# Patient Record
Sex: FEMALE | Race: WHITE | Employment: UNEMPLOYED | URBAN - METROPOLITAN AREA
[De-identification: names, ages, dates, MRNs, and addresses within clinical notes are randomized per-mention and may not be internally consistent; named-entity substitution may affect disease eponyms.]

---

## 2019-10-16 ENCOUNTER — HOSPITAL ENCOUNTER (EMERGENCY)
Age: 34
Discharge: LEFT BEFORE DISCHARGE | DRG: 175 | End: 2019-10-17
Attending: EMERGENCY MEDICINE
Payer: COMMERCIAL

## 2019-10-16 VITALS
HEIGHT: 64 IN | WEIGHT: 140 LBS | HEART RATE: 110 BPM | BODY MASS INDEX: 23.9 KG/M2 | TEMPERATURE: 98.4 F | SYSTOLIC BLOOD PRESSURE: 129 MMHG | DIASTOLIC BLOOD PRESSURE: 73 MMHG | RESPIRATION RATE: 24 BRPM | OXYGEN SATURATION: 100 %

## 2019-10-16 PROCEDURE — 99284 EMERGENCY DEPT VISIT MOD MDM: CPT

## 2019-10-16 PROCEDURE — 81003 URINALYSIS AUTO W/O SCOPE: CPT

## 2019-10-16 PROCEDURE — 87086 URINE CULTURE/COLONY COUNT: CPT

## 2019-10-16 PROCEDURE — 81025 URINE PREGNANCY TEST: CPT

## 2019-10-16 ASSESSMENT — PAIN SCALES - GENERAL: PAINLEVEL_OUTOF10: 5

## 2019-10-17 ENCOUNTER — APPOINTMENT (OUTPATIENT)
Dept: CT IMAGING | Age: 34
DRG: 175 | End: 2019-10-17
Payer: COMMERCIAL

## 2019-10-17 LAB
ABSOLUTE EOS #: 0.39 K/UL (ref 0–0.44)
ABSOLUTE IMMATURE GRANULOCYTE: 0.02 K/UL (ref 0–0.3)
ABSOLUTE LYMPH #: 2.58 K/UL (ref 1.1–3.7)
ABSOLUTE MONO #: 0.54 K/UL (ref 0.1–1.2)
ALBUMIN SERPL-MCNC: 4.5 G/DL (ref 3.5–5.2)
ALBUMIN/GLOBULIN RATIO: ABNORMAL (ref 1–2.5)
ALP BLD-CCNC: 92 U/L (ref 35–104)
ALT SERPL-CCNC: 6 U/L (ref 5–33)
AMYLASE: 47 U/L (ref 28–100)
ANION GAP SERPL CALCULATED.3IONS-SCNC: 15 MMOL/L (ref 9–17)
AST SERPL-CCNC: 13 U/L
BASOPHILS # BLD: 0 % (ref 0–2)
BASOPHILS ABSOLUTE: 0.04 K/UL (ref 0–0.2)
BILIRUB SERPL-MCNC: 0.15 MG/DL (ref 0.3–1.2)
BILIRUBIN DIRECT: <0.08 MG/DL
BILIRUBIN URINE: NEGATIVE
BILIRUBIN, INDIRECT: ABNORMAL MG/DL (ref 0–1)
BUN BLDV-MCNC: 7 MG/DL (ref 6–20)
BUN/CREAT BLD: 11 (ref 9–20)
CALCIUM SERPL-MCNC: 9.2 MG/DL (ref 8.6–10.4)
CHLORIDE BLD-SCNC: 97 MMOL/L (ref 98–107)
CHP ED QC CHECK: YES
CO2: 23 MMOL/L (ref 20–31)
COLOR: YELLOW
COMMENT UA: NORMAL
CREAT SERPL-MCNC: 0.64 MG/DL (ref 0.5–0.9)
DIFFERENTIAL TYPE: ABNORMAL
EOSINOPHILS RELATIVE PERCENT: 4 % (ref 1–4)
GFR AFRICAN AMERICAN: >60 ML/MIN
GFR NON-AFRICAN AMERICAN: >60 ML/MIN
GFR SERPL CREATININE-BSD FRML MDRD: ABNORMAL ML/MIN/{1.73_M2}
GFR SERPL CREATININE-BSD FRML MDRD: ABNORMAL ML/MIN/{1.73_M2}
GLOBULIN: ABNORMAL G/DL (ref 1.5–3.8)
GLUCOSE BLD-MCNC: 91 MG/DL (ref 70–99)
GLUCOSE URINE: NEGATIVE
HCG QUALITATIVE: NEGATIVE
HCG, PREGNANCY URINE (POC): NEGATIVE
HCT VFR BLD CALC: 32.4 % (ref 36.3–47.1)
HEMOGLOBIN: 9.4 G/DL (ref 11.9–15.1)
IMMATURE GRANULOCYTES: 0 %
KETONES, URINE: NEGATIVE
LACTIC ACID: 1.5 MMOL/L (ref 0.5–2.2)
LEUKOCYTE ESTERASE, URINE: NEGATIVE
LIPASE: 13 U/L (ref 13–60)
LYMPHOCYTES # BLD: 24 % (ref 24–43)
MCH RBC QN AUTO: 21.5 PG (ref 25.2–33.5)
MCHC RBC AUTO-ENTMCNC: 29 G/DL (ref 28.4–34.8)
MCV RBC AUTO: 74.1 FL (ref 82.6–102.9)
MONOCYTES # BLD: 5 % (ref 3–12)
NITRITE, URINE: NEGATIVE
NRBC AUTOMATED: ABNORMAL PER 100 WBC
PDW BLD-RTO: 18.3 % (ref 11.8–14.4)
PH UA: 5.5 (ref 5–8)
PLATELET # BLD: 442 K/UL (ref 138–453)
PLATELET ESTIMATE: ABNORMAL
PMV BLD AUTO: 9.8 FL (ref 8.1–13.5)
POTASSIUM SERPL-SCNC: 3.4 MMOL/L (ref 3.7–5.3)
PREGNANCY TEST URINE, POC: NORMAL
PROTEIN UA: NEGATIVE
RBC # BLD: 4.37 M/UL (ref 3.95–5.11)
RBC # BLD: ABNORMAL 10*6/UL
SEG NEUTROPHILS: 67 % (ref 36–65)
SEGMENTED NEUTROPHILS ABSOLUTE COUNT: 7.26 K/UL (ref 1.5–8.1)
SODIUM BLD-SCNC: 135 MMOL/L (ref 135–144)
SPECIFIC GRAVITY UA: 1.01 (ref 1–1.03)
TOTAL PROTEIN: 8.2 G/DL (ref 6.4–8.3)
TURBIDITY: CLEAR
URINE HGB: NEGATIVE
UROBILINOGEN, URINE: NORMAL
WBC # BLD: 10.8 K/UL (ref 3.5–11.3)
WBC # BLD: ABNORMAL 10*3/UL

## 2019-10-17 PROCEDURE — 82150 ASSAY OF AMYLASE: CPT

## 2019-10-17 PROCEDURE — 84703 CHORIONIC GONADOTROPIN ASSAY: CPT

## 2019-10-17 PROCEDURE — 6360000004 HC RX CONTRAST MEDICATION: Performed by: EMERGENCY MEDICINE

## 2019-10-17 PROCEDURE — 83605 ASSAY OF LACTIC ACID: CPT

## 2019-10-17 PROCEDURE — 96375 TX/PRO/DX INJ NEW DRUG ADDON: CPT

## 2019-10-17 PROCEDURE — 80048 BASIC METABOLIC PNL TOTAL CA: CPT

## 2019-10-17 PROCEDURE — 96376 TX/PRO/DX INJ SAME DRUG ADON: CPT

## 2019-10-17 PROCEDURE — 6360000002 HC RX W HCPCS: Performed by: EMERGENCY MEDICINE

## 2019-10-17 PROCEDURE — 83690 ASSAY OF LIPASE: CPT

## 2019-10-17 PROCEDURE — 85025 COMPLETE CBC W/AUTO DIFF WBC: CPT

## 2019-10-17 PROCEDURE — 80076 HEPATIC FUNCTION PANEL: CPT

## 2019-10-17 PROCEDURE — 87040 BLOOD CULTURE FOR BACTERIA: CPT

## 2019-10-17 PROCEDURE — 96365 THER/PROPH/DIAG IV INF INIT: CPT

## 2019-10-17 PROCEDURE — 2580000003 HC RX 258: Performed by: EMERGENCY MEDICINE

## 2019-10-17 PROCEDURE — 74177 CT ABD & PELVIS W/CONTRAST: CPT

## 2019-10-17 RX ORDER — HEPARIN SODIUM 1000 [USP'U]/ML
40 INJECTION, SOLUTION INTRAVENOUS; SUBCUTANEOUS PRN
Status: DISCONTINUED | OUTPATIENT
Start: 2019-10-17 | End: 2019-10-17 | Stop reason: HOSPADM

## 2019-10-17 RX ORDER — OMEPRAZOLE 40 MG/1
40 CAPSULE, DELAYED RELEASE ORAL DAILY
Qty: 30 CAPSULE | Refills: 0 | Status: SHIPPED | OUTPATIENT
Start: 2019-10-17 | End: 2019-10-17

## 2019-10-17 RX ORDER — ONDANSETRON 2 MG/ML
4 INJECTION INTRAMUSCULAR; INTRAVENOUS ONCE
Status: COMPLETED | OUTPATIENT
Start: 2019-10-17 | End: 2019-10-17

## 2019-10-17 RX ORDER — HEPARIN SODIUM 1000 [USP'U]/ML
80 INJECTION, SOLUTION INTRAVENOUS; SUBCUTANEOUS PRN
Status: DISCONTINUED | OUTPATIENT
Start: 2019-10-17 | End: 2019-10-17 | Stop reason: HOSPADM

## 2019-10-17 RX ORDER — HEPARIN SODIUM 10000 [USP'U]/100ML
18 INJECTION, SOLUTION INTRAVENOUS CONTINUOUS
Status: DISCONTINUED | OUTPATIENT
Start: 2019-10-17 | End: 2019-10-17 | Stop reason: HOSPADM

## 2019-10-17 RX ORDER — 0.9 % SODIUM CHLORIDE 0.9 %
80 INTRAVENOUS SOLUTION INTRAVENOUS ONCE
Status: COMPLETED | OUTPATIENT
Start: 2019-10-17 | End: 2019-10-17

## 2019-10-17 RX ORDER — DEXAMETHASONE SODIUM PHOSPHATE 10 MG/ML
10 INJECTION INTRAMUSCULAR; INTRAVENOUS ONCE
Status: COMPLETED | OUTPATIENT
Start: 2019-10-17 | End: 2019-10-17

## 2019-10-17 RX ORDER — 0.9 % SODIUM CHLORIDE 0.9 %
30 INTRAVENOUS SOLUTION INTRAVENOUS ONCE
Status: COMPLETED | OUTPATIENT
Start: 2019-10-17 | End: 2019-10-17

## 2019-10-17 RX ORDER — PREDNISONE 20 MG/1
20 TABLET ORAL 2 TIMES DAILY
Qty: 10 TABLET | Refills: 0 | Status: SHIPPED | OUTPATIENT
Start: 2019-10-17 | End: 2019-10-17

## 2019-10-17 RX ORDER — 0.9 % SODIUM CHLORIDE 0.9 %
10 VIAL (ML) INJECTION ONCE
Status: DISCONTINUED | OUTPATIENT
Start: 2019-10-17 | End: 2019-10-17 | Stop reason: HOSPADM

## 2019-10-17 RX ORDER — DOXYCYCLINE HYCLATE 100 MG
100 TABLET ORAL 2 TIMES DAILY
Qty: 20 TABLET | Refills: 0 | Status: SHIPPED | OUTPATIENT
Start: 2019-10-17 | End: 2019-10-17

## 2019-10-17 RX ORDER — HYDROMORPHONE HYDROCHLORIDE 1 MG/ML
0.5 INJECTION, SOLUTION INTRAMUSCULAR; INTRAVENOUS; SUBCUTANEOUS ONCE
Status: COMPLETED | OUTPATIENT
Start: 2019-10-17 | End: 2019-10-17

## 2019-10-17 RX ORDER — PANTOPRAZOLE SODIUM 40 MG/10ML
40 INJECTION, POWDER, LYOPHILIZED, FOR SOLUTION INTRAVENOUS ONCE
Status: DISCONTINUED | OUTPATIENT
Start: 2019-10-17 | End: 2019-10-17 | Stop reason: HOSPADM

## 2019-10-17 RX ORDER — NICOTINE 21 MG/24HR
1 PATCH, TRANSDERMAL 24 HOURS TRANSDERMAL ONCE
Status: DISCONTINUED | OUTPATIENT
Start: 2019-10-17 | End: 2019-10-17 | Stop reason: HOSPADM

## 2019-10-17 RX ORDER — HYDROMORPHONE HYDROCHLORIDE 1 MG/ML
1 INJECTION, SOLUTION INTRAMUSCULAR; INTRAVENOUS; SUBCUTANEOUS ONCE
Status: COMPLETED | OUTPATIENT
Start: 2019-10-17 | End: 2019-10-17

## 2019-10-17 RX ORDER — HEPARIN SODIUM 1000 [USP'U]/ML
80 INJECTION, SOLUTION INTRAVENOUS; SUBCUTANEOUS ONCE
Status: DISCONTINUED | OUTPATIENT
Start: 2019-10-17 | End: 2019-10-17 | Stop reason: HOSPADM

## 2019-10-17 RX ORDER — SODIUM CHLORIDE 0.9 % (FLUSH) 0.9 %
10 SYRINGE (ML) INJECTION PRN
Status: DISCONTINUED | OUTPATIENT
Start: 2019-10-17 | End: 2019-10-17 | Stop reason: HOSPADM

## 2019-10-17 RX ORDER — LORAZEPAM 2 MG/ML
1 INJECTION INTRAMUSCULAR ONCE
Status: DISCONTINUED | OUTPATIENT
Start: 2019-10-17 | End: 2019-10-17 | Stop reason: HOSPADM

## 2019-10-17 RX ADMIN — HYDROMORPHONE HYDROCHLORIDE 1 MG: 1 INJECTION, SOLUTION INTRAMUSCULAR; INTRAVENOUS; SUBCUTANEOUS at 02:25

## 2019-10-17 RX ADMIN — CEFTRIAXONE SODIUM 1 G: 1 INJECTION, POWDER, FOR SOLUTION INTRAMUSCULAR; INTRAVENOUS at 02:28

## 2019-10-17 RX ADMIN — DEXAMETHASONE SODIUM PHOSPHATE 10 MG: 10 INJECTION INTRAMUSCULAR; INTRAVENOUS at 02:26

## 2019-10-17 RX ADMIN — IOPAMIDOL 75 ML: 755 INJECTION, SOLUTION INTRAVENOUS at 00:58

## 2019-10-17 RX ADMIN — Medication 10 ML: at 00:58

## 2019-10-17 RX ADMIN — HYDROMORPHONE HYDROCHLORIDE 0.5 MG: 1 INJECTION, SOLUTION INTRAMUSCULAR; INTRAVENOUS; SUBCUTANEOUS at 00:46

## 2019-10-17 RX ADMIN — SODIUM CHLORIDE 1905 ML: 9 INJECTION, SOLUTION INTRAVENOUS at 01:07

## 2019-10-17 RX ADMIN — SODIUM CHLORIDE 80 ML: 9 INJECTION, SOLUTION INTRAVENOUS at 00:58

## 2019-10-17 RX ADMIN — ONDANSETRON 4 MG: 2 INJECTION INTRAMUSCULAR; INTRAVENOUS at 00:45

## 2019-10-17 ASSESSMENT — PAIN SCALES - GENERAL
PAINLEVEL_OUTOF10: 7

## 2019-10-17 ASSESSMENT — PAIN DESCRIPTION - ORIENTATION: ORIENTATION: RIGHT

## 2019-10-17 ASSESSMENT — PAIN DESCRIPTION - LOCATION: LOCATION: FLANK

## 2019-10-17 ASSESSMENT — PAIN DESCRIPTION - PAIN TYPE: TYPE: ACUTE PAIN

## 2019-10-17 NOTE — ED NOTES
Patients family member out to the nurses station stating that patient doesn't want to stay in the hospital, Dr. Grayson Mcpherson updated in room to speak with patient.       Xavier Sung RN  10/17/19 6043

## 2019-10-17 NOTE — ED PROVIDER NOTES
negative. PHYSICAL EXAM    (up to 7 for level 4, 8 or more for level 5)     Vitals:    10/16/19 2133 10/16/19 2134   BP: 129/73    Pulse: 110    Resp: 24    Temp: 98.4 °F (36.9 °C)    SpO2: 100%    Weight:  140 lb (63.5 kg)   Height:  5' 4\" (1.626 m)       Physical exam reflects a very uncomfortable female she is tachypneic. She does appear in significant distress. She is pale and mildly diaphoretic. She is afebrile. Pulse ox is 100% on room air. She is not hypoxic. She is alert conversive. She is appropriate in behavior. Oral pharyngeal exam without lesion. Lungs are clear although diminished. She has a splinting form of respiration. Deep breathing causes her increased discomfort. Abdomen is soft anteriorly. She does have right flank pain. Extremities show no gross abnormality. Integument without rash or lesion. No neurovascular deficits are noted. DIAGNOSTIC RESULTS         RADIOLOGY:   Non-plain film images such as CT, Ultrasound and MRI are read by the radiologist. Plain radiographic images are visualized and preliminarily interpreted by the emergency physician with the below findings:        Interpretation per the Radiologist below, if available at the time of this note:    Randal   Final Result   Addendum 1 of 1   ADDENDUM:   Findings were discussed and reviewed with Dr. Vero Cummins. There is    a   small area of pleural and parenchymal disease in the posteromedial right   costophrenic angle (axial image 26-33). This may represent a small/early   pneumonia. As the patient symptoms are in this region and because of the   peripheral location of the lesion, a small pulmonary infarct should also    be   considered. If clinically indicated, follow-up CTPA may be helpful.          Final            LABS:  Labs Reviewed   BASIC METABOLIC PANEL - Abnormal; Notable for the following components:       Result Value    Potassium 3.4 (*)     Chloride 97 (*)     All other components within normal limits   CBC WITH AUTO DIFFERENTIAL - Abnormal; Notable for the following components:    Hemoglobin 9.4 (*)     Hematocrit 32.4 (*)     MCV 74.1 (*)     MCH 21.5 (*)     RDW 18.3 (*)     Seg Neutrophils 67 (*)     All other components within normal limits   HEPATIC FUNCTION PANEL - Abnormal; Notable for the following components: Total Bilirubin 0.15 (*)     All other components within normal limits   POCT URINE PREGNANCY - Normal   CULTURE BLOOD #1   CULTURE BLOOD #1   URINE CULTURE   AMYLASE   LIPASE   URINALYSIS   HCG, SERUM, QUALITATIVE   LACTIC ACID   APTT   APTT   APTT       All other labs were within normal range or not returned as of this dictation. EMERGENCY DEPARTMENT COURSE and DIFFERENTIAL DIAGNOSIS/MDM:   Vitals:    Vitals:    10/16/19 2133 10/16/19 2134   BP: 129/73    Pulse: 110    Resp: 24    Temp: 98.4 °F (36.9 °C)    SpO2: 100%    Weight:  140 lb (63.5 kg)   Height:  5' 4\" (1.626 m)     Patient is evaluated. She presents with severe right flank pain given her recent history of severe UTI evaluation is entertained for potential pyelonephritis. CT is reviewed. Initially read as negative by radiology patient did have an abnormal finding the right posterior costophrenic angles. I did discuss this with radiology as they had not initially noted this on the report. They are uncertain if patient has an inflammatory pleuritic component or potential early infiltrate. This abnormality is at the location of her pain. She is placed on a course of steroids to help with inflammation. She is returned to antibiotic therapy secondary to concern for pneumonia. Patient is on methadone at home. No additional pain meds given. She will be advised follow-up with her family physician for reevaluation. Patient has known history of severe hiatal hernia. This was reviewed with her as well.   She will also follow-up with her family physician to discuss treatment modalities given the severity of her presentation. Patient has no evidence of UTI or pyelonephritis today. Following initial disposition of patient I was again contacted by the radiologist.  This is some 40 minutes after our initial discussion concerning the initial missed CT finding. At this time the radiologist informed me he felt this patient may indeed have a pulmonary infarct as opposed to infiltrate with possible pleurisy. I immediately discussed this development with patient. I did discuss the need for additional imaging, admission as well as the potential life-threatening nature of pulmonary infarct. We did discuss the need for formal chest imaging with contrast.  Admission of this patient was arranged. Additional pain meds and heparin ordered. As I was discussing admission with the internal medicine physician patient then decided she wanted to sign out [address] I stepped back into the room to ask patient why as she was aware of all of the developments that had occurred during the course of her case, specifically with regard to missed findings and delayed read by radiology. Patient's significant other than stated he felt the only issue was that we would not allow this patient to go outside with her IV line in place and smoke. I am uncertain where this interpretation arose as 5 minutes ago they had both expressed total understanding of patient's diagnosis, the need for additional care and the serious nature of her diagnosis. In any case patient is alert oriented cogent of conversation and capable of making informed decisions. She is aware that she may have a life-threatening condition and may progress to increasing pain, respiratory failure she may have further pulmonary emboli/infarcts. Certainly with her history of IV drug abuse she does have risk factors.   She has nonetheless decided to sign out [address] she has declined further care    CONSULTS:  [address] [facility]

## 2019-10-18 ENCOUNTER — APPOINTMENT (OUTPATIENT)
Dept: CT IMAGING | Age: 34
DRG: 175 | End: 2019-10-18
Payer: COMMERCIAL

## 2019-10-18 ENCOUNTER — HOSPITAL ENCOUNTER (INPATIENT)
Age: 34
LOS: 6 days | Discharge: HOME | DRG: 175 | End: 2019-10-24
Attending: EMERGENCY MEDICINE | Admitting: INTERNAL MEDICINE
Payer: COMMERCIAL

## 2019-10-18 PROBLEM — I26.99 ACUTE PULMONARY EMBOLISM (HCC): Status: ACTIVE | Noted: 2019-10-18

## 2019-10-18 PROBLEM — F12.90 MARIJUANA SMOKER: Status: ACTIVE | Noted: 2019-10-18

## 2019-10-18 PROBLEM — F17.200 SMOKER: Status: ACTIVE | Noted: 2019-10-18

## 2019-10-18 PROBLEM — D64.9 ANEMIA: Status: ACTIVE | Noted: 2019-10-18

## 2019-10-18 LAB
ABSOLUTE EOS #: 0.29 K/UL (ref 0–0.44)
ABSOLUTE IMMATURE GRANULOCYTE: 0.02 K/UL (ref 0–0.3)
ABSOLUTE LYMPH #: 3.24 K/UL (ref 1.1–3.7)
ABSOLUTE MONO #: 0.44 K/UL (ref 0.1–1.2)
ABSOLUTE RETIC #: 0.03 M/UL (ref 0.03–0.08)
ANION GAP SERPL CALCULATED.3IONS-SCNC: 11 MMOL/L (ref 9–17)
APPEARANCE: NORMAL
BASOPHILS # BLD: 0 % (ref 0–2)
BASOPHILS ABSOLUTE: 0.03 K/UL (ref 0–0.2)
BILIRUBIN, POC: NORMAL
BLOOD URINE, POC: NORMAL
BUN BLDV-MCNC: 8 MG/DL (ref 6–20)
BUN/CREAT BLD: 14 (ref 9–20)
CALCIUM SERPL-MCNC: 9 MG/DL (ref 8.6–10.4)
CHLORIDE BLD-SCNC: 101 MMOL/L (ref 98–107)
CHP ED QC CHECK: NORMAL
CO2: 25 MMOL/L (ref 20–31)
COLOR, POC: YELLOW
CREAT SERPL-MCNC: 0.57 MG/DL (ref 0.5–0.9)
CULTURE: NO GROWTH
DIFFERENTIAL TYPE: ABNORMAL
EOSINOPHILS RELATIVE PERCENT: 4 % (ref 1–4)
FERRITIN: 15 UG/L (ref 13–150)
FOLATE: 8.4 NG/ML
GFR AFRICAN AMERICAN: >60 ML/MIN
GFR NON-AFRICAN AMERICAN: >60 ML/MIN
GFR SERPL CREATININE-BSD FRML MDRD: NORMAL ML/MIN/{1.73_M2}
GFR SERPL CREATININE-BSD FRML MDRD: NORMAL ML/MIN/{1.73_M2}
GLUCOSE BLD-MCNC: 84 MG/DL (ref 70–99)
GLUCOSE URINE, POC: NORMAL
HCT VFR BLD CALC: 27.1 % (ref 36.3–47.1)
HCT VFR BLD CALC: 28.6 % (ref 36.3–47.1)
HEMOGLOBIN: 7.8 G/DL (ref 11.9–15.1)
HEMOGLOBIN: 8.2 G/DL (ref 11.9–15.1)
HOMOCYSTEINE: 10.4 UMOL/L
IMMATURE GRANULOCYTES: 0 %
IMMATURE RETIC FRACT: 18.6 % (ref 2.7–18.3)
IRON SATURATION: 6 % (ref 20–55)
IRON: 18 UG/DL (ref 37–145)
KETONES, POC: NORMAL
LEUKOCYTE EST, POC: NORMAL
LYMPHOCYTES # BLD: 47 % (ref 24–43)
Lab: NORMAL
MCH RBC QN AUTO: 20.9 PG (ref 25.2–33.5)
MCH RBC QN AUTO: 21 PG (ref 25.2–33.5)
MCHC RBC AUTO-ENTMCNC: 28.7 G/DL (ref 28.4–34.8)
MCHC RBC AUTO-ENTMCNC: 28.8 G/DL (ref 28.4–34.8)
MCV RBC AUTO: 73 FL (ref 82.6–102.9)
MCV RBC AUTO: 73 FL (ref 82.6–102.9)
MONOCYTES # BLD: 6 % (ref 3–12)
NITRITE, POC: NORMAL
NRBC AUTOMATED: 0 PER 100 WBC
NRBC AUTOMATED: 0 PER 100 WBC
PARTIAL THROMBOPLASTIN TIME: 28.7 SEC (ref 23–31)
PDW BLD-RTO: 18.6 % (ref 11.8–14.4)
PDW BLD-RTO: 18.6 % (ref 11.8–14.4)
PH, POC: 6
PLATELET # BLD: 350 K/UL (ref 138–453)
PLATELET # BLD: 378 K/UL (ref 138–453)
PLATELET ESTIMATE: ABNORMAL
PMV BLD AUTO: 9.4 FL (ref 8.1–13.5)
PMV BLD AUTO: 9.8 FL (ref 8.1–13.5)
POTASSIUM SERPL-SCNC: 3.7 MMOL/L (ref 3.7–5.3)
PREGNANCY TEST URINE, POC: NORMAL
PROTEIN, POC: NORMAL
RBC # BLD: 3.71 M/UL (ref 3.95–5.11)
RBC # BLD: 3.92 M/UL (ref 3.95–5.11)
RBC # BLD: ABNORMAL 10*6/UL
RETIC %: 0.8 % (ref 0.5–1.9)
RETIC HEMOGLOBIN: 22.8 PG (ref 28.2–35.7)
SEG NEUTROPHILS: 42 % (ref 36–65)
SEGMENTED NEUTROPHILS ABSOLUTE COUNT: 2.86 K/UL (ref 1.5–8.1)
SODIUM BLD-SCNC: 137 MMOL/L (ref 135–144)
SPECIFIC GRAVITY, POC: 1.03
SPECIMEN DESCRIPTION: NORMAL
TOTAL IRON BINDING CAPACITY: 322 UG/DL (ref 250–450)
UNSATURATED IRON BINDING CAPACITY: 304 UG/DL (ref 112–347)
UROBILINOGEN, POC: 0.2
VITAMIN B-12: 357 PG/ML (ref 232–1245)
WBC # BLD: 6.9 K/UL (ref 3.5–11.3)
WBC # BLD: 8 K/UL (ref 3.5–11.3)
WBC # BLD: ABNORMAL 10*3/UL

## 2019-10-18 PROCEDURE — G0378 HOSPITAL OBSERVATION PER HR: HCPCS

## 2019-10-18 PROCEDURE — 85610 PROTHROMBIN TIME: CPT

## 2019-10-18 PROCEDURE — 82728 ASSAY OF FERRITIN: CPT

## 2019-10-18 PROCEDURE — 99285 EMERGENCY DEPT VISIT HI MDM: CPT

## 2019-10-18 PROCEDURE — 85306 CLOT INHIBIT PROT S FREE: CPT

## 2019-10-18 PROCEDURE — 6360000002 HC RX W HCPCS: Performed by: NURSE PRACTITIONER

## 2019-10-18 PROCEDURE — 85303 CLOT INHIBIT PROT C ACTIVITY: CPT

## 2019-10-18 PROCEDURE — 96376 TX/PRO/DX INJ SAME DRUG ADON: CPT

## 2019-10-18 PROCEDURE — 85730 THROMBOPLASTIN TIME PARTIAL: CPT

## 2019-10-18 PROCEDURE — 96375 TX/PRO/DX INJ NEW DRUG ADDON: CPT

## 2019-10-18 PROCEDURE — 83550 IRON BINDING TEST: CPT

## 2019-10-18 PROCEDURE — 81003 URINALYSIS AUTO W/O SCOPE: CPT

## 2019-10-18 PROCEDURE — 80048 BASIC METABOLIC PNL TOTAL CA: CPT

## 2019-10-18 PROCEDURE — 6360000002 HC RX W HCPCS: Performed by: EMERGENCY MEDICINE

## 2019-10-18 PROCEDURE — 81291 MTHFR GENE: CPT

## 2019-10-18 PROCEDURE — 85045 AUTOMATED RETICULOCYTE COUNT: CPT

## 2019-10-18 PROCEDURE — 96365 THER/PROPH/DIAG IV INF INIT: CPT

## 2019-10-18 PROCEDURE — 36415 COLL VENOUS BLD VENIPUNCTURE: CPT

## 2019-10-18 PROCEDURE — 6360000004 HC RX CONTRAST MEDICATION: Performed by: EMERGENCY MEDICINE

## 2019-10-18 PROCEDURE — 82607 VITAMIN B-12: CPT

## 2019-10-18 PROCEDURE — 2060000000 HC ICU INTERMEDIATE R&B

## 2019-10-18 PROCEDURE — 85027 COMPLETE CBC AUTOMATED: CPT

## 2019-10-18 PROCEDURE — 2580000003 HC RX 258: Performed by: EMERGENCY MEDICINE

## 2019-10-18 PROCEDURE — 82746 ASSAY OF FOLIC ACID SERUM: CPT

## 2019-10-18 PROCEDURE — 2500000003 HC RX 250 WO HCPCS: Performed by: NURSE PRACTITIONER

## 2019-10-18 PROCEDURE — 81240 F2 GENE: CPT

## 2019-10-18 PROCEDURE — 96366 THER/PROPH/DIAG IV INF ADDON: CPT

## 2019-10-18 PROCEDURE — 85613 RUSSELL VIPER VENOM DILUTED: CPT

## 2019-10-18 PROCEDURE — 83090 ASSAY OF HOMOCYSTEINE: CPT

## 2019-10-18 PROCEDURE — 81241 F5 GENE: CPT

## 2019-10-18 PROCEDURE — 81025 URINE PREGNANCY TEST: CPT

## 2019-10-18 PROCEDURE — 71260 CT THORAX DX C+: CPT

## 2019-10-18 PROCEDURE — 83540 ASSAY OF IRON: CPT

## 2019-10-18 PROCEDURE — 85300 ANTITHROMBIN III ACTIVITY: CPT

## 2019-10-18 PROCEDURE — 86147 CARDIOLIPIN ANTIBODY EA IG: CPT

## 2019-10-18 PROCEDURE — 99221 1ST HOSP IP/OBS SF/LOW 40: CPT | Performed by: NURSE PRACTITIONER

## 2019-10-18 PROCEDURE — 85025 COMPLETE CBC W/AUTO DIFF WBC: CPT

## 2019-10-18 PROCEDURE — 86038 ANTINUCLEAR ANTIBODIES: CPT

## 2019-10-18 RX ORDER — LANOLIN ALCOHOL/MO/W.PET/CERES
325 CREAM (GRAM) TOPICAL
Status: DISCONTINUED | OUTPATIENT
Start: 2019-10-19 | End: 2019-10-19

## 2019-10-18 RX ORDER — HEPARIN SODIUM 10000 [USP'U]/100ML
18 INJECTION, SOLUTION INTRAVENOUS CONTINUOUS
Status: DISCONTINUED | OUTPATIENT
Start: 2019-10-18 | End: 2019-10-18 | Stop reason: SDUPTHER

## 2019-10-18 RX ORDER — SODIUM CHLORIDE 0.9 % (FLUSH) 0.9 %
10 SYRINGE (ML) INJECTION PRN
Status: DISCONTINUED | OUTPATIENT
Start: 2019-10-18 | End: 2019-10-24 | Stop reason: HOSPADM

## 2019-10-18 RX ORDER — HEPARIN SODIUM 1000 [USP'U]/ML
80 INJECTION, SOLUTION INTRAVENOUS; SUBCUTANEOUS ONCE
Status: DISCONTINUED | OUTPATIENT
Start: 2019-10-18 | End: 2019-10-18 | Stop reason: SDUPTHER

## 2019-10-18 RX ORDER — HEPARIN SODIUM 1000 [USP'U]/ML
80 INJECTION, SOLUTION INTRAVENOUS; SUBCUTANEOUS ONCE
Status: COMPLETED | OUTPATIENT
Start: 2019-10-18 | End: 2019-10-18

## 2019-10-18 RX ORDER — SODIUM CHLORIDE 0.9 % (FLUSH) 0.9 %
10 SYRINGE (ML) INJECTION
Status: COMPLETED | OUTPATIENT
Start: 2019-10-18 | End: 2019-10-18

## 2019-10-18 RX ORDER — KETOROLAC TROMETHAMINE 30 MG/ML
30 INJECTION, SOLUTION INTRAMUSCULAR; INTRAVENOUS ONCE
Status: COMPLETED | OUTPATIENT
Start: 2019-10-18 | End: 2019-10-18

## 2019-10-18 RX ORDER — LORAZEPAM 2 MG/ML
0.5 INJECTION INTRAMUSCULAR EVERY 6 HOURS PRN
Status: DISCONTINUED | OUTPATIENT
Start: 2019-10-18 | End: 2019-10-24 | Stop reason: HOSPADM

## 2019-10-18 RX ORDER — DOCUSATE SODIUM 100 MG/1
100 CAPSULE, LIQUID FILLED ORAL DAILY
Status: DISCONTINUED | OUTPATIENT
Start: 2019-10-19 | End: 2019-10-24 | Stop reason: HOSPADM

## 2019-10-18 RX ORDER — HEPARIN SODIUM 1000 [USP'U]/ML
80 INJECTION, SOLUTION INTRAVENOUS; SUBCUTANEOUS PRN
Status: DISCONTINUED | OUTPATIENT
Start: 2019-10-18 | End: 2019-10-23

## 2019-10-18 RX ORDER — SODIUM CHLORIDE 0.9 % (FLUSH) 0.9 %
10 SYRINGE (ML) INJECTION EVERY 12 HOURS SCHEDULED
Status: DISCONTINUED | OUTPATIENT
Start: 2019-10-18 | End: 2019-10-24 | Stop reason: HOSPADM

## 2019-10-18 RX ORDER — 0.9 % SODIUM CHLORIDE 0.9 %
80 INTRAVENOUS SOLUTION INTRAVENOUS ONCE
Status: COMPLETED | OUTPATIENT
Start: 2019-10-18 | End: 2019-10-18

## 2019-10-18 RX ORDER — HEPARIN SODIUM 1000 [USP'U]/ML
40 INJECTION, SOLUTION INTRAVENOUS; SUBCUTANEOUS PRN
Status: DISCONTINUED | OUTPATIENT
Start: 2019-10-18 | End: 2019-10-23

## 2019-10-18 RX ORDER — ONDANSETRON 2 MG/ML
4 INJECTION INTRAMUSCULAR; INTRAVENOUS EVERY 6 HOURS PRN
Status: DISCONTINUED | OUTPATIENT
Start: 2019-10-18 | End: 2019-10-24 | Stop reason: HOSPADM

## 2019-10-18 RX ORDER — HEPARIN SODIUM 10000 [USP'U]/100ML
18 INJECTION, SOLUTION INTRAVENOUS CONTINUOUS
Status: DISCONTINUED | OUTPATIENT
Start: 2019-10-18 | End: 2019-10-23

## 2019-10-18 RX ORDER — HEPARIN SODIUM 1000 [USP'U]/ML
80 INJECTION, SOLUTION INTRAVENOUS; SUBCUTANEOUS PRN
Status: DISCONTINUED | OUTPATIENT
Start: 2019-10-18 | End: 2019-10-18 | Stop reason: SDUPTHER

## 2019-10-18 RX ADMIN — SODIUM CHLORIDE 80 ML: 9 INJECTION, SOLUTION INTRAVENOUS at 13:44

## 2019-10-18 RX ADMIN — KETOROLAC TROMETHAMINE 30 MG: 30 INJECTION, SOLUTION INTRAMUSCULAR at 13:35

## 2019-10-18 RX ADMIN — HEPARIN SODIUM 5260 UNITS: 1000 INJECTION INTRAVENOUS; SUBCUTANEOUS at 15:11

## 2019-10-18 RX ADMIN — HEPARIN SODIUM 5260 UNITS: 1000 INJECTION INTRAVENOUS; SUBCUTANEOUS at 22:59

## 2019-10-18 RX ADMIN — HEPARIN SODIUM AND DEXTROSE 18 UNITS/KG/HR: 10000; 5 INJECTION INTRAVENOUS at 15:14

## 2019-10-18 RX ADMIN — IOPAMIDOL 75 ML: 755 INJECTION, SOLUTION INTRAVENOUS at 13:44

## 2019-10-18 RX ADMIN — FAMOTIDINE 20 MG: 10 INJECTION, SOLUTION INTRAVENOUS at 22:59

## 2019-10-18 RX ADMIN — Medication 10 ML: at 13:45

## 2019-10-18 ASSESSMENT — ENCOUNTER SYMPTOMS
DIARRHEA: 0
FACIAL SWELLING: 0
EYE DISCHARGE: 0
CONSTIPATION: 0
VOMITING: 0
COUGH: 0
COLOR CHANGE: 0
ABDOMINAL PAIN: 0
EYE REDNESS: 0
BACK PAIN: 1
SHORTNESS OF BREATH: 0

## 2019-10-18 ASSESSMENT — PAIN DESCRIPTION - LOCATION: LOCATION: BACK

## 2019-10-18 ASSESSMENT — PAIN DESCRIPTION - DESCRIPTORS: DESCRIPTORS: SHARP

## 2019-10-18 ASSESSMENT — PAIN DESCRIPTION - ONSET: ONSET: ON-GOING

## 2019-10-18 ASSESSMENT — PAIN DESCRIPTION - ORIENTATION: ORIENTATION: RIGHT

## 2019-10-18 ASSESSMENT — PAIN SCALES - GENERAL
PAINLEVEL_OUTOF10: 7
PAINLEVEL_OUTOF10: 5
PAINLEVEL_OUTOF10: 7

## 2019-10-18 ASSESSMENT — PAIN DESCRIPTION - PROGRESSION: CLINICAL_PROGRESSION: GRADUALLY WORSENING

## 2019-10-18 ASSESSMENT — PAIN DESCRIPTION - FREQUENCY: FREQUENCY: INTERMITTENT

## 2019-10-18 NOTE — ED NOTES
Patient appears to be resting quietly waiting for ct report and re-evaluation by doctor Telly Hamilton.       Sunshine Moraes RN  10/18/19 9968

## 2019-10-18 NOTE — ED NOTES
pateint requesting additional pain medication  Doctor Dorcas Ramirez notified and states that to early to repeat tordol. Diet given.       Michelle Olson RN  10/18/19 1809

## 2019-10-18 NOTE — ED NOTES
Admission bed assignment obtained and phone repot given. Transfer made via stretcher.       Guy Snider RN  10/18/19 1917

## 2019-10-18 NOTE — ED PROVIDER NOTES
EKG: All EKG's are interpreted by the Emergency Department Physician who either signs or Co-signs this chart in the absence of a cardiologist.    RADIOLOGY:   Non-plain film images such as CT, Ultrasound and MRI are read by the radiologist. [place] radiographic images are visualized and preliminarily interpreted by the emergency physician with the below findings:    Interpretation per the Radiologist below, if available at the time of this note:    Ct Chest Pulmonary Embolism W Contrast    Result Date: 10/18/2019  EXAMINATION: CTA OF THE CHEST 10/18/2019 1:36 pm TECHNIQUE: CTA of the chest was performed after the administration of intravenous contrast.  Multiplanar reformatted images are provided for review. MIP images are provided for review. Dose modulation, iterative reconstruction, and/or weight based adjustment of the mA/kV was utilized to reduce the radiation dose to as low as reasonably achievable. COMPARISON: CT abdomen pelvis with contrast 10/17/2019 HISTORY: ORDERING SYSTEM PROVIDED HISTORY: Rule out PE or pulmonary infarct FINDINGS: Pulmonary Arteries: Single segmental and additional subsegmental pulmonary arterial filling defects in the posterior basal segment right lower lobe. Mediastinum: No evidence of mediastinal lymphadenopathy. The heart and pericardium demonstrate no acute abnormality. No evidence of right heart strain. There is no acute abnormality of the thoracic aorta. Lungs/pleura: Rounded consolidation in the posterior basal segment of the right lower lobe compatible with an infarction. Lungs are otherwise clear. No effusion. No pneumothorax. Upper Abdomen: Moderate size hiatal hernia. The upper abdomen is otherwise unremarkable. Soft Tissues/Bones: No acute bone or soft tissue abnormality. Acute segmental and subsegmental pulmonary emboli in the posterior basal segment right lower lobe with an associated infarction. Moderate size hiatal hernia.  Critical results were called by reevaluation, vital sign assessment, ordering and reviewing of of lab tests ordering and reviewing of x-ray studies, and admission orders. Aggregate critical care time is 45 minutes including only time during which I was engaged in work directly related to her care and did not include time spent treating other patients simultaneously. CONSULTS:  IP CONSULT TO HOSPITALIST    PROCEDURES:  None    FINAL IMPRESSION      1. Other acute pulmonary embolism without acute cor pulmonale (HCC)          DISPOSITION/PLAN   DISPOSITION        PATIENT REFERRED TO:   No follow-up provider specified.     DISCHARGE MEDICATIONS:     New Prescriptions    No medications on file         (Please note that portions of this note were completed with a voice recognition program.  Efforts were made to edit the dictations but occasionally words are mis-transcribed.)    Na Loredo MD  Attending Emergency Physician           Na Loredo MD  10/18/19 [address]       Na Loredo MD  10/18/19 3542

## 2019-10-18 NOTE — ED NOTES
Neris funez advised that echo cardio gram and doppler studies could be done on 10-19 unless a change in patients condition should occur.       Kar Rivera RN  10/18/19 4041

## 2019-10-19 PROBLEM — F19.11 HISTORY OF INTRAVENOUS DRUG ABUSE (HCC): Status: ACTIVE | Noted: 2019-10-19

## 2019-10-19 PROBLEM — Z87.898 HISTORY OF INTRAVENOUS DRUG ABUSE: Status: ACTIVE | Noted: 2019-10-19

## 2019-10-19 PROBLEM — D50.9 IRON DEFICIENCY ANEMIA: Status: ACTIVE | Noted: 2019-10-19

## 2019-10-19 LAB
ABSOLUTE EOS #: 0.27 K/UL (ref 0–0.44)
ABSOLUTE IMMATURE GRANULOCYTE: 0.02 K/UL (ref 0–0.3)
ABSOLUTE LYMPH #: 3.8 K/UL (ref 1.1–3.7)
ABSOLUTE MONO #: 0.27 K/UL (ref 0.1–1.2)
ALBUMIN SERPL-MCNC: 3.5 G/DL (ref 3.5–5.2)
ALBUMIN/GLOBULIN RATIO: ABNORMAL (ref 1–2.5)
ALP BLD-CCNC: 71 U/L (ref 35–104)
ALT SERPL-CCNC: 5 U/L (ref 5–33)
ANION GAP SERPL CALCULATED.3IONS-SCNC: 12 MMOL/L (ref 9–17)
AST SERPL-CCNC: 11 U/L
BASOPHILS # BLD: 1 % (ref 0–2)
BASOPHILS ABSOLUTE: 0.06 K/UL (ref 0–0.2)
BILIRUB SERPL-MCNC: <0.1 MG/DL (ref 0.3–1.2)
BILIRUBIN DIRECT: <0.08 MG/DL
BILIRUBIN, INDIRECT: ABNORMAL MG/DL (ref 0–1)
BUN BLDV-MCNC: 8 MG/DL (ref 6–20)
BUN/CREAT BLD: 14 (ref 9–20)
CALCIUM SERPL-MCNC: 8.8 MG/DL (ref 8.6–10.4)
CHLORIDE BLD-SCNC: 105 MMOL/L (ref 98–107)
CO2: 23 MMOL/L (ref 20–31)
CREAT SERPL-MCNC: 0.58 MG/DL (ref 0.5–0.9)
DIFFERENTIAL TYPE: ABNORMAL
EOSINOPHILS RELATIVE PERCENT: 4 % (ref 1–4)
GFR AFRICAN AMERICAN: >60 ML/MIN
GFR NON-AFRICAN AMERICAN: >60 ML/MIN
GFR SERPL CREATININE-BSD FRML MDRD: NORMAL ML/MIN/{1.73_M2}
GFR SERPL CREATININE-BSD FRML MDRD: NORMAL ML/MIN/{1.73_M2}
GLOBULIN: ABNORMAL G/DL (ref 1.5–3.8)
GLUCOSE BLD-MCNC: 80 MG/DL (ref 70–99)
HCT VFR BLD CALC: 30.4 % (ref 36.3–47.1)
HEMOGLOBIN: 8.7 G/DL (ref 11.9–15.1)
IMMATURE GRANULOCYTES: 0 %
LV EF: 53 %
LVEF MODALITY: NORMAL
LYMPHOCYTES # BLD: 57 % (ref 24–43)
MCH RBC QN AUTO: 21.4 PG (ref 25.2–33.5)
MCHC RBC AUTO-ENTMCNC: 28.6 G/DL (ref 28.4–34.8)
MCV RBC AUTO: 74.7 FL (ref 82.6–102.9)
MONOCYTES # BLD: 4 % (ref 3–12)
NRBC AUTOMATED: 0 PER 100 WBC
PARTIAL THROMBOPLASTIN TIME: 110.1 SEC (ref 23–31)
PARTIAL THROMBOPLASTIN TIME: 49.1 SEC (ref 23–31)
PARTIAL THROMBOPLASTIN TIME: 51.1 SEC (ref 23–31)
PDW BLD-RTO: 18.9 % (ref 11.8–14.4)
PLATELET # BLD: 290 K/UL (ref 138–453)
PLATELET ESTIMATE: ABNORMAL
PMV BLD AUTO: 9.5 FL (ref 8.1–13.5)
POTASSIUM SERPL-SCNC: 4 MMOL/L (ref 3.7–5.3)
RBC # BLD: 4.07 M/UL (ref 3.95–5.11)
RBC # BLD: ABNORMAL 10*6/UL
SEG NEUTROPHILS: 34 % (ref 36–65)
SEGMENTED NEUTROPHILS ABSOLUTE COUNT: 2.31 K/UL (ref 1.5–8.1)
SODIUM BLD-SCNC: 140 MMOL/L (ref 135–144)
TOTAL PROTEIN: 6.5 G/DL (ref 6.4–8.3)
WBC # BLD: 6.7 K/UL (ref 3.5–11.3)
WBC # BLD: ABNORMAL 10*3/UL

## 2019-10-19 PROCEDURE — 6360000002 HC RX W HCPCS: Performed by: NURSE PRACTITIONER

## 2019-10-19 PROCEDURE — 6370000000 HC RX 637 (ALT 250 FOR IP): Performed by: NURSE PRACTITIONER

## 2019-10-19 PROCEDURE — 96368 THER/DIAG CONCURRENT INF: CPT

## 2019-10-19 PROCEDURE — 2500000003 HC RX 250 WO HCPCS: Performed by: NURSE PRACTITIONER

## 2019-10-19 PROCEDURE — 6360000002 HC RX W HCPCS: Performed by: EMERGENCY MEDICINE

## 2019-10-19 PROCEDURE — 6360000002 HC RX W HCPCS: Performed by: INTERNAL MEDICINE

## 2019-10-19 PROCEDURE — 85025 COMPLETE CBC W/AUTO DIFF WBC: CPT

## 2019-10-19 PROCEDURE — 96376 TX/PRO/DX INJ SAME DRUG ADON: CPT

## 2019-10-19 PROCEDURE — 2580000003 HC RX 258: Performed by: INTERNAL MEDICINE

## 2019-10-19 PROCEDURE — 99232 SBSQ HOSP IP/OBS MODERATE 35: CPT | Performed by: INTERNAL MEDICINE

## 2019-10-19 PROCEDURE — 93306 TTE W/DOPPLER COMPLETE: CPT

## 2019-10-19 PROCEDURE — 36415 COLL VENOUS BLD VENIPUNCTURE: CPT

## 2019-10-19 PROCEDURE — 80076 HEPATIC FUNCTION PANEL: CPT

## 2019-10-19 PROCEDURE — 2060000000 HC ICU INTERMEDIATE R&B

## 2019-10-19 PROCEDURE — G0378 HOSPITAL OBSERVATION PER HR: HCPCS

## 2019-10-19 PROCEDURE — 80048 BASIC METABOLIC PNL TOTAL CA: CPT

## 2019-10-19 PROCEDURE — 93970 EXTREMITY STUDY: CPT

## 2019-10-19 PROCEDURE — 85730 THROMBOPLASTIN TIME PARTIAL: CPT

## 2019-10-19 PROCEDURE — 96366 THER/PROPH/DIAG IV INF ADDON: CPT

## 2019-10-19 RX ORDER — KETOROLAC TROMETHAMINE 30 MG/ML
30 INJECTION, SOLUTION INTRAMUSCULAR; INTRAVENOUS EVERY 6 HOURS PRN
Status: DISCONTINUED | OUTPATIENT
Start: 2019-10-19 | End: 2019-10-23

## 2019-10-19 RX ORDER — ACETAMINOPHEN 325 MG/1
650 TABLET ORAL EVERY 4 HOURS PRN
Status: DISCONTINUED | OUTPATIENT
Start: 2019-10-19 | End: 2019-10-24 | Stop reason: HOSPADM

## 2019-10-19 RX ORDER — METHADONE HYDROCHLORIDE 10 MG/1
110 TABLET ORAL DAILY
Status: DISCONTINUED | OUTPATIENT
Start: 2019-10-19 | End: 2019-10-24 | Stop reason: SDUPTHER

## 2019-10-19 RX ADMIN — KETOROLAC TROMETHAMINE 30 MG: 30 INJECTION, SOLUTION INTRAMUSCULAR at 21:55

## 2019-10-19 RX ADMIN — FAMOTIDINE 20 MG: 10 INJECTION, SOLUTION INTRAVENOUS at 09:25

## 2019-10-19 RX ADMIN — KETOROLAC TROMETHAMINE 30 MG: 30 INJECTION, SOLUTION INTRAMUSCULAR at 06:18

## 2019-10-19 RX ADMIN — FERROUS SULFATE TAB EC 325 MG (65 MG FE EQUIVALENT) 325 MG: 325 (65 FE) TABLET DELAYED RESPONSE at 09:25

## 2019-10-19 RX ADMIN — HEPARIN SODIUM 2630 UNITS: 1000 INJECTION INTRAVENOUS; SUBCUTANEOUS at 14:15

## 2019-10-19 RX ADMIN — HEPARIN SODIUM 2630 UNITS: 1000 INJECTION INTRAVENOUS; SUBCUTANEOUS at 21:32

## 2019-10-19 RX ADMIN — FAMOTIDINE 20 MG: 10 INJECTION, SOLUTION INTRAVENOUS at 21:34

## 2019-10-19 RX ADMIN — METHADONE HYDROCHLORIDE 110 MG: 10 TABLET ORAL at 10:00

## 2019-10-19 RX ADMIN — IRON SUCROSE 200 MG: 20 INJECTION, SOLUTION INTRAVENOUS at 11:55

## 2019-10-19 ASSESSMENT — PAIN SCALES - GENERAL
PAINLEVEL_OUTOF10: 7
PAINLEVEL_OUTOF10: 5
PAINLEVEL_OUTOF10: 8
PAINLEVEL_OUTOF10: 8

## 2019-10-19 ASSESSMENT — ENCOUNTER SYMPTOMS
SHORTNESS OF BREATH: 1
BACK PAIN: 1

## 2019-10-19 NOTE — H&P
[address]     HISTORY AND PHYSICAL EXAMINATION            Date:   10/19/2019  Patient name:  Malia Hong  Date of admission:  10/18/2019 12:34 PM  MRN:   [id number]  Account:  [de-identified]  YOB: 1985  PCP:    Brittny Patterson PA-C  Room:   97/9451-37  Code Status:    Full Code    Chief Complaint:     Chief Complaint   Patient presents with    Back Pain     inremittent past couple weeks. denies injury . History Obtained From:     patient, electronic medical record    History of Present Illness:     Malia Hong is a 34 y.o. Non-/non  female who presents with Back Pain (inremittent past couple weeks. denies injury . )   and is admitted to the hospital for the management of Acute pulmonary embolism ([facility], [place] [address] The patient presents for evaluation related to right lower back pain that she has had for approximately 2 weeks. She states she was initially seen at [facility] 2 weeks ago and was diagnosed with UTI and was given antibiotics. She was in our ER last night with the same complaints of right lower back pain. They did a CT of her abdomen with contrast to rule out abdominal issues/kidney stones which was negative. She states they wanted to admit her for 24 hours and do a CT scan of her chest this morning but she left AMA. She is back today to continue her work-up. Her chest CT she does have acute segmental and subsegmental pulmonary emboli in the posterior basal segment right lower lobe with an associated infarction without evidence of right heart strain. She reports right lower back pain that is intermittent. She states that with deep breathing her pain is sharp in that area and at rest is more of an ache. She states its been difficult to take a deep breath. She reports shortness of breath on exertion. She denies chest pain, diaphoresis, fever or chills.   She did complete the antibiotics Protein, UA POC trace     Urobilinogen, UA 0.2     Nitrite, UA neg     Leukocytes, UA neg     Blood, UA POC large     Appearance slightly cloudy    POCT urine pregnancy    Collection Time: 10/18/19 12:58 PM   Result Value Ref Range    Preg Test, Ur neg     QC OK? ok    CBC Auto Differential    Collection Time: 10/18/19  1:25 PM   Result Value Ref Range    WBC 6.9 3.5 - 11.3 k/uL    RBC 3.92 (L) 3.95 - 5.11 m/uL    Hemoglobin 8.2 (L) 11.9 - 15.1 g/dL    Hematocrit 28.6 (L) 36.3 - 47.1 %    MCV 73.0 (L) 82.6 - 102.9 fL    MCH 20.9 (L) 25.2 - 33.5 pg    MCHC 28.7 28.4 - 34.8 g/dL    RDW 18.6 (H) 11.8 - 14.4 %    Platelets 778 729 - 387 k/uL    MPV 9.4 8.1 - 13.5 fL    NRBC Automated 0.0 0.0 per 100 WBC    Differential Type NOT REPORTED     WBC Morphology NOT REPORTED     RBC Morphology ANISOCYTOSIS PRESENT     Platelet Estimate NOT REPORTED     Seg Neutrophils 42 36 - 65 %    Lymphocytes 47 (H) 24 - 43 %    Monocytes 6 3 - 12 %    Eosinophils % 4 1 - 4 %    Basophils 0 0 - 2 %    Immature Granulocytes 0 0 %    Segs Absolute 2.86 1.50 - 8.10 k/uL    Absolute Lymph # 3.24 1.10 - 3.70 k/uL    Absolute Mono # 0.44 0.10 - 1.20 k/uL    Absolute Eos # 0.29 0.00 - 0.44 k/uL    Basophils Absolute 0.03 0.00 - 0.20 k/uL    Absolute Immature Granulocyte 0.02 0.00 - 0.30 k/uL   Basic Metabolic Panel    Collection Time: 10/18/19  1:25 PM   Result Value Ref Range    Glucose 84 70 - 99 mg/dL    BUN 8 6 - 20 mg/dL    CREATININE 0.57 0.50 - 0.90 mg/dL    Bun/Cre Ratio 14 9 - 20    Calcium 9.0 8.6 - 10.4 mg/dL    Sodium 137 135 - 144 mmol/L    Potassium 3.7 3.7 - 5.3 mmol/L    Chloride 101 98 - 107 mmol/L    CO2 25 20 - 31 mmol/L    Anion Gap 11 9 - 17 mmol/L    GFR Non-African American >60 >60 mL/min    GFR African American >60 >60 mL/min    GFR Comment          GFR Staging NOT REPORTED    Iron and TIBC    Collection Time: 10/18/19  1:25 PM   Result Value Ref Range    Iron 18 (L) 37 - 145 ug/dL    TIBC 322 250 - 450 ug/dL    Iron CLARE

## 2019-10-19 NOTE — PROGRESS NOTES
Nutrition Assessment    Type and Reason for Visit: Positive Nutrition Screen(weight loss)    Nutrition Recommendations: 1. Continue Cardiac diet as ordered. 2. Start Ensure [place] twice daily. 3. Patient thinks she has hiatal hernia, may benefit from GI consult. Nutrition Assessment: Patient mildly malnourished on admit as evidenced by weight loss and poor PO intake incontext of hiatal hernia. Patient said she eats lot of crushed ice because it feels good in her mouth and stomach. Explained pt. crshued ice craving could be related to her low Hgb and adviced to eat small 5-6 balanced meals. Didn't see Hiatal Hernia diagnosis in record, consider GI consult. Will continue Cardiac diet. start Ensure [place] and monitor PO intake and weight. Malnutrition Assessment:  · Malnutrition Status: Mild Malnutrition  · Context: Acute illness or injury  · Findings of the 6 clinical characteristics of malnutrition (Minimum of 2 out of 6 clinical characteristics is required to make the diagnosis of moderate or severe Protein Calorie Malnutrition based on AND/ASPEN Guidelines):  1. Energy Intake-Less than or equal to 75% of estimated energy requirement, Greater than or equal to 3 months    2. Weight Loss-7.5% loss or greater, in 3 months  3. Fat Loss-Mild subcutaneous fat loss, Triceps  4. Muscle Loss-Mild muscle mass loss, Clavicles (pectoralis and deltoids)  5. Fluid Accumulation-Mild fluid accumulation, Extremities  6.  Strength-Not measured    Nutrition Risk Level:  Moderate    Nutrient Needs:  · Estimated Daily Total Kcal: 2718-8985 kcal based on 24-25 kcal/kg of admission weight  · Estimated Daily Protein (g): 73-83 gm based on 1.4-1.6 gm/kg of IBW  · Estimated Daily Total Fluid (ml/day): 4323-9981 ml based on 1 ml/kcal    Nutrition Diagnosis:   · Problem: Inadequate oral intake  · Etiology: related to Alteration in GI function     Signs and symptoms:  as evidenced by Diet history of poor intake, Intake 25-50%, Intake 50-75%, Weight loss greater than or equal to 7.5% in 3 months, Mild loss of subcutaneous fat, Mild muscle loss, Lab values, GI abnormality    Objective Information:  · Nutrition-Focused Physical Findings: Bowel sounds active. +2 RLE edema. Skin: WDL  · Wound Type: None  · Current Nutrition Therapies:  · Oral Diet Orders: Cardiac   · Oral Diet intake: 26-50%, 51-75%  · Oral Nutrition Supplement (ONS) Orders: None  · Anthropometric Measures:  · Ht: 5' 3\" (160 cm)   · Current Body Wt: 145 lb (65.8 kg)(weight gain likely due to edema)  · Admission Body Wt: 140 lb (63.5 kg)  · Usual Body Wt: 155 lb (70.3 kg)  · % Weight Change:  ,  9.7% loss in last 3 months  · Ideal Body Wt: 115 lb (52.2 kg), % [place] Body 122% of admission weight  · BMI Classification: BMI 25.0 - 29.9 Overweight    Nutrition Interventions:   Continue current diet, Start ONS  Continued Inpatient Monitoring, Coordination of Care    Nutrition Evaluation:   · Evaluation: Goals set   · Goals: PO intake % of meals and supplements and prevent further weight loss.      · Monitoring: Meal Intake, Supplement Intake, Diet Tolerance, I&O, Weight, Pertinent Labs, Monitor Bowel Function      Elle Oscar RD, LD  Office phone [phone]

## 2019-10-19 NOTE — PROGRESS NOTES
Pt recvd into 1014-2. She immediately requests to go out to smoke. Writer explains to the pt that she needs to be on BR due to her blood clot. Writer offers nicotine patch to which the pt refuses. Pt is upset at this time. Writer also relays to the pt that it is hospital policy that family can only stay the night is the pt is critical. At this time the pt becomes upset and tearful. She states that she will leave AMA if she is not allowed to go outside to smoke and if her  is not able to stay the night. Writer alerts the CNP on call of above as well as the house supervisor, Chata Dee. He is coming down to speak with the pt. Report given to ST. DEONNA BOCANEGRA RN who will be night nurse.

## 2019-10-19 NOTE — PROGRESS NOTES
Transitions of Care Pharmacy Service   Medication Review    The patient's list of current home medications has been reviewed. Source(s) of information: patient, [facility] Cho in [place]    Please feel free to call me with any questions about this encounter. Thank you.     [address], [facility]   Transitions of Care Pharmacy Service  Phone:  [phone]  Fax: [phone]      Electronically signed by [address], [facility] on 10/19/2019 at 7:39 PM           Medications Prior to Admission:   METHADONE HCL PO, Take 110 mg by mouth daily

## 2019-10-19 NOTE — PROGRESS NOTES
patient symptoms are in this region and because of the peripheral location of the lesion, a small pulmonary infarct should also be considered. If clinically indicated, follow-up CTPA may be helpful. Result Date: 10/17/2019  No acute finding in the abdomen or pelvis. Specifically, the kidneys are unremarkable with no evidence of pyelonephritis. Urinary bladder is unremarkable. There is a moderate dense formed stool load throughout the colon suggesting constipation. Moderate-sized hiatal hernia. Ct Chest Pulmonary Embolism W Contrast    Result Date: 10/18/2019  Acute segmental and subsegmental pulmonary emboli in the posterior basal segment right lower lobe with an associated infarction. Moderate size hiatal hernia. Critical results were called by Dr. Mery Tejada to [facility] on 10/18/2019 at 14:08. Physical Examination:        General appearance:  alert, cooperative and no distress  Mental Status:  oriented to person, place and time and normal affect  Lungs: Diminished breath sounds at bases, no rales or wheezing  Heart:  regular rate and rhythm, no murmur  Abdomen:  soft, nontender, nondistended, normal bowel sounds, no masses, hepatomegaly, splenomegaly  Extremities:  no edema, redness, tenderness in the calves  Skin:  no gross lesions, rashes, induration    Assessment:        Hospital Problems           Last Modified POA    * (Principal) Acute pulmonary embolism ([facility] [address] 10/19/2019 Yes    Smoker 10/18/2019 Yes    Marijuana smoker 10/18/2019 Yes    Iron deficiency anemia 10/19/2019 Yes    History of intravenous drug abuse ([facility] [address] 10/19/2019 Yes          Plan:        1. Continue heparin drip for today and will switch to oral tomorrow  2. Start IV Venofer  3.  Check stool for occult blood    Alex Martinez MD  10/19/2019  12:48 PM

## 2019-10-20 PROBLEM — K44.9 HH (HIATUS HERNIA): Status: ACTIVE | Noted: 2019-10-20

## 2019-10-20 PROBLEM — R19.5 OCCULT BLOOD POSITIVE STOOL: Status: ACTIVE | Noted: 2019-10-20

## 2019-10-20 LAB
ABSOLUTE EOS #: 0.25 K/UL (ref 0–0.44)
ABSOLUTE IMMATURE GRANULOCYTE: 0 K/UL (ref 0–0.3)
ABSOLUTE LYMPH #: 2.91 K/UL (ref 1.1–3.7)
ABSOLUTE MONO #: 0.31 K/UL (ref 0.1–1.2)
ANION GAP SERPL CALCULATED.3IONS-SCNC: 8 MMOL/L (ref 9–17)
BASOPHILS # BLD: 1 % (ref 0–2)
BASOPHILS ABSOLUTE: 0.06 K/UL (ref 0–0.2)
BUN BLDV-MCNC: 7 MG/DL (ref 6–20)
BUN/CREAT BLD: 13 (ref 9–20)
CALCIUM SERPL-MCNC: 8.7 MG/DL (ref 8.6–10.4)
CHLORIDE BLD-SCNC: 105 MMOL/L (ref 98–107)
CO2: 26 MMOL/L (ref 20–31)
CREAT SERPL-MCNC: 0.56 MG/DL (ref 0.5–0.9)
DATE, STOOL #1: ABNORMAL
DATE, STOOL #2: ABNORMAL
DATE, STOOL #3: ABNORMAL
DIFFERENTIAL TYPE: ABNORMAL
EOSINOPHILS RELATIVE PERCENT: 4 % (ref 1–4)
GFR AFRICAN AMERICAN: >60 ML/MIN
GFR NON-AFRICAN AMERICAN: >60 ML/MIN
GFR SERPL CREATININE-BSD FRML MDRD: ABNORMAL ML/MIN/{1.73_M2}
GFR SERPL CREATININE-BSD FRML MDRD: ABNORMAL ML/MIN/{1.73_M2}
GLUCOSE BLD-MCNC: 85 MG/DL (ref 70–99)
HCT VFR BLD CALC: 29.1 % (ref 36.3–47.1)
HEMOCCULT SP1 STL QL: POSITIVE
HEMOCCULT SP2 STL QL: ABNORMAL
HEMOCCULT SP3 STL QL: ABNORMAL
HEMOGLOBIN: 8 G/DL (ref 11.9–15.1)
IMMATURE GRANULOCYTES: 0 %
LYMPHOCYTES # BLD: 47 % (ref 24–43)
MCH RBC QN AUTO: 20.6 PG (ref 25.2–33.5)
MCHC RBC AUTO-ENTMCNC: 27.5 G/DL (ref 28.4–34.8)
MCV RBC AUTO: 74.8 FL (ref 82.6–102.9)
MONOCYTES # BLD: 5 % (ref 3–12)
MORPHOLOGY: ABNORMAL
NRBC AUTOMATED: 0 PER 100 WBC
PARTIAL THROMBOPLASTIN TIME: 111.2 SEC (ref 23–31)
PARTIAL THROMBOPLASTIN TIME: 56.2 SEC (ref 23–31)
PARTIAL THROMBOPLASTIN TIME: 83.7 SEC (ref 23–31)
PDW BLD-RTO: 19 % (ref 11.8–14.4)
PLATELET # BLD: 313 K/UL (ref 138–453)
PLATELET # BLD: 316 K/UL (ref 138–453)
PLATELET ESTIMATE: ABNORMAL
PMV BLD AUTO: 10.2 FL (ref 8.1–13.5)
POTASSIUM SERPL-SCNC: 4.1 MMOL/L (ref 3.7–5.3)
RBC # BLD: 3.89 M/UL (ref 3.95–5.11)
RBC # BLD: ABNORMAL 10*6/UL
SEG NEUTROPHILS: 43 % (ref 36–65)
SEGMENTED NEUTROPHILS ABSOLUTE COUNT: 2.67 K/UL (ref 1.5–8.1)
SODIUM BLD-SCNC: 139 MMOL/L (ref 135–144)
TIME, STOOL #1: ABNORMAL
TIME, STOOL #2: ABNORMAL
TIME, STOOL #3: ABNORMAL
WBC # BLD: 6.2 K/UL (ref 3.5–11.3)
WBC # BLD: ABNORMAL 10*3/UL

## 2019-10-20 PROCEDURE — 96376 TX/PRO/DX INJ SAME DRUG ADON: CPT

## 2019-10-20 PROCEDURE — 96366 THER/PROPH/DIAG IV INF ADDON: CPT

## 2019-10-20 PROCEDURE — 6360000002 HC RX W HCPCS: Performed by: NURSE PRACTITIONER

## 2019-10-20 PROCEDURE — 99254 IP/OBS CNSLTJ NEW/EST MOD 60: CPT | Performed by: INTERNAL MEDICINE

## 2019-10-20 PROCEDURE — 85025 COMPLETE CBC W/AUTO DIFF WBC: CPT

## 2019-10-20 PROCEDURE — 85730 THROMBOPLASTIN TIME PARTIAL: CPT

## 2019-10-20 PROCEDURE — 99232 SBSQ HOSP IP/OBS MODERATE 35: CPT | Performed by: INTERNAL MEDICINE

## 2019-10-20 PROCEDURE — APPNB30 APP NON BILLABLE TIME 0-30 MINS: Performed by: NURSE PRACTITIONER

## 2019-10-20 PROCEDURE — G0378 HOSPITAL OBSERVATION PER HR: HCPCS

## 2019-10-20 PROCEDURE — 6370000000 HC RX 637 (ALT 250 FOR IP): Performed by: INTERNAL MEDICINE

## 2019-10-20 PROCEDURE — 2060000000 HC ICU INTERMEDIATE R&B

## 2019-10-20 PROCEDURE — 6360000002 HC RX W HCPCS: Performed by: INTERNAL MEDICINE

## 2019-10-20 PROCEDURE — 2580000003 HC RX 258: Performed by: INTERNAL MEDICINE

## 2019-10-20 PROCEDURE — 85049 AUTOMATED PLATELET COUNT: CPT

## 2019-10-20 PROCEDURE — G0328 FECAL BLOOD SCRN IMMUNOASSAY: HCPCS

## 2019-10-20 PROCEDURE — 2500000003 HC RX 250 WO HCPCS: Performed by: NURSE PRACTITIONER

## 2019-10-20 PROCEDURE — 36415 COLL VENOUS BLD VENIPUNCTURE: CPT

## 2019-10-20 PROCEDURE — 80048 BASIC METABOLIC PNL TOTAL CA: CPT

## 2019-10-20 PROCEDURE — 6370000000 HC RX 637 (ALT 250 FOR IP): Performed by: NURSE PRACTITIONER

## 2019-10-20 RX ORDER — BISACODYL 10 MG
10 SUPPOSITORY, RECTAL RECTAL ONCE
Status: COMPLETED | OUTPATIENT
Start: 2019-10-20 | End: 2019-10-20

## 2019-10-20 RX ADMIN — FAMOTIDINE 20 MG: 10 INJECTION, SOLUTION INTRAVENOUS at 21:53

## 2019-10-20 RX ADMIN — FAMOTIDINE 20 MG: 10 INJECTION, SOLUTION INTRAVENOUS at 08:23

## 2019-10-20 RX ADMIN — KETOROLAC TROMETHAMINE 30 MG: 30 INJECTION, SOLUTION INTRAMUSCULAR at 22:00

## 2019-10-20 RX ADMIN — IRON SUCROSE 200 MG: 20 INJECTION, SOLUTION INTRAVENOUS at 13:05

## 2019-10-20 RX ADMIN — DOCUSATE SODIUM 100 MG: 100 CAPSULE, LIQUID FILLED ORAL at 08:21

## 2019-10-20 RX ADMIN — METHADONE HYDROCHLORIDE 110 MG: 10 TABLET ORAL at 08:21

## 2019-10-20 RX ADMIN — BISACODYL 10 MG: 10 SUPPOSITORY RECTAL at 10:36

## 2019-10-20 RX ADMIN — HEPARIN SODIUM 2630 UNITS: 1000 INJECTION INTRAVENOUS; SUBCUTANEOUS at 14:19

## 2019-10-20 ASSESSMENT — PAIN SCALES - GENERAL
PAINLEVEL_OUTOF10: 6
PAINLEVEL_OUTOF10: 7

## 2019-10-20 NOTE — PROGRESS NOTES
Dr Bert Jose informed of pharmacy coverage. Dr. Bert Jose still wants patient on the proper does of 15 mg 2 times daily and then 20 mg. Patient not to be discharged today due to positive occult blood. Will need follow up tomorrow with [employer] [facility] for the started pack coverage with the card. Discussed with RN.

## 2019-10-20 NOTE — PLAN OF CARE
PRE CONSULT ROUNDING NOTE  HPI  34year old female with pmh of anemia, IVDA, headaches who presented to the ED for intermittent right sided low back pain. She was diagnosed with a right sided PE and is now on a heparin drip. Our service was consulted for positive fecal occult blood testing. The pt has a hgb of 8 but has not had melena, abdominal pain, hematochezia or hematemesis. Her hgb in 2016 was in the 12 range. She is not on anticoagulation medication at home and does not take NSAIDS. Reports a 70# unintentional  weight loss in the last 6-7 months because she claims her hiatal hernia causes her severe heartburn and she has tried to eliminate trigger foods, she also has been eating ice. She has tried several OTC heartburn medications with no relief. No fevers, chills, dysphagia or change in the bowel habits. Labs show iron of 18 with a 6% saturation and she has been started on Venofer. CT abdomen shows a moderate sized hiatal hernia with stool.     Endoscopy none  Family no hx of IBD, liver stomach colon or pancreatic cancer  Social on methadone for hx of IVDA, smokes marijuana every 3-6 months, denies etoh  /64   Pulse 82   Temp 98.1 °F (36.7 °C) (Oral)   Resp 18   Ht 5' 3\" (1.6 m)   Wt 145 lb (65.8 kg)   LMP 10/16/2019   SpO2 99%   BMI 25.69 kg/m²     ROS meds labs imaging and past medical records were reviewed    Exam    General Appearance: alert and oriented to person, place and time, well-developed and well-nourished, in no acute distress  Skin: warm and dry, no rash or erythema  Head: normocephalic and atraumatic  Eyes: pupils equal, round, and reactive to light, extraocular eye movements intact, conjunctivae normal  ENT: hearing grossly normal bilaterally  Neck: neck supple and non tender without mass, no thyromegaly or thyroid nodules, no cervical lymphadenopathy   Pulmonary/Chest: clear to auscultation bilaterally but decreased in the right base- no wheezes, rales or rhonchi, normal air

## 2019-10-20 NOTE — PLAN OF CARE
Problem: Infection:  Goal: Will remain free from infection  Description  Will remain free from infection  Outcome: Ongoing     Problem: Safety:  Goal: Free from accidental physical injury  Description  Free from accidental physical injury  Outcome: Ongoing     Problem: Daily Care:  Goal: Daily care needs are met  Description  Daily care needs are met  Outcome: Ongoing     Problem: Pain:  Goal: Patient's pain/discomfort is manageable  Description  Patient's pain/discomfort is manageable  Outcome: Ongoing     Problem: Nutrition  Goal: Optimal nutrition therapy  10/19/2019 1404 by Jennie Cassidy RD, LD  Outcome: Ongoing

## 2019-10-20 NOTE — PROGRESS NOTES
states that with deep breathing her pain is sharp in that area and at rest is more of an ache. She states its been difficult to take a deep breath. She reports shortness of breath on exertion. She denies chest pain, diaphoresis, fever or chills. She did complete the antibiotics that were ordered for her UTI 2 weeks ago. She has a history of marijuana use and a daily smoker. She states she was recently told by her methadone clinic that she was anemic but she has not had formal work-up      Review of Systems:     Constitutional:  negative for chills, fevers, sweats  Respiratory:  negative for cough, dyspnea on exertion, shortness of breath, wheezing  Cardiovascular:  negative for chest pain, chest pressure/discomfort, lower extremity edema, palpitations  Gastrointestinal:  negative for abdominal pain, constipation, diarrhea, nausea, vomiting  Neurological:  negative for dizziness, headache    Medications: Allergies:  No Known Allergies    Current Meds:   Scheduled Meds:    methadone  110 mg Oral Daily    iron sucrose  200 mg Intravenous Q24H    sodium chloride flush  10 mL Intravenous 2 times per day    famotidine (PEPCID) injection  20 mg Intravenous BID    docusate sodium  100 mg Oral Daily     Continuous Infusions:    heparin (porcine) Stopped (10/20/19 0440)     PRN Meds: acetaminophen, ketorolac, heparin (porcine), sodium chloride flush, magnesium hydroxide, ondansetron, heparin (porcine), heparin (porcine), LORazepam    Data:     Past Medical History:   has a past medical history of Anemia, Drug abuse, IV ([address] Headache, Marijuana smoker, and Smoker. Social History:   reports that she has been smoking. She has never used smokeless tobacco. She reports that she has current or past drug history. Drug: Marijuana. She reports that she does not drink alcohol.      Family History:   Family History   Problem Relation Age of Onset    No Known Problems Mother     No Known Problems Father Vitals:  /64   Pulse 82   Temp 98.1 °F (36.7 °C) (Oral)   Resp 18   Ht 5' 3\" (1.6 m)   Wt 145 lb (65.8 kg)   LMP 10/16/2019   SpO2 99%   BMI 25.69 kg/m²   Temp (24hrs), Av °F (36.7 °C), Min:97.6 °F (36.4 °C), Max:98.2 °F (36.8 °C)    No results for input(s): POCGLU in the last 72 hours. I/O (24Hr): Intake/Output Summary (Last 24 hours) at 10/20/2019 1247  Last data filed at 10/20/2019 0634  Gross per 24 hour   Intake 642 ml   Output --   Net 642 ml       Labs:  Hematology:  Recent Labs     10/18/19  1500 10/18/19  1627 10/19/19  0454 10/20/19  0329   WBC  --  8.0 6.7 6.2   RBC  --  3.71* 4.07 3.89*   HGB  --  7.8* 8.7* 8.0*   HCT  --  27.1* 30.4* 29.1*   MCV  --  73.0* 74.7* 74.8*   MCH  --  21.0* 21.4* 20.6*   MCHC  --  28.8 28.6 27.5*   RDW  --  18.6* 18.9* 19.0*   PLT  --  350 290 316  313   MPV  --  9.8 9.5 10.2   INR 1.0  --   --   --      Chemistry:  Recent Labs     10/18/19  1325 10/19/19  0454 10/20/19  0329    140 139   K 3.7 4.0 4.1    105 105   CO2 25 23 26   GLUCOSE 84 80 85   BUN 8 8 7   CREATININE 0.57 0.58 0.56   ANIONGAP 11 12 8*   LABGLOM >60 >60 >60   GFRAA >60 >60 >60   CALCIUM 9.0 8.8 8.7     Recent Labs     10/19/19  0454   PROT 6.5   LABALBU 3.5   AST 11   ALT 5   ALKPHOS 71   BILITOT <0.10*   BILIDIR <0.08     ABG:No results found for: POCPH, PHART, PH, POCPCO2, LBS8RNJ, PCO2, POCPO2, PO2ART, PO2, POCHCO3, DHQ1XFL, HCO3, NBEA, PBEA, BEART, BE, THGBART, THB, RLI9IPF, LTYS2TMH, Y6RAVCTS, O2SAT, FIO2  Lab Results   Component Value Date/Time    SPECIAL right fa 12ml 10/17/2019 02:20 AM     Lab Results   Component Value Date/Time    CULTURE NO GROWTH 3 DAYS 10/17/2019 02:20 AM       Radiology:  Ct Abdomen Pelvis W Iv Contrast    Addendum Date: 10/17/2019    ADDENDUM: Findings were discussed and reviewed with Dr. Mary Serrano. There is a small area of pleural and parenchymal disease in the posteromedial right costophrenic angle (axial image [id number]). This may represent a small/early pneumonia. As the patient symptoms are in this region and because of the peripheral location of the lesion, a small pulmonary infarct should also be considered. If clinically indicated, follow-up CTPA may be helpful. Result Date: 10/17/2019  No acute finding in the abdomen or pelvis. Specifically, the kidneys are unremarkable with no evidence of pyelonephritis. Urinary bladder is unremarkable. There is a moderate dense formed stool load throughout the colon suggesting constipation. Moderate-sized hiatal hernia. Ct Chest Pulmonary Embolism W Contrast    Result Date: 10/18/2019  Acute segmental and subsegmental pulmonary emboli in the posterior basal segment right lower lobe with an associated infarction. Moderate size hiatal hernia. Critical results were called by Dr. Patrica Barone to Machelle Tavarez on 10/18/2019 at 14:08.     2D echo  Left ventricle is normal in size, normal wall thickness, global left  ventricular systolic function is low normal, estimated ejection fraction is  50-55%. Right atrial dilatation. Right ventricular dilatation with normal systolic function. Mild mitral regurgitation. Moderate tricuspid regurgitation. Estimated right ventricular systolic pressure is 78.8 mmHg. No pericardial effusion is seen.   Physical Examination:        General appearance:  alert, cooperative and no distress  Mental Status:  oriented to person, place and time and normal affect  Lungs: Diminished breath sounds at bases, no rales or wheezing  Heart:  regular rate and rhythm, no murmur  Abdomen:  soft, nontender, nondistended, normal bowel sounds, no masses, hepatomegaly, splenomegaly  Extremities:  no edema, redness, tenderness in the calves  Skin:  no gross lesions, rashes, induration    Assessment:        Hospital Problems           Last Modified POA    * (Principal) Acute pulmonary embolism ([address] 10/19/2019 Yes    Smoker 10/18/2019 Yes    Marijuana smoker 10/18/2019 Yes Iron deficiency anemia 10/19/2019 Yes    History of intravenous drug abuse ([facility] 10/19/2019 Yes    Occult blood positive stool 10/20/2019 Yes    HH (hiatus hernia) 10/20/2019 Yes          Plan:        1. Continue heparin drip for today and will switch to oral after clearance from GI  2. Consult GI due to occult blood positive stools  3.  Continue IV Venofer    Plan was to discharge the patient today, which will be held due to above reason    Asa Mei MD  10/20/2019  12:47 PM

## 2019-10-20 NOTE — PROGRESS NOTES
Potential discharge discussed with Dr. Donna Michael. Patient will need xarelto for discharge. Patient will need xarelto 15 mg 2 times daily for 21 days then 20 daily for 9 days. Patient will need to follow with PCP for continued treatment. Patient indicated she uses [facility] on [address] for medication. Pharmacy opens at 1100. Will need to follow for coverage. Plan discussed with patient and nurse.

## 2019-10-21 ENCOUNTER — ANESTHESIA EVENT (OUTPATIENT)
Dept: OPERATING ROOM | Age: 34
DRG: 175 | End: 2019-10-21
Payer: COMMERCIAL

## 2019-10-21 ENCOUNTER — ANESTHESIA (OUTPATIENT)
Dept: OPERATING ROOM | Age: 34
DRG: 175 | End: 2019-10-21
Payer: COMMERCIAL

## 2019-10-21 VITALS — DIASTOLIC BLOOD PRESSURE: 81 MMHG | OXYGEN SATURATION: 99 % | SYSTOLIC BLOOD PRESSURE: 131 MMHG

## 2019-10-21 LAB
ABSOLUTE EOS #: 0.21 K/UL (ref 0–0.44)
ABSOLUTE IMMATURE GRANULOCYTE: 0.01 K/UL (ref 0–0.3)
ABSOLUTE LYMPH #: 2.29 K/UL (ref 1.1–3.7)
ABSOLUTE MONO #: 0.29 K/UL (ref 0.1–1.2)
ANION GAP SERPL CALCULATED.3IONS-SCNC: 10 MMOL/L (ref 9–17)
ANTI-NUCLEAR ANTIBODY (ANA): NEGATIVE
BASOPHILS # BLD: 1 % (ref 0–2)
BASOPHILS ABSOLUTE: 0.03 K/UL (ref 0–0.2)
BUN BLDV-MCNC: 6 MG/DL (ref 6–20)
BUN/CREAT BLD: 11 (ref 9–20)
CALCIUM SERPL-MCNC: 8.7 MG/DL (ref 8.6–10.4)
CHLORIDE BLD-SCNC: 107 MMOL/L (ref 98–107)
CO2: 24 MMOL/L (ref 20–31)
CREAT SERPL-MCNC: 0.56 MG/DL (ref 0.5–0.9)
DIFFERENTIAL TYPE: ABNORMAL
EOSINOPHILS RELATIVE PERCENT: 4 % (ref 1–4)
GFR AFRICAN AMERICAN: >60 ML/MIN
GFR NON-AFRICAN AMERICAN: >60 ML/MIN
GFR SERPL CREATININE-BSD FRML MDRD: NORMAL ML/MIN/{1.73_M2}
GFR SERPL CREATININE-BSD FRML MDRD: NORMAL ML/MIN/{1.73_M2}
GLUCOSE BLD-MCNC: 85 MG/DL (ref 70–99)
HCG, PREGNANCY URINE (POC): NEGATIVE
HCT VFR BLD CALC: 28.9 % (ref 36.3–47.1)
HEMOGLOBIN: 8.2 G/DL (ref 11.9–15.1)
IMMATURE GRANULOCYTES: 0 %
LYMPHOCYTES # BLD: 38 % (ref 24–43)
MCH RBC QN AUTO: 21 PG (ref 25.2–33.5)
MCHC RBC AUTO-ENTMCNC: 28.4 G/DL (ref 28.4–34.8)
MCV RBC AUTO: 74.1 FL (ref 82.6–102.9)
MONOCYTES # BLD: 5 % (ref 3–12)
NRBC AUTOMATED: 0 PER 100 WBC
PARTIAL THROMBOPLASTIN TIME: 30.4 SEC (ref 23–31)
PARTIAL THROMBOPLASTIN TIME: 56.1 SEC (ref 23–31)
PARTIAL THROMBOPLASTIN TIME: 70.6 SEC (ref 23–31)
PDW BLD-RTO: 19 % (ref 11.8–14.4)
PLATELET # BLD: 327 K/UL (ref 138–453)
PLATELET ESTIMATE: ABNORMAL
PMV BLD AUTO: 9.8 FL (ref 8.1–13.5)
POTASSIUM SERPL-SCNC: 3.7 MMOL/L (ref 3.7–5.3)
RBC # BLD: 3.9 M/UL (ref 3.95–5.11)
RBC # BLD: ABNORMAL 10*6/UL
SEG NEUTROPHILS: 53 % (ref 36–65)
SEGMENTED NEUTROPHILS ABSOLUTE COUNT: 3.16 K/UL (ref 1.5–8.1)
SODIUM BLD-SCNC: 141 MMOL/L (ref 135–144)
WBC # BLD: 6 K/UL (ref 3.5–11.3)
WBC # BLD: ABNORMAL 10*3/UL

## 2019-10-21 PROCEDURE — 2500000003 HC RX 250 WO HCPCS: Performed by: NURSE ANESTHETIST, CERTIFIED REGISTERED

## 2019-10-21 PROCEDURE — 85025 COMPLETE CBC W/AUTO DIFF WBC: CPT

## 2019-10-21 PROCEDURE — 43239 EGD BIOPSY SINGLE/MULTIPLE: CPT | Performed by: INTERNAL MEDICINE

## 2019-10-21 PROCEDURE — G0378 HOSPITAL OBSERVATION PER HR: HCPCS

## 2019-10-21 PROCEDURE — 3609017100 HC EGD: Performed by: INTERNAL MEDICINE

## 2019-10-21 PROCEDURE — 99232 SBSQ HOSP IP/OBS MODERATE 35: CPT | Performed by: INTERNAL MEDICINE

## 2019-10-21 PROCEDURE — 7100000001 HC PACU RECOVERY - ADDTL 15 MIN: Performed by: INTERNAL MEDICINE

## 2019-10-21 PROCEDURE — 3700000000 HC ANESTHESIA ATTENDED CARE: Performed by: INTERNAL MEDICINE

## 2019-10-21 PROCEDURE — 88305 TISSUE EXAM BY PATHOLOGIST: CPT

## 2019-10-21 PROCEDURE — 36415 COLL VENOUS BLD VENIPUNCTURE: CPT

## 2019-10-21 PROCEDURE — 80048 BASIC METABOLIC PNL TOTAL CA: CPT

## 2019-10-21 PROCEDURE — 6370000000 HC RX 637 (ALT 250 FOR IP): Performed by: NURSE PRACTITIONER

## 2019-10-21 PROCEDURE — 6360000002 HC RX W HCPCS: Performed by: INTERNAL MEDICINE

## 2019-10-21 PROCEDURE — 2500000003 HC RX 250 WO HCPCS: Performed by: NURSE PRACTITIONER

## 2019-10-21 PROCEDURE — 7100000000 HC PACU RECOVERY - FIRST 15 MIN: Performed by: INTERNAL MEDICINE

## 2019-10-21 PROCEDURE — 2060000000 HC ICU INTERMEDIATE R&B

## 2019-10-21 PROCEDURE — 85730 THROMBOPLASTIN TIME PARTIAL: CPT

## 2019-10-21 PROCEDURE — 2500000003 HC RX 250 WO HCPCS: Performed by: INTERNAL MEDICINE

## 2019-10-21 PROCEDURE — 6360000002 HC RX W HCPCS: Performed by: NURSE ANESTHETIST, CERTIFIED REGISTERED

## 2019-10-21 PROCEDURE — 2580000003 HC RX 258: Performed by: INTERNAL MEDICINE

## 2019-10-21 PROCEDURE — 0DB98ZX EXCISION OF DUODENUM, VIA NATURAL OR ARTIFICIAL OPENING ENDOSCOPIC, DIAGNOSTIC: ICD-10-PCS | Performed by: INTERNAL MEDICINE

## 2019-10-21 PROCEDURE — 2580000003 HC RX 258: Performed by: NURSE ANESTHETIST, CERTIFIED REGISTERED

## 2019-10-21 RX ORDER — MIDAZOLAM HYDROCHLORIDE 1 MG/ML
INJECTION INTRAMUSCULAR; INTRAVENOUS PRN
Status: DISCONTINUED | OUTPATIENT
Start: 2019-10-21 | End: 2019-10-21 | Stop reason: SDUPTHER

## 2019-10-21 RX ORDER — PROPOFOL 10 MG/ML
INJECTION, EMULSION INTRAVENOUS PRN
Status: DISCONTINUED | OUTPATIENT
Start: 2019-10-21 | End: 2019-10-21 | Stop reason: SDUPTHER

## 2019-10-21 RX ORDER — SODIUM CHLORIDE 9 MG/ML
INJECTION, SOLUTION INTRAVENOUS CONTINUOUS PRN
Status: DISCONTINUED | OUTPATIENT
Start: 2019-10-21 | End: 2019-10-21 | Stop reason: SDUPTHER

## 2019-10-21 RX ORDER — LIDOCAINE HYDROCHLORIDE 20 MG/ML
INJECTION, SOLUTION EPIDURAL; INFILTRATION; INTRACAUDAL; PERINEURAL PRN
Status: DISCONTINUED | OUTPATIENT
Start: 2019-10-21 | End: 2019-10-21 | Stop reason: SDUPTHER

## 2019-10-21 RX ADMIN — METHADONE HYDROCHLORIDE 110 MG: 10 TABLET ORAL at 09:04

## 2019-10-21 RX ADMIN — HEPARIN SODIUM 2630 UNITS: 1000 INJECTION INTRAVENOUS; SUBCUTANEOUS at 19:15

## 2019-10-21 RX ADMIN — SODIUM CHLORIDE: 9 INJECTION, SOLUTION INTRAVENOUS at 15:29

## 2019-10-21 RX ADMIN — MIDAZOLAM 2 MG: 1 INJECTION INTRAMUSCULAR; INTRAVENOUS at 15:29

## 2019-10-21 RX ADMIN — PROPOFOL 20 MG: 10 INJECTION, EMULSION INTRAVENOUS at 15:38

## 2019-10-21 RX ADMIN — KETOROLAC TROMETHAMINE 30 MG: 30 INJECTION, SOLUTION INTRAMUSCULAR at 21:20

## 2019-10-21 RX ADMIN — PROPOFOL 40 MG: 10 INJECTION, EMULSION INTRAVENOUS at 15:35

## 2019-10-21 RX ADMIN — FAMOTIDINE 20 MG: 10 INJECTION, SOLUTION INTRAVENOUS at 09:04

## 2019-10-21 RX ADMIN — POLYETHYLENE GLYCOL-3350 AND ELECTROLYTES 2000 ML: 236; 6.74; 5.86; 2.97; 22.74 POWDER, FOR SOLUTION ORAL at 17:15

## 2019-10-21 RX ADMIN — FAMOTIDINE 20 MG: 10 INJECTION, SOLUTION INTRAVENOUS at 21:20

## 2019-10-21 RX ADMIN — IRON SUCROSE 200 MG: 20 INJECTION, SOLUTION INTRAVENOUS at 11:31

## 2019-10-21 RX ADMIN — PROPOFOL 20 MG: 10 INJECTION, EMULSION INTRAVENOUS at 15:36

## 2019-10-21 RX ADMIN — LIDOCAINE HYDROCHLORIDE 40 MG: 20 INJECTION, SOLUTION EPIDURAL; INFILTRATION; INTRACAUDAL; PERINEURAL at 15:35

## 2019-10-21 ASSESSMENT — PAIN SCALES - GENERAL
PAINLEVEL_OUTOF10: 6
PAINLEVEL_OUTOF10: 6
PAINLEVEL_OUTOF10: 0
PAINLEVEL_OUTOF10: 0

## 2019-10-21 ASSESSMENT — PULMONARY FUNCTION TESTS
PIF_VALUE: 1
PIF_VALUE: 0
PIF_VALUE: 1
PIF_VALUE: 0
PIF_VALUE: 1
PIF_VALUE: 1

## 2019-10-21 NOTE — CONSULTS
Gastroenterology Consult Note      Patient: Stephanie Perez  : 1985  Acct#:  [de-identified]     Date:  10/21/2019    Subjective:       History of Present Illness  Patient is a 34 y.o.  female admitted with Acute pulmonary embolism, unspecified pulmonary embolism type, unspecified whether acute cor pulmonale present ([facility] [I26.99] who is seen in consult for Anemia  This young lady who is admitted with a diagnosis of pulmonary embolism she does have a past medical history of IV drug abuse headache came to the emergency room with lower back pain found to have PE on imaging for which she was started on heparin drip. Noted to be Hemoccult positive stool  And anemia with a hemoglobin of 8  She denied any black stool or blood in the stool she denied any hematochezia  She denied any significant  odynophagia dysphagia abdominal pain  When asked about her. She said they are very light and short nothing out of range  Denied taking any nonsteroidal anti-inflammatory  Her hemoglobin is to be normal in 2016  She thinks she lost a lot of weight she thinks may be around 70 pound in the last 6 to 7 months  She thinks her big hiatal hernia which she knows about is causing her some heartburn  And difficult to digest food  She is been having pica with eating ice chips  She is been trying some over-the-counter antiacid with no relief  She denied any fever chills or any other symptoms  Her iron saturation was 6%  She is been getting IV iron right now  Her imaging studies including CAT scan of the abdomen showed moderate sized hiatal hernia    No previous endoscopy      Past Medical History:   Diagnosis Date    Anemia 10/18/2019    Drug abuse, IV ([facility]     claims that she has previous iv drug dependency claims hasn' had recent usage.     Headache     Marijuana smoker 10/18/2019    Smoker 10/18/2019      Past Surgical History:   Procedure Laterality Date    APPENDECTOMY       SECTION      OVARIAN CYST REMOVAL        Past Endoscopic History none    Admission Meds  No current facility-administered medications on file prior to encounter. Current Outpatient Medications on File Prior to Encounter   Medication Sig Dispense Refill    METHADONE HCL PO Take 110 mg by mouth daily          Patient   Does Use ASA, NSAID No  Allergies  No Known Allergies     Social   Social History     Tobacco Use    Smoking status: Current Every Day Smoker    Smokeless tobacco: Never Used   Substance Use Topics    Alcohol use: No        PSYCH HISTORY:  Depression No  Anxiety No  Suicide No       Family History   Problem Relation Age of Onset    No Known Problems Mother     No Known Problems Father       No family history of colon cancer, Crohn's disease, or ulcerative colitis. Review of Systems  Constitutional: negative  Eyes: negative  Ears, nose, mouth, throat, and face: negative  Respiratory: negative  Cardiovascular: negative  Gastrointestinal: negative  Genitourinary:negative  Integument/breast: negative  Hematologic/lymphatic: negative  Musculoskeletal:negative  Endocrine: negative           Physical Exam  Blood pressure 114/75, pulse 79, temperature 98.1 °F (36.7 °C), temperature source Oral, resp. rate 14, height 5' 3\" (1.6 m), weight 145 lb (65.8 kg), last menstrual period 10/16/2019, SpO2 99 %.          General Appearance: alert and oriented to person, place and time, well-developed and well-nourished, in no acute distress  Skin: warm and dry, no rash or erythema  Head: normocephalic and atraumatic  Eyes: pupils equal, round, and reactive to light, extraocular eye movements intact, conjunctivae normal  ENT: hearing grossly normal bilaterally  Neck: neck supple and non tender without mass, no thyromegaly or thyroid nodules, no cervical lymphadenopathy   Pulmonary/Chest: clear to auscultation bilaterally- no wheezes, rales or rhonchi, normal air movement, no respiratory distress  Cardiovascular: normal rate, regular rhythm, normal S1 and S2, no murmurs, rubs, clicks or gallops, distal pulses intact, no carotid bruits  Abdomen: soft, non-tender, non-distended, normal bowel sounds, no masses or organomegaly  Extremities: no cyanosis, clubbing or edema  Musculoskeletal: normal range of motion, no joint swelling, deformity or tenderness  Neurologic: no cranial nerve deficit and muscle strength normal    Data Review:    Recent Labs     10/19/19  0454 10/20/19  0329 10/21/19  0353   WBC 6.7 6.2 6.0   HGB 8.7* 8.0* 8.2*   HCT 30.4* 29.1* 28.9*   MCV 74.7* 74.8* 74.1*    316  313 327     Recent Labs     10/19/19  0454 10/20/19  0329 10/21/19  0353    139 141   K 4.0 4.1 3.7    105 107   CO2 23 26 24   BUN 8 7 6   CREATININE 0.58 0.56 0.56     Recent Labs     10/19/19  0454   AST 11   ALT 5   BILIDIR <0.08   BILITOT <0.10*   ALKPHOS 71     No results for input(s): LIPASE, AMYLASE in the last 72 hours. Recent Labs     10/18/19  1500   PROTIME 10.1   INR 1.0     No results for input(s): PTT in the last 72 hours. No results for input(s): OCCULTBLD in the last 72 hours. CEA:  No results found for: CEA  Ca 125:  No results found for:   Ca 19-9:  No results found for:   Ca 15-3:  No results found for:   AFP:  No components found for: AFAFP  Beta HCG:  No components found for: BHCG  Neuron Specific Enolase:  No results found for: NSE  Imaging Studies:                           All appropriate imaging studies and reports reviewed: Yes                 Assessment:     Principal Problem:    Acute pulmonary embolism (HCC)  Active Problems:    Smoker    Marijuana smoker    Iron deficiency anemia    History of intravenous drug abuse ([facility]    Occult blood positive stool    HH (hiatus hernia)  Resolved Problems:    * No resolved hospital problems.  *    *Iron deficiency anemia  Positive Hemoccult stool  Large hiatal hernia  GERD with dyspepsia  Marijuana abuse  Acute right-sided PE on heparin drip    Recommendations:   Plan for EGD/colonoscopy after clearance per pulmonary  Monitor H&H  Agree with IV iron  PPI                        Thank you for allowing me to participate in the care of your patient. Please feel free to contact me with any questions or concerns.      Eulalia Cadena MD

## 2019-10-21 NOTE — PLAN OF CARE
Problem: Infection:  Goal: Will remain free from infection  Description  Will remain free from infection  Outcome: Ongoing     Problem: Safety:  Goal: Free from accidental physical injury  Description  Free from accidental physical injury  Outcome: Ongoing  Note:   Fall risk assessment completed. Patient instructed to use call light. Bed locked and in lowest position, side rails up 2/4, call light and bedside table within reach, clutter removed, and non-skid footwear on when pt out of bed. Hourly rounds will continue.       Problem: Pain:  Goal: Patient's pain/discomfort is manageable  Description  Patient's pain/discomfort is manageable  Outcome: Ongoing  Goal: Pain level will decrease  Description  Pain level will decrease  Outcome: Ongoing  Goal: Control of acute pain  Description  Control of acute pain  Outcome: Ongoing     Problem: Venous Thromboembolism:  Goal: Will show no signs or symptoms of venous thromboembolism  Description  Will show no signs or symptoms of venous thromboembolism  Outcome: Ongoing  Goal: Absence of signs or symptoms of impaired coagulation  Description  Absence of signs or symptoms of impaired coagulation  Outcome: Ongoing

## 2019-10-21 NOTE — ANESTHESIA PRE PROCEDURE
antiemetics administered. Anesthetic plan and risks discussed with patient. Use of blood products discussed with patient whom consented to blood products. Plan discussed with attending and CRNA.     Attending anesthesiologist reviewed and agrees with Neal Millan MD   10/21/2019

## 2019-10-21 NOTE — ANESTHESIA POSTPROCEDURE EVALUATION
Department of Anesthesiology  Postprocedure Note    Patient: Wicho Shankar  MRN: [id number]  YOB: 1985  Date of evaluation: 10/21/2019  Time:  6:21 PM     Procedure Summary     Date:  10/21/19 Room / Location:  [place] [address]    Anesthesia Start:  9770 Anesthesia Stop:  1753    Procedure:  EGD ESOPHAGOGASTRODUODENOSCOPY (N/A ) Diagnosis:  (ANEMIA)    Surgeon:  Sukhjinder Ruggiero MD Responsible Provider:  Henrry Akhtar MD    Anesthesia Type:  general ASA Status:  3          Anesthesia Type: general    Meghana Phase I: Meghana Score: 10    Meghana Phase II:      Last vitals: Reviewed and per EMR flowsheets.        Anesthesia Post Evaluation    Complications: no

## 2019-10-21 NOTE — PLAN OF CARE
Case d/w Dr Anu Prince will plan for egd this afternoon, order written to shut off heparin drip now and npo. RN notified . Cassie Mckenzie, EMILY - CNP

## 2019-10-21 NOTE — PROGRESS NOTES
infarction without evidence of right heart strain. Flaquito Schmitt reports right lower back pain that is intermittent.  She states that with deep breathing her pain is sharp in that area and at rest is more of an ache.  She states its been difficult to take a deep breath.  She reports shortness of breath on exertion.  She denies chest pain, diaphoresis, fever or chills.  She did complete the antibiotics that were ordered for her UTI 2 weeks ago. Flaquito Schmitt has a history of marijuana use and a daily smoker. Flaquito Schmitt states she was recently told by her methadone clinic that she was anemic but she has not had formal work-up\"    ROS:  Constitutional: Negative for chills, diaphoresis, fever, malaise/fatigue and weight loss. HENT: Negative for ear pain, hearing loss, nosebleeds, sore throat and tinnitus. Eyes: Negative for blurred vision, double vision, photophobia and pain. Respiratory: Negative for cough, hemoptysis, sputum production, shortness of breath and wheezing. Cardiovascular: Negative for palpitations, orthopnea, claudication, leg swelling and PND. Gastrointestinal: Negative for abdominal pain, blood in stool, constipation, diarrhea, heartburn, melena, nausea and vomiting. Genitourinary: Negative for dysuria, flank pain, frequency, hematuria and urgency. Musculoskeletal: Negative for back pain, falls, joint pain, myalgias and neck pain. Skin: Negative for itching and rash. Neurological: Negative for dizziness, tingling, tremors, sensory change, focal weakness, seizures, weakness and headaches. Endo/Heme/Allergies: Does not bruise/bleed easily. Psychiatric/Behavioral: Negative for depression. The patient is not nervous/anxious. Medications:      Allergies: No Known Allergies    Current Meds:    methadone  110 mg Oral Daily    sodium chloride flush  10 mL Intravenous 2 times per day    famotidine (PEPCID) injection  20 mg Intravenous BID    docusate sodium  100 mg Oral Daily     PRN Meds: posterior basal segment right lower lobe with an associated infarction. Moderate size hiatal hernia. Labs:    EGD  10/21/2019  Retropharyngeal area was grossly normal appearing   Esophagus: abnormal: Irregular Z line , ? BE , not biopsied ( heparin )  Stomach:    Fundus: normal    Body: normal    Antrum: normal  Duodenum:     Descending: abnormal:  random mall bowel  bxs taken     Bulb: abnormal:  random mall bowel  bxs taken   Recommendations/Plan:   1. F/U Biopsies  2. Colon tomorrow     Hematology:  Recent Labs     10/18/19  1500  10/19/19  0454  10/20/19  0329  10/20/19  2107 10/21/19  0353 10/21/19  0945   WBC  --    < > 6.7  --  6.2  --   --  6.0  --    RBC  --    < > 4.07  --  3.89*  --   --  3.90*  --    HGB  --    < > 8.7*  --  8.0*  --   --  8.2*  --    HCT  --    < > 30.4*  --  29.1*  --   --  28.9*  --    MCV  --    < > 74.7*  --  74.8*  --   --  74.1*  --    MCH  --    < > 21.4*  --  20.6*  --   --  21.0*  --    MCHC  --    < > 28.6  --  27.5*  --   --  28.4  --    RDW  --    < > 18.9*  --  19.0*  --   --  19.0*  --    PLT  --    < > 290  --  316  313  --   --  327  --    MPV  --    < > 9.5  --  10.2  --   --  9.8  --    SEGS  --   --  34*  --  43  --   --  53  --    LYMPHOPCT  --   --  57*  --  47*  --   --  38  --    MONOPCT  --   --  4  --  5  --   --  5  --    EOSRELPCT  --   --  4  --  4  --   --  4  --    BASOPCT  --   --  1  --  1  --   --  1  --    PROTIME 10.1  --   --   --   --   --   --   --   --    APTT 24.4   < > 110.1*   < > 111.2*   < > 83.7* 70.6* 56.1*   INR 1.0  --   --   --   --   --   --   --   --     < > = values in this interval not displayed.      Chemistry:  Recent Labs     10/19/19  0454 10/20/19  0329 10/21/19  0353    139 141   K 4.0 4.1 3.7    105 107   CO2 23 26 24   GLUCOSE 80 85 85   BUN 8 7 6   CREATININE 0.58 0.56 0.56   ANIONGAP 12 8* 10   LABGLOM >60 >60 >60   GFRAA >60 >60 >60   CALCIUM 8.8 8.7 8.7     Recent Labs     10/19/19  0454   PROT 6.5 LABALBU 3.5   AST 11   ALT 5   ALKPHOS 71   BILITOT <0.10*   BILIDIR <0.08     Anemia Profile:  Recent Labs     10/18/19  1325   IRON 18*   TIBC 322   LABIRON 6*   FERRITIN 15   IVHCHVOC89 357   FOLATE 8.4       Glucose:No results for input(s): LABA1C, POCGLU, LABINSU in the last 72 hours. Physical Examination:    BP (!) 108/56   Pulse 65   Temp 97.9 °F (36.6 °C) (Oral)   Resp 18   Ht 5' 3\" (1.6 m)   Wt 145 lb (65.8 kg)   LMP 10/16/2019   SpO2 99%   BMI 25.69 kg/m² No intake or output data in the 24 hours ending 10/21/19 1236    General Appearance:    Alert, cooperative, no distress, appears stated age   Head:    Normocephalic, without obvious abnormality, atraumatic   Eyes:    PERRL, conjunctiva/corneas clear, EOM's intact        Ears:    Normal external ear canals, both ears   Nose:   Nares normal, septum midline, mucosa normal, no drainage    or sinus tenderness   Throat:   Lips, mucosa, and tongue normal; teeth and gums normal   Neck:   Supple, symmetrical, trachea midline, no adenopathy;        thyroid:  No enlargement/tenderness/nodules; no carotid    bruit or JVD   Back:     Symmetric, no curvature, ROM normal, no CVA tenderness   Lungs:     Clear to auscultation bilaterally, respirations unlabored   Chest wall:    No tenderness or deformity   Heart:    Regular rate and rhythm, S1 and S2 normal, no murmur, rub   or gallop   Abdomen:     Soft, non-tender, bowel sounds active all four quadrants,     no masses, no organomegaly   Extremities:   Extremities normal, atraumatic, no cyanosis or edema   Pulses:   2+ and symmetric all extremities   Skin:   Skin color, texture, turgor normal, no rashes or lesions   Lymph nodes:   Cervical, supraclavicular, and axillary nodes normal   Neurologic:   CNII-XII intact.  Normal strength, sensation and reflexes       throughout       Assessment:     Hospital Problems           Last Modified POA    * (Principal) Acute pulmonary embolism ([facility] 10/19/2019 Yes Smoker 10/18/2019 Yes    Marijuana smoker 10/18/2019 Yes    Iron deficiency anemia 10/19/2019 Yes    History of intravenous drug abuse ([address] 10/19/2019 Yes    Occult blood positive stool 10/20/2019 Yes    HH (hiatus hernia) 10/20/2019 Yes        Past Medical History:   Diagnosis Date    Anemia 10/18/2019    Drug abuse, IV ([address]     claims that she has previous iv drug dependency claims hasn' had recent usage.  Headache     Marijuana smoker 10/18/2019    Smoker 10/18/2019        Consultations:     IP CONSULT TO HOSPITALIST  IP CONSULT TO SOCIAL WORK  IP CONSULT TO GI    Plan:     1. Colonoscopy in the morning  2. Continue heparin  3. Price check for Xarelto for discharge planning  4. Patient has completed Venofer 200 mg x 3 days  5. DVT prophylaxis with heparin  6. GI prophylaxis  7.  Recheck laboratories in the morning      Electronically signed by Violeta Oliva DO on 10/21/2019 at 12:36 PM

## 2019-10-22 ENCOUNTER — ANESTHESIA EVENT (OUTPATIENT)
Dept: OPERATING ROOM | Age: 34
DRG: 175 | End: 2019-10-22
Payer: COMMERCIAL

## 2019-10-22 ENCOUNTER — ANESTHESIA (OUTPATIENT)
Dept: OPERATING ROOM | Age: 34
DRG: 175 | End: 2019-10-22
Payer: COMMERCIAL

## 2019-10-22 VITALS
OXYGEN SATURATION: 100 % | RESPIRATION RATE: 13 BRPM | SYSTOLIC BLOOD PRESSURE: 93 MMHG | DIASTOLIC BLOOD PRESSURE: 57 MMHG

## 2019-10-22 LAB
-: NORMAL
ABSOLUTE EOS #: 0.23 K/UL (ref 0–0.4)
ABSOLUTE IMMATURE GRANULOCYTE: 0 K/UL (ref 0–0.3)
ABSOLUTE LYMPH #: 2.84 K/UL (ref 1–4.8)
ABSOLUTE MONO #: 0.29 K/UL (ref 0.2–0.8)
ANION GAP SERPL CALCULATED.3IONS-SCNC: 17 MMOL/L (ref 9–17)
BASOPHILS # BLD: 0 %
BASOPHILS ABSOLUTE: 0 K/UL (ref 0–0.2)
BUN BLDV-MCNC: 4 MG/DL (ref 6–20)
BUN/CREAT BLD: 7 (ref 9–20)
CALCIUM SERPL-MCNC: 9.1 MG/DL (ref 8.6–10.4)
CHLORIDE BLD-SCNC: 103 MMOL/L (ref 98–107)
CO2: 19 MMOL/L (ref 20–31)
CREAT SERPL-MCNC: 0.57 MG/DL (ref 0.5–0.9)
DIFFERENTIAL TYPE: ABNORMAL
EOSINOPHILS RELATIVE PERCENT: 4 % (ref 1–4)
GFR AFRICAN AMERICAN: >60 ML/MIN
GFR NON-AFRICAN AMERICAN: >60 ML/MIN
GFR SERPL CREATININE-BSD FRML MDRD: ABNORMAL ML/MIN/{1.73_M2}
GFR SERPL CREATININE-BSD FRML MDRD: ABNORMAL ML/MIN/{1.73_M2}
GLUCOSE BLD-MCNC: 70 MG/DL (ref 70–99)
HCT VFR BLD CALC: 31.6 % (ref 36.3–47.1)
HEMOGLOBIN: 8.9 G/DL (ref 11.9–15.1)
IMMATURE GRANULOCYTES: 0 %
LYMPHOCYTES # BLD: 49 % (ref 24–44)
MAGNESIUM: 2.3 MG/DL (ref 1.6–2.6)
MCH RBC QN AUTO: 20.9 PG (ref 25.2–33.5)
MCHC RBC AUTO-ENTMCNC: 28.2 G/DL (ref 28.4–34.8)
MCV RBC AUTO: 74.4 FL (ref 82.6–102.9)
MONOCYTES # BLD: 5 % (ref 1–7)
NRBC AUTOMATED: 0 PER 100 WBC
PARTIAL THROMBOPLASTIN TIME: 59.4 SEC (ref 23–31)
PARTIAL THROMBOPLASTIN TIME: 80.8 SEC (ref 23–31)
PDW BLD-RTO: 19.6 % (ref 11.8–14.4)
PLATELET # BLD: 271 K/UL (ref 138–453)
PLATELET ESTIMATE: ABNORMAL
PMV BLD AUTO: 10.3 FL (ref 8.1–13.5)
POTASSIUM SERPL-SCNC: 3.7 MMOL/L (ref 3.7–5.3)
RBC # BLD: 4.25 M/UL (ref 3.95–5.11)
RBC # BLD: ABNORMAL 10*6/UL
REASON FOR REJECTION: NORMAL
SEG NEUTROPHILS: 42 % (ref 36–66)
SEGMENTED NEUTROPHILS ABSOLUTE COUNT: 2.44 K/UL (ref 1.8–7.7)
SODIUM BLD-SCNC: 139 MMOL/L (ref 135–144)
WBC # BLD: 5.8 K/UL (ref 3.5–11.3)
WBC # BLD: ABNORMAL 10*3/UL
ZZ NTE CLEAN UP: ORDERED TEST: NORMAL
ZZ NTE WITH NAME CLEAN UP: SPECIMEN SOURCE: NORMAL

## 2019-10-22 PROCEDURE — 2500000003 HC RX 250 WO HCPCS: Performed by: INTERNAL MEDICINE

## 2019-10-22 PROCEDURE — 6360000002 HC RX W HCPCS: Performed by: INTERNAL MEDICINE

## 2019-10-22 PROCEDURE — 80048 BASIC METABOLIC PNL TOTAL CA: CPT

## 2019-10-22 PROCEDURE — 45378 DIAGNOSTIC COLONOSCOPY: CPT | Performed by: INTERNAL MEDICINE

## 2019-10-22 PROCEDURE — 7100000001 HC PACU RECOVERY - ADDTL 15 MIN: Performed by: INTERNAL MEDICINE

## 2019-10-22 PROCEDURE — 99232 SBSQ HOSP IP/OBS MODERATE 35: CPT | Performed by: INTERNAL MEDICINE

## 2019-10-22 PROCEDURE — 7100000000 HC PACU RECOVERY - FIRST 15 MIN: Performed by: INTERNAL MEDICINE

## 2019-10-22 PROCEDURE — 83735 ASSAY OF MAGNESIUM: CPT

## 2019-10-22 PROCEDURE — G0378 HOSPITAL OBSERVATION PER HR: HCPCS

## 2019-10-22 PROCEDURE — 3609008400 HC SIGMOIDOSCOPY DIAGNOSTIC: Performed by: INTERNAL MEDICINE

## 2019-10-22 PROCEDURE — 6360000002 HC RX W HCPCS: Performed by: ANESTHESIOLOGY

## 2019-10-22 PROCEDURE — 3700000000 HC ANESTHESIA ATTENDED CARE: Performed by: INTERNAL MEDICINE

## 2019-10-22 PROCEDURE — 6370000000 HC RX 637 (ALT 250 FOR IP): Performed by: INTERNAL MEDICINE

## 2019-10-22 PROCEDURE — 0DJD8ZZ INSPECTION OF LOWER INTESTINAL TRACT, VIA NATURAL OR ARTIFICIAL OPENING ENDOSCOPIC: ICD-10-PCS | Performed by: INTERNAL MEDICINE

## 2019-10-22 PROCEDURE — 2709999900 HC NON-CHARGEABLE SUPPLY: Performed by: INTERNAL MEDICINE

## 2019-10-22 PROCEDURE — 2060000000 HC ICU INTERMEDIATE R&B

## 2019-10-22 PROCEDURE — 2500000003 HC RX 250 WO HCPCS: Performed by: ANESTHESIOLOGY

## 2019-10-22 PROCEDURE — 85025 COMPLETE CBC W/AUTO DIFF WBC: CPT

## 2019-10-22 PROCEDURE — 36415 COLL VENOUS BLD VENIPUNCTURE: CPT

## 2019-10-22 PROCEDURE — 85730 THROMBOPLASTIN TIME PARTIAL: CPT

## 2019-10-22 PROCEDURE — 6370000000 HC RX 637 (ALT 250 FOR IP): Performed by: NURSE PRACTITIONER

## 2019-10-22 RX ORDER — FENTANYL CITRATE 50 UG/ML
50 INJECTION, SOLUTION INTRAMUSCULAR; INTRAVENOUS EVERY 5 MIN PRN
Status: DISCONTINUED | OUTPATIENT
Start: 2019-10-22 | End: 2019-10-22

## 2019-10-22 RX ORDER — PROPOFOL 10 MG/ML
INJECTION, EMULSION INTRAVENOUS PRN
Status: DISCONTINUED | OUTPATIENT
Start: 2019-10-22 | End: 2019-10-22 | Stop reason: SDUPTHER

## 2019-10-22 RX ORDER — LIDOCAINE HYDROCHLORIDE 20 MG/ML
INJECTION, SOLUTION INFILTRATION; PERINEURAL PRN
Status: DISCONTINUED | OUTPATIENT
Start: 2019-10-22 | End: 2019-10-22 | Stop reason: SDUPTHER

## 2019-10-22 RX ORDER — FENTANYL CITRATE 50 UG/ML
25 INJECTION, SOLUTION INTRAMUSCULAR; INTRAVENOUS EVERY 5 MIN PRN
Status: DISCONTINUED | OUTPATIENT
Start: 2019-10-22 | End: 2019-10-22

## 2019-10-22 RX ORDER — HYDROMORPHONE HCL 110MG/55ML
0.25 PATIENT CONTROLLED ANALGESIA SYRINGE INTRAVENOUS EVERY 5 MIN PRN
Status: DISCONTINUED | OUTPATIENT
Start: 2019-10-22 | End: 2019-10-22

## 2019-10-22 RX ORDER — MIDAZOLAM HYDROCHLORIDE 1 MG/ML
INJECTION INTRAMUSCULAR; INTRAVENOUS PRN
Status: DISCONTINUED | OUTPATIENT
Start: 2019-10-22 | End: 2019-10-22 | Stop reason: SDUPTHER

## 2019-10-22 RX ORDER — HYDROMORPHONE HCL 110MG/55ML
0.5 PATIENT CONTROLLED ANALGESIA SYRINGE INTRAVENOUS EVERY 5 MIN PRN
Status: DISCONTINUED | OUTPATIENT
Start: 2019-10-22 | End: 2019-10-22

## 2019-10-22 RX ORDER — ONDANSETRON 2 MG/ML
4 INJECTION INTRAMUSCULAR; INTRAVENOUS
Status: DISCONTINUED | OUTPATIENT
Start: 2019-10-22 | End: 2019-10-22

## 2019-10-22 RX ADMIN — POLYETHYLENE GLYCOL-3350 AND ELECTROLYTES 2000 ML: 236; 6.74; 5.86; 2.97; 22.74 POWDER, FOR SOLUTION ORAL at 00:25

## 2019-10-22 RX ADMIN — HEPARIN SODIUM AND DEXTROSE 22 UNITS/KG/HR: 10000; 5 INJECTION INTRAVENOUS at 01:06

## 2019-10-22 RX ADMIN — FAMOTIDINE 20 MG: 10 INJECTION, SOLUTION INTRAVENOUS at 09:37

## 2019-10-22 RX ADMIN — POLYETHYLENE GLYCOL 3350, SODIUM SULFATE ANHYDROUS, SODIUM BICARBONATE, SODIUM CHLORIDE, POTASSIUM CHLORIDE 2000 ML: 236; 22.74; 6.74; 5.86; 2.97 POWDER, FOR SOLUTION ORAL at 21:49

## 2019-10-22 RX ADMIN — MIDAZOLAM 2 MG: 1 INJECTION INTRAMUSCULAR; INTRAVENOUS at 19:43

## 2019-10-22 RX ADMIN — HEPARIN SODIUM AND DEXTROSE 22 UNITS/KG/HR: 10000; 5 INJECTION INTRAVENOUS at 21:42

## 2019-10-22 RX ADMIN — KETOROLAC TROMETHAMINE 30 MG: 30 INJECTION, SOLUTION INTRAMUSCULAR at 21:41

## 2019-10-22 RX ADMIN — PROPOFOL 100 MG: 10 INJECTION, EMULSION INTRAVENOUS at 19:43

## 2019-10-22 RX ADMIN — METHADONE HYDROCHLORIDE 110 MG: 10 TABLET ORAL at 09:37

## 2019-10-22 RX ADMIN — BISACODYL 20 MG: 5 TABLET, COATED ORAL at 21:41

## 2019-10-22 RX ADMIN — LIDOCAINE HYDROCHLORIDE 100 MG: 20 INJECTION, SOLUTION INFILTRATION; PERINEURAL at 19:43

## 2019-10-22 RX ADMIN — POLYETHYLENE GLYCOL 3350, SODIUM SULFATE ANHYDROUS, SODIUM BICARBONATE, SODIUM CHLORIDE, POTASSIUM CHLORIDE 4000 ML: 236; 22.74; 6.74; 5.86; 2.97 POWDER, FOR SOLUTION ORAL at 21:49

## 2019-10-22 ASSESSMENT — PAIN SCALES - GENERAL
PAINLEVEL_OUTOF10: 7
PAINLEVEL_OUTOF10: 6
PAINLEVEL_OUTOF10: 3

## 2019-10-22 ASSESSMENT — PULMONARY FUNCTION TESTS
PIF_VALUE: 1
PIF_VALUE: 0
PIF_VALUE: 1
PIF_VALUE: 1
PIF_VALUE: 0
PIF_VALUE: 1
PIF_VALUE: 1
PIF_VALUE: 0
PIF_VALUE: 0
PIF_VALUE: 1

## 2019-10-22 ASSESSMENT — LIFESTYLE VARIABLES: SMOKING_STATUS: 1

## 2019-10-22 NOTE — OP NOTE
will give more prep and try again tomorrow    Electronically signed by Bill Jones MD  on 10/22/2019 at 7:49 PM

## 2019-10-22 NOTE — CARE COORDINATION
Discharge planning    Patient to have colonoscopy today and if negative will be discharged on xarelto. Did obtain rx for starter pack and [facility] in case needed tonight. Her monthly dosage will be free at her pharmacy . Did place an anticoagulation clinic referral to follow. Call from patient pharmacy and patient already used an xarelto card and not eligible to use again     Per RN note on 10/20 for insurance to cover [employer] would have to do the following :  Script would need to be for 20 mg for 30 days and 10 mg for 21 days. Patient would need to take 20 mg for morning dose and the 10 mg for evening dose    Updated DR Erick Campbell and asked to see if eliquis is covered. Call to [facility] pharmacy at [phone] to run eliquis. It is covered at no charge. . Placed 5 months on profile and will have dr Jeffrey Wise switch to eliquis.  Will send free month with voucher to [employer]

## 2019-10-22 NOTE — PROGRESS NOTES
mg Oral Q4H PRN   ketorolac 30 mg Intravenous Q6H PRN   heparin (porcine) 40 Units/kg Intravenous PRN   sodium chloride flush 10 mL Intravenous PRN   magnesium hydroxide 30 mL Oral Daily PRN   ondansetron 4 mg Intravenous Q6H PRN   heparin (porcine) 80 Units/kg Intravenous PRN   heparin (porcine) 40 Units/kg Intravenous PRN   LORazepam 0.5 mg Intravenous Q6H PRN       Data:     Code Status:  Full Code     Echocardiogram  10/19/2019  Left ventricle is normal in size, normal wall thickness, global left  ventricular systolic function is low normal, estimated ejection fraction is  50-55%. Right atrial dilatation. Right ventricular dilatation with normal systolic function. Mild mitral regurgitation. Moderate tricuspid regurgitation. Estimated right ventricular systolic pressure is 06.7 mmHg. No pericardial effusion is seen. Bilateral venous duplex lower extremities  10/19/2019  No evidence of superficial or deep venous thrombosis in both lower  extremities. Ct Abdomen Pelvis W Iv Contrast  1. Result Date: 10/17/2019  No acute finding in the abdomen or pelvis. Specifically, the kidneys are unremarkable with no evidence of pyelonephritis. Urinary bladder is unremarkable. There is a moderate dense formed stool load throughout the colon suggesting constipation. Moderate-sized hiatal hernia. 2.Addendum Date: 10/17/2019    ADDENDUM: Findings were discussed and reviewed with Dr. Maninder Navas. There is a small area of pleural and parenchymal disease in the posteromedial right costophrenic angle (axial image 26-33). This may represent a small/early pneumonia. As the patient symptoms are in this region and because of the peripheral location of the lesion, a small pulmonary infarct should also be considered. If clinically indicated, follow-up CTPA may be helpful.      Ct Chest Pulmonary Embolism W Contrast  Result Date: 10/18/2019  Acute segmental and subsegmental pulmonary emboli in the posterior basal segment right lower lobe with an associated infarction. Moderate size hiatal hernia. Labs:    EGD  10/21/2019  Retropharyngeal area was grossly normal appearing   Esophagus: abnormal: Irregular Z line , ? BE , not biopsied ( heparin )  Stomach:    Fundus: normal    Body: normal    Antrum: normal  Duodenum:     Descending: abnormal:  random mall bowel  bxs taken     Bulb: abnormal:  random mall bowel  bxs taken   Recommendations/Plan:   1. F/U Biopsies  2. Colon tomorrow     Hematology:  Recent Labs     10/20/19  0329  10/21/19  0353  10/21/19  1647 10/22/19  0237 10/22/19  1301   WBC 6.2  --  6.0  --   --  5.8  --    RBC 3.89*  --  3.90*  --   --  4.25  --    HGB 8.0*  --  8.2*  --   --  8.9*  --    HCT 29.1*  --  28.9*  --   --  31.6*  --    MCV 74.8*  --  74.1*  --   --  74.4*  --    MCH 20.6*  --  21.0*  --   --  20.9*  --    MCHC 27.5*  --  28.4  --   --  28.2*  --    RDW 19.0*  --  19.0*  --   --  19.6*  --      313  --  327  --   --  271  --    MPV 10.2  --  9.8  --   --  10.3  --    SEGS 43  --  53  --   --  42  --    LYMPHOPCT 47*  --  38  --   --  49*  --    MONOPCT 5  --  5  --   --  5  --    EOSRELPCT 4  --  4  --   --  4  --    BASOPCT 1  --  1  --   --  0  --    APTT 111.2*   < > 70.6*   < > 30.4 59.4* 80.8*    < > = values in this interval not displayed. Chemistry:  Recent Labs     10/20/19  0329 10/21/19  0353 10/22/19  0237    141 139   K 4.1 3.7 3.7    107 103   CO2 26 24 19*   GLUCOSE 85 85 70   BUN 7 6 4*   CREATININE 0.56 0.56 0.57   MG  --   --  2.3   ANIONGAP 8* 10 17   LABGLOM >60 >60 >60   GFRAA >60 >60 >60   CALCIUM 8.7 8.7 9.1     Anemia Profile:  Recent Labs     10/18/19  1325   IRON 18*   TIBC 322   LABIRON 6*   FERRITIN 15   [id number] 357   FOLATE 8.4       Glucose:No results for input(s): LABA1C, POCGLU, LABINSU in the last 72 hours.     Physical Examination:    /65   Pulse 77   Temp 97.9 °F (36.6 °C) (Oral)   Resp 16   Ht 5' 3\" (1.6 m)   Wt 145 [facility])     claims that she has previous iv drug dependency claims hasn' had recent usage.  Headache     Marijuana smoker 10/18/2019    Smoker 10/18/2019        Consultations:     IP CONSULT TO HOSPITALIST  IP CONSULT TO SOCIAL WORK  IP CONSULT TO GI    Plan:     1. Colonoscopy today  2. Continue heparin  3. If no positive findings on colonoscopy patient will be discharged on Xarelto  4. Patient has completed Venofer 200 mg x 3 days  5. DVT prophylaxis with heparin  6. GI prophylaxis  7.  Recheck laboratories in the morning      Electronically signed by Charles Mclean DO on 10/22/2019 at 1:56 PM

## 2019-10-23 ENCOUNTER — ANESTHESIA EVENT (OUTPATIENT)
Dept: OPERATING ROOM | Age: 34
DRG: 175 | End: 2019-10-23
Payer: COMMERCIAL

## 2019-10-23 ENCOUNTER — ANESTHESIA (OUTPATIENT)
Dept: OPERATING ROOM | Age: 34
DRG: 175 | End: 2019-10-23
Payer: COMMERCIAL

## 2019-10-23 VITALS
DIASTOLIC BLOOD PRESSURE: 77 MMHG | OXYGEN SATURATION: 100 % | RESPIRATION RATE: 15 BRPM | SYSTOLIC BLOOD PRESSURE: 123 MMHG

## 2019-10-23 LAB
ABSOLUTE EOS #: 0.23 K/UL (ref 0–0.44)
ABSOLUTE IMMATURE GRANULOCYTE: 0.02 K/UL (ref 0–0.3)
ABSOLUTE LYMPH #: 2.1 K/UL (ref 1.1–3.7)
ABSOLUTE MONO #: 0.34 K/UL (ref 0.1–1.2)
ANION GAP SERPL CALCULATED.3IONS-SCNC: 10 MMOL/L (ref 9–17)
BASOPHILS # BLD: 0 % (ref 0–2)
BASOPHILS ABSOLUTE: <0.03 K/UL (ref 0–0.2)
BUN BLDV-MCNC: 3 MG/DL (ref 6–20)
BUN/CREAT BLD: 5 (ref 9–20)
CALCIUM SERPL-MCNC: 8.8 MG/DL (ref 8.6–10.4)
CHLORIDE BLD-SCNC: 104 MMOL/L (ref 98–107)
CO2: 27 MMOL/L (ref 20–31)
CREAT SERPL-MCNC: 0.56 MG/DL (ref 0.5–0.9)
CULTURE: NORMAL
CULTURE: NORMAL
DIFFERENTIAL TYPE: ABNORMAL
EOSINOPHILS RELATIVE PERCENT: 5 % (ref 1–4)
GFR AFRICAN AMERICAN: >60 ML/MIN
GFR NON-AFRICAN AMERICAN: >60 ML/MIN
GFR SERPL CREATININE-BSD FRML MDRD: ABNORMAL ML/MIN/{1.73_M2}
GFR SERPL CREATININE-BSD FRML MDRD: ABNORMAL ML/MIN/{1.73_M2}
GLUCOSE BLD-MCNC: 85 MG/DL (ref 70–99)
HCT VFR BLD CALC: 26.4 % (ref 36.3–47.1)
HEMOGLOBIN: 7.7 G/DL (ref 11.9–15.1)
IMMATURE GRANULOCYTES: 0 %
LYMPHOCYTES # BLD: 43 % (ref 24–43)
Lab: NORMAL
Lab: NORMAL
MCH RBC QN AUTO: 21.2 PG (ref 25.2–33.5)
MCHC RBC AUTO-ENTMCNC: 29.2 G/DL (ref 28.4–34.8)
MCV RBC AUTO: 72.7 FL (ref 82.6–102.9)
MONOCYTES # BLD: 7 % (ref 3–12)
NRBC AUTOMATED: 0 PER 100 WBC
PARTIAL THROMBOPLASTIN TIME: 101.7 SEC (ref 23–31)
PARTIAL THROMBOPLASTIN TIME: 30.9 SEC (ref 23–31)
PDW BLD-RTO: 19.1 % (ref 11.8–14.4)
PLATELET # BLD: 279 K/UL (ref 138–453)
PLATELET ESTIMATE: ABNORMAL
PMV BLD AUTO: 10.3 FL (ref 8.1–13.5)
POTASSIUM SERPL-SCNC: 3.9 MMOL/L (ref 3.7–5.3)
RBC # BLD: 3.63 M/UL (ref 3.95–5.11)
RBC # BLD: ABNORMAL 10*6/UL
SEG NEUTROPHILS: 44 % (ref 36–65)
SEGMENTED NEUTROPHILS ABSOLUTE COUNT: 2.16 K/UL (ref 1.5–8.1)
SODIUM BLD-SCNC: 141 MMOL/L (ref 135–144)
SPECIMEN DESCRIPTION: NORMAL
SPECIMEN DESCRIPTION: NORMAL
SURGICAL PATHOLOGY REPORT: NORMAL
WBC # BLD: 4.9 K/UL (ref 3.5–11.3)
WBC # BLD: ABNORMAL 10*3/UL

## 2019-10-23 PROCEDURE — 3700000001 HC ADD 15 MINUTES (ANESTHESIA): Performed by: INTERNAL MEDICINE

## 2019-10-23 PROCEDURE — 7100000001 HC PACU RECOVERY - ADDTL 15 MIN: Performed by: INTERNAL MEDICINE

## 2019-10-23 PROCEDURE — 6370000000 HC RX 637 (ALT 250 FOR IP): Performed by: INTERNAL MEDICINE

## 2019-10-23 PROCEDURE — 99232 SBSQ HOSP IP/OBS MODERATE 35: CPT | Performed by: INTERNAL MEDICINE

## 2019-10-23 PROCEDURE — G0378 HOSPITAL OBSERVATION PER HR: HCPCS

## 2019-10-23 PROCEDURE — 7100000000 HC PACU RECOVERY - FIRST 15 MIN: Performed by: INTERNAL MEDICINE

## 2019-10-23 PROCEDURE — 3700000000 HC ANESTHESIA ATTENDED CARE: Performed by: INTERNAL MEDICINE

## 2019-10-23 PROCEDURE — 36415 COLL VENOUS BLD VENIPUNCTURE: CPT

## 2019-10-23 PROCEDURE — 6360000002 HC RX W HCPCS: Performed by: INTERNAL MEDICINE

## 2019-10-23 PROCEDURE — 2500000003 HC RX 250 WO HCPCS: Performed by: NURSE ANESTHETIST, CERTIFIED REGISTERED

## 2019-10-23 PROCEDURE — 0DJD8ZZ INSPECTION OF LOWER INTESTINAL TRACT, VIA NATURAL OR ARTIFICIAL OPENING ENDOSCOPIC: ICD-10-PCS | Performed by: INTERNAL MEDICINE

## 2019-10-23 PROCEDURE — 80048 BASIC METABOLIC PNL TOTAL CA: CPT

## 2019-10-23 PROCEDURE — 85730 THROMBOPLASTIN TIME PARTIAL: CPT

## 2019-10-23 PROCEDURE — 2580000003 HC RX 258: Performed by: INTERNAL MEDICINE

## 2019-10-23 PROCEDURE — 2709999900 HC NON-CHARGEABLE SUPPLY: Performed by: INTERNAL MEDICINE

## 2019-10-23 PROCEDURE — 45380 COLONOSCOPY AND BIOPSY: CPT | Performed by: INTERNAL MEDICINE

## 2019-10-23 PROCEDURE — 2500000003 HC RX 250 WO HCPCS: Performed by: INTERNAL MEDICINE

## 2019-10-23 PROCEDURE — 6360000002 HC RX W HCPCS: Performed by: NURSE ANESTHETIST, CERTIFIED REGISTERED

## 2019-10-23 PROCEDURE — 3609027000 HC COLONOSCOPY: Performed by: INTERNAL MEDICINE

## 2019-10-23 PROCEDURE — 2060000000 HC ICU INTERMEDIATE R&B

## 2019-10-23 PROCEDURE — 85025 COMPLETE CBC W/AUTO DIFF WBC: CPT

## 2019-10-23 RX ORDER — PROPOFOL 10 MG/ML
INJECTION, EMULSION INTRAVENOUS PRN
Status: DISCONTINUED | OUTPATIENT
Start: 2019-10-23 | End: 2019-10-23 | Stop reason: SDUPTHER

## 2019-10-23 RX ORDER — LIDOCAINE HYDROCHLORIDE 10 MG/ML
INJECTION, SOLUTION EPIDURAL; INFILTRATION; INTRACAUDAL; PERINEURAL PRN
Status: DISCONTINUED | OUTPATIENT
Start: 2019-10-23 | End: 2019-10-23 | Stop reason: SDUPTHER

## 2019-10-23 RX ORDER — SODIUM CHLORIDE 9 MG/ML
INJECTION, SOLUTION INTRAVENOUS CONTINUOUS
Status: DISCONTINUED | OUTPATIENT
Start: 2019-10-23 | End: 2019-10-24 | Stop reason: HOSPADM

## 2019-10-23 RX ORDER — ONDANSETRON 2 MG/ML
4 INJECTION INTRAMUSCULAR; INTRAVENOUS
Status: DISCONTINUED | OUTPATIENT
Start: 2019-10-23 | End: 2019-10-23 | Stop reason: HOSPADM

## 2019-10-23 RX ADMIN — PROPOFOL 100 MG: 10 INJECTION, EMULSION INTRAVENOUS at 17:37

## 2019-10-23 RX ADMIN — ONDANSETRON 4 MG: 2 INJECTION INTRAMUSCULAR; INTRAVENOUS at 01:56

## 2019-10-23 RX ADMIN — PROPOFOL 50 MG: 10 INJECTION, EMULSION INTRAVENOUS at 17:39

## 2019-10-23 RX ADMIN — DOCUSATE SODIUM 100 MG: 100 CAPSULE, LIQUID FILLED ORAL at 08:31

## 2019-10-23 RX ADMIN — Medication 10 ML: at 21:23

## 2019-10-23 RX ADMIN — Medication 10 ML: at 08:31

## 2019-10-23 RX ADMIN — HEPARIN SODIUM 2630 UNITS: 1000 INJECTION INTRAVENOUS; SUBCUTANEOUS at 01:55

## 2019-10-23 RX ADMIN — FAMOTIDINE 20 MG: 10 INJECTION, SOLUTION INTRAVENOUS at 08:31

## 2019-10-23 RX ADMIN — APIXABAN 10 MG: 5 TABLET, FILM COATED ORAL at 21:23

## 2019-10-23 RX ADMIN — METHADONE HYDROCHLORIDE 110 MG: 10 TABLET ORAL at 08:30

## 2019-10-23 RX ADMIN — SODIUM CHLORIDE: 9 INJECTION, SOLUTION INTRAVENOUS at 13:55

## 2019-10-23 RX ADMIN — PROPOFOL 100 MG: 10 INJECTION, EMULSION INTRAVENOUS at 17:29

## 2019-10-23 RX ADMIN — LIDOCAINE HYDROCHLORIDE 5 ML: 10 INJECTION, SOLUTION EPIDURAL; INFILTRATION; INTRACAUDAL; PERINEURAL at 17:29

## 2019-10-23 ASSESSMENT — PULMONARY FUNCTION TESTS
PIF_VALUE: 1

## 2019-10-23 ASSESSMENT — PAIN SCALES - GENERAL
PAINLEVEL_OUTOF10: 4
PAINLEVEL_OUTOF10: 0
PAINLEVEL_OUTOF10: 7
PAINLEVEL_OUTOF10: 0

## 2019-10-23 NOTE — PROGRESS NOTES
[facility] Progress Note    10/23/2019   11:17 AM    Name:  Eric Bentley  :    1985  Age:  34 y.o. female  MRN:    [id number]     Acct:     [de-identified]   Room:  1014/1014-02   [facility] Date: 10/18/2019 12:34 PM  PCP: Esmer Arroyo PA-C, [facility]    Subjective:     C/C:   Chief Complaint   Patient presents with    Back Pain     inremittent past couple weeks. denies injury . Interval History: Status: improved. Patient denies shortness of breath. Her EGD is reported below. There is no significant findings that account for her iron deficiency. Colonoscopy is scheduled for later today. It was attempted yesterday but aborted secondary to extremely poor prep. She has received her third and final dose of Venofer 200 mg. Hemoglobin has decreased again to 7.7. If no obvious source of bleeding is uncovered on colonoscopy she will likely be discharged on Eliquis for 3 to 6 months for management of her acute pulmonary embolization. She was to receive her third enema however her blood pressure is somewhat low likely secondary to dehydration. IV of 0.9 normal saline at 100 mL's per hour has been started. History: Per my partners discharge note  Ladi Boyer a 33 y.o. Non-/non  female who presents with Back Pain (inremittent past couple weeks.  denies injury . )   and is admitted to the hospital for the management of Acute pulmonary embolism ([address]   The patient presents for evaluation related to right lower back pain that she has had for approximately 2 weeks.  She states she was initially seen at [facility] 2 weeks ago and was diagnosed with UTI and was given antibiotics.  She was in our ER last night with the same complaints of right lower back pain. John Medrano did a CT of her abdomen with contrast to rule out abdominal issues/kidney stones which was negative.  She states they wanted to admit her for 24 hours and do a CT scan of her chest this morning but she left AMA.  She is back today to continue her work-up.  Her chest CT she does have acute segmental and subsegmental pulmonary emboli in the posterior basal segment right lower lobe with an associated infarction without evidence of right heart strain. Jet Márquez reports right lower back pain that is intermittent.  She states that with deep breathing her pain is sharp in that area and at rest is more of an ache.  She states its been difficult to take a deep breath.  She reports shortness of breath on exertion.  She denies chest pain, diaphoresis, fever or chills.  She did complete the antibiotics that were ordered for her UTI 2 weeks ago. Jet Márquez has a history of marijuana use and a daily smoker. Jet Márquez states she was recently told by her methadone clinic that she was anemic but she has not had formal work-up\"    ROS:  Constitutional: Negative for chills, diaphoresis, fever, malaise/fatigue and weight loss. HENT: Negative for ear pain, hearing loss, nosebleeds, sore throat and tinnitus. Eyes: Negative for blurred vision, double vision, photophobia and pain. Respiratory: Negative for cough, hemoptysis, sputum production, shortness of breath and wheezing. Cardiovascular: Negative for palpitations, orthopnea, claudication, leg swelling and PND. Gastrointestinal: Negative for abdominal pain, blood in stool, constipation, diarrhea, heartburn, melena, nausea and vomiting. Genitourinary: Negative for dysuria, flank pain, frequency, hematuria and urgency. Musculoskeletal: Negative for back pain, falls, joint pain, myalgias and neck pain. Skin: Negative for itching and rash. Neurological: Negative for dizziness, tingling, tremors, sensory change, focal weakness, seizures, weakness and headaches. Endo/Heme/Allergies: Does not bruise/bleed easily. Psychiatric/Behavioral: Negative for depression. The patient is not nervous/anxious. Medications:      Allergies:

## 2019-10-23 NOTE — FLOWSHEET NOTE
10/23/19 0459   Provider Notification   Reason for Communication Evaluate  (scheduled Methadone )   Provider Name Lisset Rivera   Provider Notification Nurse Practitioner   Method of Communication Secure Message   Response Other (Comment)  ([id number] [address] to give sched.  Methadone)   Notification Time 1808

## 2019-10-24 VITALS
WEIGHT: 145 LBS | HEART RATE: 61 BPM | BODY MASS INDEX: 25.69 KG/M2 | TEMPERATURE: 98.1 F | DIASTOLIC BLOOD PRESSURE: 59 MMHG | SYSTOLIC BLOOD PRESSURE: 110 MMHG | OXYGEN SATURATION: 98 % | HEIGHT: 63 IN | RESPIRATION RATE: 18 BRPM

## 2019-10-24 LAB
ABSOLUTE EOS #: 0.21 K/UL (ref 0–0.44)
ABSOLUTE IMMATURE GRANULOCYTE: 0 K/UL (ref 0–0.3)
ABSOLUTE LYMPH #: 1.54 K/UL (ref 1.1–3.7)
ABSOLUTE MONO #: 0.42 K/UL (ref 0.1–1.2)
ANION GAP SERPL CALCULATED.3IONS-SCNC: 12 MMOL/L (ref 9–17)
BASOPHILS # BLD: 0 % (ref 0–2)
BASOPHILS ABSOLUTE: 0 K/UL (ref 0–0.2)
BUN BLDV-MCNC: 4 MG/DL (ref 6–20)
BUN/CREAT BLD: 6 (ref 9–20)
CALCIUM SERPL-MCNC: 9 MG/DL (ref 8.6–10.4)
CHLORIDE BLD-SCNC: 104 MMOL/L (ref 98–107)
CO2: 24 MMOL/L (ref 20–31)
CREAT SERPL-MCNC: 0.67 MG/DL (ref 0.5–0.9)
DIFFERENTIAL TYPE: ABNORMAL
EOSINOPHILS RELATIVE PERCENT: 4 % (ref 1–4)
FACTOR V LEIDEN MUTATION: NORMAL
GFR AFRICAN AMERICAN: >60 ML/MIN
GFR NON-AFRICAN AMERICAN: >60 ML/MIN
GFR SERPL CREATININE-BSD FRML MDRD: ABNORMAL ML/MIN/{1.73_M2}
GFR SERPL CREATININE-BSD FRML MDRD: ABNORMAL ML/MIN/{1.73_M2}
GLUCOSE BLD-MCNC: 90 MG/DL (ref 70–99)
HCT VFR BLD CALC: 30 % (ref 36.3–47.1)
HEMOGLOBIN: 8.5 G/DL (ref 11.9–15.1)
IMMATURE GRANULOCYTES: 0 %
LYMPHOCYTES # BLD: 29 % (ref 24–43)
MCH RBC QN AUTO: 21.1 PG (ref 25.2–33.5)
MCHC RBC AUTO-ENTMCNC: 28.3 G/DL (ref 28.4–34.8)
MCV RBC AUTO: 74.6 FL (ref 82.6–102.9)
MONOCYTES # BLD: 8 % (ref 3–12)
MORPHOLOGY: ABNORMAL
MTHFR MUTATION 677T/A1298C: NORMAL
NRBC AUTOMATED: 0 PER 100 WBC
PDW BLD-RTO: 20.2 % (ref 11.8–14.4)
PLATELET # BLD: 294 K/UL (ref 138–453)
PLATELET ESTIMATE: ABNORMAL
PMV BLD AUTO: 10 FL (ref 8.1–13.5)
POTASSIUM SERPL-SCNC: 3.9 MMOL/L (ref 3.7–5.3)
PROTHROMBIN G20210A MUTATION: NORMAL
RBC # BLD: 4.02 M/UL (ref 3.95–5.11)
RBC # BLD: ABNORMAL 10*6/UL
SEG NEUTROPHILS: 59 % (ref 36–65)
SEGMENTED NEUTROPHILS ABSOLUTE COUNT: 3.13 K/UL (ref 1.5–8.1)
SODIUM BLD-SCNC: 140 MMOL/L (ref 135–144)
WBC # BLD: 5.3 K/UL (ref 3.5–11.3)
WBC # BLD: ABNORMAL 10*3/UL

## 2019-10-24 PROCEDURE — 6370000000 HC RX 637 (ALT 250 FOR IP): Performed by: NURSE PRACTITIONER

## 2019-10-24 PROCEDURE — 99232 SBSQ HOSP IP/OBS MODERATE 35: CPT | Performed by: INTERNAL MEDICINE

## 2019-10-24 PROCEDURE — 80048 BASIC METABOLIC PNL TOTAL CA: CPT

## 2019-10-24 PROCEDURE — 2580000003 HC RX 258: Performed by: NURSE PRACTITIONER

## 2019-10-24 PROCEDURE — G0378 HOSPITAL OBSERVATION PER HR: HCPCS

## 2019-10-24 PROCEDURE — 36415 COLL VENOUS BLD VENIPUNCTURE: CPT

## 2019-10-24 PROCEDURE — 85025 COMPLETE CBC W/AUTO DIFF WBC: CPT

## 2019-10-24 PROCEDURE — 96376 TX/PRO/DX INJ SAME DRUG ADON: CPT

## 2019-10-24 PROCEDURE — 2500000003 HC RX 250 WO HCPCS: Performed by: NURSE PRACTITIONER

## 2019-10-24 RX ORDER — PANTOPRAZOLE SODIUM 20 MG/1
20 TABLET, DELAYED RELEASE ORAL DAILY
Qty: 30 TABLET | Refills: 3 | Status: SHIPPED | OUTPATIENT
Start: 2019-10-24 | End: 2021-07-10

## 2019-10-24 RX ORDER — METHADONE HYDROCHLORIDE 10 MG/1
110 TABLET ORAL DAILY
Status: DISCONTINUED | OUTPATIENT
Start: 2019-10-24 | End: 2019-10-24 | Stop reason: HOSPADM

## 2019-10-24 RX ORDER — FERROUS SULFATE 325(65) MG
325 TABLET ORAL 2 TIMES DAILY
Qty: 180 TABLET | Refills: 1 | Status: SHIPPED | OUTPATIENT
Start: 2019-10-24 | End: 2022-04-06

## 2019-10-24 RX ADMIN — Medication 10 ML: at 09:37

## 2019-10-24 RX ADMIN — APIXABAN 10 MG: 5 TABLET, FILM COATED ORAL at 09:28

## 2019-10-24 RX ADMIN — DOCUSATE SODIUM 100 MG: 100 CAPSULE, LIQUID FILLED ORAL at 09:28

## 2019-10-24 RX ADMIN — METHADONE HYDROCHLORIDE 110 MG: 10 TABLET ORAL at 09:27

## 2019-10-24 RX ADMIN — FAMOTIDINE 20 MG: 10 INJECTION, SOLUTION INTRAVENOUS at 09:27

## 2019-10-24 ASSESSMENT — PAIN SCALES - GENERAL
PAINLEVEL_OUTOF10: 5
PAINLEVEL_OUTOF10: 0

## 2019-10-24 NOTE — PROGRESS NOTES
Pt taken out per wheelchair by spouse. Patient stopping at pharmacy to  eliquis starter pack. Patient discharged via private auto with family member (spouse). Discharge paperwork and instructions given to patient. Patient  acknowledges understanding. Scripts given to Patient (e-scripted to patients pharmacy) for  New medications and side effects explained. Follow up appointments reviewed (directions to make follow up appointment given)  Discharge checklist completed  Any questions answered.

## 2019-10-24 NOTE — DISCHARGE SUMMARY
[facility]    Discharge Summary     Patient ID: Cody Mason  :  1985   MRN: [id number]     ACCOUNT:  [de-identified]   Patient's PCP: Bernadine Trevino PA-C  Admit Date: 10/18/2019   Discharge Date: 10/24/2019  Length of Stay: 6  Code Status:  Full Code  Admitting Physician: Annabelle Arroyo MD  Discharge Physician: Shayne Noriega DO     Active Discharge Diagnoses:     Hospital Problem Lists:  Hospital Problems           Last Modified POA    * (Principal) Acute pulmonary embolism ([facility] [address] 10/19/2019 Yes    Smoker 10/18/2019 Yes    Marijuana smoker 10/18/2019 Yes    Iron deficiency anemia 10/19/2019 Yes    History of intravenous drug abuse ([facility] [address] 10/19/2019 Yes    Occult blood positive stool 10/20/2019 Yes    HH (hiatus hernia) 10/20/2019 Yes          Admission Condition:  fair     Discharged Condition: good    Hospital Stay:     Hospital Course:      C/C:        Chief Complaint   Patient presents with    Back Pain       inremittent past couple weeks. denies injury .          Interval History: Status: improved. Patient denies shortness of breath. Her EGD and colonoscopy are reported below. There is no significant findings that account for her iron deficiency. Colonoscopy is scheduled for later today. It was attempted yesterday but aborted secondary to extremely poor prep. She has received her third and final dose of Venofer 200 mg. Hemoglobin has creased 8.5. She will be discharged on Eliquis for 3 to 6 months for management of her acute pulmonary embolization. She will also be placed on Protonix and iron replacement therapy. Follow-up with her PCP in 1 week. She may need referral to a neurology group that specializes in migraines for her weekly migraine cephalgia.     Significant therapeutic interventions: EGD  Colonoscopy  CTA chest    Significant Diagnostic Studies:     Echocardiogram  10/19/2019  Left ventricle is normal in size, normal wall thickness, global left  ventricular systolic function is low normal, estimated ejection fraction is  50-55%. Right atrial dilatation. Right ventricular dilatation with normal systolic function. Mild mitral regurgitation. Moderate tricuspid regurgitation. Estimated right ventricular systolic pressure is 42.0 mmHg. No pericardial effusion is seen.     Bilateral venous duplex lower extremities  10/19/2019  No evidence of superficial or deep venous thrombosis in both lower  extremities.     Ct Abdomen Pelvis W Iv Contrast  1. Result Date: 10/17/2019  No acute finding in the abdomen or pelvis. Specifically, the kidneys are unremarkable with no evidence of pyelonephritis. Urinary bladder is unremarkable. There is a moderate dense formed stool load throughout the colon suggesting constipation. Moderate-sized hiatal hernia. 2.Addendum Date: 10/17/2019    ADDENDUM: Findings were discussed and reviewed with Dr. Brennon Garcia. There is a small area of pleural and parenchymal disease in the posteromedial right costophrenic angle (axial image 26-33). This may represent a small/early pneumonia. As the patient symptoms are in this region and because of the peripheral location of the lesion, a small pulmonary infarct should also be considered. If clinically indicated, follow-up CTPA may be helpful.      Ct Chest Pulmonary Embolism W Contrast  Result Date: 10/18/2019  Acute segmental and subsegmental pulmonary emboli in the posterior basal segment right lower lobe with an associated infarction.  Moderate size hiatal hernia.        Labs:     Colonoscopy  10/23/2019  Terminal ileum: normal for the last 3 to 4 cm  Cecum/Ascending colon: normal  Transverse colon: normal  Descending/Sigmoid colon: normal  Rectum/Anus: examined in normal and retroflexed positions and was abnormal: Internal hemorrhoids mild     EGD  10/21/2019  Retropharyngeal area was grossly normal appearing   Esophagus: abnormal: Irregular Z line , ? BE , not biopsied ( heparin )  Stomach:    Fundus: normal    Body: normal    Antrum: normal  Duodenum:     Descending: abnormal:  random mall bowel  bxs taken     Bulb: abnormal:  random mall bowel  bxs taken   Recommendations/Plan:   1. F/U Biopsies  2. Colon tomorrow      Hematology:           Recent Labs     10/22/19  0237 10/22/19  1301 10/22/19  2245 10/23/19  0511 10/23/19  0747 10/24/19  0605   WBC 5.8  --   --  4.9  --  5.3   RBC 4.25  --   --  3.63*  --  4.02   HGB 8.9*  --   --  7.7*  --  8.5*   HCT 31.6*  --   --  26.4*  --  30.0*   MCV 74.4*  --   --  72.7*  --  74.6*   MCH 20.9*  --   --  21.2*  --  21.1*   MCHC 28.2*  --   --  29.2  --  28.3*   RDW 19.6*  --   --  19.1*  --  20.2*     --   --  279  --  294   MPV 10.3  --   --  10.3  --  10.0   SEGS 42  --   --  44  --  59   LYMPHOPCT 49*  --   --  43  --  29   MONOPCT 5  --   --  7  --  8   EOSRELPCT 4  --   --  5*  --  4   BASOPCT 0  --   --  0  --  0   APTT 59.4* 80.8* 30.9  --  101.7*  --       Chemistry:  Recent Labs     10/22/19  0237 10/23/19  0511 10/24/19  0605    141 140   K 3.7 3.9 3.9    104 104   CO2 19* 27 24   GLUCOSE 70 85 90   BUN 4* 3* 4*   CREATININE 0.57 0.56 0.67   MG 2.3  --   --    ANIONGAP 17 10 12   LABGLOM >60 >60 >60   GFRAA >60 >60 >60   CALCIUM 9.1 8.8 9.0      Anemia Profile:      Recent Labs     10/18/19  1325   IRON 18*   TIBC 322   LABIRON 6*   FERRITIN 15   [id number] 357   FOLATE 8.4       Consultations:    Consults:     Final Specialist Recommendations/Findings:   IP CONSULT TO HOSPITALIST  IP CONSULT TO SOCIAL WORK  IP CONSULT TO GI      The patient was seen and examined on day of discharge and this discharge summary is in conjunction with any daily progress note from day of discharge.     Discharge plan:     Disposition: Home    Physician Follow Up:     [facility] Medication Management  Cuca Munoz [address] [place] [id number]  [phone]    they will follow you for your anticoagulation ( xarelto)    Coby Tinoco MD  Jessica Barone, [address]  [address] [facility]  [phone]    Schedule an appointment as soon as possible for a visit in 4 weeks      Fernando Mejia, [address] [facility]  [phone]    Schedule an appointment as soon as possible for a visit in 1 week         Requiring Further Evaluation/Follow Up POST HOSPITALIZATION/Incidental Findings:   1. Your initial prescription  is waiting for you FOR FREE at the [facility]. Will need to be on for total of 6 months. Should have refills at your pharmacy   2. You need to take TWO 5mg tablets TWICE DAILY for 7 days and then ONE 5mg tablet TWICE DAILY thereafter. 3. You have a referral to the Anticoagulation Clinic here at [facility] ordered by dr Amanda Polk  for medication education and follow-up. YOUR FIRST APPOINTMENT has not been scheduled yet. Please call [phone] if you do not receive a call in 48 hours.     Diet: regular diet    Activity: As tolerated    Instructions to Patient:  Eliquis 10 mg (2 tablets) twice daily for 7 days then milligrams (1 tablet) twice daily after that  Follow-up with anticoagulation clinic - Please call [phone] if you do not receive a call in 48 hours  Follow-up with primary care physician  Possible referral by your PCP to a neurology group that specializes in migraine treatment    Discharge Medications:      Medication List      START taking these medications    apixaban 5 MG Tabs tablet  Commonly known as:  ELIQUIS  Take 2 tablets by mouth 2 times daily For 7 days then 5 mg twice daily thereafter     ferrous sulfate 325 (65 Fe) MG tablet  Take 1 tablet by mouth 2 times daily     pantoprazole 20 MG tablet  Commonly known as:  PROTONIX  Take 1 tablet by mouth daily        CONTINUE taking these medications    METHADONE HCL PO           Where to Get Your Medications      These medications were

## 2019-10-24 NOTE — PROGRESS NOTES
[facility]    10/24/2019   10:28 AM    Name:  Koki Doss  :    1985  Age:  34 y.o. female  MRN:    [id number]     Acct:     [de-identified]   Room:  [facility]02   Day: [address]: [facility] Date: 10/18/2019 12:34 PM  PCP: Leila Mahajan PA-C, [facility]    Subjective:     C/C:   Chief Complaint   Patient presents with    Back Pain     inremittent past couple weeks. denies injury . Interval History: Status: improved. Patient denies shortness of breath. Her EGD and colonoscopy are reported below. There is no significant findings that account for her iron deficiency. Colonoscopy is scheduled for later today. It was attempted yesterday but aborted secondary to extremely poor prep. She has received her third and final dose of Venofer 200 mg. Hemoglobin has creased 8.5. She will be discharged on Eliquis for 3 to 6 months for management of her acute pulmonary embolization. She will also be placed on Protonix and iron replacement therapy. Follow-up with her PCP in 1 week. She may need referral to a neurology group that specializes in migraines for her weekly migraine cephalgia. History: Per my partners discharge note  Alvaro Rear a 33 y.o. Non-/non  female who presents with Back Pain (inremittent past couple weeks.  denies injury . )   and is admitted to the hospital for the management of Acute pulmonary embolism ([facility] [address]   The patient presents for evaluation related to right lower back pain that she has had for approximately 2 weeks.  She states she was initially seen at [facility] weeks ago and was diagnosed with UTI and was given antibiotics.  She was in our ER last night with the same complaints of right lower back pain. Twan Dupont did a CT of her abdomen with contrast to rule out abdominal issues/kidney stones which was negative.  She states they wanted to admit her for 24 hours and do a CT scan of her chest this morning but she left AMA.  She is back today to continue her work-up.  Her chest CT she does have acute segmental and subsegmental pulmonary emboli in the posterior basal segment right lower lobe with an associated infarction without evidence of right heart strain. Khushi Galvez reports right lower back pain that is intermittent.  She states that with deep breathing her pain is sharp in that area and at rest is more of an ache.  She states its been difficult to take a deep breath.  She reports shortness of breath on exertion.  She denies chest pain, diaphoresis, fever or chills.  She did complete the antibiotics that were ordered for her UTI 2 weeks ago. Khushi Galvez has a history of marijuana use and a daily smoker. Khushi Galvez states she was recently told by her methadone clinic that she was anemic but she has not had formal work-up\"    ROS:  Constitutional: Negative for chills, diaphoresis, fever, malaise/fatigue and weight loss. HENT: Negative for ear pain, hearing loss, nosebleeds, sore throat and tinnitus. Eyes: Negative for blurred vision, double vision, photophobia and pain. Respiratory: Negative for cough, hemoptysis, sputum production, shortness of breath and wheezing. Cardiovascular: Negative for palpitations, orthopnea, claudication, leg swelling and PND. Gastrointestinal: Negative for abdominal pain, blood in stool, constipation, diarrhea, heartburn, melena, nausea and vomiting. Genitourinary: Negative for dysuria, flank pain, frequency, hematuria and urgency. Musculoskeletal: Negative for back pain, falls, joint pain, myalgias and neck pain. Skin: Negative for itching and rash. Neurological: Negative for dizziness, tingling, tremors, sensory change, focal weakness, seizures, weakness and headaches. Endo/Heme/Allergies: Does not bruise/bleed easily. Psychiatric/Behavioral: Negative for depression. The patient is not nervous/anxious. Medications:      Allergies: No Known Allergies    Current [id number]   FOLATE 8.4       Glucose:No results for input(s): LABA1C, POCGLU, LABINSU in the last 72 hours. Physical Examination:    BP (!) 110/59   Pulse 61   Temp 98.1 °F (36.7 °C) (Oral)   Resp 18   Ht 5' 3\" (1.6 m)   Wt 145 lb (65.8 kg)   LMP 10/16/2019   SpO2 98%   BMI 25.69 kg/m²     Intake/Output Summary (Last 24 hours) at 10/24/2019 1028  Last data filed at 10/23/2019 1817  Gross per 24 hour   Intake 65 ml   Output --   Net 65 ml       General Appearance:    Alert, cooperative, no distress, appears stated age   Head:    Normocephalic, without obvious abnormality, atraumatic   Eyes:    PERRL, conjunctiva/corneas clear, EOM's intact        Ears:    Normal external ear canals, both ears   Nose:   Nares normal, septum midline, mucosa normal, no drainage    or sinus tenderness   Throat:   Lips, mucosa, and tongue normal; teeth and gums normal   Neck:   Supple, symmetrical, trachea midline, no adenopathy;        thyroid:  No enlargement/tenderness/nodules; no carotid    bruit or JVD   Back:     Symmetric, no curvature, ROM normal, no CVA tenderness   Lungs:     Clear to auscultation bilaterally, respirations unlabored   Chest wall:    No tenderness or deformity   Heart:    Regular rate and rhythm, S1 and S2 normal, no murmur, rub   or gallop   Abdomen:     Soft, non-tender, bowel sounds active all four quadrants,     no masses, no organomegaly   Extremities:   Extremities normal, atraumatic, no cyanosis or edema   Pulses:   2+ and symmetric all extremities   Skin:   Skin color, texture, turgor normal, no rashes or lesions   Lymph nodes:   Cervical, supraclavicular, and axillary nodes normal   Neurologic:   CNII-XII intact.  Normal strength, sensation and reflexes       throughout       Assessment:     Hospital Problems           Last Modified POA    * (Principal) Acute pulmonary embolism ([address] 10/19/2019 Yes    Smoker 10/18/2019 Yes    Marijuana smoker 10/18/2019 Yes    Iron deficiency anemia 10/19/2019 Yes    History of intravenous drug abuse ([facility] [address] 10/19/2019 Yes    Occult blood positive stool 10/20/2019 Yes    HH (hiatus hernia) 10/20/2019 Yes        Past Medical History:   Diagnosis Date    Anemia 10/18/2019    Drug abuse, IV ([facility] [address]     claims that she has previous iv drug dependency claims hasn' had recent usage.  Headache     Marijuana smoker 10/18/2019    Smoker 10/18/2019        Consultations:     IP CONSULT TO HOSPITALIST  IP CONSULT TO SOCIAL WORK  IP CONSULT TO GI    Plan:     1. Discharge home today  2. Request 10 mg twice daily x7 days then 5 mg twice daily  3. Ferrous sulfate 325 mg twice daily  4. Protonix 40 mg daily  5. Resume home medications  6.  Follow-up with PCP in 1 week      Electronically signed by Jah Garduno DO on 10/24/2019 at 10:28 AM

## 2019-10-24 NOTE — PLAN OF CARE
Problem: Safety:  Goal: Free from accidental physical injury  Description  Free from accidental physical injury  Outcome: Ongoing   The patient remained free from falls this shift, call light within reach, bed in locked and lowest position. Side rails up x2. Continue to monitor closely. Problem: Pain:  Goal: Patient's pain/discomfort is manageable  Description  Patient's pain/discomfort is manageable  Outcome: Ongoing  Patient states understanding of pain scale and interventions. Pain assessed with hourly rounding and PRN. Will continue to monitor. Problem: Venous Thromboembolism:  Goal: Absence of signs or symptoms of impaired coagulation  Description  Absence of signs or symptoms of impaired coagulation  Outcome: Ongoing   Patient is on Eliquis, no signs and/or symptoms of DVT.  Patient denies any shortness of breath

## 2019-10-25 LAB
ANTICARDIOLIPIN IGA ANTIBODY: 1.8 APU
ANTICARDIOLIPIN IGG ANTIBODY: 1.8 GPU
AT-III ACTIVITY: 97 % (ref 83–122)
CARDIOLIPIN AB IGM: 6.3 MPU
DILUTE RUSSELL VIPER VENOM TIME: NORMAL
INR BLD: 1
LUPUS ANTICOAG: NORMAL
PARTIAL THROMBOPLASTIN TIME: 24.4 SEC (ref 23–31)
PROTEIN C ACTIVITY: 104 %
PROTHROMBIN TIME: 10.1 SEC (ref 9.7–11.6)

## 2019-10-29 LAB — PROTEIN S ACTIVITY: 88 % (ref 59–130)

## 2019-11-05 ENCOUNTER — TELEPHONE (OUTPATIENT)
Dept: PHARMACY | Age: 34
End: 2019-11-05

## 2019-11-11 ENCOUNTER — TELEPHONE (OUTPATIENT)
Dept: PHARMACY | Age: 34
End: 2019-11-11

## 2020-05-01 ENCOUNTER — HOSPITAL ENCOUNTER (EMERGENCY)
Age: 35
Discharge: HOME | End: 2020-05-01
Attending: EMERGENCY MEDICINE
Payer: COMMERCIAL

## 2020-05-01 VITALS
HEART RATE: 92 BPM | OXYGEN SATURATION: 95 % | WEIGHT: 112.9 LBS | BODY MASS INDEX: 20 KG/M2 | RESPIRATION RATE: 16 BRPM | DIASTOLIC BLOOD PRESSURE: 82 MMHG | TEMPERATURE: 99.2 F | HEIGHT: 63 IN | SYSTOLIC BLOOD PRESSURE: 124 MMHG

## 2020-05-01 LAB
ABSOLUTE EOS #: 0.13 K/UL (ref 0–0.44)
ABSOLUTE IMMATURE GRANULOCYTE: 0.01 K/UL (ref 0–0.3)
ABSOLUTE LYMPH #: 1.63 K/UL (ref 1.1–3.7)
ABSOLUTE MONO #: 0.41 K/UL (ref 0.1–1.2)
ANION GAP SERPL CALCULATED.3IONS-SCNC: 18 MMOL/L (ref 9–17)
APPEARANCE: NORMAL
BASOPHILS # BLD: 1 % (ref 0–2)
BASOPHILS ABSOLUTE: 0.04 K/UL (ref 0–0.2)
BILIRUBIN, POC: NORMAL
BLOOD URINE, POC: NORMAL
BUN BLDV-MCNC: 8 MG/DL (ref 6–20)
BUN/CREAT BLD: 10 (ref 9–20)
CALCIUM SERPL-MCNC: 9.7 MG/DL (ref 8.6–10.4)
CHLORIDE BLD-SCNC: 98 MMOL/L (ref 98–107)
CHP ED QC CHECK: NORMAL
CHP ED QC CHECK: NORMAL
CLARITY, POC: NORMAL
CO2: 22 MMOL/L (ref 20–31)
COLOR, POC: NORMAL
CREAT SERPL-MCNC: 0.78 MG/DL (ref 0.5–0.9)
DIFFERENTIAL TYPE: ABNORMAL
EOSINOPHILS RELATIVE PERCENT: 2 % (ref 1–4)
GFR AFRICAN AMERICAN: >60 ML/MIN
GFR NON-AFRICAN AMERICAN: >60 ML/MIN
GFR SERPL CREATININE-BSD FRML MDRD: ABNORMAL ML/MIN/{1.73_M2}
GFR SERPL CREATININE-BSD FRML MDRD: ABNORMAL ML/MIN/{1.73_M2}
GLUCOSE BLD-MCNC: 77 MG/DL (ref 70–99)
GLUCOSE URINE, POC: NORMAL
HCT VFR BLD CALC: 44.2 % (ref 36.3–47.1)
HEMOGLOBIN: 13.7 G/DL (ref 11.9–15.1)
IMMATURE GRANULOCYTES: 0 %
KETONES, POC: NORMAL
LEUKOCYTE EST, POC: NORMAL
LYMPHOCYTES # BLD: 30 % (ref 24–43)
MCH RBC QN AUTO: 25.8 PG (ref 25.2–33.5)
MCHC RBC AUTO-ENTMCNC: 31 G/DL (ref 28.4–34.8)
MCV RBC AUTO: 83.1 FL (ref 82.6–102.9)
MONOCYTES # BLD: 8 % (ref 3–12)
NITRITE, POC: POSITIVE
NRBC AUTOMATED: ABNORMAL PER 100 WBC
PDW BLD-RTO: 17.5 % (ref 11.8–14.4)
PH, POC: 5.5
PLATELET # BLD: 250 K/UL (ref 138–453)
PLATELET ESTIMATE: ABNORMAL
PMV BLD AUTO: 10.9 FL (ref 8.1–13.5)
POTASSIUM SERPL-SCNC: 3.4 MMOL/L (ref 3.7–5.3)
PREGNANCY TEST URINE, POC: NORMAL
PROTEIN, POC: NORMAL
RBC # BLD: 5.32 M/UL (ref 3.95–5.11)
RBC # BLD: ABNORMAL 10*6/UL
SEG NEUTROPHILS: 59 % (ref 36–65)
SEGMENTED NEUTROPHILS ABSOLUTE COUNT: 3.19 K/UL (ref 1.5–8.1)
SODIUM BLD-SCNC: 138 MMOL/L (ref 135–144)
SPECIFIC GRAVITY, POC: 1.03
UROBILINOGEN, POC: 1
WBC # BLD: 5.4 K/UL (ref 3.5–11.3)
WBC # BLD: ABNORMAL 10*3/UL

## 2020-05-01 PROCEDURE — 81025 URINE PREGNANCY TEST: CPT

## 2020-05-01 PROCEDURE — 80048 BASIC METABOLIC PNL TOTAL CA: CPT

## 2020-05-01 PROCEDURE — 85025 COMPLETE CBC W/AUTO DIFF WBC: CPT

## 2020-05-01 PROCEDURE — 81003 URINALYSIS AUTO W/O SCOPE: CPT

## 2020-05-01 PROCEDURE — 99284 EMERGENCY DEPT VISIT MOD MDM: CPT

## 2020-05-01 PROCEDURE — 6370000000 HC RX 637 (ALT 250 FOR IP): Performed by: EMERGENCY MEDICINE

## 2020-05-01 RX ORDER — ONDANSETRON 4 MG/1
8 TABLET, ORALLY DISINTEGRATING ORAL ONCE
Status: COMPLETED | OUTPATIENT
Start: 2020-05-01 | End: 2020-05-01

## 2020-05-01 RX ORDER — 0.9 % SODIUM CHLORIDE 0.9 %
1000 INTRAVENOUS SOLUTION INTRAVENOUS ONCE
Status: DISCONTINUED | OUTPATIENT
Start: 2020-05-01 | End: 2020-05-01

## 2020-05-01 RX ORDER — ONDANSETRON 2 MG/ML
4 INJECTION INTRAMUSCULAR; INTRAVENOUS ONCE
Status: DISCONTINUED | OUTPATIENT
Start: 2020-05-01 | End: 2020-05-01

## 2020-05-01 RX ADMIN — ONDANSETRON 8 MG: 4 TABLET, ORALLY DISINTEGRATING ORAL at 18:51

## 2020-05-01 ASSESSMENT — PAIN SCALES - GENERAL: PAINLEVEL_OUTOF10: 7

## 2020-05-01 ASSESSMENT — PAIN DESCRIPTION - LOCATION: LOCATION: ABDOMEN

## 2020-05-01 ASSESSMENT — PAIN DESCRIPTION - FREQUENCY: FREQUENCY: CONTINUOUS

## 2020-05-01 ASSESSMENT — PAIN DESCRIPTION - DESCRIPTORS: DESCRIPTORS: CRAMPING;CONSTANT

## 2020-05-02 ENCOUNTER — CARE COORDINATION (OUTPATIENT)
Dept: CARE COORDINATION | Age: 35
End: 2020-05-02

## 2020-05-02 ASSESSMENT — ENCOUNTER SYMPTOMS
RHINORRHEA: 0
SORE THROAT: 0
CONSTIPATION: 0
VOMITING: 0
WHEEZING: 0
COLOR CHANGE: 0
COUGH: 0
ABDOMINAL PAIN: 0
SHORTNESS OF BREATH: 0
DIARRHEA: 0
NAUSEA: 0
SINUS PRESSURE: 0

## 2020-05-02 NOTE — ED NOTES
While explaining discharge papers to pt, pt would not make eye contact with me or speak to me.       Bahman Lim RN  05/01/20 2023

## 2020-05-02 NOTE — ED PROVIDER NOTES
EMERGENCY DEPARTMENT ENCOUNTER   ATTENDING ATTESTATION     Pt Name: Javed Issa  MRN: [id number]  [place] 1985  Date of evaluation: 20   Javed Issa is a 30 y.o. female with CC: Abdominal Pain (difficulty eating past 3-4 days) and Nausea & Vomiting    MDM:     Pt presents to ED for loss of appetite. Vitals and labs reassuring, ketones in urine, iv placement in ed was difficult so opted to give pt oral hydration instead in ED which she tolerated well. Pt with flat affect, states she has been staying with her boyfriend, his friends and his parents, states she feels safe at home, having anxiety and fear because her boyfriend \"has seizures\", she denies SI/HI/AVH, denies that anyone is physically hurting her. On abd exam her abdomen is soft and non-tender. Pt informed she would need to follow up with PCP for this problem, given return precautions. No orders to display     LABS: All lab results were reviewed by myself, and all abnormals are listed below. Labs Reviewed   CBC WITH AUTO DIFFERENTIAL - Abnormal; Notable for the following components:       Result Value    RBC 5.32 (*)     RDW 17.5 (*)     All other components within normal limits   BASIC METABOLIC PANEL - Abnormal; Notable for the following components:    Potassium 3.4 (*)     Anion Gap 18 (*)     All other components within normal limits   POCT URINALYSIS DIPSTICK - Normal   POCT URINE PREGNANCY - Normal     CONSULTS:  None  FINAL IMPRESSION      1. Loss of appetite            PASTMEDICAL HISTORY     Past Medical History:   Diagnosis Date    Acid reflux     Anemia 10/18/2019    Drug abuse, IV ([address]     claims that she has previous iv drug dependency claims hasn' had recent usage.     Headache     History of pulmonary embolus (PE)     Marijuana smoker 10/18/2019    Smoker 10/18/2019     SURGICAL HISTORY       Past Surgical History:   Procedure Laterality Date    APPENDECTOMY       SECTION      COLONOSCOPY N/A 10/23/2019

## 2020-05-04 ENCOUNTER — CARE COORDINATION (OUTPATIENT)
Dept: CARE COORDINATION | Age: 35
End: 2020-05-04

## 2020-05-04 LAB — HCG, PREGNANCY URINE (POC): NEGATIVE

## 2020-05-04 NOTE — CARE COORDINATION
Called, message left on voicemail with my contact information and reason for call.  Will attempt 2nd call  No longer make attempts at F/U calls

## 2021-05-24 ENCOUNTER — APPOINTMENT (OUTPATIENT)
Dept: CT IMAGING | Age: 36
End: 2021-05-24
Payer: MEDICAID

## 2021-05-24 ENCOUNTER — HOSPITAL ENCOUNTER (INPATIENT)
Age: 36
LOS: 9 days | Discharge: HOME | DRG: 045 | End: 2021-06-02
Attending: EMERGENCY MEDICINE | Admitting: PSYCHIATRY & NEUROLOGY
Payer: MEDICAID

## 2021-05-24 ENCOUNTER — HOSPITAL ENCOUNTER (EMERGENCY)
Age: 36
Discharge: LEFT BEFORE DISCHARGE | End: 2021-05-24
Attending: EMERGENCY MEDICINE
Payer: MEDICAID

## 2021-05-24 VITALS
TEMPERATURE: 97.9 F | RESPIRATION RATE: 18 BRPM | HEART RATE: 80 BPM | DIASTOLIC BLOOD PRESSURE: 60 MMHG | BODY MASS INDEX: 21.72 KG/M2 | SYSTOLIC BLOOD PRESSURE: 106 MMHG | WEIGHT: 143.3 LBS | HEIGHT: 68 IN | OXYGEN SATURATION: 97 %

## 2021-05-24 DIAGNOSIS — I63.9 CEREBROVASCULAR ACCIDENT (CVA), UNSPECIFIED MECHANISM (HCC): Primary | ICD-10-CM

## 2021-05-24 DIAGNOSIS — G81.91 RIGHT HEMIPARESIS (HCC): ICD-10-CM

## 2021-05-24 DIAGNOSIS — I63.00 CEREBROVASCULAR ACCIDENT (CVA) DUE TO THROMBOSIS OF PRECEREBRAL ARTERY (HCC): Primary | ICD-10-CM

## 2021-05-24 DIAGNOSIS — G81.91 ACUTE RIGHT HEMIPARESIS (HCC): ICD-10-CM

## 2021-05-24 LAB
ABSOLUTE EOS #: 0.11 K/UL (ref 0–0.44)
ABSOLUTE IMMATURE GRANULOCYTE: 0.04 K/UL (ref 0–0.3)
ABSOLUTE LYMPH #: 1.73 K/UL (ref 1.1–3.7)
ABSOLUTE MONO #: 0.52 K/UL (ref 0.1–1.2)
ANION GAP SERPL CALCULATED.3IONS-SCNC: 15 MMOL/L (ref 9–17)
BASOPHILS # BLD: 1 % (ref 0–2)
BASOPHILS ABSOLUTE: 0.05 K/UL (ref 0–0.2)
BUN BLDV-MCNC: 11 MG/DL (ref 6–20)
BUN/CREAT BLD: 11 (ref 9–20)
CALCIUM SERPL-MCNC: 9.6 MG/DL (ref 8.6–10.4)
CHLORIDE BLD-SCNC: 101 MMOL/L (ref 98–107)
CO2: 19 MMOL/L (ref 20–31)
CREAT SERPL-MCNC: 1.02 MG/DL (ref 0.5–0.9)
DIFFERENTIAL TYPE: ABNORMAL
EOSINOPHILS RELATIVE PERCENT: 1 % (ref 1–4)
GFR AFRICAN AMERICAN: >60 ML/MIN
GFR NON-AFRICAN AMERICAN: >60 ML/MIN
GFR SERPL CREATININE-BSD FRML MDRD: ABNORMAL ML/MIN/{1.73_M2}
GFR SERPL CREATININE-BSD FRML MDRD: ABNORMAL ML/MIN/{1.73_M2}
GLUCOSE BLD-MCNC: 134 MG/DL (ref 65–105)
GLUCOSE BLD-MCNC: 135 MG/DL (ref 70–99)
HCG QUALITATIVE: NEGATIVE
HCT VFR BLD CALC: 35.2 % (ref 36.3–47.1)
HEMOGLOBIN: 10.5 G/DL (ref 11.9–15.1)
IMMATURE GRANULOCYTES: 0 %
INR BLD: 1
LYMPHOCYTES # BLD: 18 % (ref 24–43)
MAGNESIUM: 2.3 MG/DL (ref 1.6–2.6)
MCH RBC QN AUTO: 23.4 PG (ref 25.2–33.5)
MCHC RBC AUTO-ENTMCNC: 29.8 G/DL (ref 28.4–34.8)
MCV RBC AUTO: 78.4 FL (ref 82.6–102.9)
MONOCYTES # BLD: 5 % (ref 3–12)
MYOGLOBIN: 25 NG/ML (ref 25–58)
MYOGLOBIN: 29 NG/ML (ref 25–58)
NRBC AUTOMATED: 0 PER 100 WBC
PARTIAL THROMBOPLASTIN TIME: 26.4 SEC (ref 23.9–33.8)
PDW BLD-RTO: 16.9 % (ref 11.8–14.4)
PLATELET # BLD: 337 K/UL (ref 138–453)
PLATELET ESTIMATE: ABNORMAL
PMV BLD AUTO: 10.1 FL (ref 8.1–13.5)
POTASSIUM SERPL-SCNC: 4.3 MMOL/L (ref 3.7–5.3)
PROTHROMBIN TIME: 13.4 SEC (ref 11.5–14.2)
RBC # BLD: 4.49 M/UL (ref 3.95–5.11)
RBC # BLD: ABNORMAL 10*6/UL
SARS-COV-2, RAPID: NOT DETECTED
SEG NEUTROPHILS: 75 % (ref 36–65)
SEGMENTED NEUTROPHILS ABSOLUTE COUNT: 7.45 K/UL (ref 1.5–8.1)
SODIUM BLD-SCNC: 135 MMOL/L (ref 135–144)
SPECIMEN DESCRIPTION: NORMAL
TROPONIN INTERP: NORMAL
TROPONIN INTERP: NORMAL
TROPONIN T: NORMAL NG/ML
TROPONIN T: NORMAL NG/ML
TROPONIN, HIGH SENSITIVITY: 12 NG/L (ref 0–14)
TROPONIN, HIGH SENSITIVITY: 9 NG/L (ref 0–14)
WBC # BLD: 9.9 K/UL (ref 3.5–11.3)
WBC # BLD: ABNORMAL 10*3/UL

## 2021-05-24 PROCEDURE — 99284 EMERGENCY DEPT VISIT MOD MDM: CPT

## 2021-05-24 PROCEDURE — 6360000002 HC RX W HCPCS: Performed by: NURSE PRACTITIONER

## 2021-05-24 PROCEDURE — 72125 CT NECK SPINE W/O DYE: CPT

## 2021-05-24 PROCEDURE — 70496 CT ANGIOGRAPHY HEAD: CPT

## 2021-05-24 PROCEDURE — 6360000002 HC RX W HCPCS: Performed by: EMERGENCY MEDICINE

## 2021-05-24 PROCEDURE — 87635 SARS-COV-2 COVID-19 AMP PRB: CPT

## 2021-05-24 PROCEDURE — 83735 ASSAY OF MAGNESIUM: CPT

## 2021-05-24 PROCEDURE — 85025 COMPLETE CBC W/AUTO DIFF WBC: CPT

## 2021-05-24 PROCEDURE — 37195 THROMBOLYTIC THERAPY STROKE: CPT

## 2021-05-24 PROCEDURE — 84703 CHORIONIC GONADOTROPIN ASSAY: CPT

## 2021-05-24 PROCEDURE — 82947 ASSAY GLUCOSE BLOOD QUANT: CPT

## 2021-05-24 PROCEDURE — 6370000000 HC RX 637 (ALT 250 FOR IP): Performed by: NURSE PRACTITIONER

## 2021-05-24 PROCEDURE — 99255 IP/OBS CONSLTJ NEW/EST HI 80: CPT | Performed by: PSYCHIATRY & NEUROLOGY

## 2021-05-24 PROCEDURE — 80048 BASIC METABOLIC PNL TOTAL CA: CPT

## 2021-05-24 PROCEDURE — 83874 ASSAY OF MYOGLOBIN: CPT

## 2021-05-24 PROCEDURE — 96365 THER/PROPH/DIAG IV INF INIT: CPT

## 2021-05-24 PROCEDURE — 36415 COLL VENOUS BLD VENIPUNCTURE: CPT

## 2021-05-24 PROCEDURE — 85610 PROTHROMBIN TIME: CPT

## 2021-05-24 PROCEDURE — 85730 THROMBOPLASTIN TIME PARTIAL: CPT

## 2021-05-24 PROCEDURE — 2000000003 HC NEURO ICU R&B

## 2021-05-24 PROCEDURE — 70450 CT HEAD/BRAIN W/O DYE: CPT

## 2021-05-24 PROCEDURE — 84484 ASSAY OF TROPONIN QUANT: CPT

## 2021-05-24 PROCEDURE — 2580000003 HC RX 258: Performed by: NURSE PRACTITIONER

## 2021-05-24 PROCEDURE — 6360000004 HC RX CONTRAST MEDICATION: Performed by: NURSE PRACTITIONER

## 2021-05-24 PROCEDURE — 96366 THER/PROPH/DIAG IV INF ADDON: CPT

## 2021-05-24 PROCEDURE — 96372 THER/PROPH/DIAG INJ SC/IM: CPT

## 2021-05-24 RX ORDER — DEXAMETHASONE SODIUM PHOSPHATE 10 MG/ML
8 INJECTION, SOLUTION INTRAMUSCULAR; INTRAVENOUS ONCE
Status: COMPLETED | OUTPATIENT
Start: 2021-05-24 | End: 2021-05-24

## 2021-05-24 RX ORDER — SODIUM CHLORIDE 0.9 % (FLUSH) 0.9 %
5-40 SYRINGE (ML) INJECTION PRN
Status: DISCONTINUED | OUTPATIENT
Start: 2021-05-24 | End: 2021-05-24 | Stop reason: HOSPADM

## 2021-05-24 RX ORDER — LORAZEPAM 2 MG/ML
1 INJECTION INTRAMUSCULAR ONCE
Status: COMPLETED | OUTPATIENT
Start: 2021-05-24 | End: 2021-05-24

## 2021-05-24 RX ORDER — SODIUM CHLORIDE 9 MG/ML
INJECTION, SOLUTION INTRAVENOUS CONTINUOUS
Status: DISCONTINUED | OUTPATIENT
Start: 2021-05-24 | End: 2021-05-28

## 2021-05-24 RX ORDER — SODIUM CHLORIDE 0.9 % (FLUSH) 0.9 %
10 SYRINGE (ML) INJECTION PRN
Status: DISCONTINUED | OUTPATIENT
Start: 2021-05-24 | End: 2021-05-24 | Stop reason: HOSPADM

## 2021-05-24 RX ORDER — ATORVASTATIN CALCIUM 80 MG/1
80 TABLET, FILM COATED ORAL NIGHTLY
Status: DISCONTINUED | OUTPATIENT
Start: 2021-05-24 | End: 2021-06-02 | Stop reason: HOSPADM

## 2021-05-24 RX ORDER — 0.9 % SODIUM CHLORIDE 0.9 %
80 INTRAVENOUS SOLUTION INTRAVENOUS ONCE
Status: COMPLETED | OUTPATIENT
Start: 2021-05-24 | End: 2021-05-24

## 2021-05-24 RX ORDER — 0.9 % SODIUM CHLORIDE 0.9 %
50 INTRAVENOUS SOLUTION INTRAVENOUS ONCE
Status: DISCONTINUED | OUTPATIENT
Start: 2021-05-24 | End: 2021-05-24 | Stop reason: HOSPADM

## 2021-05-24 RX ORDER — SODIUM CHLORIDE 9 MG/ML
25 INJECTION, SOLUTION INTRAVENOUS PRN
Status: DISCONTINUED | OUTPATIENT
Start: 2021-05-24 | End: 2021-05-24 | Stop reason: HOSPADM

## 2021-05-24 RX ORDER — DEXTROSE MONOHYDRATE 25 G/50ML
12.5 INJECTION, SOLUTION INTRAVENOUS
Status: DISCONTINUED | OUTPATIENT
Start: 2021-05-24 | End: 2021-05-24 | Stop reason: HOSPADM

## 2021-05-24 RX ORDER — POLYETHYLENE GLYCOL 3350 17 G/17G
17 POWDER, FOR SOLUTION ORAL DAILY PRN
Status: DISCONTINUED | OUTPATIENT
Start: 2021-05-24 | End: 2021-06-02 | Stop reason: HOSPADM

## 2021-05-24 RX ORDER — ONDANSETRON 2 MG/ML
4 INJECTION INTRAMUSCULAR; INTRAVENOUS EVERY 6 HOURS PRN
Status: DISCONTINUED | OUTPATIENT
Start: 2021-05-24 | End: 2021-06-02 | Stop reason: HOSPADM

## 2021-05-24 RX ORDER — SODIUM CHLORIDE 0.9 % (FLUSH) 0.9 %
5-40 SYRINGE (ML) INJECTION EVERY 12 HOURS SCHEDULED
Status: DISCONTINUED | OUTPATIENT
Start: 2021-05-24 | End: 2021-06-02 | Stop reason: HOSPADM

## 2021-05-24 RX ORDER — PROMETHAZINE HYDROCHLORIDE 12.5 MG/1
12.5 TABLET ORAL EVERY 6 HOURS PRN
Status: DISCONTINUED | OUTPATIENT
Start: 2021-05-24 | End: 2021-06-02 | Stop reason: HOSPADM

## 2021-05-24 RX ORDER — SODIUM CHLORIDE 9 MG/ML
25 INJECTION, SOLUTION INTRAVENOUS PRN
Status: DISCONTINUED | OUTPATIENT
Start: 2021-05-24 | End: 2021-06-02 | Stop reason: HOSPADM

## 2021-05-24 RX ORDER — SODIUM CHLORIDE 0.9 % (FLUSH) 0.9 %
5-40 SYRINGE (ML) INJECTION PRN
Status: DISCONTINUED | OUTPATIENT
Start: 2021-05-24 | End: 2021-06-02 | Stop reason: HOSPADM

## 2021-05-24 RX ORDER — SODIUM CHLORIDE 0.9 % (FLUSH) 0.9 %
5-40 SYRINGE (ML) INJECTION EVERY 12 HOURS SCHEDULED
Status: DISCONTINUED | OUTPATIENT
Start: 2021-05-24 | End: 2021-05-24 | Stop reason: HOSPADM

## 2021-05-24 RX ADMIN — SODIUM CHLORIDE, PRESERVATIVE FREE 10 ML: 5 INJECTION INTRAVENOUS at 20:45

## 2021-05-24 RX ADMIN — IOPAMIDOL 75 ML: 755 INJECTION, SOLUTION INTRAVENOUS at 13:36

## 2021-05-24 RX ADMIN — ALTEPLASE 52.7 MG: KIT at 14:00

## 2021-05-24 RX ADMIN — SODIUM CHLORIDE 80 ML: 9 INJECTION, SOLUTION INTRAVENOUS at 13:36

## 2021-05-24 RX ADMIN — DEXAMETHASONE SODIUM PHOSPHATE 8 MG: 10 INJECTION, SOLUTION INTRAMUSCULAR; INTRAVENOUS at 10:59

## 2021-05-24 RX ADMIN — ATORVASTATIN CALCIUM 80 MG: 80 TABLET, FILM COATED ORAL at 21:40

## 2021-05-24 RX ADMIN — LORAZEPAM 1 MG: 2 INJECTION, SOLUTION INTRAMUSCULAR; INTRAVENOUS at 10:59

## 2021-05-24 RX ADMIN — SODIUM CHLORIDE, PRESERVATIVE FREE 10 ML: 5 INJECTION INTRAVENOUS at 13:36

## 2021-05-24 RX ADMIN — ALTEPLASE 5.9 MG: KIT at 13:58

## 2021-05-24 ASSESSMENT — ENCOUNTER SYMPTOMS
SHORTNESS OF BREATH: 0
PHOTOPHOBIA: 0
DIARRHEA: 0
BACK PAIN: 0
EYE PAIN: 0
COLOR CHANGE: 0
ABDOMINAL PAIN: 0
SHORTNESS OF BREATH: 0
NAUSEA: 0
RESPIRATORY NEGATIVE: 1
BACK PAIN: 0
COUGH: 0
VOMITING: 0
SORE THROAT: 0
GASTROINTESTINAL NEGATIVE: 1
VOICE CHANGE: 0
EYES NEGATIVE: 1
NAUSEA: 0
ABDOMINAL PAIN: 0
TROUBLE SWALLOWING: 0
VOMITING: 0

## 2021-05-24 ASSESSMENT — PAIN DESCRIPTION - ORIENTATION: ORIENTATION: RIGHT

## 2021-05-24 ASSESSMENT — PAIN DESCRIPTION - FREQUENCY: FREQUENCY: CONTINUOUS

## 2021-05-24 ASSESSMENT — PAIN SCALES - GENERAL: PAINLEVEL_OUTOF10: 5

## 2021-05-24 ASSESSMENT — PAIN DESCRIPTION - LOCATION: LOCATION: ARM;LEG

## 2021-05-24 ASSESSMENT — PAIN DESCRIPTION - DESCRIPTORS: DESCRIPTORS: ACHING

## 2021-05-24 NOTE — ED PROVIDER NOTES
[address] [place] [address] ED  EMERGENCY DEPARTMENT ENCOUNTER   ATTENDING ATTESTATION     Pt Name: Yong Sears  MRN: [id number]  [place] 1985  Date of evaluation: 5/24/21       Yong Sears is a 37 y.o. female who presents with Numbness      MDM:     68-year-old female presented with complaints of numbness tingling and weakness in the right upper extremity and right lower extremity. The patient has a history of in the distant past of drug use, as well as a previous history of pulmonary embolism, she does not currently use any blood thinners. Patient was seen and evaluated initially by the nurse practitioner, the patient had a CT scan which was unremarkable, laboratory studies that were unremarkable. When I evaluated the patient she had significant weakness to the right upper and right lower extremity, I calculated NIH of 6 and called a stroke alert, we consulted with neurology, they recommended a CTA and after evaluation of the total drug monitor they did recommend TPA infusion. We will follow up the CTA, admit to the hospitalist service and reevaluate. 2:43 PM EDT  Patient was reevaluated, the patient and her  are stating that they are refusing to go by EMS transportation. The patient has a fear of ambulances. I discussed that we could provide sedative medications to improve her comfort and allow her to be transferred via ambulance. The patient and her  are refusing. They are stating that we were \"hiding information from them\" because the staff member with the transfer form entered the room prior to me telling them that she was going to be transferred. I had a discussion with them, apologizing because I am taking care of multiple patients and I was unable to get into the room because for our staff member got him the form, I discussed that we were trying to avoid any probable delays in her care, and that it is safest if she go by ambulance. The patient is refusing.   Patient is awake and alert. The patient has significant risks associated with going by car, I discussed this with the patient and her  who both understand. Primary concern is that she will stop her TPA, I discussed that she could have worsening of her stroke, she could have a significant bleeding event or seizure in route, the most obvious severe consideration would be death. The  and her understood these risks and wished to be discharged [address] The patient was unable to sign the AMA form because she is right-handed and her stroke is affecting the right side, the  signed in her stead. Critical Care  Performed by: Maura Snow MD  Authorized by: Maura Snow MD     Critical care provider statement:     Critical care time (minutes):  36    Critical care time was exclusive of:  Separately billable procedures and treating other patients and teaching time    Critical care was necessary to treat or prevent imminent or life-threatening deterioration of the following conditions: Acute stroke. Critical care was time spent personally by me on the following activities:  Discussions with consultants, evaluation of patient's response to treatment, examination of patient, ordering and review of laboratory studies, ordering and review of radiographic studies, pulse oximetry and re-evaluation of patient's condition          Vitals:   Vitals:    05/24/21 0913 05/24/21 1004 05/24/21 1200 05/24/21 1300   BP: 112/81 113/82 107/82 119/82   Pulse: 108 100 101 96   Resp: 28 20 11 12   Temp: 97.9 °F (36.6 °C)      TempSrc: Oral      SpO2: 99% 98% 96% 96%   Weight: 143 lb 4.8 oz (65 kg)      Height: 5' 8\" (1.727 m)            I personally evaluated and examined the patient in conjunction with the Midlevel provider and agree with the assessment, treatment plan, and disposition of the patient as recorded by the midlevel.     I performed a history and physical examination of the patient and discussed management with the midlevel. I reviewed the midlevels note and agree with the documented findings and plan of care. Any areas of disagreement are noted on the chart. I was personally present for the key portions of any procedures. I have documented in the chart those procedures where I was not present during the key portions. I have personally reviewed all images and agree with the midlevel's interpretation. I have reviewed the emergency nurses triage note. I agree with the chief complaint, past medical history, past surgical history, allergies, medications, social and family history as documented unless otherwise noted.     Malina Khoury MD  Attending Emergency  Physician                  Yordan Donato MD  05/24/21 [address] [facility] Kaila Moreira MD  05/24/21 1456

## 2021-05-24 NOTE — ED PROVIDER NOTES
[address] ED  eMERGENCY dEPARTMENT eNCOUnter      Pt Name: Kathryn Parks  MRN: [id number]  [place] 1985  Date of evaluation: 2021  Provider: EMILY Siu CNP    CHIEF COMPLAINT       Chief Complaint   Patient presents with    Numbness         HISTORY OF PRESENT ILLNESS  (Location/Symptom, Timing/Onset, Context/Setting, Quality, Duration, Modifying Factors, Severity.)   Kathryn Parks is a 37 y.o. female who presents to the emergency department via private auto for right-sided numbness. Onset was 0930 this morning. States she had gotten out of the shower and was sitting down watching television when it started. States she has decreased movement of the RUE and RLE due to the numbness and pain with movement to the extremities. Denies fever, chills, injury. Denies HA, vision changes, chest pain, SOB, neck pain. Denies pain without movement. Denies chance of pregnancy. She is in recovery for IV drug use; she takes methadone. Pt appears anxious and is hyperventilating upon arrival.     Nursing Notes were reviewed. ALLERGIES     Patient has no known allergies. CURRENT MEDICATIONS       Previous Medications    FERROUS SULFATE 325 (65 FE) MG TABLET    Take 1 tablet by mouth 2 times daily    METHADONE HCL PO    Take 105 mg by mouth daily     PANTOPRAZOLE (PROTONIX) 20 MG TABLET    Take 1 tablet by mouth daily       PAST MEDICAL HISTORY         Diagnosis Date    Acid reflux     Anemia 10/18/2019    Drug abuse, IV ([address]     claims that she has previous iv drug dependency claims hasn' had recent usage.     Headache     History of pulmonary embolus (PE)     Marijuana smoker 10/18/2019    Smoker 10/18/2019       SURGICAL HISTORY           Procedure Laterality Date    APPENDECTOMY       SECTION      COLONOSCOPY N/A 10/23/2019    COLORECTAL CANCER SCREENING, NOT HIGH RISK performed by Henok Pablo MD at [address]  10/22/2019    SIGMOIDOSCOPY ABORTED COLONOSCOPY performed by Lorri Ortega MD at [facility] 35 N/A 10/21/2019    EGD ESOPHAGOGASTRODUODENOSCOPY performed by Lorri Ortega MD at [facility] 23           Problem Relation Age of Onset    No Known Problems Mother     No Known Problems Father      Family Status   Relation Name Status    Mother  (Not Specified)    Father  (Not Specified)        SOCIAL HISTORY      reports that she has been smoking cigarettes. She has never used smokeless tobacco. She reports previous drug use. Drug: Marijuana. She reports that she does not drink alcohol. REVIEW OF SYSTEMS    (2-9 systems for level 4, 10 or more for level 5)     Review of Systems   Constitutional: Negative for chills, diaphoresis, fatigue and fever. HENT: Negative for trouble swallowing and voice change. Eyes: Negative for photophobia, pain and visual disturbance. Respiratory: Negative for shortness of breath. Cardiovascular: Negative for chest pain. Gastrointestinal: Negative for abdominal pain, diarrhea, nausea and vomiting. Musculoskeletal: Positive for arthralgias and myalgias. Negative for back pain and neck pain. Skin: Negative for color change, rash and wound. Neurological: Positive for numbness. Negative for dizziness, facial asymmetry, speech difficulty, weakness, light-headedness and headaches. Psychiatric/Behavioral: Negative for confusion. Except as noted above the remainder of the review of systems was reviewed and negative. PHYSICAL EXAM    (up to 7 for level 4, 8 or more for level 5)     ED Triage Vitals [05/24/21 0913]   BP Temp Temp Source Pulse Resp SpO2 Height Weight   112/81 97.9 °F (36.6 °C) Oral 108 28 99 % 5' 8\" (1.727 m) 143 lb 4.8 oz (65 kg)     Physical Exam  Vitals reviewed. Constitutional:       General: She is not in acute distress. Appearance: She is well-developed. She is not diaphoretic.    HENT:      Right Ear: External ear normal.      Left Ear: External ear normal.   Eyes:      General: No visual field deficit or scleral icterus. Extraocular Movements: Extraocular movements intact. Conjunctiva/sclera: Conjunctivae normal.      Pupils: Pupils are equal, round, and reactive to light. Neck:      Vascular: No JVD. Cardiovascular:      Rate and Rhythm: Normal rate and regular rhythm. Pulses: Normal pulses. Pulmonary:      Effort: Pulmonary effort is normal. Tachypnea present. No respiratory distress. Breath sounds: Normal breath sounds. No stridor. No wheezing. Musculoskeletal:      Right lower leg: No edema. Left lower leg: No edema. Comments: Limited movement to RUE and RLE which patient reported was due to the pain and numbness. Skin:     General: Skin is warm and dry. Capillary Refill: Capillary refill takes less than 2 seconds. Findings: No rash. Neurological:      Mental Status: She is alert and oriented to person, place, and time. GCS: GCS eye subscore is 4. GCS verbal subscore is 5. GCS motor subscore is 6. Cranial Nerves: Cranial nerves are intact. No cranial nerve deficit or facial asymmetry. Motor: Weakness (RUE, RLE) and pronator drift present. Coordination: Heel to [facility] [[email or web address]] Test abnormal (RLE). Psychiatric:         Mood and Affect: Mood is anxious. Behavior: Behavior is agitated.            NIH Stroke Scale/Score (NIHSS) from [email or web address]  on 5/24/2021  ** All calculations should be rechecked by clinician prior to use **    RESULT SUMMARY:  6 points  NIH Stroke Scale      INPUTS:  1A: Level of consciousness --> 0 = Alert; keenly responsive  1B: Ask month and age --> 0 = Both questions right  1C: 'Blink eyes' & 'squeeze hands' --> 0 = Performs both tasks  2: Horizontal extraocular movements --> 0 = Normal  3: Visual fields --> 0 = No visual loss  4: Facial palsy --> 0 = Normal symmetry  5A: Left arm motor drift --> 0 = No drift for 10 are unremarkable. ORBITS: The visualized portion of the orbits demonstrate no acute abnormality. SINUSES: The visualized paranasal sinuses and mastoid air cells demonstrate no acute abnormality. SOFT TISSUES/SKULL:  No acute abnormality of the visualized skull or soft tissues. No acute intracranial abnormality. CT CERVICAL SPINE WO CONTRAST    Result Date: 5/24/2021  EXAMINATION: CT OF THE CERVICAL SPINE WITHOUT CONTRAST 5/24/2021 9:53 am TECHNIQUE: CT of the cervical spine was performed without the administration of intravenous contrast. Multiplanar reformatted images are provided for review. Dose modulation, iterative reconstruction, and/or weight based adjustment of the mA/kV was utilized to reduce the radiation dose to as low as reasonably achievable. COMPARISON: None. HISTORY: ORDERING SYSTEM PROVIDED HISTORY: right-sided numbness TECHNOLOGIST PROVIDED HISTORY: right-sided numbness Decision Support Exception - unselect if not a suspected or confirmed emergency medical condition->Emergency Medical Condition (MA) Is the patient pregnant?->No Reason for Exam: Right side numbness, no injury Acuity: Acute Type of Exam: Initial FINDINGS: BONES/ALIGNMENT: There is no acute fracture or traumatic malalignment. DEGENERATIVE CHANGES: No significant degenerative changes. SOFT TISSUES: There is no prevertebral soft tissue swelling. No acute abnormality of the cervical spine. CTA HEAD NECK W CONTRAST    Result Date: 5/24/2021  EXAMINATION: CTA OF THE HEAD AND NECK WITH CONTRAST 5/24/2021 1:32 pm: TECHNIQUE: CTA of the head and neck was performed with the administration of intravenous contrast. Multiplanar reformatted images are provided for review. MIP images are provided for review. Stenosis of the internal carotid arteries measured using NASCET criteria.  Dose modulation, iterative reconstruction, and/or weight based adjustment of the mA/kV was utilized to reduce the radiation dose to as low as reasonably achievable. COMPARISON: None. HISTORY: ORDERING SYSTEM PROVIDED HISTORY: numbness, weakness RUE and RLE TECHNOLOGIST PROVIDED HISTORY: numbness, weakness RUE and RLE Decision Support Exception - unselect if not a suspected or confirmed emergency medical condition->Emergency Medical Condition (MA) Reason for Exam: numbness, weakness RUE and RLE Acuity: Acute Type of Exam: Initial FINDINGS: CTA NECK: AORTIC ARCH/ARCH VESSELS: No dissection or arterial injury. No significant stenosis of the brachiocephalic or subclavian arteries. CAROTID ARTERIES: The common carotid arteries are patent. There is a large filling defect presumably representing thrombus within the posterior aspect of the left carotid bulb extending into the central aspect of the proximal left internal carotid artery. The remainder of the internal carotid arteries are patent bilaterally. VERTEBRAL ARTERIES: No dissection, arterial injury, or significant stenosis. SOFT TISSUES: The lung apices are clear. No cervical or superior mediastinal lymphadenopathy. The larynx and pharynx are unremarkable. No acute abnormality of the salivary and thyroid glands. BONES: No acute osseous abnormality. CTA HEAD: ANTERIOR CIRCULATION: No significant stenosis of the intracranial internal carotid, anterior cerebral, or middle cerebral arteries. No aneurysm. POSTERIOR CIRCULATION: No significant stenosis of the vertebral, basilar, or posterior cerebral arteries. No aneurysm. OTHER: No dural venous sinus thrombosis on this non-dedicated study. BRAIN: No mass effect or midline shift. No extra-axial fluid collection. The gray-white differentiation is maintained. Large filling defect presumably representing thrombus in the posterior aspect of the left carotid bulb extending into the central aspect of the proximal left internal carotid artery. Findings were discussed with Dr. Ishaan Gordon At 2:03 pm on 5/24/2021.        LABS:  Labs Reviewed   BASIC METABOLIC PANEL - Abnormal; Notable for the following components:       Result Value    Glucose 135 (*)     CREATININE 1.02 (*)     CO2 19 (*)     All other components within normal limits   CBC WITH AUTO DIFFERENTIAL - Abnormal; Notable for the following components:    Hemoglobin 10.5 (*)     Hematocrit 35.2 (*)     MCV 78.4 (*)     MCH 23.4 (*)     RDW 16.9 (*)     Seg Neutrophils 75 (*)     Lymphocytes 18 (*)     All other components within normal limits   POC GLUCOSE FINGERSTICK - Abnormal; Notable for the following components:    POC Glucose 134 (*)     All other components within normal limits   HCG, SERUM, QUALITATIVE   MAGNESIUM   TROP/MYOGLOBIN   PROTIME-INR   APTT   TROP/MYOGLOBIN   POCT GLUCOSE       All other labs were within normal range or not returned as of this dictation.     EMERGENCY DEPARTMENT COURSE and DIFFERENTIAL DIAGNOSIS/MDM:   Vitals:    Vitals:    05/24/21 1004 05/24/21 1200 05/24/21 1300 05/24/21 1358   BP: 113/82 107/82 119/82 108/81   Pulse: 100 101 96    Resp: 20 11 12    Temp:       TempSrc:       SpO2: 98% 96% 96%    Weight:       Height:             MEDICATIONS GIVEN IN THE ED:  Medications   sodium chloride flush 0.9 % injection 10 mL (10 mLs Intravenous Given 5/24/21 1336)   alteplase (ACTIVASE) injection 5.9 mg (5.9 mg Intravenous New Bag 5/24/21 1358)     Followed by   alteplase (ACTIVASE) injection 52.7 mg (52.7 mg Intravenous New Bag 5/24/21 1400)     Followed by   0.9 % sodium chloride bolus (has no administration in time range)   sodium chloride flush 0.9 % injection 5-40 mL (has no administration in time range)   sodium chloride flush 0.9 % injection 5-40 mL (has no administration in time range)   0.9 % sodium chloride infusion (has no administration in time range)   dextrose 50 % IV solution (has no administration in time range)   dexamethasone (PF) (DECADRON) injection 8 mg (8 mg Intramuscular Given 5/24/21 1059)   LORazepam (ATIVAN) injection 1 mg (1 mg Intramuscular Given 5/24/21 1059)   0.9 % recognition program.  Efforts were made to edit the dictations but occasionally words are mis-transcribed.)    Mariluz Service, APRN - CNP     Mariluz Service, APRN - [facility]  05/24/21 7688

## 2021-05-24 NOTE — ED PROVIDER NOTES
[facility]  ED  Emergency Department  Senior Resident Attestation     Primary Care Physician  No primary care provider on file. I performed a history and physical examination of the patient and discussed management with the naveed resident. I reviewed the naveed residents note and agree with the documented findings and plan of care. Any areas of disagreement are noted on the chart. Case was then discussed with Faculty Attending Supervisor for additional medical management. PERTINENT ATTENDING PHYSICIAN COMMENTS:    HISTORY:   Patient 31-year-old female presenting with right-sided weakness and arm leg as well as decrease in station and near extremities and face. Was seen at [facility] earlier today and received a dose of TPA. Is unclear if she actually finished this but per chart review appears that she has. On exam she is nontoxic-appearing no distress. .  She is mildly tachycardic but vitals otherwise unremarkable. Cranial nerves II through XII are intact with the exception of some decrease sensation over her right face. She has decreased strength in her right arm and right leg. Pronator drift on right arm and leg. Found to have a left carotid thrombus. Will page neurology for admission. INITIAL NIH STROKE SCALE    Administer stroke scale items in the order listed. Record performance in each category after each subscale exam. Do not go back and change scores. Follow directions provided for each exam technique. Scores should reflect what the patient does, not what the clinician thinks the patient can do. The clinician should record answers while administering the exam and work quickly. Except where indicated, the patient should not be coached (i.e., repeated requests to patient to make a special effort). 1a.  Level of consciousness:  0 - alert; keenly responsive  1b. Level of consciousness questions:  1 - answers one question correctly  1c.   Level of consciousness questions:  0 - performs both tasks correctly  2. Best Gaze:  0 - normal  3. Visual:  0 - no visual loss  4. Facial Palsy:  0 - normal symmetric movement  5a. Motor left arm:  0 - no drift, limb holds 90 (or 45) degrees for full 10 seconds  5b. Motor right arm:  1 - drift, limb holds 90 (or 45) degrees but drifts down before full 10 seconds: does not hit bed  6a. Motor left le - no drift; leg holds 30 degree position for full 5 seconds  6b. Motor right le - drift; leg falls by the end of the 5 second period but does not hit bed  7. Limb Ataxia:  0 - absent  8. Sensory:  1 - mild to moderate sensory loss; patient feels pinprick is less sharp or is dull on the affected side; there is a loss of superficial pain with pinprick but patient is aware of being touched   9. Best Language:  0 - no aphasia, normal  10. Dysarthria:  0 - normal  11.   Extinction and Inattention:  0 - no abnormality    TOTAL:  3      CRITICAL CARE TIME:    None    Indra Michelle DO  Senior Resident Physician    (Please note that portions of this note were completed with a voice recognition program.  Efforts were made to edit the dictations but occasionally words are mis-transcribed.)      Indra Michelle DO  Resident  [phone]

## 2021-05-24 NOTE — VIRTUAL HEALTH
Consults  Patient Location:  [facility] ED    Provider Location (City/State): [place]    This virtual visit was conducted via interactive/real-time audio/video. [facility] AT [place] Stroke and Vascular Neurology Consult for  [facility] Stroke Alert through [address] @ 12:51pm  5/24/2021 1:19 PM  Pt Name: Marii Mcghee  MRN: [id number]  YOB: 1985  Date of evaluation: 5/24/2021  Primary Care Physician: No primary care provider on file. Reason for Evaluation: Stroke Evaluation with Discussion with Ed or primary team with Telemedicine and stroke evaluation with Review of imaging and labs    Marii Mcghee is a 37 y.o. female who presents with right arm/leg weakness and numbness,headache. Patient was initially evaluated in the ED for Hyperventilation but due to persistent and worsening right sided deficits stroke alert was paged at 12:51pm.patient says her headache has resolved but still has her symptoms. Patient says she also had some stress. lkw 09:30am.     LKW: 09:30am  NIH:  3        Glucose 134  Cr. 1.02    Other co-morbidities include:H/O IV Drugs abuse,PE,Smoking,headache,anemia    Takes ASA 81 ocassionally for h/o PE as she states she could not afford to take eliquis. Allergies  has No Known Allergies. Medications  Prior to Admission medications    Medication Sig Start Date End Date Taking? Authorizing Provider   ferrous sulfate 325 (65 Fe) MG tablet Take 1 tablet by mouth 2 times daily 10/24/19   Margarito Wallace, DO   pantoprazole (PROTONIX) 20 MG tablet Take 1 tablet by mouth daily 10/24/19   Amara Lee, DO   METHADONE HCL PO Take 105 mg by mouth daily     Historical Provider, MD    Scheduled Meds:  Continuous Infusions:  PRN Meds:.  Past Medical History   has a past medical history of Acid reflux, Anemia, Drug abuse, IV ([address] Headache, History of pulmonary embolus (PE), Marijuana smoker, and Smoker.   Social History  Social History     Socioeconomic History    Marital status:      Spouse name: Not on file    Number of children: Not on file    Years of education: Not on file    Highest education level: Not on file   Occupational History    Not on file   Tobacco Use    Smoking status: Current Every Day Smoker     Types: Cigarettes    Smokeless tobacco: Never Used   Substance and Sexual Activity    Alcohol use: No    Drug use: Not Currently     Types: Marijuana    Sexual activity: Yes   Other Topics Concern    Not on file   Social History Narrative    Not on file     Social Determinants of Health     Financial Resource Strain:     Difficulty of Paying Living Expenses:    Food Insecurity:     Worried About Running Out of Food in the Last Year:     [address] in the Last Year:    Transportation Needs:     Lack of Transportation (Medical):  Lack of Transportation (Non-Medical):    Physical Activity:     Days of Exercise per Week:     Minutes of Exercise per Session:    Stress:     Feeling of Stress :    Social Connections:     Frequency of Communication with Friends and Family:     Frequency of Social Gatherings with Friends and Family:     Attends Anabaptism Services:     Active Member of Clubs or Organizations:     Attends Club or Organization Meetings:     Marital Status:    Intimate Partner Violence:     Fear of Current or Ex-Partner:     Emotionally Abused:     Physically Abused:     Sexually Abused:      Family History      Problem Relation Age of Onset    No Known Problems Mother     No Known Problems Father        OBJECTIVE  /82   Pulse 96   Temp 97.9 °F (36.6 °C) (Oral)   Resp 12   Ht 5' 8\" (1.727 m)   Wt 143 lb 4.8 oz (65 kg)   LMP 05/22/2021   SpO2 96%   BMI 21.79 kg/m²       NIH Stroke Scale Total (if not done complete detailed one below):    1a.  Level of consciousness:  0 - alert; keenly responsive  1b.   Level of consciousness questions:  0 - answers both questions correctly  1c. Level of consciousness questions:  0 - performs both tasks correctly  2. Best Gaze:  0 - normal  3. Visual:  0 - no visual loss  4. Facial Palsy:  0 - normal symmetric movement  5a. Motor left arm:  0 - no drift, limb holds 90 (or 45) degrees for full 10 seconds  5b. Motor right arm:  1 - drift, limb holds 90 (or 45) degrees but drifts down before full 10 seconds: does not hit bed  6a. Motor left le - no drift; leg holds 30 degree position for full 5 seconds  6b. Motor right le - drift; leg falls by the end of the 5 second period but does not hit bed  7. Limb Ataxia:  0 - absent  8. Sensory:  1 - mild to moderate sensory loss; patient feels pinprick is less sharp or is dull on the affected side; there is a loss of superficial pain with pinprick but patient is aware of being touched   9. Best Language:  0 - no aphasia, normal  10. Dysarthria:  0 - normal  11. Extinction and Inattention:  0 - no abnormality  Pre-Morbid mRS: 0    Imaging:  Images were personally reviewed with PACS used to review images including:  CT brain without contrast: no ich  CTA imaging: pending    Assessment    38 y/o F with right face/arm/leg numbness,headache(resolved),anxiety. Differential DDx:  1. Given her risk factors and the symptoms there is  possibility of subcortical TIA/Stroke however complicated migraine vs panic attack is also other possibility. 2. Due to the the possibility of her symptoms being disabling and her risk factors we recommend tPA. Other Co-morbidities include:H/O IV Drugs abuse,PE,Smoking,headache,anemia      Recommendations:  1. NIH 3  2. Recommend Inpatient Neurology Consult for further assessment and evaluation   Patient is an IV-tPA candidate:    No history of recent bleeding, no history of brain bleed/aneurysm/brain tumor, or recent surgery/trauma.     IV-tPA Consent: I have informed the patient and/or family of all the associated risks including 6% of po qday) after 24 hrs s/p TPA and imaging confirming no hemorrhagic transformation  - If stroke/TIA due to large vessel severe INTRACRANIAL stenosis: Consider  mg + Plavix 75 mg x 90 days, then ASA 81 mg or Plavix ALONE; administer after 24 hrs s/p TPA and imaging confirming no hemorrhagic transformation  - If stroke/TIA due to atrial fibrillation,  then in general: antiplatelet bridge for 8-50 days, then (re) anticoagulate (timing depending on size of stroke, blood on f/u imaging, TTE)  - If stroke/TIA and no clear etiology is found, consider outpatient 30 day event monitor to r/o PAFIB, especially if patient has CV risk factors  - Initiation of statin therapy is recommended to reduce risk of stroke and CV events, in patients with stroke/tia presumed to be of atherosclerotic origin and a LDL level >100mg/dl w/ or w/o evidence of CV disease. 3. - Initiation of blood pressure therapy is indicated for previously untreated patients with stroke/tia who after the first several days have an established BP >896WGRT systolic or >90QEVK diastolic. Recommend HCTZ +/- ACE-I/ARB for greater stroke prevention. 4.         Discussed with ED Physician    At least 35 min of Telemedicine and time in conversation directly with ED staff and physician for the patient who is in imminent and life threatening deterioration without further treatment and evaluation. This Virtual Visit was conducted with patient's (and/or legal guardian's) consent, to provide telestroke consultation and necessary medical care.   Time spent examining patient, reviewing the images personally, reviewing the chart, perform high complexity decision making and speaking with the nursing staff regarding recommendations      Timmy Flannery MD, MD   Stroke, Neurocritical Care And/or [address] [place] [address] Stroke [id number] [place]  Electronically signed 5/24/2021 at 1:19 PM

## 2021-05-24 NOTE — ED PROVIDER NOTES
[address]  ED  Emergency Department Encounter  EmergencyMedicine Resident     Pt Richie Harrell  MRN: [id number]  Eunicegfdon 1985  Date of evaluation: 21  PCP:  No primary care provider on file. CHIEF COMPLAINT       Chief Complaint   Patient presents with    Numbness     Right sided numbness, sent [facility], received some tpa then eloped       HISTORY OF PRESENT ILLNESS  (Location/Symptom, Timing/Onset, Context/Setting, Quality, Duration, Modifying Factors, Severity.)      Aki Morgan is a 37 y.o. female who presents after being discharged AMA from [facility] due to concerns for CVA, at the time neurologist at [facility] saw patient and recommended TPA, which was given. The last 50 cc normal saline arias after TPA was not given as patient requested to leave AMA for private transportation to [facility] At current, patient still describes feeling numb on the right side, mildly weak on the right as well. No facial symptoms. Does endorse a mild headache, however denies chest pain, shortness of breath, fevers, chills, abdominal pain, nausea or vomiting. Denies vision changes, neck pain. PAST MEDICAL / SURGICAL / SOCIAL / FAMILY HISTORY      has a past medical history of Acid reflux, Anemia, Drug abuse, IV ([address] Headache, History of pulmonary embolus (PE), Marijuana smoker, and Smoker. has a past surgical history that includes  section; ovarian cyst removal; Appendectomy; Upper gastrointestinal endoscopy (N/A, 10/21/2019); sigmoidoscopy (N/A, 10/22/2019); and Colonoscopy (N/A, 10/23/2019).     Social History     Socioeconomic History    Marital status:      Spouse name: Not on file    Number of children: Not on file    Years of education: Not on file    Highest education level: Not on file   Occupational History    Not on file   Tobacco Use    Smoking status: Current Every Day Smoker     Types: Cigarettes    Smokeless tobacco: Never Used   Substance and Sexual Activity    Alcohol use: No    Drug use: Not Currently     Types: Marijuana    Sexual activity: Yes   Other Topics Concern    Not on file   Social History Narrative    Not on file     Social Determinants of Health     Financial Resource Strain:     Difficulty of Paying Living Expenses:    Food Insecurity:     Worried About Running Out of Food in the Last Year:     Ran Out of Food in the Last Year:    Transportation Needs:     Lack of Transportation (Medical):  Lack of Transportation (Non-Medical):    Physical Activity:     Days of Exercise per Week:     Minutes of Exercise per Session:    Stress:     Feeling of Stress :    Social Connections:     Frequency of Communication with Friends and Family:     Frequency of Social Gatherings with Friends and Family:     Attends Latter-day Services:     Active Member of Clubs or Organizations:     Attends Club or Organization Meetings:     Marital Status:    Intimate Partner Violence:     Fear of Current or Ex-Partner:     Emotionally Abused:     Physically Abused:     Sexually Abused:        Family History   Problem Relation Age of Onset    No Known Problems Mother     No Known Problems Father        Allergies:  Patient has no known allergies. Home Medications:  Prior to Admission medications    Medication Sig Start Date End Date Taking? Authorizing Provider   ferrous sulfate 325 (65 Fe) MG tablet Take 1 tablet by mouth 2 times daily 10/24/19   Lucy Unger, DO   pantoprazole (PROTONIX) 20 MG tablet Take 1 tablet by mouth daily 10/24/19   Brody Lee, DO   METHADONE HCL PO Take 105 mg by mouth daily     Historical Provider, MD       REVIEW OF SYSTEMS    (2-9 systems for level 4, 10 or more for level 5)      Review of Systems   Constitutional: Negative for chills and fever. HENT: Negative for sore throat. Eyes: Negative for visual disturbance. Respiratory: Negative for cough and shortness of breath. Cardiovascular: Negative for chest pain and palpitations. Gastrointestinal: Negative for abdominal pain, nausea and vomiting. Endocrine: Negative for polyuria. Genitourinary: Negative for dysuria and hematuria. Musculoskeletal: Negative for back pain. Skin: Negative for rash. Neurological: Positive for headaches. Negative for light-headedness. Psychiatric/Behavioral: Negative for confusion. PHYSICAL EXAM   (up to 7 for level 4, 8 or more for level 5)      INITIAL VITALS:   /73   Pulse 104   Temp 97 °F (36.1 °C) (Oral)   Resp 16   Ht 5' 3\" (1.6 m)   Wt 145 lb (65.8 kg)   LMP 05/22/2021   SpO2 99%   BMI 25.69 kg/m²     Physical Exam  Constitutional:       Appearance: Normal appearance. HENT:      Head: Normocephalic and atraumatic. Mouth/Throat:      Mouth: Mucous membranes are moist.   Eyes:      Extraocular Movements: Extraocular movements intact. Pupils: Pupils are equal, round, and reactive to light. Cardiovascular:      Rate and Rhythm: Regular rhythm. Tachycardia present. Pulses: Normal pulses. Heart sounds: Normal heart sounds. Pulmonary:      Effort: Pulmonary effort is normal.      Breath sounds: Normal breath sounds. Abdominal:      Palpations: Abdomen is soft. Tenderness: There is no abdominal tenderness. Musculoskeletal:      Cervical back: Neck supple. No tenderness. Right lower leg: No edema. Left lower leg: No edema. Skin:     General: Skin is warm. Capillary Refill: Capillary refill takes less than 2 seconds. Neurological:      Mental Status: She is alert and oriented to person, place, and time.       Comments: NIHSS 3  No facial focal deficits  Right-sided upper and lower extremity weakness, diminished sensation   Psychiatric:         Mood and Affect: Mood normal.         DIFFERENTIAL  DIAGNOSIS     PLAN (LABS / IMAGING / EKG):  Orders Placed This Encounter   Procedures    Telemetry monitoring    Inpatient consult to Neurology    Inpatient consult to Neurocritical care    PATIENT STATUS (FROM ED OR OR/PROCEDURAL) Inpatient       MEDICATIONS ORDERED:  No orders of the defined types were placed in this encounter. DDX: Ischemic stroke, hemorrhagic stroke, seizure, postictal, hypoglycemia    DIAGNOSTIC RESULTS / [address] [place] [address] / [facility]   LAB RESULTS:  No results found for this visit on 05/24/21. IMPRESSION: 68-year-old lady presents to the emergency department from [facility] due to concerns for a stroke, patient received TPA after CT head and CTA head neck. Physical exam showed right-sided weakness and diminished sensation, NIHSS 3. Stroke critical paged, neurology at bedside. Neurology recommended admission to neuro critical care due to TPA. Patient admitted for further management, stable at current. RADIOLOGY:  See radiology report    EMERGENCY DEPARTMENT COURSE:        PROCEDURES:  None    CONSULTS:  IP CONSULT TO NEUROLOGY  IP CONSULT TO NEUROCRITICAL CARE    CRITICAL CARE:  Please see attending note    FINAL IMPRESSION      1. Cerebrovascular accident (CVA), unspecified mechanism ([address]          DISPOSITION / [address] Admitted 05/24/2021 03:57:30 PM      PATIENT REFERRED TO:  No follow-up provider specified.     DISCHARGE MEDICATIONS:  New Prescriptions    No medications on file       Ahmet Corbin MD  Emergency Medicine Resident    (Please note that portions of thisnote were completed with a voice recognition program.  Efforts were made to edit the dictations but occasionally words are mis-transcribed.)       Ahmet Corbin MD  Resident  05/24/21 5310

## 2021-05-24 NOTE — FLOWSHEET NOTE
[facility] [address]     Emergency/Trauma Note    PATIENT NAME: Marii Mcghee    Shift date: 05/24/2021  Shift day: Monday   Shift # 1    Room # 14/14   Name: Marii Mcghee            Age: 37 y.o. Gender: female          Lutheran: Other   Place of Caodaism: Unknown    Trauma/Incident type: Stroke Alert  Admit Date & Time: 5/24/2021  3:22 PM  TRAUMA NAME: None    ADVANCE DIRECTIVES IN CHART? No    NAME OF DECISION MAKER: Hubetr Pastor    RELATIONSHIP OF DECISION MAKER TO PATIENT: Spouse    PATIENT/EVENT DESCRIPTION:  Marii Mcghee is a 37 y.o. female who arrived via private vehicle from [facility] as a stroke alert. Patient left AMA from [address] [place] [address]. Patient stated she was scared of ambulances. Patient received medication (possible partial dose) at previous hospital. Pt to be admitted to 14/14. SPIRITUAL ASSESSMENT/INTERVENTION:  Patient was approachable and seemed anxious. Patient's  was bedside.  provided ministry of presence and active listening. Patient declared no needs at this time. PATIENT BELONGINGS:  With patient    ANY BELONGINGS OF SIGNIFICANT VALUE NOTED:  None noted    REGISTRATION STAFF NOTIFIED? No      WHAT IS YOUR SPIRITUAL CARE PLAN FOR THIS PATIENT?:   Chaplains will remain available to offer spiritual and emotional support as needed. Electronically signed by Adrianna Joseph, on 5/24/2021 at 3:51 PM.  [facility]  [phone]       05/24/21 1549   Encounter Summary   Services provided to: Patient and family together   Referral/Consult From: Multi-disciplinary team   Support System Spouse   Continue Visiting   (05/24/2021)   Complexity of Encounter Moderate   Length of Encounter 30 minutes   Spiritual Assessment Completed Yes   Crisis   Type Stroke Alert   Assessment Approachable; Anxious   Intervention Active listening;Sustaining presence/ Ministry of presence   Outcome Expressed gratitude

## 2021-05-24 NOTE — PROGRESS NOTES
Pharmacy Progress Note       ASSESSMENT:    Patient ordered tPA due to stroke. Being transferred to [facility] due to thrombus in the poster aspect of the left carotid bulb. PLAN    Came back to ED to check in on patient and see how infusion went and answer any questions. Was informed by Charge RN that patient signed out AMA post alteplase infusion. Line was not fully flushed but had been partially flushed with the NaCl infusion. Patient was aware of the risks post alteplase as patient did sign consent for infusion and neurology went over risks vs benefits prior to administration. Patient verbalized understanding of need to go to [facility] per report but was feeling better so it is not clear at this time if patient will present to [facility]. Patient has a phobia of ambulances. Mandeep Lynne.  Malik Arreguin, NickieD, [facility]  Emergency Department and Critical Care Pharmacist  05/24/21 3:02 PM

## 2021-05-24 NOTE — DISCHARGE INSTR - COC
lb 4.8 oz (65 kg)     Mental Status:  {IP PT MENTAL STATUS:45531}    IV Access:  { KAREN IV ACCESS:617721948}    Nursing Mobility/ADLs:  Walking   {CHP DME LRGJ:543361561}  Transfer  {CHP DME ZLLH:307888841}  Bathing  {CHP DME YJMO:011254467}  Dressing  {CHP DME WJMR:633817077}  Toileting  {CHP DME HDBN:487427907}  Feeding  {CHP DME MHKL:557399430}  Med Admin  {CHP DME MCXI:126689904}  Med Delivery   { KAREN MED Delivery:778639879}    Wound Care Documentation and Therapy:        Elimination:  Continence:   · Bowel: {YES / NB:26843}  · Bladder: {YES / HZ:64482}  Urinary Catheter: {Urinary Catheter:759448048}   Colostomy/Ileostomy/Ileal Conduit: {YES / TV:55359}       Date of Last BM: ***  No intake or output data in the 24 hours ending 21 0914  No intake/output data recorded.     Safety Concerns:     [employer] Safety Concerns:239465745}    Impairments/Disabilities:      [employer] Impairments/Disabilities:844176412}    Nutrition Therapy:  Current Nutrition Therapy:   [employer] Diet List:113851404}    Routes of Feeding: {P DME Other Feedings:629173553}  Liquids: {Slp liquid thickness:79030}  Daily Fluid Restriction: {CHP DME Yes amt example:534759282}  Last Modified Barium Swallow with Video (Video Swallowing Test): {Done Not Done DC:032634911}    Treatments at the Time of Hospital Discharge:   Respiratory Treatments: ***  Oxygen Therapy:  {Therapy; copd oxygen:83961}  Ventilator:    {[facility] Vent AUMX:276846485}    Rehab Therapies: {THERAPEUTIC INTERVENTION:5834898472}  Weight Bearing Status/Restrictions: [address] [employer] Weight [employer]in}  Other Medical Equipment (for information only, NOT a DME order):  {EQUIPMENT:886066176}  Other Treatments: ***    Patient's personal belongings (please select all that are sent with patient):  {[facility] DME Belongings:161202122}    RN SIGNATURE:  {Esignature:465375907}    CASE MANAGEMENT/SOCIAL WORK SECTION    Inpatient Status Date: ***    Readmission Risk Assessment Score:  Readmission Risk              Risk of Unplanned Readmission:  0           Discharging to Facility/ Agency   · Name:   · Address:  · Phone:  · Fax:    Dialysis Facility (if applicable)   · Name:  · Address:  · Dialysis Schedule:  · Phone:  · Fax:    / signature: {Esignature:662140758}    PHYSICIAN SECTION    Prognosis: {Prognosis:7317599125}    Condition at Discharge: Lupe Barillas Patient Condition:601313532}    Rehab Potential (if transferring to Rehab): {Prognosis:2960089283}    Recommended Labs or Other Treatments After Discharge: ***    Physician Certification: I certify the above information and transfer of Enrique Mena  is necessary for the continuing treatment of the diagnosis listed and that she requires {Admit to Appropriate Level of Care:37110} for {GREATER/LESS:628159877} 30 days.      Update Admission H&P: {CHP DME Changes in [facility]:769865866}    PHYSICIAN SIGNATURE:  {Esignature:181461690}

## 2021-05-24 NOTE — H&P
at [facility] 7833 N/A 10/22/2019    SIGMOIDOSCOPY ABORTED COLONOSCOPY performed by Rose Li MD at [facility] 35 N/A 10/21/2019    EGD ESOPHAGOGASTRODUODENOSCOPY performed by Rose Li MD at [facility] 57 History:   Social History     Socioeconomic History    Marital status:      Spouse name: Not on file    Number of children: Not on file    Years of education: Not on file    Highest education level: Not on file   Occupational History    Not on file   Tobacco Use    Smoking status: Current Every Day Smoker     Types: Cigarettes    Smokeless tobacco: Never Used   Substance and Sexual Activity    Alcohol use: No    Drug use: Not Currently     Types: Marijuana    Sexual activity: Yes   Other Topics Concern    Not on file   Social History Narrative    Not on file     Social Determinants of Health     Financial Resource Strain:     Difficulty of Paying Living Expenses:    Food Insecurity:     Worried About Running Out of Food in the Last Year:     [address] in the Last Year:    Transportation Needs:     Lack of Transportation (Medical):  Lack of Transportation (Non-Medical):    Physical Activity:     Days of Exercise per Week:     Minutes of Exercise per Session:    Stress:     Feeling of Stress :    Social Connections:     Frequency of Communication with Friends and Family:     Frequency of Social Gatherings with Friends and Family:     Attends Church Services:     Active Member of Clubs or Organizations:     Attends Club or Organization Meetings:     Marital Status:    Intimate Partner Violence:     Fear of Current or Ex-Partner:     Emotionally Abused:     Physically Abused:     Sexually Abused:        Family History:       Problem Relation Age of Onset    No Known Problems Mother     No Known Problems Father        Allergies:    Patient has no known allergies.     Medications Prior to air entry bilaterally   CARDIOVASCULAR:  normal s1 / s2, NSR   ABDOMEN:  Soft, no rigidity   NECK supple, symmetric   NEUROLOGIC:  Mental Status:  A & O x3,awake             Cranial Nerves:    II: Visual fields:  normal  III: Pupils:  equal, round, reactive to light  III,IV,VI: Extra Ocular Movements: intact  V: Facial sensation:  intact  VII: Facial strength: intact    Motor Exam:    Drift:  present - right upper and right lower extremities  Tone:  normal    Motor exam is 5 out of 5 all extremities with the exception of 4- out of 5 right upper and lower extremities    Sensory:    Touch:    Right Upper Extremity:  abnormal - neglect  Left Upper Extremity:  normal  Right Lower Extremity:  abnormal - neglect  Left Lower Extremity:  normal   SKIN No obvious ecchymosis, rashes, or lesions    NIH Stroke Scale Total (if not done complete detailed one below):    1a.  Level of consciousness:  0 - alert; keenly responsive  1b. Level of consciousness questions:  0 - answers both questions correctly  1c. Level of consciousness questions:  0 - performs both tasks correctly  2. Best Gaze:  0 - normal  3. Visual:  0 - no visual loss  4. Facial Palsy:  0 - normal symmetric movement  5a. Motor left arm:  0 - no drift, limb holds 90 (or 45) degrees for full 10 seconds  5b. Motor right arm:  1 - drift, limb holds 90 (or 45) degrees but drifts down before full 10 seconds: does not hit bed  6a. Motor left le - no drift; leg holds 30 degree position for full 5 seconds  6b. Motor right le - drift; leg falls by the end of the 5 second period but does not hit bed  7. Limb Ataxia:  0 - absent  8. Sensory:  1 - mild to moderate sensory loss; patient feels pinprick is less sharp or is dull on the affected side; there is a loss of superficial pain with pinprick but patient is aware of being touched   9. Best Language:  0 - no aphasia, normal  10. Dysarthria:  0 - normal  11.   Extinction and Inattention:  1 - visual, tactile, auditory, spatial or personal inattention or extinction to bilateral simultaneous stimulation in one of the sensory modalities     LABS AND IMAGING:     RECENT LABS:  CBC with Differential:    Lab Results   Component Value Date    WBC 9.9 05/24/2021    RBC 4.49 05/24/2021    HGB 10.5 05/24/2021    HCT 35.2 05/24/2021     05/24/2021    MCV 78.4 05/24/2021    MCH 23.4 05/24/2021    MCHC 29.8 05/24/2021    RDW 16.9 05/24/2021    LYMPHOPCT 18 05/24/2021    MONOPCT 5 05/24/2021    BASOPCT 1 05/24/2021    MONOSABS 0.52 05/24/2021    LYMPHSABS 1.73 05/24/2021    EOSABS 0.11 05/24/2021    BASOSABS 0.05 05/24/2021    DIFFTYPE NOT REPORTED 05/24/2021     BMP:    Lab Results   Component Value Date     05/24/2021    K 4.3 05/24/2021     05/24/2021    CO2 19 05/24/2021    BUN 11 05/24/2021    LABALBU 3.5 10/19/2019    CREATININE 1.02 05/24/2021    CALCIUM 9.6 05/24/2021    GFRAA >60 05/24/2021    LABGLOM >60 05/24/2021    GLUCOSE 135 05/24/2021       RADIOLOGY:   CT head without contrast    (Results Pending)   MRI brain without contrast    (Results Pending)              Labs and Images reviewed with:    [] Hema Encarnacion MD    [x] Domingo Hammond MD  [] Meri Francois MD  --[] there are no new interval images to review. ASSESSMENT AND PLAN:         ASSESSMENT:     This is a 37 y.o. female with history of substance abuse and remote history of PE 2019 who presents with acute onset right sided weakness and numbness, CT head negative received tPA at 1358. CTA head/neck revealed a nonocclusive left carotid bulb thrombus. Signed out AMA from [facility] due to refusing ambulance transfer, arrived to [facility] via private automobile. NIH 4 from 6. Admit to Neuro ICU, stroke workup, close neurological monitoring, hypercoagulable workup. Patient care will be discussed with attending, will reevaluate patient along with attending.      PLAN/MEDICAL DECISION MAKING:    NEUROLOGIC:  - Nonocclusive left carotid bulb thrombus  - s/p IV tPA 5/24 @ 1358  - MRI brain wo contrast, SWI sequence 5/25 @ 0800, if negative for hemorrhage, consider heparin infusion and aspirin load  - Hold antiplatelet and anticoagulation for now  - Post tPA precautions  - Goal SBP < 180  - Avoid symptomatic hypotension  - Neuro checks per protocol    CARDIOVASCULAR:  - Goal SBP < 180  - Avoid symptomatic hypotension  - Follow up EKG, trop, Echocardiogram  - Start Lipitor 40 mg QHS  - Continue telemetry    PULMONARY:  - Maintaining oxygen saturations on room air  - Continue to monitor  - Smoking cessation    RENAL/FLUID/ELECTROLYTE:  - BUN 11/ Creatinine 1.02  - Monitor I&Os  - IVF: Normal saline @ 75 mL/hr  - Replace electrolytes PRN  - Daily BMP    GI/NUTRITION:  NUTRITION:  Diet NPO Effective Now   - Swallow study and advance diet as tolerated  - Bowel regimen: Miralax prn  - GI prophylaxis: Not indicated    ID:  - Afebrile  - No leukocytosis, WBC 9.9  - Continue to monitor for fevers  - Daily CBC    HEME:   - H&H 10.5/35.2  - Platelets 967  - Daily CBC    ENDOCRINE:  - Continue to monitor blood glucose, goal <180  - Follow up HEmoglobin a1C     OTHER:  - PT/OT/ST     PROPHYLAXIS:  Stress ulcer: NI    DVT PROPHYLAXIS:  - SCD sleeves - Thigh High   - No chemoprophylaxis anticoagulation at this time.       DISPOSITION: Admit to ICU      EMILY Nolasco - [facility]  Neuro Critical Care Service   Pager [phone]  5/24/2021     6:14 PM

## 2021-05-24 NOTE — CONSULTS
Department of Endovascular Neurosurgery                                         Resident Consult Note  Stroke Alert   ER Room # 14          Reason for Consult:  Stroke   Requesting Physician:  ED      History Obtained From:  patient, electronic medical record    CHIEF COMPLAINT:       Weakness, numbness, right sided     HISTORY OF PRESENT ILLNESS:       The patient is a 37 y.o. female with PMH of PE in 2019, or TIA, who presented as transfer from [facility] after she presented there this morning with new onset right-sided weakness and numbness. Patient was evaluated by telestroke at [facility]. Patient was given NIHSS of 3 due to right-sided weakness and numbness. She is status post TPA. Patient was supposed to be transferred directly to the neuro ICU as direct admission however patient refused work-up with ambulance and took of her IV line after completing TPA. CTA head and neck and sent and showed left carotid bulb nonocclusive thrombus. CT head was unremarkable. On presentation to [facility] her blood pressure was 107/73. PAST MEDICAL HISTORY :       Past Medical History:        Diagnosis Date    Acid reflux     Anemia 10/18/2019    Drug abuse, IV ([address]     claims that she has previous iv drug dependency claims hasn' had recent usage.     Headache     History of pulmonary embolus (PE)     Marijuana smoker 10/18/2019    Smoker 10/18/2019       Past Surgical History:        Procedure Laterality Date    APPENDECTOMY       SECTION      COLONOSCOPY N/A 10/23/2019    COLORECTAL CANCER SCREENING, NOT HIGH RISK performed by Janis Baca MD at [facility] 7893 N/A 10/22/2019    SIGMOIDOSCOPY ABORTED COLONOSCOPY performed by Janis Baca MD at [address] [place] [address] N/A 10/21/2019    EGD ESOPHAGOGASTRODUODENOSCOPY performed by Janis Baca MD at [place] [address] History:   Social History Socioeconomic History    Marital status:      Spouse name: Not on file    Number of children: Not on file    Years of education: Not on file    Highest education level: Not on file   Occupational History    Not on file   Tobacco Use    Smoking status: Current Every Day Smoker     Types: Cigarettes    Smokeless tobacco: Never Used   Substance and Sexual Activity    Alcohol use: No    Drug use: Not Currently     Types: Marijuana    Sexual activity: Yes   Other Topics Concern    Not on file   Social History Narrative    Not on file     Social Determinants of Health     Financial Resource Strain:     Difficulty of Paying Living Expenses:    Food Insecurity:     Worried About Running Out of Food in the Last Year:     [address] in the Last Year:    Transportation Needs:     Lack of Transportation (Medical):  Lack of Transportation (Non-Medical):    Physical Activity:     Days of Exercise per Week:     Minutes of Exercise per Session:    Stress:     Feeling of Stress :    Social Connections:     Frequency of Communication with Friends and Family:     Frequency of Social Gatherings with Friends and Family:     Attends [employer] Services:     Active Member of Clubs or Organizations:     Attends Club or Organization Meetings:     Marital Status:    Intimate Partner Violence:     Fear of Current or Ex-Partner:     Emotionally Abused:     Physically Abused:     Sexually Abused:        Family History:       Problem Relation Age of Onset    No Known Problems Mother     No Known Problems Father        Allergies:  Patient has no known allergies. Home Medications:  Prior to Admission medications    Medication Sig Start Date End Date Taking?  Authorizing Provider   ferrous sulfate 325 (65 Fe) MG tablet Take 1 tablet by mouth 2 times daily 10/24/19   Shellie Guaman, DO   pantoprazole (PROTONIX) 20 MG tablet Take 1 tablet by mouth daily 10/24/19   Shellie Guaman, DO   METHADONE HCL PO Take 105 mg by mouth daily     Historical Provider, MD       Current Medications:   No current facility-administered medications for this encounter. REVIEW OF SYSTEMS:       Review of Systems   Constitutional: Negative. HENT: Negative. Eyes: Negative. Respiratory: Negative. Cardiovascular: Negative. Gastrointestinal: Negative. Endocrine: Negative. Genitourinary: Negative. Musculoskeletal: Negative. Skin: Negative. Neurological: Positive for weakness and numbness. Negative for dizziness, tremors, seizures, syncope, facial asymmetry, speech difficulty, light-headedness and headaches. PHYSICAL EXAM:       /73   Pulse 104   Temp 97 °F (36.1 °C) (Oral)   Resp 16   Ht 5' 3\" (1.6 m)   Wt 145 lb (65.8 kg)   LMP 05/22/2021   SpO2 99%   BMI 25.69 kg/m²       Physical Exam  HENT:      Head: Normocephalic and atraumatic. Eyes:      Extraocular Movements: Extraocular movements intact. Pupils: Pupils are equal, round, and reactive to light. Cardiovascular:      Rate and Rhythm: Normal rate and regular rhythm. Pulmonary:      Effort: Pulmonary effort is normal.      Breath sounds: Normal breath sounds. Abdominal:      General: Abdomen is flat. Palpations: Abdomen is soft. Musculoskeletal:      Cervical back: Normal range of motion and neck supple. Neurological:      Mental Status: She is alert and oriented to person, place, and time. GCS: GCS eye subscore is 4. GCS verbal subscore is 5. GCS motor subscore is 6. Cranial Nerves: Cranial nerves are intact. Sensory: Sensory deficit (Right-sided numbness involving the right upper and lower extremities) present. Motor: Weakness present. No abnormal muscle tone. Coordination: Coordination normal. Finger-Nose-Finger Test normal.      Deep Tendon Reflexes: Babinski sign absent on the right side. Babinski sign absent on the left side.       Reflex Scores:       Tricep reflexes are 2+ on the right side and 2+ on the left side. Bicep reflexes are 2+ on the right side and 2+ on the left side. Brachioradialis reflexes are 2+ on the right side and 2+ on the left side. Patellar reflexes are 2+ on the right side and 2+ on the left side. Achilles reflexes are 2+ on the right side and 2+ on the left side. Comments:   Right upper extremity 3/5  Right lower extremity 3/5  Left upper extremity 5/5  Left lower extremity 5/5           INITIAL NIH STROKE SCALE:    3:53 PM    1a. Level of consciousness:  0 - alert; keenly responsive  1b. Level of consciousness questions:  0 - answers both questions correctly  1c. Level of consciousness questions:  0 - performs both tasks correctly  2. Best Gaze:  0 - normal  3. Visual:  0 - no visual loss  4. Facial Palsy:  0 - normal symmetric movement  5a. Motor left arm:  0 - no drift, limb holds 90 (or 45) degrees for full 10 seconds  5b. Motor right arm:  1 - drift, limb holds 90 (or 45) degrees but drifts down before full 10 seconds: does not hit bed  6a. Motor left le - no drift; leg holds 30 degree position for full 5 seconds  6b. Motor right le - drift; leg falls by the end of the 5 second period but does not hit bed  7. Limb Ataxia:  0 - absent  8. Sensory:  1 - mild to moderate sensory loss; patient feels pinprick is less sharp or is dull on the affected side; there is a loss of superficial pain with pinprick but patient is aware of being touched   9. Best Language:  0 - no aphasia, normal  10. Dysarthria:  0 - normal  11.   Extinction and Inattention:  0 - no abnormality    TOTAL:  3    Modified Dunn Score Scale:     [x] Zero: No symptoms at all   [] 1: No significant disability despite symptoms; able to carry out all usual duties and activities   [] 2: Slight disability; unable to carry out all previous activities, but able to look after own affairs without assistance   [] 3:Moderate disability; requiring some help, but able to walk without assistance   [] 4: Moderately severe disability; unable to walk and attend to bodily needs without assistance   [] 5:Severe disability; bedridden, incontinent and requiring constant nursing care and attention    LABS AND IMAGING:     CBC with Differential:    Lab Results   Component Value Date    WBC 9.9 05/24/2021    RBC 4.49 05/24/2021    HGB 10.5 05/24/2021    HCT 35.2 05/24/2021     05/24/2021    MCV 78.4 05/24/2021    MCH 23.4 05/24/2021    MCHC 29.8 05/24/2021    RDW 16.9 05/24/2021    LYMPHOPCT 18 05/24/2021    MONOPCT 5 05/24/2021    BASOPCT 1 05/24/2021    MONOSABS 0.52 05/24/2021    LYMPHSABS 1.73 05/24/2021    EOSABS 0.11 05/24/2021    BASOSABS 0.05 05/24/2021    DIFFTYPE NOT REPORTED 05/24/2021     BMP:    Lab Results   Component Value Date     05/24/2021    K 4.3 05/24/2021     05/24/2021    CO2 19 05/24/2021    BUN 11 05/24/2021    LABALBU 3.5 10/19/2019    CREATININE 1.02 05/24/2021    CALCIUM 9.6 05/24/2021    GFRAA >60 05/24/2021    LABGLOM >60 05/24/2021    GLUCOSE 135 05/24/2021       Radiology Review:    1.) CT brain without contrast: Unremarkable    2.) CTA Head/Neck: Left carotid bulb nonocclusive thrombus extending to proximal cervical ICA    3.) Brain MRI W/O: Pending    ASSESSMENT AND PLAN:       Patient Active Problem List   Diagnosis    Acute pulmonary embolism ([address]    Anemia    Smoker    Marijuana smoker    Iron deficiency anemia    History of intravenous drug abuse ([address]    Occult blood positive stool    HH (hiatus hernia)       Assessment                 37 y.o. female who presents with right hemiparesis and hemisensory loss most likely due to ischemic stroke. Thromboembolic with CTA finding of left nonocclusive thrombus. 1. -170 mm Hg  2. Glucose  with accucheck q 6h x 24  3. No anticoagulation, DVT med prophlyaxis, antiplatelets, no ASA or Plavix x 24 hours  4.  Repeat CT or MRI 24 hours post IV tPA  5. CTA head and neck-completed in ED, no need for carotid US  6. Cardiac Echo  7. Fasting Lipid Panel  8. Hypercoag work up  9. DVT prophylaxis  10. PT/OT/ST and PMR consult  11. IVF  12. Admit to the neurocritical care unit for Intravenous Medication to Control the Blood pressure monitoring  13. Continuous cardiac, respiratory, hemodynamic and neurological monitoring         Additional recommendations may follow    Please contact [facility] with any changes in patients neurologic status. Thank you for your consult.        Kerry Judd MD  Neurology Resident PGY-4  5/24/2021 at 3:53 PM

## 2021-05-25 ENCOUNTER — APPOINTMENT (OUTPATIENT)
Dept: MRI IMAGING | Age: 36
DRG: 045 | End: 2021-05-25
Payer: MEDICAID

## 2021-05-25 LAB
ABSOLUTE EOS #: 0 K/UL (ref 0–0.44)
ABSOLUTE IMMATURE GRANULOCYTE: 0 K/UL (ref 0–0.3)
ABSOLUTE LYMPH #: 2.23 K/UL (ref 1.1–3.7)
ABSOLUTE MONO #: 0.85 K/UL (ref 0.1–1.2)
ANION GAP SERPL CALCULATED.3IONS-SCNC: 13 MMOL/L (ref 9–17)
ANTICARDIOLIPIN IGA ANTIBODY: NORMAL APL
ANTICARDIOLIPIN IGG ANTIBODY: NORMAL GPL
BASOPHILS # BLD: 0 % (ref 0–2)
BASOPHILS ABSOLUTE: 0 K/UL (ref 0–0.2)
BUN BLDV-MCNC: 11 MG/DL (ref 6–20)
BUN/CREAT BLD: NORMAL (ref 9–20)
CALCIUM SERPL-MCNC: 9 MG/DL (ref 8.6–10.4)
CARDIOLIPIN AB IGM: NORMAL MPL
CHLORIDE BLD-SCNC: 101 MMOL/L (ref 98–107)
CHOLESTEROL/HDL RATIO: 6.7
CHOLESTEROL: 194 MG/DL
CO2: 22 MMOL/L (ref 20–31)
CREAT SERPL-MCNC: 0.59 MG/DL (ref 0.5–0.9)
DIFFERENTIAL TYPE: ABNORMAL
DILUTE RUSSELL VIPER VENOM TIME: NORMAL
EOSINOPHILS RELATIVE PERCENT: 0 % (ref 1–4)
ESTIMATED AVERAGE GLUCOSE: 105 MG/DL
GFR AFRICAN AMERICAN: >60 ML/MIN
GFR NON-AFRICAN AMERICAN: >60 ML/MIN
GFR SERPL CREATININE-BSD FRML MDRD: NORMAL ML/MIN/{1.73_M2}
GFR SERPL CREATININE-BSD FRML MDRD: NORMAL ML/MIN/{1.73_M2}
GLUCOSE BLD-MCNC: 85 MG/DL (ref 70–99)
HBA1C MFR BLD: 5.3 % (ref 4–6)
HCT VFR BLD CALC: 34.6 % (ref 36.3–47.1)
HDLC SERPL-MCNC: 29 MG/DL
HEMOGLOBIN: 9.4 G/DL (ref 11.9–15.1)
HOMOCYSTEINE: 6.4 UMOL/L
IMMATURE GRANULOCYTES: 0 %
INR BLD: NORMAL
LDL CHOLESTEROL: 135 MG/DL (ref 0–130)
LUPUS ANTICOAG: NORMAL
LYMPHOCYTES # BLD: 21 % (ref 24–43)
MCH RBC QN AUTO: 23.3 PG (ref 25.2–33.5)
MCHC RBC AUTO-ENTMCNC: 27.2 G/DL (ref 28.4–34.8)
MCV RBC AUTO: 85.6 FL (ref 82.6–102.9)
MONOCYTES # BLD: 8 % (ref 3–12)
MORPHOLOGY: ABNORMAL
NRBC AUTOMATED: 0 PER 100 WBC
PARTIAL THROMBOPLASTIN TIME: 35.4 SEC (ref 20.5–30.5)
PARTIAL THROMBOPLASTIN TIME: NORMAL SEC
PDW BLD-RTO: 17.3 % (ref 11.8–14.4)
PLATELET # BLD: 307 K/UL (ref 138–453)
PLATELET ESTIMATE: ABNORMAL
PMV BLD AUTO: 11.4 FL (ref 8.1–13.5)
POTASSIUM SERPL-SCNC: 4 MMOL/L (ref 3.7–5.3)
PROTHROMBIN TIME: NORMAL SEC
RBC # BLD: 4.04 M/UL (ref 3.95–5.11)
RBC # BLD: ABNORMAL 10*6/UL
SEG NEUTROPHILS: 71 % (ref 36–65)
SEGMENTED NEUTROPHILS ABSOLUTE COUNT: 7.52 K/UL (ref 1.5–8.1)
SODIUM BLD-SCNC: 136 MMOL/L (ref 135–144)
TRIGL SERPL-MCNC: 150 MG/DL
VLDLC SERPL CALC-MCNC: ABNORMAL MG/DL (ref 1–30)
WBC # BLD: 10.6 K/UL (ref 3.5–11.3)
WBC # BLD: ABNORMAL 10*3/UL

## 2021-05-25 PROCEDURE — 02HV33Z INSERTION OF INFUSION DEVICE INTO SUPERIOR VENA CAVA, PERCUTANEOUS APPROACH: ICD-10-PCS | Performed by: PSYCHIATRY & NEUROLOGY

## 2021-05-25 PROCEDURE — 99233 SBSQ HOSP IP/OBS HIGH 50: CPT | Performed by: PSYCHIATRY & NEUROLOGY

## 2021-05-25 PROCEDURE — 97162 PT EVAL MOD COMPLEX 30 MIN: CPT

## 2021-05-25 PROCEDURE — 85613 RUSSELL VIPER VENOM DILUTED: CPT

## 2021-05-25 PROCEDURE — 85730 THROMBOPLASTIN TIME PARTIAL: CPT

## 2021-05-25 PROCEDURE — 70551 MRI BRAIN STEM W/O DYE: CPT

## 2021-05-25 PROCEDURE — 6360000002 HC RX W HCPCS: Performed by: NURSE PRACTITIONER

## 2021-05-25 PROCEDURE — 6370000000 HC RX 637 (ALT 250 FOR IP): Performed by: NURSE PRACTITIONER

## 2021-05-25 PROCEDURE — 85306 CLOT INHIBIT PROT S FREE: CPT

## 2021-05-25 PROCEDURE — 80061 LIPID PANEL: CPT

## 2021-05-25 PROCEDURE — 85302 CLOT INHIBIT PROT C ANTIGEN: CPT

## 2021-05-25 PROCEDURE — 86147 CARDIOLIPIN ANTIBODY EA IG: CPT

## 2021-05-25 PROCEDURE — 83036 HEMOGLOBIN GLYCOSYLATED A1C: CPT

## 2021-05-25 PROCEDURE — 85025 COMPLETE CBC W/AUTO DIFF WBC: CPT

## 2021-05-25 PROCEDURE — 81291 MTHFR GENE: CPT

## 2021-05-25 PROCEDURE — 36415 COLL VENOUS BLD VENIPUNCTURE: CPT

## 2021-05-25 PROCEDURE — 97530 THERAPEUTIC ACTIVITIES: CPT

## 2021-05-25 PROCEDURE — 36569 INSJ PICC 5 YR+ W/O IMAGING: CPT

## 2021-05-25 PROCEDURE — 81241 F5 GENE: CPT

## 2021-05-25 PROCEDURE — 80048 BASIC METABOLIC PNL TOTAL CA: CPT

## 2021-05-25 PROCEDURE — C1751 CATH, INF, PER/CENT/MIDLINE: HCPCS

## 2021-05-25 PROCEDURE — 2000000003 HC NEURO ICU R&B

## 2021-05-25 PROCEDURE — 76937 US GUIDE VASCULAR ACCESS: CPT

## 2021-05-25 PROCEDURE — 97535 SELF CARE MNGMENT TRAINING: CPT

## 2021-05-25 PROCEDURE — 81240 F2 GENE: CPT

## 2021-05-25 PROCEDURE — 85230 CLOT FACTOR VII PROCONVERTIN: CPT

## 2021-05-25 PROCEDURE — 2580000003 HC RX 258: Performed by: NURSE PRACTITIONER

## 2021-05-25 PROCEDURE — 85307 ASSAY ACTIVATED PROTEIN C: CPT

## 2021-05-25 PROCEDURE — 92523 SPEECH SOUND LANG COMPREHEN: CPT

## 2021-05-25 PROCEDURE — 97166 OT EVAL MOD COMPLEX 45 MIN: CPT

## 2021-05-25 PROCEDURE — 85610 PROTHROMBIN TIME: CPT

## 2021-05-25 PROCEDURE — 99291 CRITICAL CARE FIRST HOUR: CPT | Performed by: PSYCHIATRY & NEUROLOGY

## 2021-05-25 PROCEDURE — 85300 ANTITHROMBIN III ACTIVITY: CPT

## 2021-05-25 PROCEDURE — 83090 ASSAY OF HOMOCYSTEINE: CPT

## 2021-05-25 RX ORDER — METHADONE HYDROCHLORIDE 5 MG/5ML
90 SOLUTION ORAL ONCE
Status: COMPLETED | OUTPATIENT
Start: 2021-05-25 | End: 2021-05-25

## 2021-05-25 RX ORDER — HEPARIN SODIUM 10000 [USP'U]/100ML
5-30 INJECTION, SOLUTION INTRAVENOUS CONTINUOUS
Status: DISCONTINUED | OUTPATIENT
Start: 2021-05-25 | End: 2021-06-02

## 2021-05-25 RX ORDER — METHADONE HYDROCHLORIDE 5 MG/5ML
120 SOLUTION ORAL DAILY
Status: DISCONTINUED | OUTPATIENT
Start: 2021-05-25 | End: 2021-06-02 | Stop reason: HOSPADM

## 2021-05-25 RX ORDER — METHADONE HYDROCHLORIDE 10 MG/1
120 TABLET ORAL DAILY
Status: DISCONTINUED | OUTPATIENT
Start: 2021-05-25 | End: 2021-05-25

## 2021-05-25 RX ORDER — ASPIRIN 81 MG/1
324 TABLET, CHEWABLE ORAL ONCE
Status: COMPLETED | OUTPATIENT
Start: 2021-05-25 | End: 2021-05-25

## 2021-05-25 RX ORDER — ASPIRIN 81 MG/1
81 TABLET, CHEWABLE ORAL DAILY
Status: DISCONTINUED | OUTPATIENT
Start: 2021-05-26 | End: 2021-06-02 | Stop reason: HOSPADM

## 2021-05-25 RX ADMIN — ONDANSETRON 4 MG: 2 INJECTION INTRAMUSCULAR; INTRAVENOUS at 11:51

## 2021-05-25 RX ADMIN — SODIUM CHLORIDE: 9 INJECTION, SOLUTION INTRAVENOUS at 10:50

## 2021-05-25 RX ADMIN — METHADONE HYDROCHLORIDE 90 MG: 5 SOLUTION ORAL at 16:34

## 2021-05-25 RX ADMIN — SODIUM CHLORIDE, PRESERVATIVE FREE 10 ML: 5 INJECTION INTRAVENOUS at 20:24

## 2021-05-25 RX ADMIN — ASPIRIN 324 MG: 81 TABLET, CHEWABLE ORAL at 18:15

## 2021-05-25 RX ADMIN — METHADONE HYDROCHLORIDE 120 MG: 5 SOLUTION ORAL at 12:55

## 2021-05-25 RX ADMIN — HEPARIN SODIUM AND DEXTROSE 12 UNITS/KG/HR: 10000; 5 INJECTION INTRAVENOUS at 12:38

## 2021-05-25 RX ADMIN — PROMETHAZINE HYDROCHLORIDE 12.5 MG: 12.5 TABLET ORAL at 13:23

## 2021-05-25 RX ADMIN — ATORVASTATIN CALCIUM 80 MG: 80 TABLET, FILM COATED ORAL at 20:24

## 2021-05-25 RX ADMIN — SODIUM CHLORIDE, PRESERVATIVE FREE 10 ML: 5 INJECTION INTRAVENOUS at 10:51

## 2021-05-25 ASSESSMENT — PAIN SCALES - GENERAL
PAINLEVEL_OUTOF10: 0

## 2021-05-25 ASSESSMENT — PAIN - FUNCTIONAL ASSESSMENT: PAIN_FUNCTIONAL_ASSESSMENT: 0-10

## 2021-05-25 NOTE — PROGRESS NOTES
Physical Therapy    Facility/Department: [address]  Initial Assessment    NAME: Ad Mckenzie  : 1985  MRN: [id number]  Chief Complaint   Patient presents with    Numbness     Right sided numbness, sent [facility], received some tpa then eloped     Date of Service: 2021    Discharge Recommendations: Further therapy recommended at discharge. The patient should be able to tolerate at least three hours of therapy per day over 5 days or 15 hours over 7 days. PT Equipment Recommendations  Equipment Needed: Yes  Mobility Devices: Laurance Preciado: Rolling    Assessment   Body structures, Functions, Activity limitations: Decreased functional mobility ; Decreased ADL status; Decreased ROM; Decreased strength;Decreased endurance;Decreased balance  Assessment: Pt ambulated 40ft Min A with RW with increased reliance of LUE and LLE, and minimal assist from LUE in order to progress RLE for bed mobility. Pt could benefit from continued PT in order to increase strength and improve functional mobility. Pt currently requires 24 hour care for functional tasks due to assist level. Prognosis: Good  Decision Making: Medium Complexity  PT Education: Goals;PT Role;Plan of Care;Precautions;Transfer Training;Gait Training  Patient Education: UE placement for RW with transfers with fair return  REQUIRES PT FOLLOW UP: Yes  Activity Tolerance  Activity Tolerance: Patient Tolerated treatment well;Patient limited by endurance; Other  Activity Tolerance: activity limited by increased numbness in RLE per pt report; Patient Diagnosis(es): The encounter diagnosis was Cerebrovascular accident (CVA), unspecified mechanism ([facility] has a past medical history of Acid reflux, Anemia, Drug abuse, IV ([address] Headache, History of pulmonary embolus (PE), Marijuana smoker, and Smoker. has a past surgical history that includes  section; ovarian cyst removal; Appendectomy;  Upper gastrointestinal endoscopy (N/A, RLE  Strength RLE: Exception  R Hip Flexion: 3+/5  R Knee Flexion: 3/5  R Knee Extension: 3+/5  R Ankle Dorsiflexion: 3+/5  R Ankle Plantar flexion: 2+/5 (walks on toes at baseline, able to get to neutral)  Strength LLE  Strength LLE: WFL  Comment: grossly 4/5  Strength RUE  Strength RUE: Exception  Comment: hands assessed by OT grossly 4-/5  R Shoulder Flexion: 3-/5  R Elbow Flexion: 4/5  R Elbow Extension: 4/5  Strength LUE  Strength LUE: WFL  Comment: assessed by OT  Tone RLE  RLE Tone: Normotonic  Tone LLE  LLE Tone: Normotonic  Motor Control  Gross Motor?:  (difficulty with nose to OT finger, tremor noted)  Sensation  Overall Sensation Status: Impaired (pt reports increased numbness with activity in anterior thigh)  Bed mobility  Bridging: Modified independent   Rolling to Right: Modified independent  Supine to Sit: Stand by assistance  Sit to Supine: Supervision  Comment: supine>sit SBA for safety, able to complete sit>supne wtih supervision; HOB elevated, use of bedrails and LUE for RLE advancement to sit EOB  Transfers  Sit to Stand: Minimal Assistance  Stand to sit: Minimal Assistance  Comment: assessed with RW, pt put minimal weight through RUE and RLE; verbal and tactile cues for UE placement  Ambulation  Ambulation?: Yes  Ambulation 1  Surface: level tile  Device: Rolling Walker  Assistance: Minimal assistance  Quality of Gait: pt inconsistently RLE flat foot, reports walking on toes at baseline  Gait Deviations: Slow Magdalene;Decreased step length;Decreased step height  Distance: 40ft  Stairs/Curb  Stairs?: No     Balance  Posture: Fair  Sitting - Static: Fair;+  Sitting - Dynamic: Fair  Standing - Static: Fair  Standing - Dynamic: Fair;-  Comments: assessed with RW        Plan   Plan  Times per week: 5-6x/wk  Current Treatment Recommendations: Strengthening, Balance Training, Functional Mobility Training, ROM, Transfer Training, ADL/Self-care Training, Stair training, Gait Training, Endurance Training, Home Exercise Program, Safety Education & Training, Patient/Caregiver Education & Training  Safety Devices  Type of devices: All fall risk precautions in place, Gait belt, Left in bed, Nurse notified (Pt left with OT and  upon exit)  Restraints  Initially in place: No      AM-PAC Score  AM-PAC Inpatient Mobility Raw Score : 17 (05/25/21 1317)  AM-PAC Inpatient T-Scale Score : 42.13 (05/25/21 1317)  Mobility Inpatient CMS 0-100% Score: 50.57 (05/25/21 1317)  Mobility Inpatient CMS G-Code Modifier : CK (05/25/21 1317)          Goals  Short term goals  Time Frame for Short term goals: 14 visits  Short term goal 1: Ambulate 500ft CGA and least restrictive AD  Short term goal 2: Ascend/Descend 3 stairs with CGA without handrail  Short term goal 3: Demo Good- dynamic standing balance  Short term goal 4: Demo Good- dynamic sitting balance       Therapy Time   Individual Concurrent Group Co-treatment   Time In 1056         Time Out 1120         Minutes 24         Timed Code Treatment Minutes: 8 Minutes       Rosalie Lagos    Evaluation/treatment performed by Student PT under the supervision of co-signing PT who agrees with all evaluation/treatment and documentation.

## 2021-05-25 NOTE — CARE COORDINATION
Case Management Initial Discharge Plan  Khloe Romero,             Met with:patient to discuss discharge plans. Information verified: address, contacts, phone number, , insurance Yes    Emergency Contact/Next of Kin name & number: Patti Rodriguez [phone]    PCP: No primary care provider on file. Date of last visit: Pt was seen at [facility] in [facility] approx 6 months ago. Pt not happy with care an is currently looking for new PCP. Provided pt with list of primary care clinics. Insurance Provider: BC    Discharge Planning    Living Arrangements:  Spouse/Significant Other, Family Members   Support Systems:  Spouse/Significant Other, Family Members    Home has 1 stories  4 stairs to climb to get into front door, 0 stairs to climb to reach second floor  Location of bedroom/bathroom in home-main floor    Patient able to perform ADL's:Independent    Current Services (outpatient & in home) [address], prescribes Methodone. Is seen 1x per week. DME equipment: none  DME provider:     Receiving oral anticoagulation therapy? No    If indicated:   Physician managing anticoagulation treatment:   Where does patient obtain lab work for ATC treatment? Potential Assistance Needed:  N/A    Patient agreeable to home care: No  [place] of choice provided:  n/a    Prior SNF/Rehab Placement and Facility: no  Agreeable to SNF/Rehab: No  [place] of choice provided: n/a     Evaluation: no    Expected Discharge date:  21    Patient expects to be discharged to:  home  Follow Up Appointment: Best Day/ Time:      Transportation provider:  will transport home  Transportation arrangements needed for discharge: No    Readmission Risk              Risk of Unplanned Readmission:  14             Does patient have a readmission risk score greater than 14?: No  If yes, follow-up appointment must be made within 7 days of discharge.      Goals of Care:

## 2021-05-25 NOTE — PROGRESS NOTES
Occupational Therapy   Occupational Therapy Initial Assessment  Date: 2021   Patient Name: Disha Bahena  MRN: [id number]     : 1985    Date of Service: 2021  Chief Complaint   Patient presents with    Numbness     Right sided numbness, sent from [facility], received some tpa then eloped       Discharge Recommendations: Further therapy recommended at discharge. The patient should be able to tolerate at least three hours of therapy per day over 5 days or 15 hours over 7 days. Equipment recommendations listed below are based on what the patient would need if they were able to return to prior living arrangements at the time of discharge. Patient would benefit from continued therapy after discharge     OT Equipment Recommendations  Equipment Needed: Yes  Mobility Devices: Lori Ashia; ADL Assistive Devices  Walker: Rolling  ADL Assistive Devices: Transfer Tub Bench;Reacher;Sock-Aid Hard;Long-handled Sponge;Long-handled Shoe Horn;Dressing Stick    Assessment   Performance deficits / Impairments: Decreased functional mobility ; Decreased endurance;Decreased ADL status; Decreased sensation;Decreased balance;Decreased strength;Decreased high-level IADLs  Prognosis: Good  Decision Making: Medium Complexity  OT Education: OT Role;Plan of Care;Transfer Training;ADL Adaptive Strategies  Patient Education: Pt provided and educated on built-up handle-Good return from pt  REQUIRES OT FOLLOW UP: Yes  Activity Tolerance  Activity Tolerance: Patient Tolerated treatment well;Treatment limited secondary to medical complications (free text)  Activity Tolerance: R deficits, anxiety  Safety Devices  Safety Devices in place: Yes  Type of devices: All fall risk precautions in place;Call light within reach;Gait belt;Left in bed;Nurse notified  Restraints  Initially in place: No         Patient Diagnosis(es): The encounter diagnosis was Cerebrovascular accident (CVA), unspecified mechanism ([facility] [address]      has a past medical history of Acid reflux, Anemia, Drug abuse, IV (HCC), Headache, History of pulmonary embolus (PE), Marijuana smoker, and Smoker. has a past surgical history that includes  section; ovarian cyst removal; Appendectomy; Upper gastrointestinal endoscopy (N/A, 10/21/2019); sigmoidoscopy (N/A, 10/22/2019); and Colonoscopy (N/A, 10/23/2019).      Restrictions  Restrictions/Precautions  Restrictions/Precautions: Fall Risk, General Precautions  Required Braces or Orthoses?: No  Position Activity Restriction  Other position/activity restrictions: SBP goal <180, TPA BR until OT/PT    Subjective   General  Patient assessed for rehabilitation services?: Yes  Family / Caregiver Present: Yes (Spouse arrived at end of session)  Diagnosis: small scattered acute infarcts in L MCA, R weakness/numbness  Patient Currently in Pain: Denies  Pain Assessment  Pain Assessment: 0-10  Pain Level: 0  Vital Signs  Patient Currently in Pain: Denies  Social/Functional History  Social/Functional History  Lives With: Spouse, Family (In-laws live in home)  Type of Home: Trailer  Home Layout: One level  Home Access: Stairs to enter without rails, Ramped entrance  Entrance Stairs - Number of Steps: 4  Bathroom Shower/Tub: Tub/Shower unit  Bathroom Toilet: Handicap height  Bathroom Equipment: Grab bars in shower  Home Equipment: 4 wheeled walker, Wheelchair-manual (pt mother uses PRN)  Receives Help From: Family ( able to help PRN)  ADL Assistance: Independent  Homemaking Assistance: Independent  Homemaking Responsibilities: Yes  Ambulation Assistance: Independent  Transfer Assistance: Independent  Active : No  Occupation: Unemployed  Leisure & Hobbies: knitting and sewing  Additional Comments: In-laws take care of themselves, spouse home to assist pt PRN     Objective   Vision: Within Functional Limits  Hearing: Within functional limits    Orientation  Overall Orientation Status: Within Functional Limits     Balance  Sitting Balance: Supervision  Standing Balance: Contact guard assistance  Standing Balance  Time: 4 min  Activity: Pt stood bedside, func mob around bed using RW  Functional Mobility  Functional - Mobility Device: Rolling Walker  Activity: Other  Assist Level: Minimal assistance  Functional Mobility Comments: Pt states walking \"on tiptoes\" at baseline \"since I was 9 yo), pt placed R fball of foot on ground and able to feel floor, L foot flat on floor during func mob, RW improved balance  ADL  Feeding: Supervision;Setup; Increased time to complete  Grooming: Stand by assistance;Setup; Increased time to complete  UE Bathing: Setup; Increased time to complete;Stand by assistance  LE Bathing: Setup; Increased time to complete;Contact guard assistance  UE Dressing: Setup; Increased time to complete;Stand by assistance  LE Dressing: Setup; Increased time to complete;Minimal assistance  Toileting: Setup; Increased time to complete;Minimal assistance  Additional Comments: Pt able to sit on EOB and don B/L socks this date relying mainly on LUE despite [id number], pt performed [address] fun activity supine in bed at end of session using only RUE  Tone RUE  RUE Tone: Normotonic (Pt is R-handed, new weakness noted)  Tone LUE  LUE Tone: Normotonic  Coordination  Movements Are Fluid And Coordinated: No  Coordination and Movement description: Fine motor impairments;Gross motor impairments;Right UE;Decreased speed;Decreased accuracy  Quality of Movement Other  Comment: Pt demo'd fair accuracy and did not drop small objects during [address] fun activity while supine in bed at end of session     Bed mobility  Supine to Sit: Stand by assistance  Sit to Supine: Supervision  Scooting: Modified independent  Comment: Pt used BUE's to assist RLE to EOB which was not necessary based on later observations on RLE strength, slow transfer, pt supine in bed upon arrival/exit this date with spouse at bedside  Transfers  Sit to stand: Minimal assistance  Stand to sit: Contact guard assistance  Transfer Comments: vc's required for proper hand placement upon standing/sitting transfers this date     Cognition  Overall Cognitive Status: [facility]  Cognition Comment: Anxious for most of session, was poked by plebotomy multiple times upon arrival, pt crying at start        Sensation  Overall Sensation Status: Impaired (NT in R thigh-acute issue per pt)        LUE AROM (degrees)  LUE AROM : WFL  RUE AROM (degrees)  RUE AROM : Exceptions  R Shoulder Flexion 0-180: Limited to 100 degrees this date  R Elbow Flexion 0-145: WFL  R Elbow Extension 145-0: WFL  LUE Strength  Gross LUE Strength: WFL  L Shoulder Flex: 4+/5  L Elbow Flex: 4+/5  L Elbow Ext: 4+/5  L Hand General: 5/5  RUE Strength  Gross RUE Strength: Exceptions to [facility]  R Shoulder Flex: 3-/5  R Elbow Flex: 4/5  R Elbow Ext: 4/5  R Hand General: 4-/5   Plan   Plan  Times per week: 4-5x    AM-PAC Score        AM-[facility] Inpatient Daily Activity Raw Score: 16 (05/25/21 1510)  AM-PAC Inpatient ADL T-Scale Score : 35.96 (05/25/21 1510)  ADL Inpatient CMS 0-100% Score: 53.32 (05/25/21 1510)  ADL Inpatient CMS G-Code Modifier : CK (05/25/21 1510)    Goals  Short term goals  Time Frame for Short term goals: Pt will by discharge  Short term goal 1: demo good safety awareness during func mob around room using LRD and SUP  Short term goal 2: demo ADL UB bathing/dressing activity with adaptive tech's and mod I  Short term goal 3: demo ADL LB bathing/dressing activity with adaptive tech's, AD PRN, and SBA  Short term goal 4: demo RUE strength of 5/5 grossly for use in ADL performance  Short term goal 5: demo [address] func activity using only RUE for >10 min to address ADL performance deficits     Therapy Time   Individual Concurrent Group Co-treatment   Time In 1056         Time Out 1129         Minutes 33         Timed Code Treatment Minutes: 25 Minutes       Delbert So, OTR/L

## 2021-05-25 NOTE — PROGRESS NOTES
Speech Language Pathology  Facility/Department: [facility] 5B NSICU  Initial Speech/Language/Cognitive Assessment    NAME: Megan Muniz  : 1985   MRN: [id number]  ADMISSION DATE: 2021  ADMITTING DIAGNOSIS: has Acute pulmonary embolism ([facility] [address] Anemia; Smoker; Marijuana smoker; Iron deficiency anemia; History of intravenous drug abuse ([facility] [address] Occult blood positive stool; HH (hiatus hernia); and Acute CVA (cerebrovascular accident) [facility]) on their problem list.    Date of Eval: 2021   Evaluating Therapist: Boris Lawrence    RECENT RESULTS  CT OF HEAD/MRI:   Impression   1. Numerous small scattered acute infarcts within the left MCA territory   including the left caudate head.  Given the peripheral location of these   infarcts, these may be embolic in nature.  No significant mass effect or   midline shift. 2. Otherwise, no acute intracranial abnormality. Primary Complaint: The patient is a 37 y.o. female with history of substance abuse (last use 5 years ago, on Methadone) and history of PE in 2019 treated with Eliquis presented with acute onset right sided weakness and numbness, LKW 0900. NIH 6. Stroke alert called. CT head negative. tPA adminstered at 1358. CTA head/neck concerning for thrombus in the left carotid bulb extending to central aspect of proximal ICA. It was recommended she be transferred to [place] [address] for neuro endovascular evaluation, but patient refused ambulance transport and signed out [facility] from [address] [place] [address]. She came to [place] [address] via private automobile. On arrival to [address] [place] [address] Dr. Aretha Goetz, she is awake, alert, and oriented to person, place (hospital), and time. She has clear speech, no aphasia or facial droop. She has right sided sensory neglect, right upper and lower drift. . No antiplatelet/anticoagulation at home. Pain:  Pain Assessment  Pain Assessment: 0-10  Pain Level: 0    Assessment:  Pt presents with no apparent cognitive deficits at this time.   No dysarthria noted, no oral motor deficits. Pt. Reported she is performing at baseline. No further ST is recommended. Verbal education provided. Recommendations:  Requires SLP Intervention: No  D/C Recommendations: No therapy recommended at discharge. Subjective:   Previous level of function and limitations: independent  General  Chart Reviewed: Yes  Family / Caregiver Present: Yes ()  Social/Functional History  Lives With: Spouse  Active : No  Occupation: Unemployed  Vision  Vision: Within Functional Limits  Hearing  Hearing: Within functional limits       Objective:     Oral/Motor  Oral Motor: Within functional limits    Motor Speech  Motor Speech: Within Functional Limits    Cognition:   Orientation  Overall Orientation Status: Within Normal Limits  Attention  Attention: Within Functional Limits  Memory  Memory: Within Funtional Limits  Problem Solving  Problem Solving: Within Functional Limits  Abstract Reasoning  Abstract Reasoning: Within Functional Limits  Safety/Judgement  Safety/Judgement: Within Functional Limits    Prognosis:  Speech Therapy Prognosis  Prognosis: Good  Individuals consulted  Consulted and agree with results and recommendations: Patient; Family member  Family member consulted:     Education:  Patient Education: yes  Patient Education Response: Verbalizes understanding          Therapy Time:   Individual Concurrent Group Co-treatment   Time In 6015         Time Out 1139         Minutes 10               Completed by: [address]  Clinician    Cosigned By: Laura Sabillon A.CCC/SLP    5/25/2021 11:45 AM

## 2021-05-25 NOTE — PROGRESS NOTES
Pt vomited 90 ml of methadone after attempting to take. 30 ml left in bottle at this time. Will give phenergan and attempt the 30 ml before re-dosing with the 90 ml again.

## 2021-05-25 NOTE — PROGRESS NOTES
Mendoza Cali called to verify patient methadone dose. Pt release of information faxed to [place]. Writer called again and verified her dose is 120 mg daily of Methadone.

## 2021-05-25 NOTE — PROGRESS NOTES
ENDOVASCULAR NEUROSURGERY PROGRESS NOTE  2021 7:33 AM  Subjective:   Admit Date: 2021  PCP: No primary care provider on file. No new acute events. MRI brain was obtained and reviewed. Objective:   Vitals: /72   Pulse 74   Temp 98.2 °F (36.8 °C) (Oral)   Resp 12   Ht 5' 3\" (1.6 m)   Wt 145 lb (65.8 kg)   LMP 2021   SpO2 99%   BMI 25.69 kg/m²   General appearance: Lying in bed, NAD. HEENT: Atraumatic. Neck: Neck is supple. Lungs: No respiratory distress noted. Heart: normal sinus rhythm on tele. .   Abdomen: Soft nontender. Extremities: No lower limb edema noted. Neurologic: awake, following simple commands appropriately, able to name simple objects, intact comprehension, no dysarthria noted. CN: Has intact extraocular muscles movements, no facial droop noted, intact sensation on the face on trigeminal distribution bilaterally. MOTOR: Has good strength in both upper and lower extremities, moving both upper and lower extremities against gravity. Drift noted in right upper and right lower extremity. SENSORY: Right-sided numbness noted in right upper and right lower extremities. No numbness on the face noted. NIH Stroke Scale Total:  1a.  Level of consciousness:  0  1b. Level of consciousness questions:  0   1c. Level of consciousness questions:  0   2. Best Gaze:  0   3. Visual:  0   4. Facial Palsy:  0   5a. Motor left arm:  0  5b. Motor right arm:  1  6a. Motor left le  6b. Motor right le  7. Limb Ataxia:  0 - absent  8. Sensory:  1  9. Best Language:  0  10. Dysarthria:  0  11.   Extinction and Inattention: 0     Total: 3    Medications and labs:   Scheduled Meds:   sodium chloride flush  5-40 mL Intravenous 2 times per day    atorvastatin  80 mg Oral Nightly     Continuous Infusions:   sodium chloride      sodium chloride Stopped (21)     CBC:   Recent Labs     21  0945 21  0512   WBC 9.9 10.6   HGB 10.5* 9.4*    307     BMP:    Recent Labs     05/24/21  0945      K 4.3      CO2 19*   BUN 11   CREATININE 1.02*   GLUCOSE 135*     Hepatic: No results for input(s): AST, ALT, ALB, BILITOT, ALKPHOS in the last 72 hours. Troponin: No results for input(s): TROPONINI in the last 72 hours. BNP: No results for input(s): BNP in the last 72 hours. Lipids:   Recent Labs     05/25/21  0530   CHOL 194   HDL 29*     INR:   Recent Labs     05/24/21  0923   INR 1.0       Assessment and Recommendations: This is a 30-year-old female with past medical history including PE in 2019. She presented with acute onset right-sided weakness/numbness. She received TPA. NIH stroke scale is 3. CT head revealed no blood. CTA head and neck revealed left cervical ICA subocclusive thrombus. Neuroimages reviewed. Labs reviewed. Patient neuro exam is stable with right-sided weakness/numbness. --Left MCA/IESHA embolic ischemic stroke with left cervical ICA subocclusive thrombus. --Left cervical ICA subocclusive thrombus possibly due to underlying carotid dissection. Plan  1. Medical management  2. Heparin drip, PTT goal 50-70  3. Aspirin 81 mg daily  4. Neuro ICU care  5. Normalize blood pressure as tolerated  6. Need follow-up vessel images in 72 hours.       Grupo Joya MD, MD  Stroke, [facility]  [address] [place]  Electronically signed 5/25/2021 at 7:33 AM

## 2021-05-25 NOTE — PROGRESS NOTES
Pt wanting to go to the vending machines and get candy and pop. Instructed pt that she is not able to be off the unit at this time due to her stroke and critical status. Pt becoming irritable and  is adding to pt wanting to leave AMA. Told writer she could sign a paper and go off the unit. Writer again explained that she is unable to leave the unit for candy or pop at this time. Pt stating she is going AMA and she will just leave then. Jeffery Denis neuro/critical care informed and will be to see the patient. Patient instructed to not pull out lines due to being on blood thinners and bleeding afterwards.  angry that patient had to wait for her methodone this morning. Explained that it needed to be ordered by a physician and that was done near 1200. Dose was given to pt at 1225 where she proceeded to vomit 3/4 of it up. Phenergan given with ginger ale and pt able to get food and 30 mg of methadone down by 1500. Re dose ordered of 90 mg for patient and given at [id number].  angry that she had to wait for her dose even after it was explained to him the timing and need to hold off on the medication until after she was no longer nauseated and vomiting.

## 2021-05-25 NOTE — ED NOTES
Pt resting comfortably on cot, NAD noted. Pt denies needs at this time. Aware of plan. Family remains at bedside. Call light in reach.       Dara Rojas RN  05/25/21 0453

## 2021-05-25 NOTE — PROGRESS NOTES
Daily Progress Note  Neuro Critical Care    Patient Name: Julee Davis  Patient : 1985  Room/Bed:   Code Status: Full  Allergies: No Known Allergies    CHIEF COMPLAINT:      Right sided weakness/numbness     INTERVAL HISTORY    Initial Presentation (Admitted 21): The patient is a 37 y.o. female with history of substance abuse (last use 5 years ago, on Methadone) and history of PE in 2019 treated with Eliquis presented with acute onset right sided weakness and numbness, LKW 0900. NIH 6. Stroke alert called. CT head negative. tPA adminstered at 1358. CTA head/neck concerning for thrombus in the left carotid bulb extending to central aspect of proximal ICA. It was recommended she be transferred to [facility] for neuro endovascular evaluation, but patient refused ambulance transport and signed out [place] from [facility] [address] [place] [address]. She came to [facility] via private automobile. On arrival to [address] [place] [address] Dr. Eirc Howard, she is awake, alert, and oriented to person, place (hospital), and time. She has clear speech, no aphasia or facial droop. She has right sided sensory neglect, right upper and lower drift. . No antiplatelet/anticoagulation at home. Last 24h:   No acute events overnight. MRI brain this morning showed watershed distribution infarct, no hemorrhage. Started on low intensity heparin, no bolus. Will repeat CT head when PTT therapeutic. Hypercoagulable panel sent.      CURRENT MEDICATIONS:  SCHEDULED MEDICATIONS:   sodium chloride flush  5-40 mL Intravenous 2 times per day    atorvastatin  80 mg Oral Nightly     CONTINUOUS INFUSIONS:   sodium chloride      sodium chloride Stopped (21 2134)     PRN MEDICATIONS:   sodium chloride flush, sodium chloride, promethazine **OR** ondansetron, polyethylene glycol, perflutren lipid microspheres    VITALS:  Temperature Range: Temp: 98.2 °F (36.8 °C) Temp  Av.7 °F (36.5 °C)  Min: 97 °F (36.1 °C)  Max: 98.2 °F (36.8 °C)  BP Range: Systolic (64QXT), effect or   midline shift. 2. Otherwise, no acute intracranial abnormality. These results were sent to the [facility] [address] ([address] [place] [address]) on   5/25/2021 at 8:12 am to be communicated to the referring/covering health care   provider/office. Labs and Images reviewed with:  [x] Dr. Kam Shetty    [] Dr. Rocío Ferrell  [] Dr. Vishal Chance  [] There are no new interval images to review. PHYSICAL EXAM       PHYSICAL EXAM:  CONSTITUTIONAL:  Well developed, well nourished, alert and oriented x 3, in no acute distress. GCS 15. Nontoxic. No dysarthria. No aphasia. HEAD:  normocephalic, atraumatic    EYES:  PERRLA, EOMI.   ENT:  moist mucous membranes   LUNGS:  Equal air entry bilaterally   CARDIOVASCULAR:  normal s1 / s2, NSR   ABDOMEN:  Soft, no rigidity   NECK supple, symmetric   NEUROLOGIC:  Mental Status:  A & O x3,awake             Cranial Nerves:    II: Visual fields:  normal  III: Pupils:  equal, round, reactive to light  III,IV,VI: Extra Ocular Movements: intact  V: Facial sensation:  intact  VII: Facial strength: intact     Motor Exam:    Drift:  present - right upper and right lower extremities  Tone:  normal     Motor exam is 5 out of 5 all extremities with the exception of 4- out of 5 right upper and lower extremities     Sensory:    Touch:    Right Upper Extremity:  abnormal - neglect  Left Upper Extremity:  normal  Right Lower Extremity:  abnormal - neglect  Left Lower Extremity:  normal   SKIN No obvious ecchymosis, rashes, or lesions          DRAINS:  [x] There are no drains for Neuro Critical Care to monitor at this time. ASSESSMENT AND PLAN:       This is a 37 y.o. female with history of substance abuse and remote history of PE 2019 who presents with acute onset right sided weakness and numbness, CT head negative received tPA at 1358. CTA head/neck revealed a nonocclusive left carotid bulb thrombus.  Signed out AMA from [facility] due to refusing ambulance transfer, arrived

## 2021-05-26 LAB
ABSOLUTE EOS #: 0.19 K/UL (ref 0–0.44)
ABSOLUTE IMMATURE GRANULOCYTE: <0.03 K/UL (ref 0–0.3)
ABSOLUTE LYMPH #: 3.37 K/UL (ref 1.1–3.7)
ABSOLUTE MONO #: 0.43 K/UL (ref 0.1–1.2)
ANION GAP SERPL CALCULATED.3IONS-SCNC: 9 MMOL/L (ref 9–17)
AT-III ACTIVITY: 77 % (ref 83–122)
BASOPHILS # BLD: 1 % (ref 0–2)
BASOPHILS ABSOLUTE: 0.04 K/UL (ref 0–0.2)
BUN BLDV-MCNC: 6 MG/DL (ref 6–20)
BUN/CREAT BLD: ABNORMAL (ref 9–20)
CALCIUM SERPL-MCNC: 8.4 MG/DL (ref 8.6–10.4)
CHLORIDE BLD-SCNC: 105 MMOL/L (ref 98–107)
CO2: 25 MMOL/L (ref 20–31)
CREAT SERPL-MCNC: 0.63 MG/DL (ref 0.5–0.9)
DIFFERENTIAL TYPE: ABNORMAL
EOSINOPHILS RELATIVE PERCENT: 3 % (ref 1–4)
FACTOR VII ACTIVITY: 76 % (ref 50–150)
FERRITIN: 7 UG/L (ref 13–150)
FOLATE: 9.2 NG/ML
GFR AFRICAN AMERICAN: >60 ML/MIN
GFR NON-AFRICAN AMERICAN: >60 ML/MIN
GFR SERPL CREATININE-BSD FRML MDRD: ABNORMAL ML/MIN/{1.73_M2}
GFR SERPL CREATININE-BSD FRML MDRD: ABNORMAL ML/MIN/{1.73_M2}
GLUCOSE BLD-MCNC: 92 MG/DL (ref 70–99)
HCT VFR BLD CALC: 28.7 % (ref 36.3–47.1)
HEMOGLOBIN: 8.3 G/DL (ref 11.9–15.1)
IMMATURE GRANULOCYTES: 0 %
IRON SATURATION: 6 % (ref 20–55)
IRON: 17 UG/DL (ref 37–145)
LV EF: 63 %
LVEF MODALITY: NORMAL
LYMPHOCYTES # BLD: 47 % (ref 24–43)
MCH RBC QN AUTO: 23.1 PG (ref 25.2–33.5)
MCHC RBC AUTO-ENTMCNC: 28.9 G/DL (ref 28.4–34.8)
MCV RBC AUTO: 79.9 FL (ref 82.6–102.9)
MONOCYTES # BLD: 6 % (ref 3–12)
NRBC AUTOMATED: 0 PER 100 WBC
PARTIAL THROMBOPLASTIN TIME: 102.2 SEC (ref 20.5–30.5)
PARTIAL THROMBOPLASTIN TIME: 33 SEC (ref 20.5–30.5)
PARTIAL THROMBOPLASTIN TIME: 41.3 SEC (ref 20.5–30.5)
PARTIAL THROMBOPLASTIN TIME: 66.7 SEC (ref 20.5–30.5)
PDW BLD-RTO: 17.1 % (ref 11.8–14.4)
PLATELET # BLD: 252 K/UL (ref 138–453)
PLATELET ESTIMATE: ABNORMAL
PMV BLD AUTO: 9.9 FL (ref 8.1–13.5)
POTASSIUM SERPL-SCNC: 3.7 MMOL/L (ref 3.7–5.3)
RBC # BLD: 3.59 M/UL (ref 3.95–5.11)
RBC # BLD: ABNORMAL 10*6/UL
SEG NEUTROPHILS: 44 % (ref 36–65)
SEGMENTED NEUTROPHILS ABSOLUTE COUNT: 3.19 K/UL (ref 1.5–8.1)
SODIUM BLD-SCNC: 139 MMOL/L (ref 135–144)
TOTAL IRON BINDING CAPACITY: 305 UG/DL (ref 250–450)
UNSATURATED IRON BINDING CAPACITY: 288 UG/DL (ref 112–347)
VITAMIN B-12: 322 PG/ML (ref 232–1245)
WBC # BLD: 7.2 K/UL (ref 3.5–11.3)
WBC # BLD: ABNORMAL 10*3/UL

## 2021-05-26 PROCEDURE — 82728 ASSAY OF FERRITIN: CPT

## 2021-05-26 PROCEDURE — 97535 SELF CARE MNGMENT TRAINING: CPT

## 2021-05-26 PROCEDURE — 83540 ASSAY OF IRON: CPT

## 2021-05-26 PROCEDURE — 94761 N-INVAS EAR/PLS OXIMETRY MLT: CPT

## 2021-05-26 PROCEDURE — 82746 ASSAY OF FOLIC ACID SERUM: CPT

## 2021-05-26 PROCEDURE — 85730 THROMBOPLASTIN TIME PARTIAL: CPT

## 2021-05-26 PROCEDURE — 36415 COLL VENOUS BLD VENIPUNCTURE: CPT

## 2021-05-26 PROCEDURE — 93306 TTE W/DOPPLER COMPLETE: CPT

## 2021-05-26 PROCEDURE — 2580000003 HC RX 258: Performed by: NURSE PRACTITIONER

## 2021-05-26 PROCEDURE — 6370000000 HC RX 637 (ALT 250 FOR IP): Performed by: STUDENT IN AN ORGANIZED HEALTH CARE EDUCATION/TRAINING PROGRAM

## 2021-05-26 PROCEDURE — 99233 SBSQ HOSP IP/OBS HIGH 50: CPT | Performed by: PSYCHIATRY & NEUROLOGY

## 2021-05-26 PROCEDURE — 80048 BASIC METABOLIC PNL TOTAL CA: CPT

## 2021-05-26 PROCEDURE — 99253 IP/OBS CNSLTJ NEW/EST LOW 45: CPT | Performed by: STUDENT IN AN ORGANIZED HEALTH CARE EDUCATION/TRAINING PROGRAM

## 2021-05-26 PROCEDURE — 83550 IRON BINDING TEST: CPT

## 2021-05-26 PROCEDURE — 82607 VITAMIN B-12: CPT

## 2021-05-26 PROCEDURE — 2000000003 HC NEURO ICU R&B

## 2021-05-26 PROCEDURE — 6370000000 HC RX 637 (ALT 250 FOR IP): Performed by: NURSE PRACTITIONER

## 2021-05-26 PROCEDURE — 6360000002 HC RX W HCPCS: Performed by: NURSE PRACTITIONER

## 2021-05-26 PROCEDURE — 85025 COMPLETE CBC W/AUTO DIFF WBC: CPT

## 2021-05-26 RX ORDER — ACETAMINOPHEN, ASPIRIN AND CAFFEINE 250; 250; 65 MG/1; MG/1; MG/1
1 TABLET, FILM COATED ORAL EVERY 6 HOURS PRN
Status: DISCONTINUED | OUTPATIENT
Start: 2021-05-26 | End: 2021-06-02 | Stop reason: HOSPADM

## 2021-05-26 RX ORDER — LANOLIN ALCOHOL/MO/W.PET/CERES
325 CREAM (GRAM) TOPICAL
Status: DISCONTINUED | OUTPATIENT
Start: 2021-05-27 | End: 2021-06-02 | Stop reason: HOSPADM

## 2021-05-26 RX ORDER — SENNA AND DOCUSATE SODIUM 50; 8.6 MG/1; MG/1
2 TABLET, FILM COATED ORAL DAILY
Status: DISCONTINUED | OUTPATIENT
Start: 2021-05-26 | End: 2021-06-02 | Stop reason: HOSPADM

## 2021-05-26 RX ADMIN — PROMETHAZINE HYDROCHLORIDE 12.5 MG: 12.5 TABLET ORAL at 09:37

## 2021-05-26 RX ADMIN — DOCUSATE SODIUM 50MG AND SENNOSIDES 8.6MG 2 TABLET: 8.6; 5 TABLET, FILM COATED ORAL at 15:06

## 2021-05-26 RX ADMIN — SODIUM CHLORIDE: 9 INJECTION, SOLUTION INTRAVENOUS at 11:27

## 2021-05-26 RX ADMIN — HEPARIN SODIUM AND DEXTROSE 18 UNITS/KG/HR: 10000; 5 INJECTION INTRAVENOUS at 15:06

## 2021-05-26 RX ADMIN — SODIUM CHLORIDE, PRESERVATIVE FREE 10 ML: 5 INJECTION INTRAVENOUS at 08:10

## 2021-05-26 RX ADMIN — ATORVASTATIN CALCIUM 80 MG: 80 TABLET, FILM COATED ORAL at 20:18

## 2021-05-26 RX ADMIN — SODIUM CHLORIDE, PRESERVATIVE FREE 10 ML: 5 INJECTION INTRAVENOUS at 20:14

## 2021-05-26 RX ADMIN — METHADONE HYDROCHLORIDE 120 MG: 5 SOLUTION ORAL at 09:38

## 2021-05-26 RX ADMIN — SODIUM CHLORIDE: 9 INJECTION, SOLUTION INTRAVENOUS at 20:19

## 2021-05-26 RX ADMIN — ASPIRIN 81 MG: 81 TABLET, CHEWABLE ORAL at 08:10

## 2021-05-26 RX ADMIN — POLYETHYLENE GLYCOL 3350 17 G: 17 POWDER, FOR SOLUTION ORAL at 09:41

## 2021-05-26 RX ADMIN — ACETAMINOPHEN, ASPIRIN (NSAID) AND CAFFEINE 1 TABLET: 250; 250; 65 TABLET, FILM COATED ORAL at 22:14

## 2021-05-26 ASSESSMENT — PAIN DESCRIPTION - LOCATION: LOCATION: ARM;LEG

## 2021-05-26 ASSESSMENT — PAIN SCALES - GENERAL
PAINLEVEL_OUTOF10: 5
PAINLEVEL_OUTOF10: 0
PAINLEVEL_OUTOF10: 4
PAINLEVEL_OUTOF10: 0
PAINLEVEL_OUTOF10: 0

## 2021-05-26 ASSESSMENT — ENCOUNTER SYMPTOMS
BLURRED VISION: 0
DOUBLE VISION: 0
CONSTIPATION: 1

## 2021-05-26 ASSESSMENT — PAIN DESCRIPTION - ORIENTATION: ORIENTATION: RIGHT

## 2021-05-26 NOTE — PROGRESS NOTES
Daily Progress Note  Neuro Critical Care    Patient Name: Janeth Gamez  Patient : 1985  Room/Bed: 0530/0530-01  Code Status: Full  Allergies: No Known Allergies    CHIEF COMPLAINT:      Right sided weakness/numbness     INTERVAL HISTORY    Initial Presentation (Admitted 21): The patient is a 37 y.o. female with history of substance abuse (last use 5 years ago, on Methadone) and history of PE in 2019 treated with Eliquis presented with acute onset right sided weakness and numbness, LKW 0900. NIH 6. Stroke alert called. CT head negative. tPA adminstered at 1358. CTA head/neck concerning for thrombus in the left carotid bulb extending to central aspect of proximal ICA. It was recommended she be transferred to [facility] for neuro endovascular evaluation, but patient refused ambulance transport and signed out Ozzy Guerra from [address] [place] [address]. She came to [facility] via private automobile. On arrival to [address] [place] [address] Dr. Terra Chandra, she is awake, alert, and oriented to person, place (hospital), and time. She has clear speech, no aphasia or facial droop. She has right sided sensory neglect, right upper and lower drift. . No antiplatelet/anticoagulation at home. Hospital Course:  :  MRI brain this morning showed watershed distribution infarct, no hemorrhage. Started on heparin infusion, no bolus. Aspirin loaded. Hypercoagulable panel sent. Last 24h:   No acute events overnight. Patient doing well this morning. Tolerating PO diet without difficulty. Does report mild heartburn, chronic with known hiatal hernia. Heparin infusion continued, PTT 41.3 this AM. Plan to repeat CTA head/neck  around 1330 for re-evaluate of Left ICA thrombus.      CURRENT MEDICATIONS:  SCHEDULED MEDICATIONS:   aspirin  81 mg Oral Daily    methadone  120 mg Oral Daily    sodium chloride flush  5-40 mL Intravenous 2 times per day    atorvastatin  80 mg Oral Nightly     CONTINUOUS INFUSIONS:   heparin (PORCINE) Infusion 18 Units/kg/hr (21 9025)    sodium chloride      sodium chloride 75 mL/hr at 21 1050     PRN MEDICATIONS:   sodium chloride flush, sodium chloride, promethazine **OR** ondansetron, polyethylene glycol, perflutren lipid microspheres    VITALS:  Temperature Range: Temp: 97.2 °F (36.2 °C) Temp  Av.2 °F (36.8 °C)  Min: 97.2 °F (36.2 °C)  Max: 98.6 °F (37 °C)  BP Range: Systolic (11PPK), JZ , Min:92 , UYM:045     Diastolic (01DHZ), BJN:66, Min:64, Max:84    Pulse Range: Pulse  Av.5  Min: 76  Max: 112  Respiration Range: Resp  Avg: 15.2  Min: 9  Max: 27  Current Pulse Ox: SpO2: 99 %  24HR Pulse Ox Range: SpO2  Av.4 %  Min: 94 %  Max: 99 %  Patient Vitals for the past 12 hrs:   BP Temp Temp src Pulse Resp SpO2 Weight   21 0737 92/64 97.2 °F (36.2 °C) Oral 83 13 99 % --   21 0600 106/69 -- -- 94 19 97 % 148 lb 11.2 oz (67.4 kg)   21 0500 111/65 -- -- 76 10 98 % --   21 0400 119/72 98.6 °F (37 °C) -- 77 9 99 % --   21 0300 108/70 -- -- 85 11 99 % --   21 0200 112/65 -- -- 87 14 97 % --   21 0100 110/68 -- -- 77 17 98 % --   21 0000 121/70 98.4 °F (36.9 °C) -- 83 14 97 % --   21 2300 125/74 -- -- 80 14 98 % --   21 2200 115/65 -- -- 84 15 97 % --   21 2100 117/69 -- -- 112 17 97 % --   05/25/21 2019 116/68 98.4 °F (36.9 °C) -- 92 14 94 % --     Estimated body mass index is 26.34 kg/m² as calculated from the following:    Height as of this encounter: 5' 3\" (1.6 m).     Weight as of this encounter: 148 lb 11.2 oz (67.4 kg).  []<16 Severe malnutrition  []16-16.99 Moderate malnutrition  []17-18.49 Mild malnutrition  []18.5-24.9 Normal  [x]25-29.9 Overweight (not obese)  []30-34.9 Obese class 1 (Low Risk)  []35-39.9 Obese class 2 (Moderate Risk)  []?40 Obese class 3 (High Risk)    RECENT LABS:   Lab Results   Component Value Date    WBC 7.2 2021    HGB 8.3 (L) 2021    HCT 28.7 (L) 2021     2021    CHOL 194 2021    TRIG 150 (H) 2021    HDL 29 (L) 2021    ALT 5 10/19/2019    AST 11 10/19/2019     2021    K 3.7 2021     2021    CREATININE 0.63 2021    BUN 6 2021    CO2 25 2021    INR [id number] 2021    LABA1C 5.3 2021     24 HOUR INTAKE/OUTPUT:    Intake/Output Summary (Last 24 hours) at 2021 0759  Last data filed at 2021 0600  Gross per 24 hour   Intake 2101 ml   Output 800 ml   Net 1301 ml       IMAGING:   MRI brain without contrast   Final Result   1. Numerous small scattered acute infarcts within the left MCA territory   including the left caudate head. Given the peripheral location of these   infarcts, these may be embolic in nature. No significant mass effect or   midline shift. 2. Otherwise, no acute intracranial abnormality. These results were sent to the [facility] [address] ([address] [place] [address]) on   2021 at 8:12 am to be communicated to the referring/covering health care   provider/office. CTA HEAD NECK W CONTRAST    (Results Pending)         Labs and Images reviewed with:  [] Dr. Len Man. Diogenes    [x] Dr. Jain Morse  [] Dr. Martin Solano  [] There are no new interval images to review. PHYSICAL EXAM       PHYSICAL EXAM:  CONSTITUTIONAL:  Well developed, well nourished, alert and oriented x 3, in no acute distress. GCS 15. Nontoxic. No dysarthria. No aphasia.    HEAD:  normocephalic, atraumatic    EYES:  PERRLA, EOMI.   ENT:  moist mucous membranes   LUNGS:  Equal air entry bilaterally   CARDIOVASCULAR:  normal s1 / s2, NSR   ABDOMEN:  Soft, no rigidity   NECK supple, symmetric   NEUROLOGIC:  Mental Status:  A & O x3,awake             Cranial Nerves:    II: Visual fields:  normal  III: Pupils:  equal, round, reactive to light  III,IV,VI: Extra Ocular Movements: intact  V: Facial sensation:  intact  VII: Facial strength: intact     Motor Exam:    Drift:  present - right upper and right lower extremities  Tone: normal     Motor exam is 5 out of 5 all extremities with the exception of 4- out of 5 right upper and lower extremities     Sensory:    Touch:    Right Upper Extremity:  abnormal - neglect  Left Upper Extremity:  normal  Right Lower Extremity:  abnormal - neglect  Left Lower Extremity:  normal   SKIN No obvious ecchymosis, rashes, or lesions          DRAINS:  [x] There are no drains for Neuro Critical Care to monitor at this time. ASSESSMENT AND PLAN:       This is a 37 y.o. female with history of substance abuse and remote history of PE 2019 who presents with acute onset right sided weakness and numbness, CT head negative received tPA at 1358. CTA head/neck revealed a nonocclusive left carotid bulb thrombus. Signed out AMA from [address] [place] [address] due to refusing ambulance transfer, arrived to [place] [address] via private automobile. NIH 4 from 6.  Admit to Neuro ICU, stroke workup, close neurological monitoring, hypercoagulable workup.      PLAN/MEDICAL DECISION MAKING:     NEUROLOGIC:  - Nonocclusive left carotid bulb thrombus  - s/p IV tPA 5/24 @ 1358  - MRI brain showed left sided watershed infarct, no hemorrhage  - Heparin infusion, no bolus, PTT goal 50-70 started  - Continue Aspirin 81mg and Lipitor 80 mg QHS for secondary strooke  - Follow up repeat CTA head/neck 5/27 @ 1330  - Goal SBP < 180  - Avoid symptomatic hypotension  - Neuro checks per protocol     CARDIOVASCULAR:  - Goal SBP < 180  - Avoid symptomatic hypotension  - Troponin 12-9  - Follow up Echocardiogram  - Hyperlipidemia,  Lipitor 40 mg QHS  - Follow up hypercoagulable panel  - Continue telemetry     PULMONARY:  - Maintaining oxygen saturations on room air  - Continue to monitor  - Smoking cessation     RENAL/FLUID/ELECTROLYTE:  - BUN 6/ Creatinine 0.63  - Monitor I&Os  - IVF: Normal saline @ 75 mL/hr  - Replace electrolytes PRN  - Daily BMP     GI/NUTRITION:  NUTRITION:  General diet  - Bowel regimen: senna-s daily Miralax prn  - GI prophylaxis: Not indicated     ID:  - Afebrile, Tmax 37  - No leukocytosis, WBC 7.2  - Continue to monitor for fevers  - Daily CBC     HEME:   - H&H 8.3/28.7  - Iron deficiency anemia, start ferrous sulfate  - Platelets 889  - Daily CBC     ENDOCRINE:  - Continue to monitor blood glucose, goal <180  - Hemoglobin a1C 5.3     OTHER:  - PT/OT/ST  - PM&R consult  - Continue home Methadone 120 mg QD      PROPHYLAXIS:  Stress ulcer: NI     DVT PROPHYLAXIS:  - SCD sleeves - Thigh High   - Heparin infusion    DISPOSITION:  [x] To remain ICU:   [] OK for out of ICU from Neuro Critical Care standpoint    We will continue to follow along. For any changes in exam or patient status please contact Neuro Critical Care.       EMILY Lozoya - [facility]  Neuro Critical Care  Pager [phone]  5/26/2021     7:59 AM

## 2021-05-26 NOTE — PROGRESS NOTES
Occupational Therapy  Facility/Department: [address]  Daily Treatment Note  NAME: Court Wei  : 1985  MRN: [id number]    Date of Service: 2021    Discharge Recommendations:  Patient would benefit from continued therapy after discharge to increase func mobility, endurance, ADL status, and balance  Assessment   Performance deficits / Impairments: Decreased functional mobility ; Decreased endurance;Decreased ADL status; Decreased sensation;Decreased balance;Decreased strength;Decreased high-level IADLs  Prognosis: Good  OT Education: OT Role;Plan of Care;Transfer Training;ADL Adaptive Strategies  Patient Education: Pt educated and encouraged to complete ADLs without spouse help to ensure proper tx. Pt verbalized understanding but continued to ask spouse for assistance during ADL tasks  Activity Tolerance  Activity Tolerance: Patient Tolerated treatment well  Activity Tolerance: R deficits, anxiety  Safety Devices  Safety Devices in place: Yes  Type of devices: All fall risk precautions in place;Call light within reach;Gait belt;Left in bed;Nurse notified  Restraints  Initially in place: No     Patient Diagnosis(es): The encounter diagnosis was Cerebrovascular accident (CVA), unspecified mechanism ([facility] [address] has a past medical history of Acid reflux, Anemia, Drug abuse, IV ([address] Headache, History of pulmonary embolus (PE), Marijuana smoker, and Smoker. has a past surgical history that includes  section; ovarian cyst removal; Appendectomy; Upper gastrointestinal endoscopy (N/A, 10/21/2019); sigmoidoscopy (N/A, 10/22/2019); and Colonoscopy (N/A, 10/23/2019).   Restrictions  Restrictions/Precautions  Restrictions/Precautions: Fall Risk, General Precautions  Required Braces or Orthoses?: No  Position Activity Restriction  Other position/activity restrictions: SBP goal <180, TPA BR until OT/PT  Subjective   General  Chart Reviewed: Yes  Patient assessed for rehabilitation services?: Yes  Family / Caregiver Present: Yes (Spouse)  Diagnosis: small scattered acute infarcts in L MCA, R weakness/numbness  Pain Assessment  Pain Level: 4  Pain Location: Arm;Leg  Pain Orientation: Right  Non-Pharmaceutical Pain Intervention(s): Ambulation/Increased Activity;Repositioned;Rest;Therapeutic presence  Vital Signs  Patient Currently in Pain: Yes   Orientation  Orientation  Overall Orientation Status: Within Functional Limits  Objective    ADL  UE Dressing: Setup; Increased time to complete;Minimal assistance ([place] Narrow back gown sitting EOB, req assistance with line mgmt)  LE Dressing: Setup; Increased time to complete;Minimal assistance (Don/doff shoes. Pt able to slip shoes on and off, but req assistance to tie)  Toileting: Setup; Increased time to complete;Minimal assistance (Pt completed toileting on BSC, req Min A for transfer)  Additional Comments: Spouse helping pt with ADL tasks this date. Pt encouraged to try to complete ADL tasks by self.  Pt verbalized understanding however pt spouse continued to assist  Balance  Sitting Balance: Supervision  Standing Balance: Contact guard assistance  Standing Balance  Time: 4 min  Activity: Pt stood bedside, func mob in halls for household distances using RW  Comment: Pt able to let go of RW with LUE, utilizing RUE for support, but only displayed LOB when writer looked over at pt  Functional Mobility  Functional - Mobility Device: Rolling Walker  Activity: Other  Assist Level: Contact guard assistance  Functional Mobility Comments: Pt states walking \"on tiptoes\" at baseline \"since I was 9 yo), pt placed R fball of foot on ground and able to feel floor, L foot flat on floor during func mob, RW improved balance  Toilet Transfers  Toilet - Technique: Ambulating  Equipment Used: Standard bedside commode  Toilet Transfer: Minimal assistance  Bed mobility  Rolling to Right: Modified independent  Supine to Sit: Stand by assistance  Sit to Supine: Supervision  Scooting: Modified independent  Comment: Pt used BUE's to assist RLE to EOB which was not necessary based on later observations on RLE strength  Transfers  Sit to stand: Contact guard assistance  Stand to sit: Contact guard assistance  Transfer Comments: vc's required for proper hand placement upon standing/sitting transfers this date  Cognition  Overall Cognitive Status: [address]  Times per week: 4-5x  Goals  Short term goals  Time Frame for Short term goals: Pt will by discharge  Short term goal 1: demo good safety awareness during func mob around room using LRD and SUP  Short term goal 2: demo ADL UB bathing/dressing activity with adaptive tech's and mod I  Short term goal 3: demo ADL LB bathing/dressing activity with adaptive tech's, AD PRN, and SBA  Short term goal 4: demo RUE strength of 5/5 grossly for use in ADL performance  Short term goal 5: [facility] func activity using only RUE for >10 min to address ADL performance deficits     Therapy Time   Individual Concurrent Group Co-treatment   Time In 1520         Time Out 1543         Minutes 23         Timed Code Treatment Minutes: 23 Minutes   Pt in bed upon arrival, pleasant and agreeable to tx this date. Pt retired to bed at end of session, call light within reach, RN notified.      DESI Michele/LUIS FERNANDO

## 2021-05-26 NOTE — PROGRESS NOTES
ENDOVASCULAR NEUROSURGERY PROGRESS NOTE  2021 5:17 PM  Subjective:   Admit Date: 2021  PCP: No primary care provider on file. No new acute events. No new complaints. She feels better today. Objective:   Vitals: BP (!) 99/55   Pulse 91   Temp 97 °F (36.1 °C) (Oral)   Resp 11   Ht 5' 3\" (1.6 m)   Wt 148 lb 11.2 oz (67.4 kg)   LMP 2021   SpO2 97%   BMI 26.34 kg/m²   General appearance: Lying in bed, NAD. HEENT: Atraumatic. Neck: Neck is supple. Lungs: No respiratory distress noted. Heart: normal sinus rhythm on tele. .   Abdomen: Soft nontender. Extremities: No lower limb edema noted. Neurologic: awake, following simple commands appropriately, able to name simple objects, intact comprehension, no dysarthria noted. CN: Has intact extraocular muscles movements, no facial droop noted, intact sensation on the face on trigeminal distribution bilaterally. MOTOR: Has good strength in both upper and lower extremities, moving both upper and lower extremities against gravity. Drift noted in right upper and right lower extremity. SENSORY: Right-sided numbness noted in right upper and right lower extremities. No numbness on the face noted. NIH Stroke Scale Total:  1a.  Level of consciousness:  0  1b. Level of consciousness questions:  0   1c. Level of consciousness questions:  0   2. Best Gaze:  0   3. Visual:  0   4. Facial Palsy:  0   5a. Motor left arm:  0  5b. Motor right arm:  1  6a. Motor left le  6b. Motor right le  7. Limb Ataxia:  0 - absent  8. Sensory:  1  9. Best Language:  0  10. Dysarthria:  0  11.   Extinction and Inattention: 0     Total: 3    Medications and labs:   Scheduled Meds:   [START ON 2021] ferrous sulfate  325 mg Oral Daily with breakfast    sennosides-docusate sodium  2 tablet Oral Daily    aspirin  81 mg Oral Daily    methadone  120 mg Oral Daily    sodium chloride flush  5-40 mL Intravenous 2 times per day    atorvastatin  80 mg Oral Nightly     Continuous Infusions:   heparin (PORCINE) Infusion 18 Units/kg/hr (05/26/21 1506)    sodium chloride      sodium chloride 75 mL/hr at 05/26/21 1127     CBC:   Recent Labs     05/24/21  0945 05/25/21  0512 05/26/21  0302   WBC 9.9 10.6 7.2   HGB 10.5* 9.4* 8.3*    307 252     BMP:    Recent Labs     05/24/21  0945 05/25/21  1116 05/26/21  0302    136 139   K 4.3 4.0 3.7    101 105   CO2 19* 22 25   BUN 11 11 6   CREATININE 1.02* 0.59 0.63   GLUCOSE 135* 85 92     Hepatic: No results for input(s): AST, ALT, ALB, BILITOT, ALKPHOS in the last 72 hours. Troponin: No results for input(s): TROPONINI in the last 72 hours. BNP: No results for input(s): BNP in the last 72 hours. Lipids:   Recent Labs     05/25/21  0530   CHOL 194   HDL 29*     INR:   Recent Labs     05/24/21  0923 05/25/21  1116 05/25/21  1311   INR 1.0 1.1 [id number]       Assessment and Recommendations: This is a 74-year-old female with past medical history including PE in 2019. She presented with acute onset right-sided weakness/numbness. She received TPA. NIH stroke scale is 3. CT head revealed no blood. CTA head and neck revealed left cervical ICA subocclusive thrombus. Neuroimages reviewed. Labs reviewed. Patient neuro exam is stable with right-sided weakness/numbness. --Left MCA/IESHA embolic ischemic stroke with left cervical ICA subocclusive thrombus. --Left cervical ICA subocclusive thrombus possibly due to underlying carotid dissection. She is feeling better today. Neuro exam is stable. Last PTT at 66.7. She is on heparin drip and aspirin 81 mg daily. We will repeat follow-up vascular images. Plan  1. Medical management  2. Heparin drip, PTT goal 50-70  3. Aspirin 81 mg daily  4. Neuro ICU care  5. Normalize blood pressure as tolerated  6. Repeat CTA neck tomorrow.       Zaira Bliss MD, MD  Stroke, [facility] Stroke [address]  Electronically signed 5/26/2021 at 5:17 PM

## 2021-05-26 NOTE — CONSULTS
tactile cues for UE placement  Ambulation 1  Surface: level tile  Device: Rolling Walker  Assistance: Minimal assistance  Quality of Gait: pt inconsistently RLE flat foot, reports walking on toes at baseline  Gait Deviations: Slow Magdalene, Decreased step length, Decreased step height  Distance: 40ft    Transfers  Sit to Stand: Minimal Assistance  Stand to sit: Minimal Assistance  Comment: assessed with RW, pt put minimal weight through RUE and RLE; verbal and tactile cues for UE placement  Ambulation  Ambulation?: Yes  Ambulation 1  Surface: level tile  Device: Rolling Walker  Assistance: Minimal assistance  Quality of Gait: pt inconsistently RLE flat foot, reports walking on toes at baseline  Gait Deviations: Slow Magdalene, Decreased step length, Decreased step height  Distance: 40ft    Surface: level tile  Ambulation 1  Surface: level tile  Device: Rolling Walker  Assistance: Minimal assistance  Quality of Gait: pt inconsistently RLE flat foot, reports walking on toes at baseline  Gait Deviations: Slow Magdalene, Decreased step length, Decreased step height  Distance: 40ft      OT:   ADL  Feeding: Supervision, Setup, Increased time to complete  Grooming: Stand by assistance, Setup, Increased time to complete  UE Bathing: Setup, Increased time to complete, Stand by assistance  LE Bathing: Setup, Increased time to complete, Contact guard assistance  UE Dressing: Setup, Increased time to complete, Minimal assistance (Drew Neigh back gown sitting EOB, req assistance with line mgmt)  LE Dressing: Setup, Increased time to complete, Minimal assistance (Don/doff shoes. Pt able to slip shoes on and off, but req assistance to tie)  Toileting: Setup, Increased time to complete, Minimal assistance (Pt completed toileting on BSC, req Min A for transfer)  Additional Comments: Spouse helping pt with ADL tasks this date. Pt encouraged to try to complete ADL tasks by self.  Pt verbalized understanding however pt spouse continued to assist Balance  Sitting Balance: Supervision  Standing Balance: Contact guard assistance   Standing Balance  Time: 4 min  Activity: Pt stood bedside, func mob in halls for household distances using RW  Comment: Pt able to let go of RW with LUE, utilizing RUE for support, but only displayed LOB when writer looked over at pt  Functional Mobility  Functional - Mobility Device: Rolling Walker  Activity: Other  Assist Level: Contact guard assistance  Functional Mobility Comments: Pt states walking \"on tiptoes\" at baseline \"since I was 11 yo), pt placed R fball of foot on ground and able to feel floor, L foot flat on floor during func mob, RW improved balance     Bed mobility  Bridging: Modified independent   Rolling to Right: Modified independent  Supine to Sit: Stand by assistance  Sit to Supine: Supervision  Scooting: Modified independent  Comment: Pt used BUE's to assist RLE to EOB which was not necessary based on later observations on RLE strength  Transfers  Sit to stand: Contact guard assistance  Stand to sit: Contact guard assistance  Transfer Comments: vc's required for proper hand placement upon standing/sitting transfers this date   Toilet Transfers  Toilet - Technique: Ambulating  Equipment Used: Standard bedside commode  Toilet Transfer: Minimal assistance             SLP:  Assessment:  Pt presents with no apparent cognitive deficits at this time. No dysarthria noted, no oral motor deficits. Pt. Reported she is performing at baseline. No further ST is recommended. Verbal education provided. Past Medical History:        Diagnosis Date    Acid reflux     Anemia 10/18/2019    Drug abuse, IV ([address]     claims that she has previous iv drug dependency claims hasn' had recent usage.     Headache     History of pulmonary embolus (PE)     Marijuana smoker 10/18/2019    Smoker 10/18/2019       Past Surgical History:        Procedure Laterality Date    APPENDECTOMY       SECTION      COLONOSCOPY N/A 10/23/2019    COLORECTAL CANCER SCREENING, NOT HIGH RISK performed by Libertad Wesley MD at [facility] [address] N/A 10/22/2019    SIGMOIDOSCOPY ABORTED COLONOSCOPY performed by Libertad Wesley MD at [address] N/A 10/21/2019    EGD ESOPHAGOGASTRODUODENOSCOPY performed by Libertad Wesley MD at [address]:    No Known Allergies     Current Medications:   Current Facility-Administered Medications: [START ON 5/27/2021] ferrous sulfate (FE TABS 325) EC tablet 325 mg, 325 mg, Oral, Daily with breakfast  sennosides-docusate sodium (SENOKOT-S) 8.6-50 MG tablet 2 tablet, 2 tablet, Oral, Daily  heparin 25,000 units in dextrose 5% 250 mL (premix) infusion, 5-30 Units/kg/hr, Intravenous, Continuous  aspirin chewable tablet 81 mg, 81 mg, Oral, Daily  methadone 5 MG/5ML solution 120 mg, 120 mg, Oral, Daily  sodium chloride flush 0.9 % injection 5-40 mL, 5-40 mL, Intravenous, 2 times per day  sodium chloride flush 0.9 % injection 5-40 mL, 5-40 mL, Intravenous, PRN  0.9 % sodium chloride infusion, 25 mL, Intravenous, PRN  promethazine (PHENERGAN) tablet 12.5 mg, 12.5 mg, Oral, Q6H PRN **OR** ondansetron (ZOFRAN) injection 4 mg, 4 mg, Intravenous, Q6H PRN  polyethylene glycol (GLYCOLAX) packet 17 g, 17 g, Oral, Daily PRN  perflutren lipid microspheres (DEFINITY) injection 1.65 mg, 1.5 mL, Intravenous, ONCE PRN  atorvastatin (LIPITOR) tablet 80 mg, 80 mg, Oral, Nightly  0.9 % sodium chloride infusion, , Intravenous, Continuous    Family History:       Problem Relation Age of Onset    No Known Problems Mother     No Known Problems Father        Social History:  Lives With: Spouse, Family (In-laws live in home)  Type of Home: Trailer  Home Layout: One level  Home Access: Stairs to enter without rails, Ramped entrance  Entrance Stairs - Number of Steps: 4  Bathroom Shower/Tub: Tub/Shower unit  Bathroom Toilet: Handicap height  Bathroom Equipment: Grab bars in shower  Home Equipment: 4 wheeled walker, Henry Ratel (pt mother uses PRN)  Receives Help From: Family ( able to help PRN)  ADL Assistance: Independent  Homemaking Assistance: Independent  Homemaking Responsibilities: Yes  Ambulation Assistance: Independent  Transfer Assistance: Independent  Active : No  Occupation: Unemployed  Leisure & Hobbies: knitting and sewing  Additional Comments: In-laws take care of themselves, spouse home to assist pt PRN  Social History     Socioeconomic History    Marital status:      Spouse name: Trino Mena Number of children: 4    Years of education: None    Highest education level: None   Occupational History    None   Tobacco Use    Smoking status: Current Every Day Smoker     Types: Cigarettes    Smokeless tobacco: Never Used   Vaping Use    Vaping Use: Never used   Substance and Sexual Activity    Alcohol use: No    Drug use: Not Currently     Types: Marijuana    Sexual activity: Yes   Other Topics Concern    None   Social History Narrative    None     Social Determinants of Health     Financial Resource Strain:     Difficulty of Paying Living Expenses:    Food Insecurity:     Worried About Running Out of Food in the Last Year:     Ran Out of Food in the Last Year:    Transportation Needs:     Lack of Transportation (Medical):      Lack of Transportation (Non-Medical):    Physical Activity:     Days of Exercise per Week:     Minutes of Exercise per Session:    Stress:     Feeling of Stress :    Social Connections:     Frequency of Communication with Friends and Family:     Frequency of Social Gatherings with Friends and Family:     Attends [employer] Services:     Active Member of Clubs or Organizations:     Attends Club or Organization Meetings:     Marital Status:    Intimate Partner Violence:     Fear of Current or Ex-Partner:     Emotionally Abused:     Physically Abused:     Sexually Abused:        Physical Exam:  /63 Pulse 97   Temp 98.1 °F (36.7 °C) (Oral)   Resp 17   Ht 5' 3\" (1.6 m)   Wt 148 lb 11.2 oz (67.4 kg)   LMP 05/22/2021   SpO2 96%   BMI 26.34 kg/m²     GEN: Well developed, well nourished, no acute distress  HEENT: NCAT. EOMI. Hearing grossly intact. Mucous membranes pink and moist.  RESP: Normal breath sounds with no wheezing, rales, or rhonchi. Respirations WNL and unlabored. CV: Regular rate and rhythm. No murmurs, rubs, or gallops. ABD: Soft, non-distended, BS+ and equal.  NEURO: Alert. Speech fluent with no aphasia or dysarthria noted. No facial droop. Symmetrical shoulder shrug. Midline tongue protrusion. Sensation to light touch decreased in right upper and lower limbs. MSK: Decreased AROM in the right upper and lower limbs due to weakness. Otherwise functional ROM. Muscle bulk is normal bilaterally. Strength 4-/5 in right upper limb and 5/5 in left upper limb. Strength 4/5 with right ankle dorsiflexion and plantarflexion and 5/5 with left ankle dorsiflexion and plantarflexion. Has more difficulty lifting the right lower limb off of the bed than the left lower limb. LIMBS: No edema in bilateral lower limbs. SKIN: Warm and dry with good turgor. PSYCH: Mood WNL. Affect WNL. Appropriately interactive. Diagnostics:    CBC:   Recent Labs     05/24/21  0945 05/25/21  0512 05/26/21  0302   WBC 9.9 10.6 7.2   RBC 4.49 4.04 3.59*   HGB 10.5* 9.4* 8.3*   HCT 35.2* 34.6* 28.7*   MCV 78.4* 85.6 79.9*   RDW 16.9* 17.3* 17.1*    307 252     BMP:   Recent Labs     05/24/21  0945 05/25/21  1116 05/26/21  0302    136 139   K 4.3 4.0 3.7    101 105   CO2 19* 22 25   BUN 11 11 6   CREATININE 1.02* 0.59 0.63   GLUCOSE 135* 85 92      HbA1c:   Lab Results   Component Value Date    LABA1C 5.3 05/25/2021     BNP: No results for input(s): BNP in the last 72 hours.   PT/INR:   Recent Labs     05/24/21  0923 05/25/21  1116 05/25/21  1311   PROTIME 13.4 11.9 [id number]   INR 1.0 1.1 [id number] APTT:   Recent Labs     05/26/21  0629 05/26/21  1221 05/26/21  1755   APTT 41.3* 66.7* 102.2*     CARDIAC ENZYMES:   Recent Labs     05/24/21  0945 05/24/21  1320   TROPONINT NOT REPORTED NOT REPORTED     FASTING LIPID PANEL:  Lab Results   Component Value Date    CHOL 194 05/25/2021    HDL 29 (L) 05/25/2021    TRIG 150 (H) 05/25/2021     LIVER PROFILE: No results for input(s): AST, ALT, ALB, BILIDIR, BILITOT, ALKPHOS in the last 72 hours. Radiology:  MRI brain without contrast   Final Result   1. Numerous small scattered acute infarcts within the left MCA territory   including the left caudate head. Given the peripheral location of these   infarcts, these may be embolic in nature. No significant mass effect or   midline shift. 2. Otherwise, no acute intracranial abnormality. These results were sent to the [facility] [address] ([address] [place] [address]) on   5/25/2021 at 8:12 am to be communicated to the referring/covering health care   provider/office. CTA HEAD NECK W CONTRAST    (Results Pending)         Impression:    1. Left MCA CVA  2. Right hemiparesis  3. Anemia  4. History of PE  5. GERD  6. History of IV drug use, on methadone    Recommendations:    1. Diagnosis:  Left MCA CVA  2. Therapy: Has PT/OT needs  3. Medical Necessity: As above  4. Support: Lives with spouse  5. Rehab Recommendation: The patient will benefit from acute inpatient rehabilitation once medically stable per primary service. Anticipate she will be able to tolerate 3 hours of therapy per day in rehabilitation. The patient requires multidisciplinary rehabilitation treatment including medical management by a PM&R physician, 24 hour rehabilitation nursing, physical therapy, occupational therapy, rehabilitation social work, and nutrition services.  Patient and family also require education in post-hospital precautions and home exercise routine, adaptive techniques and deficit compensation strategies, strengthening and conditioning,

## 2021-05-26 NOTE — PLAN OF CARE
Problem: Falls - Risk of:  Goal: Will remain free from falls  Description: Will remain free from falls  5/26/2021 0117 by Shannan Pena RN  Outcome: Ongoing     Problem: Falls - Risk of:  Goal: Absence of physical injury  Description: Absence of physical injury  5/26/2021 0117 by Shannan Pena RN  Outcome: Ongoing     Problem: Skin Integrity:  Goal: Will show no infection signs and symptoms  Description: Will show no infection signs and symptoms  5/26/2021 0117 by Shannan Pena RN  Outcome: Ongoing     Problem: Skin Integrity:  Goal: Absence of new skin breakdown  Description: Absence of new skin breakdown  5/26/2021 0117 by Shannan Pena RN  Outcome: Ongoing     Problem: HEMODYNAMIC STATUS  Goal: Patient has stable vital signs and fluid balance  5/26/2021 0117 by Shannan Pena RN  Outcome: Ongoing     Problem: ACTIVITY INTOLERANCE/IMPAIRED MOBILITY  Goal: Mobility/activity is maintained at optimum level for patient  5/26/2021 0117 by Shannan Pena RN  Outcome: Ongoing     Problem: COMMUNICATION IMPAIRMENT  Goal: Ability to express needs and understand communication  5/26/2021 0117 by Shannan Pena RN  Outcome: Ongoing

## 2021-05-27 ENCOUNTER — APPOINTMENT (OUTPATIENT)
Dept: CT IMAGING | Age: 36
DRG: 045 | End: 2021-05-27
Payer: MEDICAID

## 2021-05-27 LAB
ABSOLUTE EOS #: 0.14 K/UL (ref 0–0.44)
ABSOLUTE IMMATURE GRANULOCYTE: 0.03 K/UL (ref 0–0.3)
ABSOLUTE LYMPH #: 2.17 K/UL (ref 1.1–3.7)
ABSOLUTE MONO #: 0.34 K/UL (ref 0.1–1.2)
ACTIVATED PROTEIN C RESISTANCE: 4.3
ANION GAP SERPL CALCULATED.3IONS-SCNC: 8 MMOL/L (ref 9–17)
BASOPHILS # BLD: 1 % (ref 0–2)
BASOPHILS ABSOLUTE: 0.03 K/UL (ref 0–0.2)
BUN BLDV-MCNC: 3 MG/DL (ref 6–20)
BUN/CREAT BLD: ABNORMAL (ref 9–20)
CALCIUM SERPL-MCNC: 7.8 MG/DL (ref 8.6–10.4)
CHLORIDE BLD-SCNC: 108 MMOL/L (ref 98–107)
CO2: 25 MMOL/L (ref 20–31)
CREAT SERPL-MCNC: 0.53 MG/DL (ref 0.5–0.9)
DIFFERENTIAL TYPE: ABNORMAL
EOSINOPHILS RELATIVE PERCENT: 2 % (ref 1–4)
GFR AFRICAN AMERICAN: >60 ML/MIN
GFR NON-AFRICAN AMERICAN: >60 ML/MIN
GFR SERPL CREATININE-BSD FRML MDRD: ABNORMAL ML/MIN/{1.73_M2}
GFR SERPL CREATININE-BSD FRML MDRD: ABNORMAL ML/MIN/{1.73_M2}
GLUCOSE BLD-MCNC: 84 MG/DL (ref 70–99)
HCT VFR BLD CALC: 26.3 % (ref 36.3–47.1)
HEMOGLOBIN: 7.7 G/DL (ref 11.9–15.1)
IMMATURE GRANULOCYTES: 1 %
INR BLD: 1
LYMPHOCYTES # BLD: 36 % (ref 24–43)
MCH RBC QN AUTO: 23.4 PG (ref 25.2–33.5)
MCHC RBC AUTO-ENTMCNC: 29.3 G/DL (ref 28.4–34.8)
MCV RBC AUTO: 79.9 FL (ref 82.6–102.9)
MONOCYTES # BLD: 6 % (ref 3–12)
NRBC AUTOMATED: 0 PER 100 WBC
PARTIAL THROMBOPLASTIN TIME: 37.6 SEC (ref 20.5–30.5)
PARTIAL THROMBOPLASTIN TIME: 37.9 SEC (ref 20.5–30.5)
PARTIAL THROMBOPLASTIN TIME: 62.1 SEC (ref 20.5–30.5)
PARTIAL THROMBOPLASTIN TIME: 80.9 SEC (ref 20.5–30.5)
PDW BLD-RTO: 17.2 % (ref 11.8–14.4)
PLATELET # BLD: 218 K/UL (ref 138–453)
PLATELET ESTIMATE: ABNORMAL
PMV BLD AUTO: 10.4 FL (ref 8.1–13.5)
POTASSIUM SERPL-SCNC: 3.7 MMOL/L (ref 3.7–5.3)
PROTEIN C ANTIGEN: >95 % (ref 63–153)
PROTEIN S ANTIGEN, FREE: 93 % (ref 55–123)
PROTHROMBIN TIME: 10.7 SEC (ref 9.1–12.3)
RBC # BLD: 3.29 M/UL (ref 3.95–5.11)
RBC # BLD: ABNORMAL 10*6/UL
SEG NEUTROPHILS: 55 % (ref 36–65)
SEGMENTED NEUTROPHILS ABSOLUTE COUNT: 3.36 K/UL (ref 1.5–8.1)
SODIUM BLD-SCNC: 141 MMOL/L (ref 135–144)
WBC # BLD: 6.1 K/UL (ref 3.5–11.3)
WBC # BLD: ABNORMAL 10*3/UL

## 2021-05-27 PROCEDURE — 6370000000 HC RX 637 (ALT 250 FOR IP): Performed by: NURSE PRACTITIONER

## 2021-05-27 PROCEDURE — 70496 CT ANGIOGRAPHY HEAD: CPT

## 2021-05-27 PROCEDURE — 6360000004 HC RX CONTRAST MEDICATION: Performed by: NURSE PRACTITIONER

## 2021-05-27 PROCEDURE — 2000000003 HC NEURO ICU R&B

## 2021-05-27 PROCEDURE — 99233 SBSQ HOSP IP/OBS HIGH 50: CPT | Performed by: PSYCHIATRY & NEUROLOGY

## 2021-05-27 PROCEDURE — 80048 BASIC METABOLIC PNL TOTAL CA: CPT

## 2021-05-27 PROCEDURE — 85610 PROTHROMBIN TIME: CPT

## 2021-05-27 PROCEDURE — APPNB60 APP NON BILLABLE TIME 46-60 MINS: Performed by: NURSE PRACTITIONER

## 2021-05-27 PROCEDURE — 97116 GAIT TRAINING THERAPY: CPT

## 2021-05-27 PROCEDURE — 97530 THERAPEUTIC ACTIVITIES: CPT

## 2021-05-27 PROCEDURE — 85730 THROMBOPLASTIN TIME PARTIAL: CPT

## 2021-05-27 PROCEDURE — 6360000002 HC RX W HCPCS: Performed by: NURSE PRACTITIONER

## 2021-05-27 PROCEDURE — 85025 COMPLETE CBC W/AUTO DIFF WBC: CPT

## 2021-05-27 PROCEDURE — 2580000003 HC RX 258: Performed by: NURSE PRACTITIONER

## 2021-05-27 PROCEDURE — 99232 SBSQ HOSP IP/OBS MODERATE 35: CPT | Performed by: PSYCHIATRY & NEUROLOGY

## 2021-05-27 RX ORDER — HEPARIN SODIUM 1000 [USP'U]/ML
INJECTION, SOLUTION INTRAVENOUS; SUBCUTANEOUS
Status: DISCONTINUED
Start: 2021-05-27 | End: 2021-05-27 | Stop reason: WASHOUT

## 2021-05-27 RX ORDER — WARFARIN SODIUM 5 MG/1
5 TABLET ORAL
Status: COMPLETED | OUTPATIENT
Start: 2021-05-27 | End: 2021-05-27

## 2021-05-27 RX ADMIN — ATORVASTATIN CALCIUM 80 MG: 80 TABLET, FILM COATED ORAL at 20:20

## 2021-05-27 RX ADMIN — IOPAMIDOL 90 ML: 755 INJECTION, SOLUTION INTRAVENOUS at 12:40

## 2021-05-27 RX ADMIN — DOCUSATE SODIUM 50MG AND SENNOSIDES 8.6MG 2 TABLET: 8.6; 5 TABLET, FILM COATED ORAL at 08:26

## 2021-05-27 RX ADMIN — SODIUM CHLORIDE: 9 INJECTION, SOLUTION INTRAVENOUS at 14:45

## 2021-05-27 RX ADMIN — METHADONE HYDROCHLORIDE 120 MG: 5 SOLUTION ORAL at 08:30

## 2021-05-27 RX ADMIN — HEPARIN SODIUM AND DEXTROSE 16 UNITS/KG/HR: 10000; 5 INJECTION INTRAVENOUS at 13:28

## 2021-05-27 RX ADMIN — SODIUM CHLORIDE, PRESERVATIVE FREE 10 ML: 5 INJECTION INTRAVENOUS at 08:35

## 2021-05-27 RX ADMIN — SODIUM CHLORIDE, PRESERVATIVE FREE 10 ML: 5 INJECTION INTRAVENOUS at 20:19

## 2021-05-27 RX ADMIN — FERROUS SULFATE TAB EC 325 MG (65 MG FE EQUIVALENT) 325 MG: 325 (65 FE) TABLET DELAYED RESPONSE at 08:26

## 2021-05-27 RX ADMIN — ASPIRIN 81 MG: 81 TABLET, CHEWABLE ORAL at 08:26

## 2021-05-27 RX ADMIN — WARFARIN SODIUM 5 MG: 5 TABLET ORAL at 21:32

## 2021-05-27 ASSESSMENT — PAIN DESCRIPTION - ORIENTATION
ORIENTATION: LOWER
ORIENTATION: RIGHT

## 2021-05-27 ASSESSMENT — PAIN DESCRIPTION - LOCATION
LOCATION: BACK
LOCATION: ARM;LEG
LOCATION: ARM;LEG

## 2021-05-27 ASSESSMENT — PAIN SCALES - GENERAL
PAINLEVEL_OUTOF10: 6
PAINLEVEL_OUTOF10: 3
PAINLEVEL_OUTOF10: 0
PAINLEVEL_OUTOF10: 3
PAINLEVEL_OUTOF10: 0

## 2021-05-27 ASSESSMENT — PAIN DESCRIPTION - DESCRIPTORS
DESCRIPTORS: ACHING;NUMBNESS
DESCRIPTORS: ACHING;NUMBNESS

## 2021-05-27 ASSESSMENT — PAIN DESCRIPTION - FREQUENCY: FREQUENCY: CONTINUOUS

## 2021-05-27 ASSESSMENT — PAIN DESCRIPTION - PAIN TYPE
TYPE: CHRONIC PAIN
TYPE: CHRONIC PAIN

## 2021-05-27 NOTE — PROGRESS NOTES
Orientation: Lower  Vital Signs  Patient Currently in Pain: Yes       Orientation  Orientation  Overall Orientation Status: Within Functional Limits  Cognition      Objective   Bed mobility  Rolling to Right: Modified independent  Supine to Sit: Modified independent  Sit to Supine: Modified independent  Scooting: Modified independent  Transfers  Sit to Stand: Contact guard assistance  Stand to sit: Contact guard assistance  Ambulation  Ambulation?: Yes  Ambulation 1  Surface: level tile  Device: Rolling Walker  Assistance: Contact guard assistance  Quality of Gait: pt lacking heel strike on R LE, walking only on toes however steady, no LOB  Distance: 150 ft  Stairs/Curb  Stairs?: No     Balance  Posture: Good  Sitting - Static: Good  Sitting - Dynamic: Good  Standing - Static: Fair  Standing - Dynamic: Fair  Comments: assessed with RW      Goals  Short term goals  Time Frame for Short term goals: 14 visits  Short term goal 1: Ambulate 500ft CGA and least restrictive AD  Short term goal 2: Ascend/Descend 3 stairs with CGA without handrail  Short term goal 3: Demo Good- dynamic standing balance  Short term goal 4: Demo Good- dynamic sitting balance    Plan    Plan  Times per week: 5-6x/wk  Current Treatment Recommendations: Strengthening, Balance Training, Functional Mobility Training, ROM, Transfer Training, ADL/Self-care Training, Stair training, Gait Training, Endurance Training, Home Exercise Program, Safety Education & Training, Patient/Caregiver Education & Training  Safety Devices  Type of devices:  All fall risk precautions in place, Gait belt, Left in bed, Nurse notified  Restraints  Initially in place: No     Therapy Time   Individual Concurrent Group Co-treatment   Time In 1125         Time Out 1152         Minutes 27         Timed Code Treatment Minutes: [address] [facility], [facility]

## 2021-05-27 NOTE — PROGRESS NOTES
ENDOVASCULAR NEUROSURGERY PROGRESS NOTE  2021 10:36 AM  Subjective:   Admit Date: 2021  PCP: No primary care provider on file. No new acute events. No new complaints. Objective:   Vitals: /64   Pulse 103   Temp 98.4 °F (36.9 °C) (Oral)   Resp 21   Ht 5' 3\" (1.6 m)   Wt 148 lb 11.2 oz (67.4 kg)   LMP 2021   SpO2 (!) 88%   BMI 26.34 kg/m²   General appearance: Lying in bed, NAD. HEENT: Atraumatic. Neck: Neck is supple. Lungs: No respiratory distress noted. Heart: normal sinus rhythm on tele. .   Abdomen: Soft nontender. Extremities: No lower limb edema noted. Neurologic: awake, following simple commands appropriately, able to name simple objects, intact comprehension, no dysarthria noted. CN: Has intact extraocular muscles movements, no facial droop noted, intact sensation on the face on trigeminal distribution bilaterally. MOTOR: Has good strength in both upper and lower extremities, moving both upper and lower extremities against gravity. Drift noted in right upper and right lower extremity. SENSORY: Right-sided numbness noted in right upper and right lower extremities. No numbness on the face noted. NIH Stroke Scale Total:  1a.  Level of consciousness:  0  1b. Level of consciousness questions:  0   1c. Level of consciousness questions:  0   2. Best Gaze:  0   3. Visual:  0   4. Facial Palsy:  0   5a. Motor left arm:  0  5b. Motor right arm:  0  6a. Motor left le  6b. Motor right le  7. Limb Ataxia:  0 - absent  8. Sensory:  1  9. Best Language:  0  10. Dysarthria:  0  11.   Extinction and Inattention: 0     Total: 2    Medications and labs:   Scheduled Meds:   ferrous sulfate  325 mg Oral Daily with breakfast    sennosides-docusate sodium  2 tablet Oral Daily    aspirin  81 mg Oral Daily    methadone  120 mg Oral Daily    sodium chloride flush  5-40 mL Intravenous 2 times per day    atorvastatin  80 mg Oral Nightly Continuous Infusions:   heparin (PORCINE) Infusion 18 Units/kg/hr (05/27/21 0528)    sodium chloride      sodium chloride 75 mL/hr at 05/26/21 2019     CBC:   Recent Labs     05/25/21  0512 05/26/21  0302 05/27/21  0454   WBC 10.6 7.2 6.1   HGB 9.4* 8.3* 7.7*    252 218     BMP:    Recent Labs     05/25/21  1116 05/26/21  0302 05/27/21  0454    139 141   K 4.0 3.7 3.7    105 108*   CO2 22 25 25   BUN 11 6 3*   CREATININE 0.59 0.63 0.53   GLUCOSE 85 92 84     Hepatic: No results for input(s): AST, ALT, ALB, BILITOT, ALKPHOS in the last 72 hours. Troponin: No results for input(s): TROPONINI in the last 72 hours. BNP: No results for input(s): BNP in the last 72 hours. Lipids:   Recent Labs     05/25/21  0530   CHOL 194   HDL 29*     INR:   Recent Labs     05/25/21  1116 05/25/21  1311   INR 1.1 [id number]       Assessment and Recommendations: This is a 79-year-old female with past medical history including PE in 2019. She presented with acute onset right-sided weakness/numbness. She received TPA. NIH stroke scale is 3. CT head revealed no blood. CTA head and neck revealed left cervical ICA subocclusive thrombus. Neuroimages reviewed. Labs reviewed. Patient neuro exam is stable with right-sided weakness/numbness. --Left MCA/IESHA embolic ischemic stroke with left cervical ICA subocclusive thrombus. --Left cervical ICA subocclusive thrombus possibly due to underlying carotid dissection. She is feeling better. Neuro exam is stable. Last PTT at 37.9. She is on heparin drip and aspirin 81 mg daily. We will repeat follow-up vascular images. Plan  1. Medical management  2. Heparin drip, PTT goal 50-70  3. Aspirin 81 mg daily  4. Neuro ICU care  5. Normalize blood pressure as tolerated  6. Repeating CTA neck today.       Lamon Alpers, MD, MD  Stroke, [facility] Stroke [address] [facility] [address] [facility]  Electronically signed 5/27/2021 at 10:36 AM

## 2021-05-27 NOTE — PROGRESS NOTES
Pharmacy Note  Warfarin Consult    Court Wei is a 37 y.o. female for whom pharmacy has been consulted to manage warfarin therapy. Consulting Physician: Haze Harada, CNP  Reason for Admission: stroke    Warfarin dose prior to admission: n/a  Warfarin indication: PE and stroke hx  Target INR range: 2-3     Past Medical History:   Diagnosis Date    Acid reflux     Anemia 10/18/2019    Drug abuse, IV ([address]     claims that she has previous iv drug dependency claims hasn' had recent usage. Headache     History of pulmonary embolus (PE)     Marijuana smoker 10/18/2019    Smoker 10/18/2019                Recent Labs     05/27/21  1853   INR 1.0     Recent Labs     05/25/21  0512 05/26/21  0302 05/27/21  0454   HGB 9.4* 8.3* 7.7*   HCT 34.6* 28.7* 26.3*    252 218       Current warfarin drug-drug interactions: heparin, aspirin      Date             INR        Dose   5/27/2021       1.0    5 mg    Daily PT/INR while inpatient. PT/INR ordered to start 5/27. Thank you for the consult. Will continue to follow. PAYTON Florez, PharmD  5/27/2021 7:22 PM

## 2021-05-27 NOTE — PLAN OF CARE
Problem: Falls - Risk of:  Goal: Will remain free from falls  Description: Will remain free from falls  5/27/2021 0322 by Pio Olguin RN  Outcome: Ongoing  5/26/2021 1631 by Domonique Stein RN  Outcome: Ongoing  Goal: Absence of physical injury  Description: Absence of physical injury  5/27/2021 0322 by Pio Olguin RN  Outcome: Ongoing  5/26/2021 1631 by Domonique Stein RN  Outcome: Ongoing     Problem: Skin Integrity:  Goal: Will show no infection signs and symptoms  Description: Will show no infection signs and symptoms  5/27/2021 0322 by Pio Olguin RN  Outcome: Ongoing  5/26/2021 1631 by Domonique Stein RN  Outcome: Ongoing  Goal: Absence of new skin breakdown  Description: Absence of new skin breakdown  5/27/2021 0322 by Pio Olguin RN  Outcome: Ongoing  5/26/2021 1631 by Domonique Stein RN  Outcome: Ongoing     Problem: HEMODYNAMIC STATUS  Goal: Patient has stable vital signs and fluid balance  5/27/2021 0322 by Pio Olguin RN  Outcome: Ongoing  5/26/2021 1631 by Domonique Stein RN  Outcome: Ongoing     Problem: ACTIVITY INTOLERANCE/IMPAIRED MOBILITY  Goal: Mobility/activity is maintained at optimum level for patient  5/27/2021 0322 by Pio Olguin RN  Outcome: Ongoing  5/26/2021 1631 by Domonique Stein RN  Outcome: Ongoing     Problem: COMMUNICATION IMPAIRMENT  Goal: Ability to express needs and understand communication  5/27/2021 0322 by Pio Olguin RN  Outcome: Ongoing  5/26/2021 1631 by Domonique Stein RN  Outcome: Ongoing     Problem: Pain:  Goal: Pain level will decrease  Description: Pain level will decrease  5/27/2021 0322 by Pio Olguin RN  Outcome: Ongoing  5/26/2021 1631 by Domonique Stein RN  Outcome: Ongoing  Goal: Control of acute pain  Description: Control of acute pain  5/27/2021 0322 by Pio Olguin RN  Outcome: Ongoing  5/26/2021 1631 by Domonique Stein RN  Outcome: Ongoing  Goal: Control of chronic pain  Description: Control of chronic pain  5/27/2021 0322 by Pio Olguin RN  Outcome: Ongoing  5/26/2021 1631 by Toñito Whitman RN  Outcome: Ongoing

## 2021-05-27 NOTE — PROGRESS NOTES
aspirin  81 mg Oral Daily    methadone  120 mg Oral Daily    sodium chloride flush  5-40 mL Intravenous 2 times per day    atorvastatin  80 mg Oral Nightly     CONTINUOUS INFUSIONS:   heparin (PORCINE) Infusion 18 Units/kg/hr (21)    sodium chloride      sodium chloride 75 mL/hr at 21 2019     PRN MEDICATIONS:   aspirin-acetaminophen-caffeine, sodium chloride flush, sodium chloride, promethazine **OR** ondansetron, polyethylene glycol, perflutren lipid microspheres    VITALS:  Temperature Range: Temp: 98.4 °F (36.9 °C) Temp  Av.8 °F (36.6 °C)  Min: 97 °F (36.1 °C)  Max: 98.4 °F (36.9 °C)  BP Range: Systolic (04VER), HEW:567 , Min:99 , TSZ:116     Diastolic (73UHN), GMU:16, Min:52, Max:93    Pulse Range: Pulse  Av.5  Min: 86  Max: 112  Respiration Range: Resp  Av.3  Min: 11  Max: 31  Current Pulse Ox: SpO2: 98 %  24HR Pulse Ox Range: SpO2  Av.6 %  Min: 96 %  Max: 99 %  Patient Vitals for the past 12 hrs:   BP Temp Temp src Pulse Resp SpO2   21 0700 122/67 -- -- 92 12 98 %   21 0315 121/78 98.4 °F (36.9 °C) Oral 100 19 --   21 0300 -- -- -- 100 19 98 %   21 0200 -- -- -- 96 12 97 %   21 0100 -- -- -- 112 26 99 %   21 2305 134/62 -- -- 110 20 97 %   21 2224 120/70 -- -- 111 (!) 31 97 %   21 118/63 -- -- 101 20 98 %   21 123/63 98.1 °F (36.7 °C) Oral 97 17 96 %     Estimated body mass index is 26.34 kg/m² as calculated from the following:    Height as of this encounter: 5' 3\" (1.6 m).     Weight as of this encounter: 148 lb 11.2 oz (67.4 kg).  []<16 Severe malnutrition  []16-16.99 Moderate malnutrition  []17-18.49 Mild malnutrition  []18.5-24.9 Normal  [x]25-29.9 Overweight (not obese)  []30-34.9 Obese class 1 (Low Risk)  []35-39.9 Obese class 2 (Moderate Risk)  []?40 Obese class 3 (High Risk)    RECENT LABS:   Lab Results   Component Value Date    WBC 6.1 2021    HGB 7.7 (L) 2021    HCT 26.3 (L) 05/27/2021     05/27/2021    CHOL 194 05/25/2021    TRIG 150 (H) 05/25/2021    HDL 29 (L) 05/25/2021    ALT 5 10/19/2019    AST 11 10/19/2019     05/27/2021    K 3.7 05/27/2021     (H) 05/27/2021    CREATININE 0.53 05/27/2021    BUN 3 (L) 05/27/2021    CO2 25 05/27/2021    INR [id number] 05/25/2021    LABA1C 5.3 05/25/2021     24 HOUR INTAKE/OUTPUT:    Intake/Output Summary (Last 24 hours) at 5/27/2021 0802  Last data filed at 5/26/2021 1600  Gross per 24 hour   Intake 611 ml   Output --   Net 611 ml       IMAGING:   Echo Complete    Result Date: 5/26/2021  Transthoracic Echocardiography Report (TTE)  Patient Name Cathy Moreau   Date of Study             05/26/2021               IRINA   Date of      1985  Gender                    Female  Birth   Age          37 year(s)  Race                         Room Number  0530        Height:                   63 inch, 160.02 cm   Corporate ID [id number]    Weight:                   145 pounds, 65.8 kg  #   Patient Acct [de-identified]   BSA:         1.69 m^2     BMI:       25.69 kg/m^2  #   MR #         [id number]     Sonographer               Monika Henderson   Accession #  [id number]  Interpreting Physician    [address] [place] [address]   Fellow                   Referring Nurse                           Practitioner   Interpreting             Referring Physician       Tiago Moyer  Fellow                                             APRN-CNP  Type of Study   TTE procedure:2D Echocardiogram, M-Mode, Doppler, Color Doppler, Bubble  Study. Procedure Date Date: 05/26/2021 Start: 08:35 AM Study Location: [facility] Technical Quality: Adequate visualization Indications:CVA. History / Tech. Comments: Procedure explained to patient. Smoker PMHx: IV Drug use Patient Status: Inpatient Contrast Medium: Bubble Study.  Height: 63 inches Weight: 145 pounds BSA: 1.69 m^2 BMI: 25.69 kg/m^2 HR: 82 bpm CONCLUSIONS Summary Left ventricle is normal in size, normal wall thickness. Global left ventricular systolic function is normal, calculated ejection fraction is 63% (by 3D Heart Model.) Normal Calculated global L. strain of -21.5 %. Negative bubble study, no shunt noted. Trivial mitral regurgitation. Mild tricuspid regurgitation. Estimated right ventricular systolic pressure is 30 mmHg. Trivial pulmonic insufficiency. CT HEAD WO CONTRAST  Result Date: 5/24/2021  No acute intracranial abnormality. CT CERVICAL SPINE WO CONTRAST  Result Date: 5/24/2021  No acute abnormality of the cervical spine. CTA HEAD NECK W CONTRAST  Result Date: 5/27/2021  Redemonstration of thrombus in the left carotid bulb posteriorly extending into the proximal left internal carotid artery. This is similar to slightly smaller compared to prior exam.     CTA HEAD NECK W CONTRAST  Result Date: 5/24/2021  Large filling defect presumably representing thrombus in the posterior aspect of the left carotid bulb extending into the central aspect of the proximal left internal carotid artery. Findings were discussed with Dr. Angela Lindo At 2:03 pm on 5/24/2021. MRI brain without contrast  Result Date: 5/25/2021  1. Numerous small scattered acute infarcts within the left MCA territory including the left caudate head. Given the peripheral location of these infarcts, these may be embolic in nature. No significant mass effect or midline shift. 2. Otherwise, no acute intracranial abnormality. These results were sent to the [facility] [id number] ([address] [place] [address]) on 5/25/2021 at 8:12 am to be communicated to the referring/covering health care provider/office. Labs and Images reviewed with:  [] Dr. Meera Luna. Diogenes    [x] Dr. Elicia Rosen  [] Dr. Jamie Barron  [] There are no new interval images to review. PHYSICAL EXAM       PHYSICAL EXAM:  CONSTITUTIONAL:  Well developed, well nourished. Alert and oriented x 3, in no acute distress. GCS 15. Nontoxic. No dysarthria. No aphasia.    HEAD:  normocephalic, atraumatic    EYES:  PERRLA, EOMI.   ENT:  moist mucous membranes   LUNGS:  Equal air entry bilaterally   CARDIOVASCULAR:  normal s1 / s2, RRR   ABDOMEN:  Soft, no rigidity, normal bowel sounds   NECK supple, symmetric   NEUROLOGIC:  Mental Status:  A & O x3,awake             Cranial Nerves:    II: Visual fields:  normal  III: Pupils:  equal, round, reactive to light  III,IV,VI: Extra Ocular Movements: intact  V: Facial sensation:  intact  VII: Facial strength: intact     Motor Exam:    Drift:  absent  Tone:  normal     4+/5 strength in the right upper and lower extremities, no drift. 5/5 strength in the left upper and lower extremities.     Sensory:    Touch:    Right Upper Extremity:  normal  Left Upper Extremity:  normal  Right Lower Extremity:  abnormal - numbness  Left Lower Extremity:  normal     SKIN No obvious ecchymosis, rashes, or lesions          DRAINS:  [x] There are no drains for Neuro Critical Care to monitor at this time. ASSESSMENT AND PLAN:       This is a 37 y.o. female with history of substance abuse and remote history of PE 2019 who presents with acute onset right sided weakness and numbness. CT head negative. Received tPA at 1358. CTA head/neck revealed a nonocclusive left carotid bulb thrombus. Signed out AMA from [address] [place] [address] due to refusing ambulance transfer, arrived to [place] [address] via private automobile. Started on Heparin infusion, Aspirin.   Unclear etiology of thrombus; hypercoag panel pending, WILLIS ordered     PLAN/MEDICAL DECISION MAKING:     NEUROLOGIC:  - Acute scattered left MCA territory infarctions  - Nonocclusive left carotid bulb thrombus  - Unclear etiology of thrombus  - Repeat CTA Head/Neck re-demonstrated left carotid bulb thrombus, similar to slightly smaller  - Continue low dose Heparin infusion, bridge to Coumadin  - Continue Aspirin 81mg and Lipitor 80 mg QHS for secondary stroke prevention  - Goal SBP <180  - Avoid symptomatic hypotension  - Neuro checks per protocol     CARDIOVASCULAR:  - Goal SBP <180  - Avoid symptomatic hypotension  - Echo EF 63%, negative bubble  - WILLIS ordered  - Follow up hypercoagulable panel  - Hyperlipidemia, , cholesterol 194  - Lipitor 80 mg QHS  - Continue telemetry     PULMONARY:  - Maintaining oxygen saturations on room air  - Smoking cessation encouraged     RENAL/FLUID/ELECTROLYTE:  - Normal renal functioning   - BUN 3/ Creatinine 0.53  - Monitor I&Os; 781/3 occurrences  - IVF: Normal saline @ 75 mL/hr post contrast through tomorrow  - Replace electrolytes PRN  - Daily BMP     GI/NUTRITION:  NUTRITION:  - Tolerating general diet  - Last BM this morning  - Bowel regimen: Senna-S daily,  Miralax prn  - GI prophylaxis: Not indicated     ID:  - Afebrile, Tmax 36.9C  - No leukocytosis, WBC 6.1  - No signs/symptoms of infection  - Daily CBC     HEME:   - Iron deficiency anemia  - H&H 7.7/26.3  - Continue Ferrous sulfate 325mg QD  - Platelets 259  - Daily CBC     ENDOCRINE:  - Continue to monitor blood glucose, goal <180  - Hemoglobin A1C 5.3     OTHER:  - PT/OT/ST  - PM&R consult; ARU candidate  - Continue home Methadone 120 mg QD, remote hx Heroin use     PROPHYLAXIS:  Stress ulcer: N/A     DVT PROPHYLAXIS:  - SCD sleeves - Thigh High   - Heparin infusion    DISPOSITION:  [x] To remain ICU. Consider transfer to stepdown later today vs tomorrow. We will continue to follow along. For any changes in exam or patient status please contact Neuro Critical Care.       EMILY Chávez - [facility]  Neuro Critical Care  Pager [phone]  5/27/2021     8:02 AM

## 2021-05-28 ENCOUNTER — APPOINTMENT (OUTPATIENT)
Dept: CARDIAC CATH/INVASIVE PROCEDURES | Age: 36
DRG: 045 | End: 2021-05-28
Payer: MEDICAID

## 2021-05-28 LAB
ABSOLUTE EOS #: 0.16 K/UL (ref 0–0.44)
ABSOLUTE IMMATURE GRANULOCYTE: <0.03 K/UL (ref 0–0.3)
ABSOLUTE LYMPH #: 1.72 K/UL (ref 1.1–3.7)
ABSOLUTE MONO #: 0.33 K/UL (ref 0.1–1.2)
ANION GAP SERPL CALCULATED.3IONS-SCNC: 7 MMOL/L (ref 9–17)
BASOPHILS # BLD: 1 % (ref 0–2)
BASOPHILS ABSOLUTE: 0.03 K/UL (ref 0–0.2)
BUN BLDV-MCNC: 2 MG/DL (ref 6–20)
BUN/CREAT BLD: ABNORMAL (ref 9–20)
CALCIUM SERPL-MCNC: 7.7 MG/DL (ref 8.6–10.4)
CHLORIDE BLD-SCNC: 111 MMOL/L (ref 98–107)
CO2: 24 MMOL/L (ref 20–31)
CREAT SERPL-MCNC: 0.5 MG/DL (ref 0.5–0.9)
DIFFERENTIAL TYPE: ABNORMAL
EOSINOPHILS RELATIVE PERCENT: 4 % (ref 1–4)
GFR AFRICAN AMERICAN: >60 ML/MIN
GFR NON-AFRICAN AMERICAN: >60 ML/MIN
GFR SERPL CREATININE-BSD FRML MDRD: ABNORMAL ML/MIN/{1.73_M2}
GFR SERPL CREATININE-BSD FRML MDRD: ABNORMAL ML/MIN/{1.73_M2}
GLUCOSE BLD-MCNC: 95 MG/DL (ref 70–99)
HCT VFR BLD CALC: 25.2 % (ref 36.3–47.1)
HCT VFR BLD CALC: 26.6 % (ref 36.3–47.1)
HEMOGLOBIN: 7.4 G/DL (ref 11.9–15.1)
HEMOGLOBIN: 7.7 G/DL (ref 11.9–15.1)
IMMATURE GRANULOCYTES: 0 %
INR BLD: 1
LV EF: 55 %
LVEF MODALITY: NORMAL
LYMPHOCYTES # BLD: 38 % (ref 24–43)
MCH RBC QN AUTO: 23.1 PG (ref 25.2–33.5)
MCH RBC QN AUTO: 23.3 PG (ref 25.2–33.5)
MCHC RBC AUTO-ENTMCNC: 28.9 G/DL (ref 28.4–34.8)
MCHC RBC AUTO-ENTMCNC: 29.4 G/DL (ref 28.4–34.8)
MCV RBC AUTO: 79.5 FL (ref 82.6–102.9)
MCV RBC AUTO: 79.6 FL (ref 82.6–102.9)
MONOCYTES # BLD: 7 % (ref 3–12)
MTHFR INTERPRETATION: NORMAL
MTHFR MUTATION A1286C: NORMAL
MTHFR MUTATION C665T: NEGATIVE
NRBC AUTOMATED: 0 PER 100 WBC
NRBC AUTOMATED: 0 PER 100 WBC
PARTIAL THROMBOPLASTIN TIME: 25.4 SEC (ref 20.5–30.5)
PARTIAL THROMBOPLASTIN TIME: 64.9 SEC (ref 20.5–30.5)
PARTIAL THROMBOPLASTIN TIME: 73.6 SEC (ref 20.5–30.5)
PARTIAL THROMBOPLASTIN TIME: 76.1 SEC (ref 20.5–30.5)
PDW BLD-RTO: 17.2 % (ref 11.8–14.4)
PDW BLD-RTO: 17.2 % (ref 11.8–14.4)
PLATELET # BLD: 195 K/UL (ref 138–453)
PLATELET # BLD: 241 K/UL (ref 138–453)
PLATELET ESTIMATE: ABNORMAL
PMV BLD AUTO: 10 FL (ref 8.1–13.5)
PMV BLD AUTO: 10.3 FL (ref 8.1–13.5)
POTASSIUM SERPL-SCNC: 3.8 MMOL/L (ref 3.7–5.3)
PROTHROMBIN TIME: 10.8 SEC (ref 9.1–12.3)
RBC # BLD: 3.17 M/UL (ref 3.95–5.11)
RBC # BLD: 3.34 M/UL (ref 3.95–5.11)
RBC # BLD: ABNORMAL 10*6/UL
SEG NEUTROPHILS: 50 % (ref 36–65)
SEGMENTED NEUTROPHILS ABSOLUTE COUNT: 2.28 K/UL (ref 1.5–8.1)
SODIUM BLD-SCNC: 142 MMOL/L (ref 135–144)
SPECIMEN: NORMAL
WBC # BLD: 4.1 K/UL (ref 3.5–11.3)
WBC # BLD: 4.5 K/UL (ref 3.5–11.3)
WBC # BLD: ABNORMAL 10*3/UL

## 2021-05-28 PROCEDURE — APPNB60 APP NON BILLABLE TIME 46-60 MINS: Performed by: NURSE PRACTITIONER

## 2021-05-28 PROCEDURE — 6360000002 HC RX W HCPCS: Performed by: NURSE PRACTITIONER

## 2021-05-28 PROCEDURE — 2580000003 HC RX 258: Performed by: NURSE PRACTITIONER

## 2021-05-28 PROCEDURE — 80048 BASIC METABOLIC PNL TOTAL CA: CPT

## 2021-05-28 PROCEDURE — 6370000000 HC RX 637 (ALT 250 FOR IP): Performed by: NURSE PRACTITIONER

## 2021-05-28 PROCEDURE — 2060000000 HC ICU INTERMEDIATE R&B

## 2021-05-28 PROCEDURE — 36415 COLL VENOUS BLD VENIPUNCTURE: CPT

## 2021-05-28 PROCEDURE — 6360000002 HC RX W HCPCS

## 2021-05-28 PROCEDURE — 85730 THROMBOPLASTIN TIME PARTIAL: CPT

## 2021-05-28 PROCEDURE — 85610 PROTHROMBIN TIME: CPT

## 2021-05-28 PROCEDURE — 99232 SBSQ HOSP IP/OBS MODERATE 35: CPT | Performed by: PSYCHIATRY & NEUROLOGY

## 2021-05-28 PROCEDURE — 99233 SBSQ HOSP IP/OBS HIGH 50: CPT | Performed by: PSYCHIATRY & NEUROLOGY

## 2021-05-28 PROCEDURE — 85025 COMPLETE CBC W/AUTO DIFF WBC: CPT

## 2021-05-28 PROCEDURE — 85027 COMPLETE CBC AUTOMATED: CPT

## 2021-05-28 PROCEDURE — 93325 DOPPLER ECHO COLOR FLOW MAPG: CPT

## 2021-05-28 PROCEDURE — 93312 ECHO TRANSESOPHAGEAL: CPT

## 2021-05-28 RX ORDER — WARFARIN SODIUM 5 MG/1
5 TABLET ORAL
Status: COMPLETED | OUTPATIENT
Start: 2021-05-28 | End: 2021-05-28

## 2021-05-28 RX ADMIN — SODIUM CHLORIDE, PRESERVATIVE FREE 10 ML: 5 INJECTION INTRAVENOUS at 09:32

## 2021-05-28 RX ADMIN — METHADONE HYDROCHLORIDE 120 MG: 5 SOLUTION ORAL at 09:03

## 2021-05-28 RX ADMIN — HEPARIN SODIUM AND DEXTROSE 12 UNITS/KG/HR: 10000; 5 INJECTION INTRAVENOUS at 14:30

## 2021-05-28 RX ADMIN — SODIUM CHLORIDE: 9 INJECTION, SOLUTION INTRAVENOUS at 04:10

## 2021-05-28 RX ADMIN — FERROUS SULFATE TAB EC 325 MG (65 MG FE EQUIVALENT) 325 MG: 325 (65 FE) TABLET DELAYED RESPONSE at 09:02

## 2021-05-28 RX ADMIN — SODIUM CHLORIDE, PRESERVATIVE FREE 10 ML: 5 INJECTION INTRAVENOUS at 20:42

## 2021-05-28 RX ADMIN — ASPIRIN 81 MG: 81 TABLET, CHEWABLE ORAL at 09:02

## 2021-05-28 RX ADMIN — DOCUSATE SODIUM 50MG AND SENNOSIDES 8.6MG 2 TABLET: 8.6; 5 TABLET, FILM COATED ORAL at 09:02

## 2021-05-28 RX ADMIN — IRON SUCROSE 500 MG: 20 INJECTION, SOLUTION INTRAVENOUS at 10:49

## 2021-05-28 RX ADMIN — WARFARIN SODIUM 5 MG: 5 TABLET ORAL at 17:50

## 2021-05-28 RX ADMIN — ATORVASTATIN CALCIUM 80 MG: 80 TABLET, FILM COATED ORAL at 20:41

## 2021-05-28 ASSESSMENT — PAIN SCALES - GENERAL
PAINLEVEL_OUTOF10: 0

## 2021-05-28 NOTE — PROGRESS NOTES
Daily Progress Note  Neuro Critical Care    Patient Name: Shantanu Andino  Patient : 1985  Room/Bed: 0530/0530-01  Code Status: Full  Allergies: No Known Allergies    CHIEF COMPLAINT:      Right sided weakness/numbness     INTERVAL HISTORY    Initial Presentation (Admitted 21): The patient is a 37 y.o. female with history of substance abuse (last use 5 years ago, on Methadone) and history of PE in 2019 treated with Eliquis presented with acute onset right sided weakness and numbness, LKW 0900. NIH 6. Stroke alert called. CT head negative. tPA adminstered at 1358. CTA head/neck concerning for thrombus in the left carotid bulb extending to central aspect of proximal ICA. It was recommended she be transferred to [facility] for neuro endovascular evaluation, but patient refused ambulance transport and signed out Ozzy Guerra from [address] [place] [address]. She came to [facility] via private automobile. On arrival to [address] [place] [address] Dr. Duran Tovar, she is awake, alert, and oriented to person, place (hospital), and time. She has clear speech, no aphasia or facial droop. She has right sided sensory neglect, right upper and lower drift. . No antiplatelet/anticoagulation at home. Hospital Course:  :  MRI brain this morning showed watershed distribution infarct, no hemorrhage. Started on heparin infusion, no bolus. Aspirin loaded. Hypercoagulable panel sent. : Heparin infusion continued, PTT 41.3 this AM.   : Remains on Heparin infusion, PTT 37.9 this AM.  Repeat CTA Head/Neck re-demonstrated left carotid bulb thrombus, similar to slightly smaller. Discussed with Neuro Endovascular who recommends bridging patient to Coumadin. Unclear etiology of thrombus, WILLIS ordered. Patient and significant other updated on plan and agreeable. Last 24h:  No acute events overnight. Hemoglobin 7.4 this morning, repeat 7.7 this afternoon. No signs of bleeding. Given 500mg Venofer x1 today for iron deficiency anemia.   Continues on Heparin infusion, PTT 64.9. Started on Coumadin yesterday evening. Underwent WILLIS that was negative this afternoon. Clinical exam remain stable.       CURRENT MEDICATIONS:  SCHEDULED MEDICATIONS:   warfarin (COUMADIN) daily dosing (placeholder)   Other RX Placeholder    ferrous sulfate  325 mg Oral Daily with breakfast    sennosides-docusate sodium  2 tablet Oral Daily    aspirin  81 mg Oral Daily    methadone  120 mg Oral Daily    sodium chloride flush  5-40 mL Intravenous 2 times per day    atorvastatin  80 mg Oral Nightly     CONTINUOUS INFUSIONS:   heparin (PORCINE) Infusion 14 Units/kg/hr (21 1820)    sodium chloride      sodium chloride 75 mL/hr at 21 0410     PRN MEDICATIONS:   aspirin-acetaminophen-caffeine, sodium chloride flush, sodium chloride, promethazine **OR** ondansetron, polyethylene glycol, perflutren lipid microspheres    VITALS:  Temperature Range: Temp: 98.2 °F (36.8 °C) Temp  Av.3 °F (36.8 °C)  Min: 98.1 °F (36.7 °C)  Max: 98.5 °F (36.9 °C)  BP Range: Systolic (47PFQ), ELP:284 , Min:75 , VKA:166     Diastolic (12RLD), YQB:13, Min:40, Max:113    Pulse Range: Pulse  Av  Min: 76  Max: 108  Respiration Range: Resp  Av.8  Min: 2  Max: 26  Current Pulse Ox: SpO2: 99 %  24HR Pulse Ox Range: SpO2  Av.7 %  Min: 97 %  Max: 99 %  Patient Vitals for the past 12 hrs:   BP Temp Temp src Pulse Resp SpO2   21 0757 -- 98.2 °F (36.8 °C) Oral -- -- --   21 0700 (!) 92/56 -- -- 76 16 99 %   21 0605 (!) 108/57 -- -- 85 17 99 %   21 0600 (!) 75/40 -- -- 83 (!) 2 98 %   21 0500 98/66 -- -- 87 13 97 %   21 0400 105/67 98.4 °F (36.9 °C) Oral 84 11 99 %   21 0300 107/60 -- -- 87 14 99 %   21 0200 101/72 -- -- 90 16 98 %   21 0100 107/62 -- -- 88 21 98 %   21 0056 111/71 -- -- 87 14 97 %   21 0000 -- 98.1 °F (36.7 °C) Oral 98 12 97 %   21 2300 (!) 137/113 -- -- 88 15 98 %   21 2249 127/80 -- -- 95 19 98 %   05/27/21 2100 106/67 -- -- 87 17 98 %   05/27/21 2019 108/66 -- -- 92 14 97 %     Estimated body mass index is 26.34 kg/m² as calculated from the following:    Height as of this encounter: 5' 3\" (1.6 m).     Weight as of this encounter: 148 lb 11.2 oz (67.4 kg).  []<16 Severe malnutrition  []16-16.99 Moderate malnutrition  []17-18.49 Mild malnutrition  []18.5-24.9 Normal  [x]25-29.9 Overweight (not obese)  []30-34.9 Obese class 1 (Low Risk)  []35-39.9 Obese class 2 (Moderate Risk)  []?40 Obese class 3 (High Risk)    RECENT LABS:   Lab Results   Component Value Date    WBC 4.5 05/28/2021    HGB 7.4 (L) 05/28/2021    HCT 25.2 (L) 05/28/2021     05/28/2021    CHOL 194 05/25/2021    TRIG 150 (H) 05/25/2021    HDL 29 (L) 05/25/2021    ALT 5 10/19/2019    AST 11 10/19/2019     05/28/2021    K 3.8 05/28/2021     (H) 05/28/2021    CREATININE 0.50 05/28/2021    BUN 2 (L) 05/28/2021    CO2 24 05/28/2021    INR 1.0 05/28/2021    LABA1C 5.3 05/25/2021     24 HOUR INTAKE/OUTPUT:    Intake/Output Summary (Last 24 hours) at 5/28/2021 0806  Last data filed at 5/28/2021 0409  Gross per 24 hour   Intake 3898 ml   Output --   Net 3898 ml       IMAGING:   Echo Complete  Result Date: 5/26/2021  Transthoracic Echocardiography Report (TTE)  Patient Name Cathy Moreau   Date of Study             05/26/2021               IRINA   Date of      1985  Gender                    Female  Birth   Age          37 year(s)  Race                         Room Number  0530        Height:                   63 inch, 160.02 cm   Corporate ID [id number]    Weight:                   145 pounds, 65.8 kg  #   Patient Acct [de-identified]   BSA:         1.69 m^2     BMI:       25.69 kg/m^2  #   MR #         [id number]     Sonographer               Maia Pereira   Accession #  [id number]  Interpreting Physician    [address] [place] [address]   Fellow                   Referring Nurse                           Practitioner   Interpreting Referring Physician       Patricia Parks  Fellow                                             APRN-CNP  Type of Study   TTE procedure:2D Echocardiogram, M-Mode, Doppler, Color Doppler, Bubble  Study. Procedure Date Date: 05/26/2021 Start: 08:35 AM Study Location: [facility] Technical Quality: Adequate visualization Indications:CVA. History / Tech. Comments: Procedure explained to patient. Smoker PMHx: IV Drug use Patient Status: Inpatient Contrast Medium: Bubble Study. Height: 63 inches Weight: 145 pounds BSA: 1.69 m^2 BMI: 25.69 kg/m^2 HR: 82 bpm CONCLUSIONS Summary Left ventricle is normal in size, normal wall thickness. Global left ventricular systolic function is normal, calculated ejection fraction is 63% (by 3D Heart Model.) Normal Calculated global L. strain of -21.5 %. Negative bubble study, no shunt noted. Trivial mitral regurgitation. Mild tricuspid regurgitation. Estimated right ventricular systolic pressure is 30 mmHg. Trivial pulmonic insufficiency. CT HEAD WO CONTRAST  Result Date: 5/24/2021  No acute intracranial abnormality. CT CERVICAL SPINE WO CONTRAST  Result Date: 5/24/2021  No acute abnormality of the cervical spine. CTA HEAD NECK W CONTRAST  Result Date: 5/27/2021  Redemonstration of thrombus in the left carotid bulb posteriorly extending into the proximal left internal carotid artery. This is similar to slightly smaller compared to prior exam.     CTA HEAD NECK W CONTRAST  Result Date: 5/24/2021  Large filling defect presumably representing thrombus in the posterior aspect of the left carotid bulb extending into the central aspect of the proximal left internal carotid artery. Findings were discussed with Dr. Brianna Ochoa At 2:03 pm on 5/24/2021. MRI brain without contrast  Result Date: 5/25/2021  1. Numerous small scattered acute infarcts within the left MCA territory including the left caudate head.   Given the peripheral location of these infarcts, these may be embolic in nature. No significant mass effect or midline shift. 2. Otherwise, no acute intracranial abnormality. These results were sent to the [facility] [address] ([address]) on 5/25/2021 at 8:12 am to be communicated to the referring/covering health care provider/office. Labs and Images reviewed with:  [] Dr. Delmis Hunt. Diogenes    [x] Dr. Ericka Reilly  [] Dr. Luli Godoy  [] There are no new interval images to review. PHYSICAL EXAM       PHYSICAL EXAM:  CONSTITUTIONAL:  Well developed, well nourished. Alert and oriented x 3, in no acute distress. GCS 15. Nontoxic. No dysarthria. No aphasia. HEAD:  normocephalic, atraumatic    EYES:  PERRL, EOMI.   ENT:  moist mucous membranes   LUNGS:  Equal air entry bilaterally   CARDIOVASCULAR:  normal s1 / s2, RRR   ABDOMEN:  Soft, no rigidity, normal bowel sounds   NECK supple, symmetric   NEUROLOGIC:  Mental Status:  A & O x3,awake             Cranial Nerves:    II: Visual fields:  normal  III: Pupils:  equal, round, reactive to light  III,IV,VI: Extra Ocular Movements: intact  V: Facial sensation:  intact  VII: Facial strength: intact     Motor Exam:    Drift:  absent  Tone:  normal     4+/5 strength in the right upper and lower extremities, no drift. 5/5 strength in the left upper and lower extremities.     Sensory:    Touch:    Right Upper Extremity:  normal  Left Upper Extremity:  normal  Right Lower Extremity:  abnormal - numbness  Left Lower Extremity:  normal     SKIN No obvious ecchymosis, rashes, or lesions      DRAINS:  [x] There are no drains for Neuro Critical Care to monitor at this time. ASSESSMENT AND PLAN:       This is a 37 y.o. female with history of substance abuse (on Methadone) and remote history of PE 2019 who presents with acute onset right sided weakness and numbness. CT head negative. Received tPA at 1358. CTA head/neck revealed a nonocclusive left carotid bulb thrombus.  Signed out AMA from [facility] due to refusing ambulance transfer, arrived to [facility] via private automobile. Started on Heparin infusion, Aspirin. Now bridging Heparin infusion to Coumadin.  Unclear etiology of thrombus; hypercoag panel pending, WILLIS negative.       NEUROLOGIC:  - Acute scattered left MCA territory infarctions  - Nonocclusive left carotid bulb thrombus  - Unclear etiology of thrombus; hypercoag work up pending, WILLIS negative  - Repeat CTA Head/Neck 5/27 re-demonstrated left carotid bulb thrombus, similar to slightly smaller  - Continue low dose Heparin infusion, bridging to Coumadin  - Continue Aspirin 81mg and Lipitor 80 mg QHS for secondary stroke prevention  - Goal SBP <180  - Avoid symptomatic hypotension  - Neuro checks per protocol     CARDIOVASCULAR:  - Goal SBP <180  - Avoid symptomatic hypotension  - Echo EF 63%, negative bubble  - WILLIS negative  - Follow hypercoagulable panel  - Hyperlipidemia, , cholesterol 194  - Lipitor 80 mg QHS  - Continue telemetry     PULMONARY:  - Maintaining oxygen saturations on room air  - Smoking cessation encouraged     RENAL/FLUID/ELECTROLYTE:  - Normal renal functioning   - BUN 2/ Creatinine 0.50  - Monitor I&Os; 3898/3 occurrences  - Stop maintenance IVF  - Replace electrolytes PRN  - Daily BMP     GI/NUTRITION:  NUTRITION:  - Tolerating general diet  - Last BM 5/27  - Bowel regimen: Senna-S daily,  Miralax prn  - GI prophylaxis: Not indicated     ID:  - Afebrile, Tmax 36.9C  - No leukocytosis, WBC 4.5  - COVID-19 negative 5/24  - No signs/symptoms of infection  - Daily CBC     HEME:   - Iron deficiency anemia  - H&H 7.4/25.2  - Repeat hemoglobin 7.7 this afternoon  - Continue Ferrous sulfate 325mg QD  - Given 500mg Venofer x1 today  - Platelets 169  - Daily CBC     ENDOCRINE:  - Continue to monitor blood glucose, goal <180  - Hemoglobin A1C 5.3     OTHER:  - PT/OT/ST  - PM&R consult; ARU candidate  - Continue home Methadone 120 mg QD, remote hx Heroin use     PROPHYLAXIS:  Stress ulcer: N/A     DVT PROPHYLAXIS:  - SCD sleeves - Thigh High   - Heparin infusion, Coumadin    DISPOSITION:  [x] OK to transfer out of Neuro ICU to stepdown. We will sign off to Neurology. For any changes in exam or patient status please contact Neuro Critical Care.       Latoya Rm, EMILY - [facility]  Neuro Critical Care  Pager [phone]  5/28/2021     8:06 AM

## 2021-05-28 NOTE — CONSULTS
Endovascular Neurosurgery Consult    Pt Name: Ad Mckenzie  MRN: [id number]  YOB: 1985  Date of evaluation: 5/28/2021  Primary Care Physician: No primary care provider on file. Reason for evaluation: Cervical ICA thrombus. This is a late entry consult note. SUBJECTIVE:   History of Chief Complaint: This is a 63-year-old female with past medical history including PE in 2019. She presented with acute onset right-sided weakness/numbness. She received TPA. NIH stroke scale is 3. CT head revealed no blood. CTA head and neck revealed left cervical ICA subocclusive thrombus. Neurology was consulted. She had right-sided numbness/weakness. Neuroimages reviewed. Labs reviewed. Allergies  has No Known Allergies. Medications  Prior to Admission medications    Medication Sig Start Date End Date Taking?  Authorizing Provider   ferrous sulfate 325 (65 Fe) MG tablet Take 1 tablet by mouth 2 times daily 10/24/19   Levy Tarah, DO   pantoprazole (PROTONIX) 20 MG tablet Take 1 tablet by mouth daily 10/24/19   Levy Tarah, DO   METHADONE HCL PO Take 120 mg by mouth daily     Historical Provider, MD    Scheduled Meds:   warfarin (COUMADIN) daily dosing (placeholder)   Other RX Placeholder    ferrous sulfate  325 mg Oral Daily with breakfast    sennosides-docusate sodium  2 tablet Oral Daily    aspirin  81 mg Oral Daily    methadone  120 mg Oral Daily    sodium chloride flush  5-40 mL Intravenous 2 times per day    atorvastatin  80 mg Oral Nightly     Continuous Infusions:   heparin (PORCINE) Infusion 14 Units/kg/hr (05/28/21 3658)    sodium chloride      sodium chloride 75 mL/hr at 05/28/21 4150     PRN Meds:.aspirin-acetaminophen-caffeine, sodium chloride flush, sodium chloride, promethazine **OR** ondansetron, polyethylene glycol, perflutren lipid microspheres  Past Medical History   has a past medical history of Acid reflux, Anemia, Drug abuse, IV ([address] Headache, History of pulmonary embolus (PE), Marijuana smoker, and Smoker. Past Surgical History   has a past surgical history that includes  section; ovarian cyst removal; Appendectomy; Upper gastrointestinal endoscopy (N/A, 10/21/2019); sigmoidoscopy (N/A, 10/22/2019); and Colonoscopy (N/A, 10/23/2019). Social History   reports that she has been smoking cigarettes. She has never used smokeless tobacco.   reports no history of alcohol use. reports previous drug use. Drug: Marijuana. Family History  family history includes No Known Problems in her father and mother. Review of Systems:  CONSTITUTIONAL:  negative for fevers, chills, fatigue and malaise    EYES:  negative for double vision, blurred vision and photophobia     HEENT:  negative for tinnitus, epistaxis and sore throat    RESPIRATORY:  negative for cough, shortness of breath, wheezing    CARDIOVASCULAR:  negative for chest pain, palpitations, syncope, edema    GASTROINTESTINAL:  negative for nausea, vomiting    GENITOURINARY:  negative for incontinence    MUSCULOSKELETAL:  negative for neck or back pain    NEUROLOGICAL:  Negative for weakness and tingling  negative for headaches and dizziness    PSYCHIATRIC:  negative for anxiety      Review of systems otherwise negative.       OBJECTIVE:   Vitals: /64   Pulse 86   Temp 97.9 °F (36.6 °C) (Oral)   Resp 18   Ht 5' 3\" (1.6 m)   Wt 148 lb 11.2 oz (67.4 kg)   LMP 2021   SpO2 99%   BMI 26.34 kg/m²      LABS:     Recent Labs     21  1311 21  0302 21  0454 21  1853 21  0619   WBC  --  7.2 6.1  --  4.5   HGB  --  8.3* 7.7*  --  7.4*   HCT  --  28.7* 26.3*  --  25.2*   PLT  --  252 218  --  195   NA  --  139 141  --  142   K  --  3.7 3.7  --  3.8   CL  --  105 108*  --  111*   CO2  --  25 25  --  24   BUN  --  6 3*  --  2*   CREATININE  --  0.63 0.53  --  0.50   CALCIUM  --  8.4* 7.8*  --  7.7*   INR [id number]  --   --  1.0 1.0     No results for input(s): ALKPHOS, ALT, AST, BILITOT, BILIDIR, LABALBU, AMYLASE, LIPASE in the last 72 hours. RADIOLOGY:   Images were personally reviewed including:      IMPRESSIONS:   This is a 81-year-old female with past medical history including PE in 2019. She presented with acute onset right-sided weakness/numbness. She received TPA. NIH stroke scale is 3. CT head revealed no blood. CTA head and neck revealed left cervical ICA subocclusive thrombus. Neurology was consulted. She had right-sided numbness/weakness. Neuroimages reviewed. Labs reviewed. She had right-sided numbness/weakness. No headache. --Left MCA/IESHA embolic ischemic stroke with left cervical ICA subocclusive thrombus. --Left cervical ICA subocclusive thrombus possibly due to underlying carotid dissection. 1. Left cervical ICA thrombus likely related to carotid dissection versus cardioembolic. PLANS:   1. Medical management  2. Heparin drip  3. Neuro ICU care  4. Repeat vessel images at 48 hours. Thank you for the interesting evaluation.      Bryant Butler MD, MD  Pager [phone]  Stroke, [facility]  [address] [place]  Electronically signed 5/28/2021 at 9:18 AM

## 2021-05-28 NOTE — PLAN OF CARE
Problem: Falls - Risk of:  Goal: Will remain free from falls  Description: Will remain free from falls  Outcome: Ongoing     Problem: Falls - Risk of:  Goal: Absence of physical injury  Description: Absence of physical injury  Outcome: Ongoing     Problem: Skin Integrity:  Goal: Will show no infection signs and symptoms  Description: Will show no infection signs and symptoms  Outcome: Ongoing     Problem: Skin Integrity:  Goal: Absence of new skin breakdown  Description: Absence of new skin breakdown  Outcome: Ongoing     Problem: HEMODYNAMIC STATUS  Goal: Patient has stable vital signs and fluid balance  Outcome: Ongoing     Problem: ACTIVITY INTOLERANCE/IMPAIRED MOBILITY  Goal: Mobility/activity is maintained at optimum level for patient  Outcome: Ongoing     Problem: COMMUNICATION IMPAIRMENT  Goal: Ability to express needs and understand communication  Outcome: Ongoing     Problem: Pain:  Goal: Pain level will decrease  Description: Pain level will decrease  Outcome: Ongoing     Problem: Pain:  Goal: Control of acute pain  Description: Control of acute pain  Outcome: Ongoing     Problem: Pain:  Goal: Control of chronic pain  Description: Control of chronic pain  Outcome: Ongoing

## 2021-05-28 NOTE — PROGRESS NOTES
ENDOVASCULAR NEUROSURGERY PROGRESS NOTE  2021 9:05 AM  Subjective:   Admit Date: 2021  PCP: No primary care provider on file. No new acute events. No new complaints. Objective:   Vitals: /64   Pulse 86   Temp 97.9 °F (36.6 °C) (Oral)   Resp 18   Ht 5' 3\" (1.6 m)   Wt 148 lb 11.2 oz (67.4 kg)   LMP 2021   SpO2 99%   BMI 26.34 kg/m²   General appearance: Lying in bed, NAD. HEENT: Atraumatic. Neck: Neck is supple. Lungs: No respiratory distress noted. Heart: normal sinus rhythm on tele. .   Abdomen: Soft nontender. Extremities: No lower limb edema noted. Neurologic: awake, following simple commands appropriately, able to name simple objects, intact comprehension, no dysarthria noted. CN: Has intact extraocular muscles movements, no facial droop noted, intact sensation on the face on trigeminal distribution bilaterally. MOTOR: Has good strength in both upper and lower extremities, moving both upper and lower extremities against gravity. Drift noted in right upper and right lower extremity. SENSORY: Right-sided numbness noted in right upper and right lower extremities. No numbness on the face noted. NIH Stroke Scale Total:  1a.  Level of consciousness:  0  1b. Level of consciousness questions:  0   1c. Level of consciousness questions:  0   2. Best Gaze:  0   3. Visual:  0   4. Facial Palsy:  0   5a. Motor left arm:  0  5b. Motor right arm:  0  6a. Motor left le  6b. Motor right le  7. Limb Ataxia:  0 - absent  8. Sensory:  1  9. Best Language:  0  10. Dysarthria:  0  11.   Extinction and Inattention: 0     Total: 2    Medications and labs:   Scheduled Meds:   warfarin (COUMADIN) daily dosing (placeholder)   Other RX Placeholder    ferrous sulfate  325 mg Oral Daily with breakfast    sennosides-docusate sodium  2 tablet Oral Daily    aspirin  81 mg Oral Daily    methadone  120 mg Oral Daily    sodium chloride flush  5-40 mL Intravenous 2 times per day    atorvastatin  80 mg Oral Nightly     Continuous Infusions:   heparin (PORCINE) Infusion 14 Units/kg/hr (05/28/21 0642)    sodium chloride      sodium chloride 75 mL/hr at 05/28/21 0410     CBC:   Recent Labs     05/26/21  0302 05/27/21  0454 05/28/21  0619   WBC 7.2 6.1 4.5   HGB 8.3* 7.7* 7.4*    218 195     BMP:    Recent Labs     05/26/21  0302 05/27/21  0454 05/28/21  0619    141 142   K 3.7 3.7 3.8    108* 111*   CO2 25 25 24   BUN 6 3* 2*   CREATININE 0.63 0.53 0.50   GLUCOSE 92 84 95     Hepatic: No results for input(s): AST, ALT, ALB, BILITOT, ALKPHOS in the last 72 hours. Troponin: No results for input(s): TROPONINI in the last 72 hours. BNP: No results for input(s): BNP in the last 72 hours. Lipids:   No results for input(s): CHOL, HDL in the last 72 hours. Invalid input(s): LDLCALCU  INR:   Recent Labs     05/25/21  1311 05/27/21  1853 05/28/21  0619   INR [id number] 1.0 1.0       Assessment and Recommendations: This is a 31-year-old female with past medical history including PE in 2019. She presented with acute onset right-sided weakness/numbness. She received TPA. NIH stroke scale is 3. CT head revealed no blood. CTA head and neck revealed left cervical ICA subocclusive thrombus. Neuroimages reviewed. Labs reviewed. Patient neuro exam is stable with right-sided weakness/numbness. --Left MCA/IESHA embolic ischemic stroke with left cervical ICA subocclusive thrombus. --Left cervical ICA subocclusive thrombus possibly due to underlying carotid dissection. CTA neck on 5/27 obtain reviewed. Stable with slightly improvement in thrombus noted. Neuro exam is stable. She is on heparin drip and aspirin 81 mg daily. Warfarin bridging. Continue oral anticoagulation and antiplatelet therapy. Plan to repeat in 2 to 3 weeks. Follow-up. Plan  1. Medical management  2. Warfarin bridging while on heparin.   3. Aspirin 81 mg daily  4. Neuro ICU care  5. Blood pressure goal normotensive. 6. Follow-up in neuro device clinic at 2 to 3 weeks with a repeat CTA neck. Follow-up with Dr. Viry Anthony in 3 months.       Elvia Weaver MD, MD  Stroke, [facility]  [address] [facility]   Electronically signed 5/28/2021 at 9:05 AM

## 2021-05-28 NOTE — PROCEDURES
[facility]   WILLIS  Procedure Note         Today's Date:  5/28/2021  Patient name:  Barry Alcantar  MRN:   [id number]  YOB: 1985  PCP:    No primary care provider on file. Indication:  CVA  Operators:    Primary:Dr Corbett  Assistant: Nicole Barnes MD (CV Fellow)    Patient seen and examined. History and Physical reviewed. Labs reviewed. After informed consent was obtained with explanation of the risks and benefits, the patient was brought to Cath lab. All sedation was administered by the cardiologist. The oropharynx was pre-anesthetisized with cetacaine spray. Transesophageal Echocardiogram (WILLIS) :    Structures:  LA: Normal  NI: No thrombus  RA: Normal  RV:  Normal  LV: Normal in size with normal systolic function   Estimated LVEF: 55%    Aorta: Mild atheromatous disease arch  Percardium: No pericardial effusion  Septum: No intracardiac shunt via color Doppler. No intracardiac shunt via injection of agitated saline. Valves:  Mitral Valve: Structurally normal.Mild regurgitation is identified. Aortic Valve: The aortic valve is trileaflet and opens adequately. No regurgitiation is identified. Tricuspid valve: Structurally normal. Mild regurgitation is identified. Pulmonary valve: Normal. No significant regurgitation    No valvular vegetations or thrombus identified. Summary:     A WILLIS was performed without complications. Normal LV size with normal systolic function. Estimated LVEF 55%  No thrombus or valvular vegetation identified          Nicole Barnes MD, MD  Fellow, [address] [place] [address]        Attending Physician Statement  I have discussed the case of Barry Alcantar including pertinent history and exam findings with the resident. I have seen and examined the patient and the key elements of the encounter have been performed by me.  I agree with the assessment, plan and orders as documented by the resident With changes

## 2021-05-29 LAB
ABSOLUTE EOS #: 0.12 K/UL (ref 0–0.44)
ABSOLUTE IMMATURE GRANULOCYTE: <0.03 K/UL (ref 0–0.3)
ABSOLUTE LYMPH #: 1.16 K/UL (ref 1.1–3.7)
ABSOLUTE MONO #: 0.31 K/UL (ref 0.1–1.2)
ANION GAP SERPL CALCULATED.3IONS-SCNC: 9 MMOL/L (ref 9–17)
BASOPHILS # BLD: 0 % (ref 0–2)
BASOPHILS ABSOLUTE: <0.03 K/UL (ref 0–0.2)
BUN BLDV-MCNC: 2 MG/DL (ref 6–20)
BUN/CREAT BLD: ABNORMAL (ref 9–20)
CALCIUM SERPL-MCNC: 8 MG/DL (ref 8.6–10.4)
CHLORIDE BLD-SCNC: 106 MMOL/L (ref 98–107)
CHOLESTEROL/HDL RATIO: 2.7
CHOLESTEROL: 93 MG/DL
CO2: 24 MMOL/L (ref 20–31)
CREAT SERPL-MCNC: 0.51 MG/DL (ref 0.5–0.9)
DIFFERENTIAL TYPE: ABNORMAL
EOSINOPHILS RELATIVE PERCENT: 3 % (ref 1–4)
GFR AFRICAN AMERICAN: >60 ML/MIN
GFR NON-AFRICAN AMERICAN: >60 ML/MIN
GFR SERPL CREATININE-BSD FRML MDRD: ABNORMAL ML/MIN/{1.73_M2}
GFR SERPL CREATININE-BSD FRML MDRD: ABNORMAL ML/MIN/{1.73_M2}
GLUCOSE BLD-MCNC: 90 MG/DL (ref 70–99)
HCT VFR BLD CALC: 25.6 % (ref 36.3–47.1)
HDLC SERPL-MCNC: 34 MG/DL
HEMOGLOBIN: 7.5 G/DL (ref 11.9–15.1)
IMMATURE GRANULOCYTES: 0 %
INR BLD: 1.4
LDL CHOLESTEROL: 41 MG/DL (ref 0–130)
LYMPHOCYTES # BLD: 25 % (ref 24–43)
MCH RBC QN AUTO: 23 PG (ref 25.2–33.5)
MCHC RBC AUTO-ENTMCNC: 29.3 G/DL (ref 28.4–34.8)
MCV RBC AUTO: 78.5 FL (ref 82.6–102.9)
MONOCYTES # BLD: 7 % (ref 3–12)
NRBC AUTOMATED: 0 PER 100 WBC
PARTIAL THROMBOPLASTIN TIME: 116.4 SEC (ref 20.5–30.5)
PARTIAL THROMBOPLASTIN TIME: 24.5 SEC (ref 20.5–30.5)
PARTIAL THROMBOPLASTIN TIME: 37.3 SEC (ref 20.5–30.5)
PDW BLD-RTO: 17.3 % (ref 11.8–14.4)
PLATELET # BLD: 230 K/UL (ref 138–453)
PLATELET ESTIMATE: ABNORMAL
PMV BLD AUTO: 10.4 FL (ref 8.1–13.5)
POTASSIUM SERPL-SCNC: 3.2 MMOL/L (ref 3.7–5.3)
PROTHROMBIN TIME: 14.2 SEC (ref 9.1–12.3)
RBC # BLD: 3.26 M/UL (ref 3.95–5.11)
RBC # BLD: ABNORMAL 10*6/UL
SEG NEUTROPHILS: 65 % (ref 36–65)
SEGMENTED NEUTROPHILS ABSOLUTE COUNT: 2.96 K/UL (ref 1.5–8.1)
SODIUM BLD-SCNC: 139 MMOL/L (ref 135–144)
TRIGL SERPL-MCNC: 88 MG/DL
VLDLC SERPL CALC-MCNC: ABNORMAL MG/DL (ref 1–30)
WBC # BLD: 4.6 K/UL (ref 3.5–11.3)
WBC # BLD: ABNORMAL 10*3/UL

## 2021-05-29 PROCEDURE — 6360000002 HC RX W HCPCS: Performed by: NURSE PRACTITIONER

## 2021-05-29 PROCEDURE — 85025 COMPLETE CBC W/AUTO DIFF WBC: CPT

## 2021-05-29 PROCEDURE — 99222 1ST HOSP IP/OBS MODERATE 55: CPT | Performed by: PSYCHIATRY & NEUROLOGY

## 2021-05-29 PROCEDURE — 2580000003 HC RX 258: Performed by: NURSE PRACTITIONER

## 2021-05-29 PROCEDURE — 6370000000 HC RX 637 (ALT 250 FOR IP): Performed by: NURSE PRACTITIONER

## 2021-05-29 PROCEDURE — 99233 SBSQ HOSP IP/OBS HIGH 50: CPT | Performed by: PSYCHIATRY & NEUROLOGY

## 2021-05-29 PROCEDURE — 85610 PROTHROMBIN TIME: CPT

## 2021-05-29 PROCEDURE — 85730 THROMBOPLASTIN TIME PARTIAL: CPT

## 2021-05-29 PROCEDURE — 80048 BASIC METABOLIC PNL TOTAL CA: CPT

## 2021-05-29 PROCEDURE — 2060000000 HC ICU INTERMEDIATE R&B

## 2021-05-29 PROCEDURE — 6370000000 HC RX 637 (ALT 250 FOR IP): Performed by: STUDENT IN AN ORGANIZED HEALTH CARE EDUCATION/TRAINING PROGRAM

## 2021-05-29 PROCEDURE — 80061 LIPID PANEL: CPT

## 2021-05-29 RX ORDER — METHADONE HYDROCHLORIDE 5 MG/5ML
120 SOLUTION ORAL ONCE
Status: COMPLETED | OUTPATIENT
Start: 2021-05-29 | End: 2021-05-29

## 2021-05-29 RX ORDER — WARFARIN SODIUM 5 MG/1
5 TABLET ORAL
Status: DISPENSED | OUTPATIENT
Start: 2021-05-29 | End: 2021-05-30

## 2021-05-29 RX ADMIN — FERROUS SULFATE TAB EC 325 MG (65 MG FE EQUIVALENT) 325 MG: 325 (65 FE) TABLET DELAYED RESPONSE at 08:30

## 2021-05-29 RX ADMIN — SODIUM CHLORIDE, PRESERVATIVE FREE 10 ML: 5 INJECTION INTRAVENOUS at 08:30

## 2021-05-29 RX ADMIN — ASPIRIN 81 MG: 81 TABLET, CHEWABLE ORAL at 08:30

## 2021-05-29 RX ADMIN — METHADONE HYDROCHLORIDE 120 MG: 5 SOLUTION ORAL at 08:30

## 2021-05-29 RX ADMIN — ATORVASTATIN CALCIUM 80 MG: 80 TABLET, FILM COATED ORAL at 20:46

## 2021-05-29 RX ADMIN — HEPARIN SODIUM AND DEXTROSE 12 UNITS/KG/HR: 10000; 5 INJECTION INTRAVENOUS at 21:03

## 2021-05-29 RX ADMIN — METHADONE HYDROCHLORIDE 120 MG: 5 SOLUTION ORAL at 16:07

## 2021-05-29 RX ADMIN — SODIUM CHLORIDE, PRESERVATIVE FREE 10 ML: 5 INJECTION INTRAVENOUS at 20:46

## 2021-05-29 RX ADMIN — ONDANSETRON 4 MG: 2 INJECTION INTRAMUSCULAR; INTRAVENOUS at 13:35

## 2021-05-29 RX ADMIN — DOCUSATE SODIUM 50MG AND SENNOSIDES 8.6MG 2 TABLET: 8.6; 5 TABLET, FILM COATED ORAL at 08:30

## 2021-05-29 ASSESSMENT — PAIN SCALES - GENERAL
PAINLEVEL_OUTOF10: 1
PAINLEVEL_OUTOF10: 0

## 2021-05-29 NOTE — PROGRESS NOTES
Physical Therapy    DATE: 2021    NAME: Disha Bahena  MRN: [id number]   : 1985      Patient not seen this date for Physical Therapy due to:    Patient Declined: Pt states she threw up her methadone today. She declines to walk until she get another dose. RN is aware and states pt is possibly going to be disappointed by the doctors decision regarding her meds. RN also states pt is mobilizing well.         Electronically signed by Tanisha Evans PTA on 2021 at 10:24 AM

## 2021-05-29 NOTE — PROGRESS NOTES
ENDOVASCULAR NEUROSURGERY PROGRESS NOTE  2021 10:26 AM  Subjective:   Admit Date: 2021  PCP: No primary care provider on file. No new acute events. No new complaints. Objective:   Vitals: /65   Pulse 82   Temp 98.2 °F (36.8 °C)   Resp 23   Ht 5' 3\" (1.6 m)   Wt 148 lb 11.2 oz (67.4 kg)   LMP 2021   SpO2 98%   BMI 26.34 kg/m²   General appearance: Lying in bed, NAD. HEENT: Atraumatic. Neck: Neck is supple. Lungs: No respiratory distress noted. Heart: normal sinus rhythm on tele. .   Abdomen: Soft nontender. Extremities: No lower limb edema noted. Neurologic: awake, following simple commands appropriately, able to name simple objects, intact comprehension, no dysarthria noted. CN: Has intact extraocular muscles movements, no facial droop noted, intact sensation on the face on trigeminal distribution bilaterally. MOTOR: Has good strength in both upper and lower extremities, moving both upper and lower extremities against gravity. Drift noted in right upper and right lower extremity. SENSORY: Right-sided numbness noted in right upper and right lower extremities. No numbness on the face noted. NIH Stroke Scale Total:  1a.  Level of consciousness:  0  1b. Level of consciousness questions:  0   1c. Level of consciousness questions:  0   2. Best Gaze:  0   3. Visual:  0   4. Facial Palsy:  0   5a. Motor left arm:  0  5b. Motor right arm:  0  6a. Motor left le  6b. Motor right le  7. Limb Ataxia:  0 - absent  8. Sensory:  1  9. Best Language:  0  10. Dysarthria:  0  11.   Extinction and Inattention: 0     Total: 2    Medications and labs:   Scheduled Meds:   warfarin (COUMADIN) daily dosing (placeholder)   Other RX Placeholder    ferrous sulfate  325 mg Oral Daily with breakfast    sennosides-docusate sodium  2 tablet Oral Daily    aspirin  81 mg Oral Daily    methadone  120 mg Oral Daily    sodium chloride flush  5-40 mL Intravenous 2 times per day    atorvastatin  80 mg Oral Nightly     Continuous Infusions:   heparin (PORCINE) Infusion 16 Units/kg/hr (05/29/21 0007)    sodium chloride       CBC:   Recent Labs     05/27/21  0454 05/28/21  0619 05/28/21  1136   WBC 6.1 4.5 4.1   HGB 7.7* 7.4* 7.7*    195 241     BMP:    Recent Labs     05/27/21  0454 05/28/21  0619    142   K 3.7 3.8   * 111*   CO2 25 24   BUN 3* 2*   CREATININE 0.53 0.50   GLUCOSE 84 95     Hepatic: No results for input(s): AST, ALT, ALB, BILITOT, ALKPHOS in the last 72 hours. Troponin: No results for input(s): TROPONINI in the last 72 hours. BNP: No results for input(s): BNP in the last 72 hours. Lipids:   No results for input(s): CHOL, HDL in the last 72 hours. Invalid input(s): LDLCALCU  INR:   Recent Labs     05/27/21  1853 05/28/21  0619   INR 1.0 1.0       Assessment and Recommendations: This is a 31-year-old female with past medical history including PE in 2019. She presented with acute onset right-sided weakness/numbness. She received TPA. NIH stroke scale is 3. CT head revealed no blood. CTA head and neck revealed left cervical ICA subocclusive thrombus. Neuroimages reviewed. Labs reviewed. Patient neuro exam is stable with right-sided weakness/numbness. --Left MCA/IESHA embolic ischemic stroke with left cervical ICA subocclusive thrombus. --Left cervical ICA subocclusive thrombus possibly due to underlying carotid dissection. CTA neck on 5/27 obtain reviewed. Stable with slightly improvement in thrombus noted. Neuro exam is stable. She is on heparin drip and aspirin 81 mg daily. Warfarin bridging. Continue oral anticoagulation and antiplatelet therapy. Plan to repeat in 2 to 3 weeks. Follow-up. Plan  1. Medical management  2. Warfarin bridging while on heparin. Consider bridging with enoxaparin. 3. Aspirin 81 mg daily  4. Neuro ICU care  5. Blood pressure goal normotensive.   6. Follow-up in neuro device clinic at 2 to 3 weeks with a repeat CTA neck. Follow-up with Dr. Bela Shanks in 3 months.       Tenzin Wylie MD, MD  Stroke, [facility]  [address]  Electronically signed 5/29/2021 at 10:26 AM

## 2021-05-29 NOTE — CONSULTS
Neurology Transfer Note:    Chief Complaint: Right sided numbness and weakness    A/P  Acute onset of right-sided numbness and weakness (7/67/3188) secondary to LICA dissection with thrombus that caused embolic infarcts to LMCA territory, received TPA NIH 3 with recovery    Continue Heparin and ASA bridging to Coumadin until INR >2   Continue statin  Lipid profile - DDT=626; elevated triglycerides; normal glucose  Hypercoagulable work-up: MTHFR - not significant; anticardiolipin antibody -pending; negative RICARDO  Smoking cessation    HPI:   36 yo right handed lady, with PMHx of PE in 2019, IVDA clean since 5 yrs ago and on Methadone,  presents with an acute onset of right-sided numbness and weakness on 5/24/2021. She was admitted to the Neuro ICU. Echo was unremarkable. Follow-up Cta neck showed stable appearance of LICA thrombus. She was started on Heparin drip and ASA to be bridged to Coumadin. Her right sided numbness and weakness continue to improve while in the neuro ICU and hence was transferred to regular neuro floor today.     Denies prior excessive physical activity, miscarriages    No Known Allergies  Current Facility-Administered Medications   Medication Dose Route Frequency Provider Last Rate Last Admin    warfarin (COUMADIN) tablet 5 mg  5 mg Oral Once Veverly EMILY Corrigan CNP        warfarin (COUMADIN) daily dosing (placeholder)   Other RX Placeholder Vejenniferly EMILY Corrigan CNP        ferrous sulfate (FE TABS 325) EC tablet 325 mg  325 mg Oral Daily with breakfast EMILY Lee CNP   325 mg at 05/29/21 0830    sennosides-docusate sodium (SENOKOT-S) 8.6-50 MG tablet 2 tablet  2 tablet Oral Daily EMILY Lee CNP   2 tablet at 05/29/21 0830    aspirin-acetaminophen-caffeine (EXCEDRIN MIGRAINE) per tablet 1 tablet  1 tablet Oral Q6H PRN Alfa Jolley MD   1 tablet at 05/26/21 2214    heparin 25,000 units in dextrose 5% 250 mL (premix) infusion  5-30 Units/kg/hr Intravenous Continuous Harleen Lam APRN - CNP 7.9 mL/hr at 05/29/21 1352 12 Units/kg/hr at 05/29/21 1352    aspirin chewable tablet 81 mg  81 mg Oral Daily Harleen Carole, APRN - CNP   81 mg at 05/29/21 0830    methadone 5 MG/5ML solution 120 mg  120 mg Oral Daily Harleen Lam, APRN - CNP   120 mg at 05/29/21 0830    sodium chloride flush 0.9 % injection 5-40 mL  5-40 mL Intravenous 2 times per day Harleen Lam, APRN - CNP   10 mL at 05/29/21 0830    sodium chloride flush 0.9 % injection 5-40 mL  5-40 mL Intravenous PRN Harleen Lam, APRN - CNP        0.9 % sodium chloride infusion  25 mL Intravenous PRN Harleen Lam, APRN - CNP        promethazine (PHENERGAN) tablet 12.5 mg  12.5 mg Oral Q6H PRN Harleen Lam, APRN - CNP   12.5 mg at 05/26/21 9604    Or    ondansetron (ZOFRAN) injection 4 mg  4 mg Intravenous Q6H PRN Harleen Lam, APRN - CNP   4 mg at 05/29/21 1335    polyethylene glycol (GLYCOLAX) packet 17 g  17 g Oral Daily PRN Harleen Lam, APRN - CNP   17 g at 05/26/21 4985    perflutren lipid microspheres (DEFINITY) injection 1.65 mg  1.5 mL Intravenous ONCE PRN Harleen Lam, APRN - CNP        atorvastatin (LIPITOR) tablet 80 mg  80 mg Oral Nightly Harleen Lam, APRN - CNP   80 mg at 05/28/21 2041     Family History   Problem Relation Age of Onset    No Known Problems Mother     No Known Problems Father      Social History     Tobacco Use    Smoking status: Current Every Day Smoker     Types: Cigarettes    Smokeless tobacco: Never Used   Vaping Use    Vaping Use: Never used   Substance Use Topics    Alcohol use: No    Drug use: Not Currently     Types: Marijuana     ROS: 10 point ROS is unremarkable and neurologic ROS as per HPI    Physical Exam:  Vitals:    05/29/21 1624   BP: 127/83   Pulse: 98   Resp: 13   Temp: 98.7 °F (37.1 °C)   SpO2: 99%   General: Pleasant, cooperative  Skin: hyperpigmented scars in all extremities  Extremities: No edema    NEURO EXAM  Mental Status: Alert, oriented as to month year and place; intact attention span; language output is fluent and coherent; followed commands; intact naming and repetition  CN: II to XII intact  Motor: Right UE drift and rest of extremities 5/5  Sensory: Intact light touch sensation in all extremities except diminished in RUE and RLE  Reflexes: +3 RUE and RLE; +2 LUE and LLE  Coordination: Intact FTNT and HTST bilaterally  Gait: adequate arm swing and stride    Radha Rawls MD

## 2021-05-29 NOTE — PROGRESS NOTES
Patient on heparin drip and requesting to leave the unit to walk around. Dr. Jose Alejandro Piedra notified and approval given. Patient educated and agreeable to risks of leaving the unit, appropriate documentation signed, and placed in patient chart.

## 2021-05-29 NOTE — PROGRESS NOTES
0845- Pt vomited Methadone after drinking it. Pt requesting pill version, pharmacy states that it is against hospital policy and cannot be ordered for withdraw treatment. Attempting to contact resident to get a new dose of Methadone re-ordered. [phone]- Dr. Tye Rao states he will order a new dose of Methadone. 6062-Report called to [facility], pt going to room 536. Lisa updated on heparin delay and rate change and that we are waiting for 1 dose of Methadone to be reordered. Pt updated. Zofran given. Pharmacy updated that PT/INR results are in.

## 2021-05-30 LAB
ABSOLUTE EOS #: 0.11 K/UL (ref 0–0.44)
ABSOLUTE IMMATURE GRANULOCYTE: <0.03 K/UL (ref 0–0.3)
ABSOLUTE LYMPH #: 1.59 K/UL (ref 1.1–3.7)
ABSOLUTE MONO #: 0.48 K/UL (ref 0.1–1.2)
ANION GAP SERPL CALCULATED.3IONS-SCNC: 5 MMOL/L (ref 9–17)
BASOPHILS # BLD: 1 % (ref 0–2)
BASOPHILS ABSOLUTE: 0.03 K/UL (ref 0–0.2)
BUN BLDV-MCNC: <2 MG/DL (ref 6–20)
BUN/CREAT BLD: ABNORMAL (ref 9–20)
CALCIUM SERPL-MCNC: 7.9 MG/DL (ref 8.6–10.4)
CHLORIDE BLD-SCNC: 106 MMOL/L (ref 98–107)
CO2: 27 MMOL/L (ref 20–31)
CREAT SERPL-MCNC: 0.47 MG/DL (ref 0.5–0.9)
DIFFERENTIAL TYPE: ABNORMAL
EOSINOPHILS RELATIVE PERCENT: 2 % (ref 1–4)
GFR AFRICAN AMERICAN: >60 ML/MIN
GFR NON-AFRICAN AMERICAN: >60 ML/MIN
GFR SERPL CREATININE-BSD FRML MDRD: ABNORMAL ML/MIN/{1.73_M2}
GFR SERPL CREATININE-BSD FRML MDRD: ABNORMAL ML/MIN/{1.73_M2}
GLUCOSE BLD-MCNC: 73 MG/DL (ref 70–99)
HCT VFR BLD CALC: 24.6 % (ref 36.3–47.1)
HEMOGLOBIN: 7.1 G/DL (ref 11.9–15.1)
IMMATURE GRANULOCYTES: 0 %
INR BLD: 1.3
LYMPHOCYTES # BLD: 33 % (ref 24–43)
MCH RBC QN AUTO: 23 PG (ref 25.2–33.5)
MCHC RBC AUTO-ENTMCNC: 28.9 G/DL (ref 28.4–34.8)
MCV RBC AUTO: 79.6 FL (ref 82.6–102.9)
MONOCYTES # BLD: 10 % (ref 3–12)
NRBC AUTOMATED: 0 PER 100 WBC
PARTIAL THROMBOPLASTIN TIME: 41.5 SEC (ref 20.5–30.5)
PARTIAL THROMBOPLASTIN TIME: 51.7 SEC (ref 20.5–30.5)
PARTIAL THROMBOPLASTIN TIME: 56.9 SEC (ref 20.5–30.5)
PDW BLD-RTO: 17.5 % (ref 11.8–14.4)
PLATELET # BLD: 242 K/UL (ref 138–453)
PLATELET ESTIMATE: ABNORMAL
PMV BLD AUTO: 10.7 FL (ref 8.1–13.5)
POTASSIUM SERPL-SCNC: 3.2 MMOL/L (ref 3.7–5.3)
PROTHROMBIN TIME: 13.9 SEC (ref 9.1–12.3)
RBC # BLD: 3.09 M/UL (ref 3.95–5.11)
RBC # BLD: ABNORMAL 10*6/UL
SEG NEUTROPHILS: 54 % (ref 36–65)
SEGMENTED NEUTROPHILS ABSOLUTE COUNT: 2.65 K/UL (ref 1.5–8.1)
SODIUM BLD-SCNC: 138 MMOL/L (ref 135–144)
WBC # BLD: 4.9 K/UL (ref 3.5–11.3)
WBC # BLD: ABNORMAL 10*3/UL

## 2021-05-30 PROCEDURE — 6370000000 HC RX 637 (ALT 250 FOR IP): Performed by: STUDENT IN AN ORGANIZED HEALTH CARE EDUCATION/TRAINING PROGRAM

## 2021-05-30 PROCEDURE — 97530 THERAPEUTIC ACTIVITIES: CPT

## 2021-05-30 PROCEDURE — 6370000000 HC RX 637 (ALT 250 FOR IP): Performed by: NURSE PRACTITIONER

## 2021-05-30 PROCEDURE — 99231 SBSQ HOSP IP/OBS SF/LOW 25: CPT | Performed by: PSYCHIATRY & NEUROLOGY

## 2021-05-30 PROCEDURE — 6360000002 HC RX W HCPCS: Performed by: NURSE PRACTITIONER

## 2021-05-30 PROCEDURE — 2580000003 HC RX 258: Performed by: NURSE PRACTITIONER

## 2021-05-30 PROCEDURE — 36415 COLL VENOUS BLD VENIPUNCTURE: CPT

## 2021-05-30 PROCEDURE — 85730 THROMBOPLASTIN TIME PARTIAL: CPT

## 2021-05-30 PROCEDURE — 97110 THERAPEUTIC EXERCISES: CPT

## 2021-05-30 PROCEDURE — 85610 PROTHROMBIN TIME: CPT

## 2021-05-30 PROCEDURE — 80048 BASIC METABOLIC PNL TOTAL CA: CPT

## 2021-05-30 PROCEDURE — 2060000000 HC ICU INTERMEDIATE R&B

## 2021-05-30 PROCEDURE — 85025 COMPLETE CBC W/AUTO DIFF WBC: CPT

## 2021-05-30 PROCEDURE — 99233 SBSQ HOSP IP/OBS HIGH 50: CPT | Performed by: PSYCHIATRY & NEUROLOGY

## 2021-05-30 RX ORDER — POTASSIUM CHLORIDE 7.45 MG/ML
10 INJECTION INTRAVENOUS PRN
Status: DISCONTINUED | OUTPATIENT
Start: 2021-05-30 | End: 2021-06-02 | Stop reason: HOSPADM

## 2021-05-30 RX ORDER — POTASSIUM CHLORIDE 20 MEQ/1
40 TABLET, EXTENDED RELEASE ORAL PRN
Status: DISCONTINUED | OUTPATIENT
Start: 2021-05-30 | End: 2021-06-02 | Stop reason: HOSPADM

## 2021-05-30 RX ORDER — WARFARIN SODIUM 5 MG/1
5 TABLET ORAL
Status: COMPLETED | OUTPATIENT
Start: 2021-05-30 | End: 2021-05-30

## 2021-05-30 RX ADMIN — ATORVASTATIN CALCIUM 80 MG: 80 TABLET, FILM COATED ORAL at 20:06

## 2021-05-30 RX ADMIN — SODIUM CHLORIDE, PRESERVATIVE FREE 10 ML: 5 INJECTION INTRAVENOUS at 08:55

## 2021-05-30 RX ADMIN — ASPIRIN 81 MG: 81 TABLET, CHEWABLE ORAL at 08:53

## 2021-05-30 RX ADMIN — ONDANSETRON 4 MG: 2 INJECTION INTRAMUSCULAR; INTRAVENOUS at 09:02

## 2021-05-30 RX ADMIN — WARFARIN SODIUM 5 MG: 5 TABLET ORAL at 18:40

## 2021-05-30 RX ADMIN — SODIUM CHLORIDE, PRESERVATIVE FREE 10 ML: 5 INJECTION INTRAVENOUS at 20:06

## 2021-05-30 RX ADMIN — METHADONE HYDROCHLORIDE 120 MG: 5 SOLUTION ORAL at 10:11

## 2021-05-30 RX ADMIN — FERROUS SULFATE TAB EC 325 MG (65 MG FE EQUIVALENT) 325 MG: 325 (65 FE) TABLET DELAYED RESPONSE at 08:53

## 2021-05-30 RX ADMIN — POTASSIUM CHLORIDE 40 MEQ: 1500 TABLET, EXTENDED RELEASE ORAL at 09:02

## 2021-05-30 ASSESSMENT — PAIN SCALES - GENERAL: PAINLEVEL_OUTOF10: 3

## 2021-05-30 NOTE — CARE COORDINATION
Transitional Planning     Spoke with patient about discharge planning. Home independent. Has transportation. Currently on Heparin gtt. Bridging to Coumadin.

## 2021-05-30 NOTE — PLAN OF CARE
Neuro assessment completed, fall precautions in place, aspirations precautions in place, assess for barriers in communication and mobility, interventions to assist in communication and mobility in place, encouraged to call for assistance, adaptive devices used as needed, assess emotional state and support offered, encouraged patient to communicate by available means, and support systems included in patient care. Skin assessed for breakdown and redness, patient turned regularly, and heels elevated off of bed on pillows. Fall assessment preformed. Bed in low locked position with call light and tray table within reach. Education given. Will continue to monitor.     Problem: Falls - Risk of:  Goal: Will remain free from falls  Description: Will remain free from falls  Outcome: Ongoing  Goal: Absence of physical injury  Description: Absence of physical injury  Outcome: Ongoing     Problem: Skin Integrity:  Goal: Will show no infection signs and symptoms  Description: Will show no infection signs and symptoms  Outcome: Ongoing  Goal: Absence of new skin breakdown  Description: Absence of new skin breakdown  Outcome: Ongoing     Problem: HEMODYNAMIC STATUS  Goal: Patient has stable vital signs and fluid balance  Outcome: Ongoing     Problem: ACTIVITY INTOLERANCE/IMPAIRED MOBILITY  Goal: Mobility/activity is maintained at optimum level for patient  Outcome: Ongoing

## 2021-05-30 NOTE — PROGRESS NOTES
Physical Therapy  Facility/Department: [facility] NEURO  Daily Treatment Note  NAME: Megan Muniz  : 1985  MRN: [id number]    Date of Service: 2021    Discharge Recommendations:  Patient would benefit from continued therapy after discharge   PT Equipment Recommendations  Equipment Needed: Yes  Mobility Devices: Victorine Olmstead: Rolling    Assessment   Body structures, Functions, Activity limitations: Decreased functional mobility ; Decreased ADL status; Decreased ROM; Decreased strength;Decreased endurance;Decreased balance  Assessment: Pt (prev) amb 150 ft with RW and CGA. limited this date by generalized fatigue from plasmapheresis tx. Pt would benefit from continued PT after d/c as well as use of RW. Prognosis: Good  PT Education: Goals;PT Role;Plan of Care;Precautions;Transfer Training;Gait Training  REQUIRES PT FOLLOW UP: Yes  Activity Tolerance  Activity Tolerance: Patient limited by endurance; Patient limited by fatigue     Patient Diagnosis(es): The encounter diagnosis was Cerebrovascular accident (CVA), unspecified mechanism ([address] has a past medical history of Acid reflux, Anemia, Drug abuse, IV ([address] Headache, History of pulmonary embolus (PE), Marijuana smoker, and Smoker. has a past surgical history that includes  section; ovarian cyst removal; Appendectomy; Upper gastrointestinal endoscopy (N/A, 10/21/2019); sigmoidoscopy (N/A, 10/22/2019); and Colonoscopy (N/A, 10/23/2019). Restrictions  Restrictions/Precautions  Restrictions/Precautions: Fall Risk, General Precautions  Required Braces or Orthoses?: No  Position Activity Restriction  Other position/activity restrictions: SBP goal <180, TPA BR until OT/PT  Subjective   General  Response To Previous Treatment: Patient with no complaints from previous session. Family / Caregiver Present: No  Subjective  Subjective: Pt and RN agreeable to PT.  Pt seated EOB with RN present upon arrival, c/o fatigue after plasmapheresis Concurrent Group Co-treatment   Time In 1440         Time Out 1504         Minutes 24         Timed Code Treatment Minutes: 25 Minutes       Johan Stuart [place]

## 2021-05-30 NOTE — PROGRESS NOTES
NEUROLOGY PROGRESS NOTES    A/P  Acute onset of right-sided numbness and weakness (5/24/2021), improving secondary to LICA dissection with thrombus that caused embolic infarcts to LMCA territory, received TPA NIH 3 with recovery   INR today - 1.3 Continue bridging Heparin with Coumadin and ASA     May go home once INR > 2    S: Right sided numbness is 90% better    O:  Vitals:    05/30/21 0734   BP: 110/71   Pulse: 89   Resp: 15   Temp: 98.2 °F (36.8 °C)   SpO2:    Alert, language output is fluent and coherent, followed commands  No facial asymmetry; no dysarthria  Moves all extremities spontaneously and symmetrically  Diminished light touch sensation in RUE and Daniel Joy MD

## 2021-05-30 NOTE — PROGRESS NOTES
ENDOVASCULAR NEUROSURGERY PROGRESS NOTE  2021 10:25 AM  Subjective:   Admit Date: 2021  PCP: No primary care provider on file. No new acute events. Objective:   Vitals: /71   Pulse 89   Temp 98.2 °F (36.8 °C) (Oral)   Resp 15   Ht 5' 3\" (1.6 m)   Wt 148 lb 11.2 oz (67.4 kg)   LMP 2021   SpO2 99%   BMI 26.34 kg/m²   General appearance: Lying in bed, NAD. HEENT: Atraumatic. Neck: Neck is supple. Lungs: No respiratory distress noted. Heart: normal sinus rhythm on tele. .   Abdomen: Soft nontender. Extremities: No lower limb edema noted. Neurologic: awake, following simple commands appropriately, able to name simple objects, intact comprehension, no dysarthria noted. CN: Has intact extraocular muscles movements, no facial droop noted, intact sensation on the face on trigeminal distribution bilaterally. MOTOR: Has good strength in both upper and lower extremities, moving both upper and lower extremities against gravity. Drift noted in right upper and right lower extremity. SENSORY: Right-sided numbness noted in right upper and right lower extremities. No numbness on the face noted. NIH Stroke Scale Total:  1a.  Level of consciousness:  0  1b. Level of consciousness questions:  0   1c. Level of consciousness questions:  0   2. Best Gaze:  0   3. Visual:  0   4. Facial Palsy:  0   5a. Motor left arm:  0  5b. Motor right arm:  0  6a. Motor left le  6b. Motor right le  7. Limb Ataxia:  0 - absent  8. Sensory:  1  9. Best Language:  0  10. Dysarthria:  0  11.   Extinction and Inattention: 0     Total: 2    Medications and labs:   Scheduled Meds:   warfarin (COUMADIN) daily dosing (placeholder)   Other RX Placeholder    ferrous sulfate  325 mg Oral Daily with breakfast    sennosides-docusate sodium  2 tablet Oral Daily    aspirin  81 mg Oral Daily    methadone  120 mg Oral Daily    sodium chloride flush  5-40 mL Intravenous 2 times per day    atorvastatin  80 mg Oral Nightly     Continuous Infusions:   heparin (PORCINE) Infusion 16 Units/kg/hr (05/30/21 0855)    sodium chloride       CBC:   Recent Labs     05/28/21  1136 05/29/21  1225 05/30/21  0654   WBC 4.1 4.6 4.9   HGB 7.7* 7.5* 7.1*    230 242     BMP:    Recent Labs     05/28/21  0619 05/29/21  1225 05/30/21  0654    139 138   K 3.8 3.2* 3.2*   * 106 106   CO2 24 24 27   BUN 2* 2* <2*   CREATININE 0.50 0.51 0.47*   GLUCOSE 95 90 73     Hepatic: No results for input(s): AST, ALT, ALB, BILITOT, ALKPHOS in the last 72 hours. Troponin: No results for input(s): TROPONINI in the last 72 hours. BNP: No results for input(s): BNP in the last 72 hours. Lipids:   Recent Labs     05/29/21  2125   CHOL 93   HDL 34*     INR:   Recent Labs     05/28/21  0619 05/29/21  1225 05/30/21  0654   INR 1.0 1.4 1.3       Assessment and Recommendations: This is a 70-year-old female with past medical history including PE in 2019. She presented with acute onset right-sided weakness/numbness. She received TPA. NIH stroke scale is 3. CT head revealed no blood. CTA head and neck revealed left cervical ICA subocclusive thrombus. Neuroimages reviewed. Labs reviewed. Patient neuro exam is stable with right-sided weakness/numbness. --Left MCA/IESHA embolic ischemic stroke with left cervical ICA subocclusive thrombus. --Left cervical ICA subocclusive thrombus possibly due to underlying carotid dissection. CTA neck on 5/27 obtain reviewed. Stable with slightly improvement in thrombus noted. Neuro exam is stable. She is on heparin drip and aspirin 81 mg daily. Warfarin bridging. Continue oral anticoagulation and antiplatelet therapy. Plan to repeat in 2 to 3 weeks. Follow-up in clinic. Plan  1. Medical management  2. Warfarin bridging while on heparin. Consider bridging with enoxaparin. 3. Aspirin 81 mg daily  4. Neurology care.   5. Blood pressure goal normotensive. 6. Follow-up in neuro endovascular clinic at 2 to 3 weeks with a repeat CTA neck. Follow-up with Dr. Aurea Yost in 3 months.       Sydnie Piña MD, MD  Stroke, [facility]  [address]  Electronically signed 5/30/2021 at 10:25 AM

## 2021-05-30 NOTE — PROGRESS NOTES
Patient and  informed that visiting hours are over at 10 pm and no overnight visitors were permitted. Patient states that if her  is not permitted to stay over night that she will sign herself out. Patient states that  has stayed over every night since she was admitted. Writer contacted [facility] and it was agreed that  could spend the night.  informed that he is not permitted to leave the room between [address] [place].   and patient agreed to terms provided.

## 2021-05-30 NOTE — PROGRESS NOTES
[id number] [facility] Neurology   [address] [place] [address]    Progress note             Date:   5/30/2021  Patient name:  Edy Díaz  Date of admission:  5/24/2021  3:22 PM  MRN:   [id number]  Account:  [de-identified]  YOB: 1985  PCP:    No primary care provider on file. Room:   [address] [place] [address]  Code Status:    Full Code    Chief Complaint:     Chief Complaint   Patient presents with    Numbness     Right sided numbness, sent from [facility], received some tpa then eloped       Interval hx: The Patient was seen and examined at bedside  Is vitally stable alert oriented x3  No acute events overnight  The patient stated that she is improving  PTT in the 57.5, INR is 1.3. Brief History of Present Illness: The patient is a 37 y.o. Non-/non  female who presents with Numbness (Right sided numbness, sent from [facility], received some tpa then eloped)   and she is admitted to the hospital for the management of right-sided weakness and numbness. 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 CT head was negative. CTA showed left ICA thrombus possible underlying dissection. MRI of the brain showed watershed infarction in the left hemisphere. The patient was given TPA with resolving of the symptoms. Heparin drip was started along with aspirin. Bridging to warfarin. LDL is 135,    Vitamin B12 322, folate 9.2, homocystine serum is 6.4  Pending lupus anticoagulant and anticardiolipin anticoagulant  MTHFR mutation homozygous  Pending prothrombin gene mutation and factor V Leyden mutation. Past Medical History:     Past Medical History:   Diagnosis Date    Acid reflux     Anemia 10/18/2019    Drug abuse, IV ([facility]     claims that she has previous iv drug dependency claims hasn' had recent usage.  Headache     History of pulmonary embolus (PE)     Marijuana smoker 10/18/2019    Smoker 10/18/2019        Past Surgical History:     Past Surgical History:   Procedure Laterality Date    APPENDECTOMY       SECTION      COLONOSCOPY N/A 10/23/2019    COLORECTAL CANCER SCREENING, NOT HIGH RISK performed by Cristobal Heimlich, MD at [place] [address] N/A 10/22/2019    SIGMOIDOSCOPY ABORTED COLONOSCOPY performed by Cristobal Heimlich, MD at [address] [place] [address] N/A 10/21/2019    EGD ESOPHAGOGASTRODUODENOSCOPY performed by Cristobal Heimlich, MD at [address] [place] [address]        Medications Prior to Admission:     Prior to Admission medications    Medication Sig Start Date End Date Taking? Authorizing Provider   ferrous sulfate 325 (65 Fe) MG tablet Take 1 tablet by mouth 2 times daily 10/24/19   Evonne Lal, DO   pantoprazole (PROTONIX) 20 MG tablet Take 1 tablet by mouth daily 10/24/19   Sameera Lee, DO   METHADONE HCL PO Take 120 mg by mouth daily     Historical Provider, MD        Allergies:     Patient has no known allergies. Social History:     Tobacco:    reports that she has been smoking cigarettes. She has never used smokeless tobacco.  Alcohol:      reports no history of alcohol use. Drug Use:  reports previous drug use. Drug: Marijuana.     Family History:     Family History   Problem Relation Age of Onset    No Known Problems Mother     No Known Problems Father        Review of Systems:     ROS:  Constitutional  Negative for fever and chills    HEENT  Negative for ear discharge, ear pain, nosebleed    Eyes  Negative for photophobia, pain and discharge    Respiratory  Negative for hemoptysis and sputum    Cardiovascular  Negative for orthopnea, claudication and PND    Gastrointestinal  Negative for abdominal pain, diarrhea, blood in stool    Musculoskeletal  Negative for joint pain, negative for myalgia    Neurology    Skin  Negative for rash or itching Endo/heme/allergies  Negative for polydipsia, environmental allergy    Psychiatric/behavioral  Negative for suicidal ideation. Patient is not anxious        Physical Exam:   /71   Pulse 89   Temp 98.2 °F (36.8 °C) (Oral)   Resp 15   Ht 5' 3\" (1.6 m)   Wt 148 lb 11.2 oz (67.4 kg)   LMP 2021   SpO2 99%   BMI 26.34 kg/m²   Temp (24hrs), Av.1 °F (36.7 °C), Min:97.3 °F (36.3 °C), Max:98.7 °F (37.1 °C)    No results for input(s): POCGLU in the last 72 hours. No intake or output data in the 24 hours ending 21 0827      NEUROLOGIC EXAMINATION  GENERAL  Appears comfortable and in no distress   HEENT  NC/ AT   NECK  Supple and no bruits heard   MENTAL STATUS:  Alert, oriented, intact memory, no confusion, normal speech, normal language, no hallucination or delusion   CRANIAL NERVES: II     -      Visual fields intact to confrontation  III,IV,VI -  EOMs full, no afferent defect, no URIEL, no ptosis  V     -     Normal facial sensation  VII    -     Normal facial symmetry  VIII   -     Intact hearing  IX,X -     Symmetrical palate  XI    -     Symmetrical shoulder shrug  XII   -     Midline tongue, no atrophy    MOTOR FUNCTION:  significant for good strength of grade 5/5 left upper and lower extremity 4+ out of 5 in the right upper and lower extremity. In bilateral proximal and distal muscle groups of both upper and lower extremities with normal bulk, normal tone and no involuntary movements, no tremor   SENSORY FUNCTION:  Decrease sensation in the right upper and lower extremity.    CEREBELLAR FUNCTION:  Intact fine motor control over upper limbs   REFLEX FUNCTION:  Symmetric, no perverted reflex, no Babinski sign   STATION and GAIT  Not tested       Investigations:      Laboratory Testing:  Recent Results (from the past 24 hour(s))   BASIC METABOLIC PANEL    Collection Time: 21 12:25 PM   Result Value Ref Range    Glucose 90 70 - 99 mg/dL    BUN 2 (L) 6 - 20 mg/dL    CREATININE 0.51 0.50 - 0.90 mg/dL    Bun/Cre Ratio NOT REPORTED 9 - 20    Calcium 8.0 (L) 8.6 - 10.4 mg/dL    Sodium 139 135 - 144 mmol/L    Potassium 3.2 (L) 3.7 - 5.3 mmol/L    Chloride 106 98 - 107 mmol/L    CO2 24 20 - 31 mmol/L    Anion Gap 9 9 - 17 mmol/L    GFR Non-African American >60 >60 mL/min    GFR African American >60 >60 mL/min    GFR Comment          GFR Staging NOT REPORTED    CBC auto differential    Collection Time: 05/29/21 12:25 PM   Result Value Ref Range    WBC 4.6 3.5 - 11.3 k/uL    RBC 3.26 (L) 3.95 - 5.11 m/uL    Hemoglobin 7.5 (L) 11.9 - 15.1 g/dL    Hematocrit 25.6 (L) 36.3 - 47.1 %    MCV 78.5 (L) 82.6 - 102.9 fL    MCH 23.0 (L) 25.2 - 33.5 pg    MCHC 29.3 28.4 - 34.8 g/dL    RDW 17.3 (H) 11.8 - 14.4 %    Platelets 731 877 - 021 k/uL    MPV 10.4 8.1 - 13.5 fL    NRBC Automated 0.0 0.0 per 100 WBC    Differential Type NOT REPORTED     WBC Morphology NOT REPORTED     RBC Morphology ANISOCYTOSIS PRESENT     Platelet Estimate NOT REPORTED     Seg Neutrophils 65 36 - 65 %    Lymphocytes 25 24 - 43 %    Monocytes 7 3 - 12 %    Eosinophils % 3 1 - 4 %    Basophils 0 0 - 2 %    Immature Granulocytes 0 0 %    Segs Absolute 2.96 1.50 - 8.10 k/uL    Absolute Lymph # 1.16 1.10 - 3.70 k/uL    Absolute Mono # 0.31 0.10 - 1.20 k/uL    Absolute Eos # 0.12 0.00 - 0.44 k/uL    Basophils Absolute <0.03 0.00 - 0.20 k/uL    Absolute Immature Granulocyte <0.03 0.00 - 0.30 k/uL   PROTIME-INR    Collection Time: 05/29/21 12:25 PM   Result Value Ref Range    Protime 14.2 (H) 9.1 - 12.3 sec    INR 1.4    APTT    Collection Time: 05/29/21 12:25 PM   Result Value Ref Range    .4 (HH) 20.5 - 30.5 sec   APTT    Collection Time: 05/29/21  9:25 PM   Result Value Ref Range    PTT 37.3 (H) 20.5 - 30.5 sec   LIPID PANEL    Collection Time: 05/29/21  9:25 PM   Result Value Ref Range    Cholesterol 93 <200 mg/dL    HDL 34 (L) >40 mg/dL    LDL Cholesterol 41 0 - 130 mg/dL    Chol/HDL Ratio 2.7 <5    Triglycerides 88 <150 mg/dL    VLDL NOT REPORTED 1 - 30 mg/dL   BASIC METABOLIC PANEL    Collection Time: 05/30/21  6:54 AM   Result Value Ref Range    Glucose 73 70 - 99 mg/dL    BUN <2 (L) 6 - 20 mg/dL    CREATININE 0.47 (L) 0.50 - 0.90 mg/dL    Bun/Cre Ratio NOT REPORTED 9 - 20    Calcium 7.9 (L) 8.6 - 10.4 mg/dL    Sodium 138 135 - 144 mmol/L    Potassium 3.2 (L) 3.7 - 5.3 mmol/L    Chloride 106 98 - 107 mmol/L    CO2 27 20 - 31 mmol/L    Anion Gap 5 (L) 9 - 17 mmol/L    GFR Non-African American >60 >60 mL/min    GFR African American >60 >60 mL/min    GFR Comment          GFR Staging NOT REPORTED    CBC auto differential    Collection Time: 05/30/21  6:54 AM   Result Value Ref Range    WBC 4.9 3.5 - 11.3 k/uL    RBC 3.09 (L) 3.95 - 5.11 m/uL    Hemoglobin 7.1 (L) 11.9 - 15.1 g/dL    Hematocrit 24.6 (L) 36.3 - 47.1 %    MCV 79.6 (L) 82.6 - 102.9 fL    MCH 23.0 (L) 25.2 - 33.5 pg    MCHC 28.9 28.4 - 34.8 g/dL    RDW 17.5 (H) 11.8 - 14.4 %    Platelets 048 890 - 285 k/uL    MPV 10.7 8.1 - 13.5 fL    NRBC Automated 0.0 0.0 per 100 WBC    Differential Type NOT REPORTED     WBC Morphology NOT REPORTED     RBC Morphology ANISOCYTOSIS PRESENT     Platelet Estimate NOT REPORTED     Seg Neutrophils 54 36 - 65 %    Lymphocytes 33 24 - 43 %    Monocytes 10 3 - 12 %    Eosinophils % 2 1 - 4 %    Basophils 1 0 - 2 %    Immature Granulocytes 0 0 %    Segs Absolute 2.65 1.50 - 8.10 k/uL    Absolute Lymph # 1.59 1.10 - 3.70 k/uL    Absolute Mono # 0.48 0.10 - 1.20 k/uL    Absolute Eos # 0.11 0.00 - 0.44 k/uL    Basophils Absolute 0.03 0.00 - 0.20 k/uL    Absolute Immature Granulocyte <0.03 0.00 - 0.30 k/uL   PROTIME-INR    Collection Time: 05/30/21  6:54 AM   Result Value Ref Range    Protime 13.9 (H) 9.1 - 12.3 sec    INR 1.3    APTT    Collection Time: 05/30/21  6:54 AM   Result Value Ref Range    PTT 41.5 (H) 20.5 - 30.5 sec         Assessment :      Primary Problem  Left ICA thrombus possible secondary to underlying dissection  Right-sided weakness.     Active file.

## 2021-05-31 LAB
ABSOLUTE EOS #: 0.22 K/UL (ref 0–0.44)
ABSOLUTE IMMATURE GRANULOCYTE: <0.03 K/UL (ref 0–0.3)
ABSOLUTE LYMPH #: 2.12 K/UL (ref 1.1–3.7)
ABSOLUTE MONO #: 0.47 K/UL (ref 0.1–1.2)
ANION GAP SERPL CALCULATED.3IONS-SCNC: 7 MMOL/L (ref 9–17)
BASOPHILS # BLD: 1 % (ref 0–2)
BASOPHILS ABSOLUTE: 0.03 K/UL (ref 0–0.2)
BUN BLDV-MCNC: <2 MG/DL (ref 6–20)
BUN/CREAT BLD: ABNORMAL (ref 9–20)
CALCIUM SERPL-MCNC: 8.1 MG/DL (ref 8.6–10.4)
CHLORIDE BLD-SCNC: 109 MMOL/L (ref 98–107)
CO2: 26 MMOL/L (ref 20–31)
CREAT SERPL-MCNC: 0.54 MG/DL (ref 0.5–0.9)
DIFFERENTIAL TYPE: ABNORMAL
EOSINOPHILS RELATIVE PERCENT: 4 % (ref 1–4)
GFR AFRICAN AMERICAN: >60 ML/MIN
GFR NON-AFRICAN AMERICAN: >60 ML/MIN
GFR SERPL CREATININE-BSD FRML MDRD: ABNORMAL ML/MIN/{1.73_M2}
GFR SERPL CREATININE-BSD FRML MDRD: ABNORMAL ML/MIN/{1.73_M2}
GLUCOSE BLD-MCNC: 81 MG/DL (ref 70–99)
HCT VFR BLD CALC: 26.4 % (ref 36.3–47.1)
HEMOGLOBIN: 7.5 G/DL (ref 11.9–15.1)
IMMATURE GRANULOCYTES: 0 %
INR BLD: 1.3
LYMPHOCYTES # BLD: 41 % (ref 24–43)
MCH RBC QN AUTO: 22.9 PG (ref 25.2–33.5)
MCHC RBC AUTO-ENTMCNC: 28.4 G/DL (ref 28.4–34.8)
MCV RBC AUTO: 80.5 FL (ref 82.6–102.9)
MONOCYTES # BLD: 9 % (ref 3–12)
NRBC AUTOMATED: 0 PER 100 WBC
PARTIAL THROMBOPLASTIN TIME: 54 SEC (ref 20.5–30.5)
PARTIAL THROMBOPLASTIN TIME: 57.5 SEC (ref 20.5–30.5)
PARTIAL THROMBOPLASTIN TIME: 64.2 SEC (ref 20.5–30.5)
PDW BLD-RTO: 17.8 % (ref 11.8–14.4)
PLATELET # BLD: 253 K/UL (ref 138–453)
PLATELET ESTIMATE: ABNORMAL
PMV BLD AUTO: 10 FL (ref 8.1–13.5)
POTASSIUM SERPL-SCNC: 3.5 MMOL/L (ref 3.7–5.3)
PROTHROMBIN TIME: 13.7 SEC (ref 9.1–12.3)
RBC # BLD: 3.28 M/UL (ref 3.95–5.11)
RBC # BLD: ABNORMAL 10*6/UL
SEG NEUTROPHILS: 45 % (ref 36–65)
SEGMENTED NEUTROPHILS ABSOLUTE COUNT: 2.3 K/UL (ref 1.5–8.1)
SODIUM BLD-SCNC: 142 MMOL/L (ref 135–144)
WBC # BLD: 5.2 K/UL (ref 3.5–11.3)
WBC # BLD: ABNORMAL 10*3/UL

## 2021-05-31 PROCEDURE — 6360000002 HC RX W HCPCS: Performed by: NURSE PRACTITIONER

## 2021-05-31 PROCEDURE — 99233 SBSQ HOSP IP/OBS HIGH 50: CPT | Performed by: PSYCHIATRY & NEUROLOGY

## 2021-05-31 PROCEDURE — 2580000003 HC RX 258: Performed by: NURSE PRACTITIONER

## 2021-05-31 PROCEDURE — 85610 PROTHROMBIN TIME: CPT

## 2021-05-31 PROCEDURE — 6370000000 HC RX 637 (ALT 250 FOR IP): Performed by: STUDENT IN AN ORGANIZED HEALTH CARE EDUCATION/TRAINING PROGRAM

## 2021-05-31 PROCEDURE — 80048 BASIC METABOLIC PNL TOTAL CA: CPT

## 2021-05-31 PROCEDURE — 6370000000 HC RX 637 (ALT 250 FOR IP): Performed by: NURSE PRACTITIONER

## 2021-05-31 PROCEDURE — 6360000002 HC RX W HCPCS: Performed by: STUDENT IN AN ORGANIZED HEALTH CARE EDUCATION/TRAINING PROGRAM

## 2021-05-31 PROCEDURE — 2060000000 HC ICU INTERMEDIATE R&B

## 2021-05-31 PROCEDURE — 85025 COMPLETE CBC W/AUTO DIFF WBC: CPT

## 2021-05-31 PROCEDURE — 85730 THROMBOPLASTIN TIME PARTIAL: CPT

## 2021-05-31 RX ORDER — KETOROLAC TROMETHAMINE 30 MG/ML
30 INJECTION, SOLUTION INTRAMUSCULAR; INTRAVENOUS ONCE
Status: COMPLETED | OUTPATIENT
Start: 2021-06-01 | End: 2021-06-01

## 2021-05-31 RX ORDER — 0.9 % SODIUM CHLORIDE 0.9 %
500 INTRAVENOUS SOLUTION INTRAVENOUS ONCE
Status: DISCONTINUED | OUTPATIENT
Start: 2021-05-31 | End: 2021-06-02 | Stop reason: HOSPADM

## 2021-05-31 RX ORDER — MAGNESIUM SULFATE 1 G/100ML
1000 INJECTION INTRAVENOUS ONCE
Status: COMPLETED | OUTPATIENT
Start: 2021-05-31 | End: 2021-05-31

## 2021-05-31 RX ORDER — WARFARIN SODIUM 5 MG/1
5 TABLET ORAL
Status: COMPLETED | OUTPATIENT
Start: 2021-05-31 | End: 2021-05-31

## 2021-05-31 RX ORDER — DIPHENHYDRAMINE HYDROCHLORIDE 50 MG/ML
12.5 INJECTION INTRAMUSCULAR; INTRAVENOUS ONCE
Status: COMPLETED | OUTPATIENT
Start: 2021-05-31 | End: 2021-05-31

## 2021-05-31 RX ORDER — ACETAMINOPHEN 325 MG/1
650 TABLET ORAL EVERY 4 HOURS PRN
Status: DISCONTINUED | OUTPATIENT
Start: 2021-05-31 | End: 2021-06-02 | Stop reason: HOSPADM

## 2021-05-31 RX ADMIN — ACETAMINOPHEN, ASPIRIN (NSAID) AND CAFFEINE 1 TABLET: 250; 250; 65 TABLET, FILM COATED ORAL at 04:33

## 2021-05-31 RX ADMIN — ACETAMINOPHEN, ASPIRIN (NSAID) AND CAFFEINE 1 TABLET: 250; 250; 65 TABLET, FILM COATED ORAL at 21:49

## 2021-05-31 RX ADMIN — ACETAMINOPHEN 650 MG: 325 TABLET ORAL at 14:58

## 2021-05-31 RX ADMIN — ONDANSETRON 4 MG: 2 INJECTION INTRAMUSCULAR; INTRAVENOUS at 09:36

## 2021-05-31 RX ADMIN — HEPARIN SODIUM AND DEXTROSE 16 UNITS/KG/HR: 10000; 5 INJECTION INTRAVENOUS at 04:49

## 2021-05-31 RX ADMIN — MAGNESIUM SULFATE HEPTAHYDRATE 1000 MG: 1 INJECTION, SOLUTION INTRAVENOUS at 13:42

## 2021-05-31 RX ADMIN — METHADONE HYDROCHLORIDE 120 MG: 5 SOLUTION ORAL at 10:15

## 2021-05-31 RX ADMIN — DIPHENHYDRAMINE HYDROCHLORIDE 12.5 MG: 50 INJECTION, SOLUTION INTRAMUSCULAR; INTRAVENOUS at 13:42

## 2021-05-31 RX ADMIN — ASPIRIN 81 MG: 81 TABLET, CHEWABLE ORAL at 09:26

## 2021-05-31 RX ADMIN — SODIUM CHLORIDE, PRESERVATIVE FREE 10 ML: 5 INJECTION INTRAVENOUS at 21:46

## 2021-05-31 RX ADMIN — SODIUM CHLORIDE, PRESERVATIVE FREE 10 ML: 5 INJECTION INTRAVENOUS at 09:27

## 2021-05-31 RX ADMIN — ATORVASTATIN CALCIUM 80 MG: 80 TABLET, FILM COATED ORAL at 21:46

## 2021-05-31 RX ADMIN — FERROUS SULFATE TAB EC 325 MG (65 MG FE EQUIVALENT) 325 MG: 325 (65 FE) TABLET DELAYED RESPONSE at 09:26

## 2021-05-31 RX ADMIN — WARFARIN SODIUM 5 MG: 5 TABLET ORAL at 18:56

## 2021-05-31 ASSESSMENT — PAIN SCALES - GENERAL
PAINLEVEL_OUTOF10: 3
PAINLEVEL_OUTOF10: 6
PAINLEVEL_OUTOF10: 7
PAINLEVEL_OUTOF10: 9
PAINLEVEL_OUTOF10: 5
PAINLEVEL_OUTOF10: 6
PAINLEVEL_OUTOF10: 10
PAINLEVEL_OUTOF10: 6

## 2021-05-31 ASSESSMENT — PAIN DESCRIPTION - ORIENTATION: ORIENTATION: POSTERIOR;LOWER

## 2021-05-31 ASSESSMENT — PAIN DESCRIPTION - LOCATION
LOCATION: HEAD;NECK
LOCATION: HEAD;NECK

## 2021-05-31 ASSESSMENT — PAIN DESCRIPTION - DESCRIPTORS: DESCRIPTORS: ACHING

## 2021-05-31 ASSESSMENT — PAIN DESCRIPTION - PAIN TYPE
TYPE: CHRONIC PAIN
TYPE: CHRONIC PAIN

## 2021-05-31 ASSESSMENT — PAIN DESCRIPTION - FREQUENCY: FREQUENCY: CONTINUOUS

## 2021-05-31 NOTE — PROGRESS NOTES
Nutrition Assessment     Type and Reason for Visit: Initial (LOS day 7)    Nutrition Recommendations/Plan:   -Continue general diet   -Pt declined all nutritional supplements at this time     Nutrition Assessment:   Pt admitted d/t rt sided weakness/numbness. Pt reported that her appetite has been good this week consuming >50% of her meals. Pt declined all nutritional supplements at this time. No wounds noted. Pt stated UBW of 140 lbs and that her wt usually flux. No significant wt loss noted since admission. Pt deemed to be low-risk. Full nutrition assessment not needed at this time. Will monitor for changes in nutritional status.      Malnutrition Assessment:  Malnutrition Status: No malnutrition    Current Nutrition Therapies:    DIET GENERAL;    Nutrition Diagnosis:   No nutrition diagnosis at this time     Nutrition Interventions:   Food and/or Nutrient Delivery:  Continue Current Diet  Nutrition Education/Counseling:  Education not indicated   Coordination of Nutrition Care:  Continue to monitor while inpatient    Nutrition Monitoring and Evaluation:  Food/Nutrient Intake Outcomes:  Food and Nutrient Intake  Physical Signs/Symptoms Outcomes:  Biochemical Data, Nutrition Focused Physical Findings, Skin, Weight, GI Status, Fluid Status or Edema     Discharge Planning:    Continue current diet     Electronically signed by Ren Roche RD, LD on 5/31/21 at 2:23 PM EDT    Contact: [phone]

## 2021-05-31 NOTE — PLAN OF CARE
Neuro assessment completed, fall precautions in place, aspirations precautions in place, assess for barriers in communication and mobility, interventions to assist in communication and mobility in place, encouraged to call for assistance, adaptive devices used as needed, assess emotional state and support offered, encouraged patient to communicate by available means, and support systems included in patient care. Skin assessed for breakdown and redness, patient turned regularly, and heels elevated off of bed on pillows. Fall assessment preformed. Bed in low locked position with call light and tray table within reach. Education given. Will continue to monitor.       Problem: Falls - Risk of:  Goal: Will remain free from falls  Description: Will remain free from falls  Outcome: Ongoing  Goal: Absence of physical injury  Description: Absence of physical injury  Outcome: Ongoing     Problem: Skin Integrity:  Goal: Will show no infection signs and symptoms  Description: Will show no infection signs and symptoms  Outcome: Ongoing  Goal: Absence of new skin breakdown  Description: Absence of new skin breakdown  Outcome: Ongoing     Problem: HEMODYNAMIC STATUS  Goal: Patient has stable vital signs and fluid balance  Outcome: Ongoing

## 2021-05-31 NOTE — PROGRESS NOTES
ENDOVASCULAR NEUROSURGERY PROGRESS NOTE  2021 9:29 AM  Subjective:   No reported events overnight. This am pt reported that at 3am she had bad headache which was of similar character to her typical migraines, but it was in an atypical location, in her occiput. There are no associated symptoms. She received Excedrin overnight, but it did not help. Objective:     Vitals:    21 0809   BP: 124/75   Pulse: 90   Resp: 16   Temp: 97.9 °F (36.6 °C)   SpO2: 100%        General:  Gen: normal habitus, NAD  HEENT: NCAT, mucosa moist  Cvs: RRR, S1 S2 normal  Resp: symmetric unlabored breathing  Abd: s/nd/nt  Ext: no edema  Skin: no lesions seen, warm and dry    Neuro:  Gen: awake and alert, oriented x3. Lang/speech: no aphasia or dysarthria. Follows commands. CN: PERRL, EOMI, VFF, V1-3 intact, face symmetric, hearing intact, shoulder shrug symmetric, tongue midline  Motor: r hemibody 4+/5, L hemibody 5/5. No drift. Sense: decreased LT R hemibody  Coord: FTN and HTS intact b/l  DTR: deferred  Gait: deferred      NIH Stroke Scale Total:  1a.  Level of consciousness:  0  1b. Level of consciousness questions:  0   1c. Level of consciousness questions:  0   2. Best Gaze:  0   3. Visual:  0   4. Facial Palsy:  0   5a. Motor left arm:  0  5b. Motor right arm:  0  6a. Motor left le  6b. Motor right le  7. Limb Ataxia:  0 - absent  8. Sensory:  1  9. Best Language:  0  10. Dysarthria:  0  11. Extinction and Inattention: 0     Total: 1  Mrs 1    Medications and labs:   CTA head and neck w con 2021  Redemonstration of thrombus in the left carotid bulb posteriorly extending   into the proximal left internal carotid artery.  This is similar to slightly   smaller compared to prior exam.     MRI brain wo con 2021  1.  Numerous small scattered acute infarcts within the left MCA territory   including the left caudate head.  Given the peripheral location of these   infarcts, these may be embolic in nature.  No significant mass effect or   midline shift. 2. Otherwise, no acute intracranial abnormality. Assessment and Recommendations:   74-year-old female with past medical history of PE in 2019. She presented 5/24/2021 with L MCA small embolic strokes, and L cervical ICA subocclusive thrombus, s/p tPA. Stroke etio possible 2/2 dissection (no trauma hx). ddx includes hypercoag, cardio embolic. Repeat CTA 5/27/2021 shows stable vs mildly improved thrombus. Pt has severe migraine this am.she has residual mild R joe weakness and numbness. Plan  --migraine rx/cocktail as per neuro. --warfarin bridge with heparin gtt. Last inr 1.3. Last aptt 57.5  --Aspirin 81 mg daily  --Neurology care, hypercoag workup  --sbp < 140  --Follow-up dr. Irina Lane  2 weeks after discharge, repeat cta head and neck w con prior to 2w visit. Follow-up with Dr. Severo Dockery in 3 months. Case discussed with Dr. Virgilio Montilla attending.  Pt is followed by dr. Adina Alvarez MD, MD  Stroke, [facility]  [id number]  Electronically signed 5/31/2021 at 9:29 AM

## 2021-05-31 NOTE — PROGRESS NOTES
Pharmacy Note  Warfarin Consult follow-up      Recent Labs     05/31/21  0447   INR 1.3     Recent Labs     05/29/21  1225 05/30/21  0654 05/31/21  0447   HGB 7.5* 7.1* 7.5*   HCT 25.6* 24.6* 26.4*    242 253     Warfarin Drug-Drug Interactions: aspirin, heparin infusion     Date INR Dose   5/27/21 1.0 5 mg   5/28/21 1.0 5 mg   5/29/21 1.4 was not given per MD d/t high PTT   5/30/21 1.3 5mg   05.31 1.3 5mg       Will give additional one time dose of 5mg today and reevaluate tomorrow based on INR         Daily PT/INR while inpatient.      Aretha Grayson PharmD BCPS [facility]  5/31/2021 10:34 AM

## 2021-06-01 LAB
ABSOLUTE EOS #: 0.14 K/UL (ref 0–0.44)
ABSOLUTE IMMATURE GRANULOCYTE: <0.03 K/UL (ref 0–0.3)
ABSOLUTE LYMPH #: 1.46 K/UL (ref 1.1–3.7)
ABSOLUTE MONO #: 0.38 K/UL (ref 0.1–1.2)
ANION GAP SERPL CALCULATED.3IONS-SCNC: 7 MMOL/L (ref 9–17)
BASOPHILS # BLD: 1 % (ref 0–2)
BASOPHILS ABSOLUTE: 0.03 K/UL (ref 0–0.2)
BUN BLDV-MCNC: 3 MG/DL (ref 6–20)
BUN/CREAT BLD: ABNORMAL (ref 9–20)
CALCIUM SERPL-MCNC: 8.5 MG/DL (ref 8.6–10.4)
CHLORIDE BLD-SCNC: 105 MMOL/L (ref 98–107)
CO2: 28 MMOL/L (ref 20–31)
CREAT SERPL-MCNC: 0.62 MG/DL (ref 0.5–0.9)
DIFFERENTIAL TYPE: ABNORMAL
EOSINOPHILS RELATIVE PERCENT: 3 % (ref 1–4)
FACTOR V MUTATION: NEGATIVE
GFR AFRICAN AMERICAN: >60 ML/MIN
GFR NON-AFRICAN AMERICAN: >60 ML/MIN
GFR SERPL CREATININE-BSD FRML MDRD: ABNORMAL ML/MIN/{1.73_M2}
GFR SERPL CREATININE-BSD FRML MDRD: ABNORMAL ML/MIN/{1.73_M2}
GLUCOSE BLD-MCNC: 96 MG/DL (ref 70–99)
HCT VFR BLD CALC: 25 % (ref 36.3–47.1)
HEMOGLOBIN: 7.2 G/DL (ref 11.9–15.1)
IMMATURE GRANULOCYTES: 0 %
INR BLD: 1.6
LYMPHOCYTES # BLD: 33 % (ref 24–43)
MCH RBC QN AUTO: 23.4 PG (ref 25.2–33.5)
MCHC RBC AUTO-ENTMCNC: 28.8 G/DL (ref 28.4–34.8)
MCV RBC AUTO: 81.2 FL (ref 82.6–102.9)
MONOCYTES # BLD: 9 % (ref 3–12)
NRBC AUTOMATED: 0 PER 100 WBC
PARTIAL THROMBOPLASTIN TIME: 53.5 SEC (ref 20.5–30.5)
PARTIAL THROMBOPLASTIN TIME: 62.2 SEC (ref 20.5–30.5)
PARTIAL THROMBOPLASTIN TIME: 81.6 SEC (ref 20.5–30.5)
PDW BLD-RTO: 17.9 % (ref 11.8–14.4)
PLATELET # BLD: 277 K/UL (ref 138–453)
PLATELET ESTIMATE: ABNORMAL
PMV BLD AUTO: 9.9 FL (ref 8.1–13.5)
POTASSIUM SERPL-SCNC: 3.5 MMOL/L (ref 3.7–5.3)
PROTHROMBIN G20210A MUTATION: NEGATIVE
PROTHROMBIN TIME: 16.8 SEC (ref 9.1–12.3)
PT PCR SPECIMEN: NORMAL
RBC # BLD: 3.08 M/UL (ref 3.95–5.11)
RBC # BLD: ABNORMAL 10*6/UL
SEG NEUTROPHILS: 54 % (ref 36–65)
SEGMENTED NEUTROPHILS ABSOLUTE COUNT: 2.42 K/UL (ref 1.5–8.1)
SODIUM BLD-SCNC: 140 MMOL/L (ref 135–144)
SPECIMEN: NORMAL
WBC # BLD: 4.4 K/UL (ref 3.5–11.3)
WBC # BLD: ABNORMAL 10*3/UL

## 2021-06-01 PROCEDURE — 6370000000 HC RX 637 (ALT 250 FOR IP): Performed by: NURSE PRACTITIONER

## 2021-06-01 PROCEDURE — 6360000002 HC RX W HCPCS: Performed by: STUDENT IN AN ORGANIZED HEALTH CARE EDUCATION/TRAINING PROGRAM

## 2021-06-01 PROCEDURE — 6360000002 HC RX W HCPCS: Performed by: NURSE PRACTITIONER

## 2021-06-01 PROCEDURE — 2580000003 HC RX 258: Performed by: NURSE PRACTITIONER

## 2021-06-01 PROCEDURE — 99232 SBSQ HOSP IP/OBS MODERATE 35: CPT | Performed by: PSYCHIATRY & NEUROLOGY

## 2021-06-01 PROCEDURE — 6370000000 HC RX 637 (ALT 250 FOR IP): Performed by: STUDENT IN AN ORGANIZED HEALTH CARE EDUCATION/TRAINING PROGRAM

## 2021-06-01 PROCEDURE — 85025 COMPLETE CBC W/AUTO DIFF WBC: CPT

## 2021-06-01 PROCEDURE — 80048 BASIC METABOLIC PNL TOTAL CA: CPT

## 2021-06-01 PROCEDURE — 85610 PROTHROMBIN TIME: CPT

## 2021-06-01 PROCEDURE — 2060000000 HC ICU INTERMEDIATE R&B

## 2021-06-01 PROCEDURE — 85730 THROMBOPLASTIN TIME PARTIAL: CPT

## 2021-06-01 RX ORDER — WARFARIN SODIUM 5 MG/1
5 TABLET ORAL
Status: DISCONTINUED | OUTPATIENT
Start: 2021-06-01 | End: 2021-06-01 | Stop reason: DRUGHIGH

## 2021-06-01 RX ORDER — WARFARIN SODIUM 7.5 MG/1
7.5 TABLET ORAL
Status: COMPLETED | OUTPATIENT
Start: 2021-06-01 | End: 2021-06-01

## 2021-06-01 RX ADMIN — ATORVASTATIN CALCIUM 80 MG: 80 TABLET, FILM COATED ORAL at 21:46

## 2021-06-01 RX ADMIN — ONDANSETRON 4 MG: 2 INJECTION INTRAMUSCULAR; INTRAVENOUS at 07:49

## 2021-06-01 RX ADMIN — WARFARIN SODIUM 7.5 MG: 7.5 TABLET ORAL at 18:00

## 2021-06-01 RX ADMIN — ACETAMINOPHEN, ASPIRIN (NSAID) AND CAFFEINE 1 TABLET: 250; 250; 65 TABLET, FILM COATED ORAL at 09:20

## 2021-06-01 RX ADMIN — POTASSIUM CHLORIDE 40 MEQ: 1500 TABLET, EXTENDED RELEASE ORAL at 06:49

## 2021-06-01 RX ADMIN — FERROUS SULFATE TAB EC 325 MG (65 MG FE EQUIVALENT) 325 MG: 325 (65 FE) TABLET DELAYED RESPONSE at 09:19

## 2021-06-01 RX ADMIN — KETOROLAC TROMETHAMINE 30 MG: 30 INJECTION, SOLUTION INTRAMUSCULAR; INTRAVENOUS at 00:31

## 2021-06-01 RX ADMIN — ASPIRIN 81 MG: 81 TABLET, CHEWABLE ORAL at 09:19

## 2021-06-01 RX ADMIN — ONDANSETRON 4 MG: 2 INJECTION INTRAMUSCULAR; INTRAVENOUS at 21:46

## 2021-06-01 RX ADMIN — ACETAMINOPHEN, ASPIRIN (NSAID) AND CAFFEINE 1 TABLET: 250; 250; 65 TABLET, FILM COATED ORAL at 18:00

## 2021-06-01 RX ADMIN — METHADONE HYDROCHLORIDE 120 MG: 5 SOLUTION ORAL at 09:18

## 2021-06-01 RX ADMIN — HEPARIN SODIUM AND DEXTROSE 14 UNITS/KG/HR: 10000; 5 INJECTION INTRAVENOUS at 05:56

## 2021-06-01 RX ADMIN — SODIUM CHLORIDE, PRESERVATIVE FREE 10 ML: 5 INJECTION INTRAVENOUS at 21:49

## 2021-06-01 ASSESSMENT — PAIN DESCRIPTION - FREQUENCY: FREQUENCY: CONTINUOUS

## 2021-06-01 ASSESSMENT — PAIN DESCRIPTION - PAIN TYPE: TYPE: CHRONIC PAIN

## 2021-06-01 ASSESSMENT — PAIN SCALES - GENERAL
PAINLEVEL_OUTOF10: 6
PAINLEVEL_OUTOF10: 5
PAINLEVEL_OUTOF10: 5
PAINLEVEL_OUTOF10: 10

## 2021-06-01 ASSESSMENT — PAIN DESCRIPTION - PROGRESSION: CLINICAL_PROGRESSION: NOT CHANGED

## 2021-06-01 ASSESSMENT — PAIN DESCRIPTION - LOCATION: LOCATION: HEAD;NECK

## 2021-06-01 ASSESSMENT — PAIN DESCRIPTION - DESCRIPTORS: DESCRIPTORS: ACHING

## 2021-06-01 ASSESSMENT — PAIN DESCRIPTION - ONSET: ONSET: ON-GOING

## 2021-06-01 NOTE — PROGRESS NOTES
Physical Therapy    DATE: 2021    NAME: Edward Fox  MRN: [id number]   : 1985      Patient not seen this date for Physical Therapy due to:    Patient Declined: states she just returned from ambulating and is fatigued. PT will check back .       Electronically signed by Urabn Salmeron PT on 2021 at 3:05 PM

## 2021-06-01 NOTE — PLAN OF CARE
Patient remained free from injury. Patient verbalized understanding of need for the safety precautions. Demonstrates proper use of assistive devices. Bed remains in the lowest position. Call light remains within reach. [employer] [place] [facility] in use.      Problem: Falls - Risk of:  Goal: Will remain free from falls  Description: Will remain free from falls  Outcome: Ongoing  Goal: Absence of physical injury  Description: Absence of physical injury  Outcome: Ongoing     Problem: Skin Integrity:  Goal: Will show no infection signs and symptoms  Description: Will show no infection signs and symptoms  Outcome: Ongoing  Goal: Absence of new skin breakdown  Description: Absence of new skin breakdown  Outcome: Ongoing     Problem: HEMODYNAMIC STATUS  Goal: Patient has stable vital signs and fluid balance  Outcome: Ongoing

## 2021-06-01 NOTE — PROGRESS NOTES
ENDOVASCULAR NEUROSURGERY PROGRESS NOTE  2021 9:37 AM  Subjective:   No reported events overnight. Pt continues to have headache, rated as 5/10. Pain is somewhat improved with Toradol. Objective:     Vitals:    21 0812   BP: 114/71   Pulse: 84   Resp: 11   Temp: 98 °F (36.7 °C)   SpO2: 100%        General:  Gen: normal habitus, NAD  HEENT: NCAT, mucosa moist  Cvs: RRR, S1 S2 normal  Resp: symmetric unlabored breathing  Abd: s/nd/nt  Ext: no edema  Skin: no lesions seen, warm and dry    Neuro:  Gen: awake and alert, oriented x3. Lang/speech: no aphasia or dysarthria. Follows commands. CN: PERRL, EOMI, VFF, V1-3 intact, face symmetric, hearing intact, shoulder shrug symmetric, tongue midline  Motor: r hemibody 4+/5, L hemibody 5/5. No drift. Sense: decreased LT R hemibody  Coord: FTN and HTS intact b/l  DTR: deferred  Gait: deferred      NIH Stroke Scale Total:  1a.  Level of consciousness:  0  1b. Level of consciousness questions:  0   1c. Level of consciousness questions:  0   2. Best Gaze:  0   3. Visual:  0   4. Facial Palsy:  0   5a. Motor left arm:  0  5b. Motor right arm:  0  6a. Motor left le  6b. Motor right le  7. Limb Ataxia:  0 - absent  8. Sensory:  1  9. Best Language:  0  10. Dysarthria:  0  11. Extinction and Inattention: 0     Total: 1  Mrs 1    Medications and labs:   CTA head and neck w con 2021  Redemonstration of thrombus in the left carotid bulb posteriorly extending   into the proximal left internal carotid artery.  This is similar to slightly   smaller compared to prior exam.     MRI brain wo con 2021  1. Numerous small scattered acute infarcts within the left MCA territory   including the left caudate head.  Given the peripheral location of these   infarcts, these may be embolic in nature.  No significant mass effect or   midline shift. 2. Otherwise, no acute intracranial abnormality.        Assessment and Recommendations: 51-year-old female with past medical history of PE in 2019. She presented 5/24/2021 with L MCA small embolic strokes, and L cervical ICA subocclusive thrombus, s/p tPA. Stroke etio possible 2/2 dissection (no trauma hx). ddx includes hypercoag, cardio embolic. Repeat CTA 5/27/2021 shows stable vs mildly improved thrombus. Pt has severe migraine this am.she has residual mild R joe weakness and numbness. Plan  --migraine rx/cocktail as per neuro. --warfarin bridge with heparin gtt. Last inr 1.6. Last aptt 62.2  --Aspirin 81 mg daily  --Neurology care, hypercoag workup  --sbp < 140  --Follow-up dr. Luisa Soriano  2 weeks after discharge, repeat cta head and neck w con prior to 2w visit. Follow-up with Dr. Sofiya Dominguez in 3 months. Case discussed with Dr. Sofiya Dominguez attending.     Alek Hinojosa MD, MD  Stroke, [facility]  [id number]  Electronically signed 6/1/2021 at 9:37 AM

## 2021-06-01 NOTE — PROGRESS NOTES
Neurology Resident Progress Note      SUBJECTIVE:  This is a 37 y.o.  female admitted 2021 for Acute CVA (cerebrovascular accident) [facility]) [I63.9]  Acute CVA (cerebrovascular accident) [facility]) [I63.9]  This is a follow-up neurology progress note. The patient was seen and examined and the chart was reviewed. There were no acute events overnight. ROS  Constitutional: no fever, chills, fatigue  HENT: No change in vision or hearing   Respiratory: No cough, SOB, wheezing. Cardiovascular:  No chest pain, palpitations, leg swelling. Gastrointestinal: No nausea, vomiting, diarrhea. Genitourinary: No increased frequency, urgency. Musculoskeletal: No myalgia or arthralgia. Skin: No rashes or scarring or bruises. Neurological: No headache, paresthesia, or focal weakness. Endo/Heme/Allergies: Negative for itchy eyes or runny nose. Psychiatric/Behavioral: No anxiety or depressed mood. HPI  See H&P     sodium chloride  500 mL Intravenous Once    warfarin (COUMADIN) daily dosing (placeholder)   Other RX Placeholder    ferrous sulfate  325 mg Oral Daily with breakfast    sennosides-docusate sodium  2 tablet Oral Daily    aspirin  81 mg Oral Daily    methadone  120 mg Oral Daily    sodium chloride flush  5-40 mL Intravenous 2 times per day    atorvastatin  80 mg Oral Nightly       Past Medical History:   Diagnosis Date    Acid reflux     Anemia 10/18/2019    Drug abuse, IV ([address]     claims that she has previous iv drug dependency claims hasn' had recent usage.     Headache     History of pulmonary embolus (PE)     Marijuana smoker 10/18/2019    Smoker 10/18/2019       Past Surgical History:   Procedure Laterality Date    APPENDECTOMY       SECTION      COLONOSCOPY N/A 10/23/2019    COLORECTAL CANCER SCREENING, NOT HIGH RISK performed by Zohra Chaudhry MD at [place] [address] N/A 10/22/2019    SIGMOIDOSCOPY ABORTED COLONOSCOPY performed by Alex biceps, brachioradialis, patellar, calcaneal  Babinski b/l plantar downgoing   Gait                  Not assessed       Investigations:      Laboratory Testing:  Recent Results (from the past 24 hour(s))   APTT    Collection Time: 05/31/21 12:15 PM   Result Value Ref Range    PTT 54.0 (H) 20.5 - 30.5 sec   APTT    Collection Time: 05/31/21  6:43 PM   Result Value Ref Range    PTT 64.2 (H) 20.5 - 30.5 sec   APTT    Collection Time: 06/01/21 12:31 AM   Result Value Ref Range    PTT 81.6 (H) 20.5 - 30.5 sec   BASIC METABOLIC PANEL    Collection Time: 06/01/21  5:52 AM   Result Value Ref Range    Glucose 96 70 - 99 mg/dL    BUN 3 (L) 6 - 20 mg/dL    CREATININE 0.62 0.50 - 0.90 mg/dL    Bun/Cre Ratio NOT REPORTED 9 - 20    Calcium 8.5 (L) 8.6 - 10.4 mg/dL    Sodium 140 135 - 144 mmol/L    Potassium 3.5 (L) 3.7 - 5.3 mmol/L    Chloride 105 98 - 107 mmol/L    CO2 28 20 - 31 mmol/L    Anion Gap 7 (L) 9 - 17 mmol/L    GFR Non-African American >60 >60 mL/min    GFR African American >60 >60 mL/min    GFR Comment          GFR Staging NOT REPORTED    CBC auto differential    Collection Time: 06/01/21  5:52 AM   Result Value Ref Range    WBC 4.4 3.5 - 11.3 k/uL    RBC 3.08 (L) 3.95 - 5.11 m/uL    Hemoglobin 7.2 (L) 11.9 - 15.1 g/dL    Hematocrit 25.0 (L) 36.3 - 47.1 %    MCV 81.2 (L) 82.6 - 102.9 fL    MCH 23.4 (L) 25.2 - 33.5 pg    MCHC 28.8 28.4 - 34.8 g/dL    RDW 17.9 (H) 11.8 - 14.4 %    Platelets 411 821 - 752 k/uL    MPV 9.9 8.1 - 13.5 fL    NRBC Automated 0.0 0.0 per 100 WBC    Differential Type NOT REPORTED     WBC Morphology NOT REPORTED     RBC Morphology ANISOCYTOSIS PRESENT     Platelet Estimate NOT REPORTED     Seg Neutrophils 54 36 - 65 %    Lymphocytes 33 24 - 43 %    Monocytes 9 3 - 12 %    Eosinophils % 3 1 - 4 %    Basophils 1 0 - 2 %    Immature Granulocytes 0 0 %    Segs Absolute 2.42 1.50 - 8.10 k/uL    Absolute Lymph # 1.46 1.10 - 3.70 k/uL    Absolute Mono # 0.38 0.10 - 1.20 k/uL    Absolute Eos # 0.14 0.00 - 0.44 k/uL    Basophils Absolute 0.03 0.00 - 0.20 k/uL    Absolute Immature Granulocyte <0.03 0.00 - 0.30 k/uL   PROTIME-INR    Collection Time: 06/01/21  5:52 AM   Result Value Ref Range    Protime 16.8 (H) 9.1 - 12.3 sec    INR 1.6    APTT    Collection Time: 06/01/21  5:52 AM   Result Value Ref Range    PTT 62.2 (H) 20.5 - 30.5 sec       Imaging/Diagnostics:  Echo Complete    Result Date: 5/26/2021  Transthoracic Echocardiography Report (TTE)  Patient Name Cathy Moreau   Date of Study             05/26/2021               IRINA   Date of      1985  Gender                    Female  Birth   Age          37 year(s)  Race                         Room Number  0530        Height:                   63 inch, 160.02 cm   Corporate ID [id number]    Weight:                   145 pounds, 65.8 kg  #   Patient Acct [de-identified]   BSA:         1.69 m^2     BMI:       25.69 kg/m^2  #   MR #         [id number]     Sonographer               Derrick Kern   Accession #  [id number]  Interpreting Physician    [address] [place] [address]   Fellow                   Referring Nurse                           Practitioner   Interpreting             Referring Physician       Fanta Arana  Fellow                                             APRN-CNP  Type of Study   TTE procedure:2D Echocardiogram, M-Mode, Doppler, Color Doppler, Bubble  Study. Procedure Date Date: 05/26/2021 Start: 08:35 AM Study Location: [facility] Technical Quality: Adequate visualization Indications:CVA. History / Tech. Comments: Procedure explained to patient. Smoker PMHx: IV Drug use Patient Status: Inpatient Contrast Medium: Bubble Study. Height: 63 inches Weight: 145 pounds BSA: 1.69 m^2 BMI: 25.69 kg/m^2 HR: 82 bpm CONCLUSIONS Summary Left ventricle is normal in size, normal wall thickness. Global left ventricular systolic function is normal, calculated ejection fraction is 63% (by 3D Heart Model.) Normal Calculated global L. strain of -21.5 %.  Negative bubble study, no shunt noted. Trivial mitral regurgitation. Mild tricuspid regurgitation. Estimated right ventricular systolic pressure is 30 mmHg. Trivial pulmonic insufficiency. Signature ----------------------------------------------------------------------------  Electronically signed by Tammy Lemon(Sonographer) on 05/26/2021 10:12 AM ---------------------------------------------------------------------------- ----------------------------------------------------------------------------  Electronically signed by Arturo Chris(Interpreting physician) on 05/26/2021  12:06 PM ---------------------------------------------------------------------------- FINDINGS Left Atrium Left atrium is normal in size. Inter-atrial septum is intact with no evidence for an atrial septal defect. Negative bubble study, no shunt noted. Left Ventricle Left ventricle is normal in size, normal wall thickness, global left ventricular systolic function is normal, calculated ejection fraction is 63% (by 3D Heart Model.) Normal Calculated global L. strain of -21.5 %. Right Atrium Right atrium is normal in size. Right Ventricle Normal right ventricular size and function. Mitral Valve Normal mitral valve structure. Trivial mitral regurgitation. Aortic Valve Aortic valve is trileaflet. No evidence of aortic insufficiency or stenosis. Tricuspid Valve Normal tricuspid valve leaflets. Mild tricuspid regurgitation. Estimated right ventricular systolic pressure is 30 mmHg. Pulmonic Valve Pulmonic valve not well visualized but Doppler velocities are normal. Trivial pulmonic insufficiency. Pericardial Effusion No significant pericardial effusion is seen. Miscellaneous Normal aortic root dimension. E/E' average = 8.2. IVC normal diameter & inspiratory collapse indicating normal RA filling pressure .  M-mode / 2D Measurements & Calculations:   LVIDd:4.6 cm(3.7 - 5.6 cm)         Diastolic LBFFMT:08.2 ml  PSVD:8.0 cm(0.6 - 1.1 cm)          Systolic YYPYOT:37.2 ml  LVPWd:0.7 cm(0.6 - 1.1 cm)         Aortic Root:2.9 cm(2.0 - 3.7 cm)                                     LA Dimension: 3 cm(1.9 - 4.0 cm)  Calculated LVEF (%): 65.55 %       LA volume/Index: 50.1 ml /30m^2                                     LVOT:2.1 cm                                     RVDd:2.4 cm   Mitral:                                 Aortic   Valve Area (P1/2-Time): 4.4 cm^2        Peak Velocity: 1.41 m/s  Peak E-Wave: 1.17 m/s                   Mean Velocity: 1.03 m/s  Peak A-Wave: 0.54 m/s                   Peak Gradient: 7.95 mmHg  E/A Ratio: 2.15                         Mean Gradient: 5 mmHg  Peak Gradient: 5.48 mmHg  Mean Gradient: 3 mmHg  Deceleration Time: 165 msec             Area (continuity): 2.98 cm^2  P1/2t: 50 msec                          AV VTI: 30.7 cm   Area (continuity): 2.99 cm^2  Mean Velocity: 0.77 m/s   Tricuspid:                              Pulmonic:   Estimated RVSP: 30 mmHg                 Peak Velocity: 1.17 m/s  Peak TR Velocity: 2.50 m/s              Peak Gradient: 5.48 mmHg  Peak TR Gradient: 25 mmHg  Diastology / Tissue Doppler Septal Wall E' velocity:0.13 m/s Septal Wall E/E':9.1 Lateral Wall E' velocity:0.16 m/s Lateral Wall E/E':7.3    CT HEAD WO CONTRAST    Result Date: 5/24/2021  EXAMINATION: CT OF THE HEAD WITHOUT CONTRAST  5/24/2021 9:53 am TECHNIQUE: CT of the head was performed without the administration of intravenous contrast. Dose modulation, iterative reconstruction, and/or weight based adjustment of the mA/kV was utilized to reduce the radiation dose to as low as reasonably achievable. COMPARISON: None.  HISTORY: ORDERING SYSTEM PROVIDED HISTORY: right-sided numbness TECHNOLOGIST PROVIDED HISTORY: right-sided numbness Decision Support Exception - unselect if not a suspected or confirmed emergency medical condition->Emergency Medical Condition (MA) Is the patient pregnant?->No Reason for Exam: Right side numbness, no injury Acuity: Acute Type of Exam: Initial FINDINGS: BRAIN/VENTRICLES: There is no acute intracranial hemorrhage, mass effect, or midline shift. There is satisfactory overall gray-white matter differentiation. The ventricular structures are symmetric and unremarkable. The infratentorial structures are unremarkable. ORBITS: The visualized portion of the orbits demonstrate no acute abnormality. SINUSES: The visualized paranasal sinuses and mastoid air cells demonstrate no acute abnormality. SOFT TISSUES/SKULL:  No acute abnormality of the visualized skull or soft tissues. No acute intracranial abnormality. CT CERVICAL SPINE WO CONTRAST    Result Date: 5/24/2021  EXAMINATION: CT OF THE CERVICAL SPINE WITHOUT CONTRAST 5/24/2021 9:53 am TECHNIQUE: CT of the cervical spine was performed without the administration of intravenous contrast. Multiplanar reformatted images are provided for review. Dose modulation, iterative reconstruction, and/or weight based adjustment of the mA/kV was utilized to reduce the radiation dose to as low as reasonably achievable. COMPARISON: None. HISTORY: ORDERING SYSTEM PROVIDED HISTORY: right-sided numbness TECHNOLOGIST PROVIDED HISTORY: right-sided numbness Decision Support Exception - unselect if not a suspected or confirmed emergency medical condition->Emergency Medical Condition (MA) Is the patient pregnant?->No Reason for Exam: Right side numbness, no injury Acuity: Acute Type of Exam: Initial FINDINGS: BONES/ALIGNMENT: There is no acute fracture or traumatic malalignment. DEGENERATIVE CHANGES: No significant degenerative changes. SOFT TISSUES: There is no prevertebral soft tissue swelling. No acute abnormality of the cervical spine. CTA HEAD NECK W CONTRAST    Result Date: 5/27/2021  EXAMINATION: CTA OF THE HEAD AND NECK WITH CONTRAST 5/27/2021 12:31 pm: TECHNIQUE: CTA of the head and neck was performed with the administration of intravenous contrast. Multiplanar reformatted images are provided for review.   MIP images are provided for review. Stenosis of the internal carotid arteries measured using NASCET criteria. Dose modulation, iterative reconstruction, and/or weight based adjustment of the mA/kV was utilized to reduce the radiation dose to as low as reasonably achievable. COMPARISON: CTA head and neck performed 05/24/2021. HISTORY: ORDERING SYSTEM PROVIDED HISTORY: re-eval L carotid thrombus TECHNOLOGIST PROVIDED HISTORY: re-eval L carotid thrombus Reason for Exam: re-eval L carotid thrombus Acuity: Unknown Type of Exam: Unknown FINDINGS: CTA NECK: AORTIC ARCH/ARCH VESSELS: No dissection or arterial injury. No significant stenosis of the brachiocephalic or subclavian arteries. CAROTID ARTERIES: The common carotid arteries are patent. There is redemonstration of thrombus in the left carotid bulb extending into the proximal left internal carotid artery. This is similar to slightly smaller compared to prior exam.  Internal carotid arteries are otherwise patent. VERTEBRAL ARTERIES: No dissection, arterial injury, or significant stenosis. SOFT TISSUES: The lung apices are clear. No cervical or superior mediastinal lymphadenopathy. The larynx and pharynx are unremarkable. No acute abnormality of the salivary and thyroid glands. BONES: No acute osseous abnormality. CTA HEAD: ANTERIOR CIRCULATION: No significant stenosis of the intracranial internal carotid, anterior cerebral, or middle cerebral arteries. No aneurysm. POSTERIOR CIRCULATION: No significant stenosis of the vertebral, basilar, or posterior cerebral arteries. No aneurysm. OTHER: No dural venous sinus thrombosis on this non-dedicated study. BRAIN: No mass effect or midline shift. No extra-axial fluid collection. The gray-white differentiation is maintained. Redemonstration of thrombus in the left carotid bulb posteriorly extending into the proximal left internal carotid artery.   This is similar to slightly smaller compared to prior exam.     CTA HEAD nature. No significant mass effect or midline shift. 2. Otherwise, no acute intracranial abnormality. These results were sent to the [facility] [id number] ([address]) on 5/25/2021 at 8:12 am to be communicated to the referring/covering health care provider/office. Assessment & Differential Dx:      Primary Problem  <principal problem not specified>    Active Hospital Problems    Diagnosis Date Noted    Acute right hemiparesis ([facility] [G81.91]     Carotid artery stenosis with cerebral infarction [facility]) [I63.239]     Stroke due to occlusion of left carotid artery ([facility]) [I63.232]     Acute CVA (cerebrovascular accident) ([facility] [I63.9] 05/24/2021       Case of 72-year-old female presented with right-sided weakness and numbness, 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, PE in 2019 on Eliquis, acute onset of right sided numbness and weakness CT: Negative, CTA: Left ICA thrombus possible on the line dissection  -MRI of brain: Watershed infarction in the left hemisphere, patient given TPA resolved symptoms, heparin drip started along with aspirin bridging to warfarin  -LDL: 135, V B12: 322, folate: 9.2, homocystine signal: 6.4,    Impression:  -Left hemispheric watershed infarct secondary to left ICA subocclusion thrombosis, s/p TPA infusion  -History of PE previously on Eliquis  -Homozygous MTHFR gene mutation not likely due to acute infarct      Plan:     -On aspirin 81 mg, Lipitor 80 mg  -Neuro endovascular:  Follow-up with the neuro endovascular  in 2 weeks, repeat CTA H/N with contrast prior to visit, follow-up with Dr. Dejan Lee in 3 months  -Lupus anticoagulant and anticardiolipin anticoagulant:  -MTHFR mutation: Homozygous  -Prothrombin gene mutation, factor V Leyden mutation, pending  -On Coumadin INR: 1.6 today  -We will discharge home once INR therapeutic        Sharyn Esparza MD, MD, 6/1/2021 8:18 AM

## 2021-06-01 NOTE — PROGRESS NOTES
Pharmacy Note  Warfarin Consult follow-up      Recent Labs     06/01/21  0552   INR 1.6     Recent Labs     05/30/21  0654 05/31/21  0447 06/01/21  0552   HGB 7.1* 7.5* 7.2*   HCT 24.6* 26.4* 25.0*    253 277       Significant Drug-Drug Interactions:  New warfarin drug-drug interactions: none  Discontinued drug-drug interactions: none  Current warfarin drug-drug interactions: heparin gtt, aspirin      Date INR Dose   5/27/21 1.0 5 mg   5/28/21 1.0 5 mg   5/29/21 1.4 was not given  per MD d/t high PTT   5/30/21 1.3 5mg   6/1/21 1.6 5 mg        Notes:       Warfarin 5 mg ordered for this evening. Daily PT/INR while inpatient. [address] [place] [address]  Ph., CACP, Clinical Pharmacist  Anticoagulation Services, [address] [place] [address] [facility]  6/1/2021  8:30 AM

## 2021-06-02 VITALS
HEIGHT: 63 IN | DIASTOLIC BLOOD PRESSURE: 73 MMHG | WEIGHT: 148.7 LBS | RESPIRATION RATE: 22 BRPM | BODY MASS INDEX: 26.35 KG/M2 | TEMPERATURE: 98.1 F | SYSTOLIC BLOOD PRESSURE: 119 MMHG | OXYGEN SATURATION: 98 % | HEART RATE: 105 BPM

## 2021-06-02 LAB
ABSOLUTE EOS #: 0.25 K/UL (ref 0–0.44)
ABSOLUTE IMMATURE GRANULOCYTE: <0.03 K/UL (ref 0–0.3)
ABSOLUTE LYMPH #: 2.01 K/UL (ref 1.1–3.7)
ABSOLUTE MONO #: 0.5 K/UL (ref 0.1–1.2)
ANION GAP SERPL CALCULATED.3IONS-SCNC: 10 MMOL/L (ref 9–17)
ANTICARDIOLIPIN IGA ANTIBODY: 2.2 APL (ref 0–14)
ANTICARDIOLIPIN IGG ANTIBODY: 0.6 GPL (ref 0–10)
BASOPHILS # BLD: 0 % (ref 0–2)
BASOPHILS ABSOLUTE: <0.03 K/UL (ref 0–0.2)
BUN BLDV-MCNC: 4 MG/DL (ref 6–20)
BUN/CREAT BLD: ABNORMAL (ref 9–20)
CALCIUM SERPL-MCNC: 8.9 MG/DL (ref 8.6–10.4)
CARDIOLIPIN AB IGM: 1.6 MPL (ref 0–10)
CHLORIDE BLD-SCNC: 104 MMOL/L (ref 98–107)
CO2: 26 MMOL/L (ref 20–31)
CREAT SERPL-MCNC: 0.68 MG/DL (ref 0.5–0.9)
DIFFERENTIAL TYPE: ABNORMAL
DILUTE RUSSELL VIPER VENOM TIME: NORMAL
EOSINOPHILS RELATIVE PERCENT: 4 % (ref 1–4)
GFR AFRICAN AMERICAN: >60 ML/MIN
GFR NON-AFRICAN AMERICAN: >60 ML/MIN
GFR SERPL CREATININE-BSD FRML MDRD: ABNORMAL ML/MIN/{1.73_M2}
GFR SERPL CREATININE-BSD FRML MDRD: ABNORMAL ML/MIN/{1.73_M2}
GLUCOSE BLD-MCNC: 88 MG/DL (ref 70–99)
HCT VFR BLD CALC: 28.2 % (ref 36.3–47.1)
HEMOGLOBIN: 8.1 G/DL (ref 11.9–15.1)
IMMATURE GRANULOCYTES: 0 %
INR BLD: 1.1
INR BLD: 1.8
LUPUS ANTICOAG: NORMAL
LYMPHOCYTES # BLD: 33 % (ref 24–43)
MCH RBC QN AUTO: 23.5 PG (ref 25.2–33.5)
MCHC RBC AUTO-ENTMCNC: 28.7 G/DL (ref 28.4–34.8)
MCV RBC AUTO: 82 FL (ref 82.6–102.9)
MONOCYTES # BLD: 8 % (ref 3–12)
NRBC AUTOMATED: 0 PER 100 WBC
PARTIAL THROMBOPLASTIN TIME: 21.5 SEC (ref 20.5–30.5)
PARTIAL THROMBOPLASTIN TIME: 76.9 SEC (ref 20.5–30.5)
PDW BLD-RTO: 19.5 % (ref 11.8–14.4)
PLATELET # BLD: 329 K/UL (ref 138–453)
PLATELET ESTIMATE: ABNORMAL
PMV BLD AUTO: 9.6 FL (ref 8.1–13.5)
POTASSIUM SERPL-SCNC: 3.6 MMOL/L (ref 3.7–5.3)
PROTHROMBIN TIME: 11.9 SEC (ref 9.1–12.3)
PROTHROMBIN TIME: 18.3 SEC (ref 9.1–12.3)
RBC # BLD: 3.44 M/UL (ref 3.95–5.11)
RBC # BLD: ABNORMAL 10*6/UL
SEG NEUTROPHILS: 55 % (ref 36–65)
SEGMENTED NEUTROPHILS ABSOLUTE COUNT: 3.21 K/UL (ref 1.5–8.1)
SODIUM BLD-SCNC: 140 MMOL/L (ref 135–144)
WBC # BLD: 6 K/UL (ref 3.5–11.3)
WBC # BLD: ABNORMAL 10*3/UL

## 2021-06-02 PROCEDURE — 97116 GAIT TRAINING THERAPY: CPT

## 2021-06-02 PROCEDURE — 85730 THROMBOPLASTIN TIME PARTIAL: CPT

## 2021-06-02 PROCEDURE — 85610 PROTHROMBIN TIME: CPT

## 2021-06-02 PROCEDURE — 6370000000 HC RX 637 (ALT 250 FOR IP): Performed by: NURSE PRACTITIONER

## 2021-06-02 PROCEDURE — 6360000002 HC RX W HCPCS: Performed by: STUDENT IN AN ORGANIZED HEALTH CARE EDUCATION/TRAINING PROGRAM

## 2021-06-02 PROCEDURE — 80048 BASIC METABOLIC PNL TOTAL CA: CPT

## 2021-06-02 PROCEDURE — 6360000002 HC RX W HCPCS: Performed by: NURSE PRACTITIONER

## 2021-06-02 PROCEDURE — 99239 HOSP IP/OBS DSCHRG MGMT >30: CPT | Performed by: PSYCHIATRY & NEUROLOGY

## 2021-06-02 PROCEDURE — 85025 COMPLETE CBC W/AUTO DIFF WBC: CPT

## 2021-06-02 PROCEDURE — 99233 SBSQ HOSP IP/OBS HIGH 50: CPT | Performed by: PSYCHIATRY & NEUROLOGY

## 2021-06-02 RX ORDER — WARFARIN SODIUM 7.5 MG/1
7.5 TABLET ORAL
Status: COMPLETED | OUTPATIENT
Start: 2021-06-02 | End: 2021-06-02

## 2021-06-02 RX ORDER — ASPIRIN 81 MG/1
81 TABLET, CHEWABLE ORAL DAILY
Qty: 30 TABLET | Refills: 3 | Status: SHIPPED | OUTPATIENT
Start: 2021-06-03 | End: 2022-04-06

## 2021-06-02 RX ORDER — ATORVASTATIN CALCIUM 80 MG/1
80 TABLET, FILM COATED ORAL NIGHTLY
Qty: 30 TABLET | Refills: 3 | Status: SHIPPED | OUTPATIENT
Start: 2021-06-02 | End: 2022-04-06

## 2021-06-02 RX ORDER — WARFARIN SODIUM 5 MG/1
5 TABLET ORAL DAILY
Qty: 30 TABLET | Refills: 0 | Status: SHIPPED | OUTPATIENT
Start: 2021-06-02 | End: 2021-07-09 | Stop reason: SDUPTHER

## 2021-06-02 RX ADMIN — WARFARIN SODIUM 7.5 MG: 7.5 TABLET ORAL at 16:31

## 2021-06-02 RX ADMIN — ENOXAPARIN SODIUM 100 MG: 100 INJECTION SUBCUTANEOUS at 16:31

## 2021-06-02 RX ADMIN — METHADONE HYDROCHLORIDE 120 MG: 5 SOLUTION ORAL at 08:53

## 2021-06-02 RX ADMIN — FERROUS SULFATE TAB EC 325 MG (65 MG FE EQUIVALENT) 325 MG: 325 (65 FE) TABLET DELAYED RESPONSE at 08:53

## 2021-06-02 RX ADMIN — ONDANSETRON 4 MG: 2 INJECTION INTRAMUSCULAR; INTRAVENOUS at 08:21

## 2021-06-02 RX ADMIN — ASPIRIN 81 MG: 81 TABLET, CHEWABLE ORAL at 08:53

## 2021-06-02 ASSESSMENT — PAIN SCALES - GENERAL: PAINLEVEL_OUTOF10: 7

## 2021-06-02 NOTE — PROGRESS NOTES
Neurology Resident Progress Note      SUBJECTIVE:  This is a 37 y.o.  female admitted 2021 for Acute CVA (cerebrovascular accident) [facility]) [I63.9]  Acute CVA (cerebrovascular accident) [facility]) [I63.9]  This is a follow-up neurology progress note. The patient was seen and examined and the chart was reviewed. There were no acute events overnight. ROS  Constitutional: no fever, chills, fatigue  HENT: No change in vision or hearing   Respiratory: No cough, SOB, wheezing. Cardiovascular:  No chest pain, palpitations, leg swelling. Gastrointestinal: No nausea, vomiting, diarrhea. Genitourinary: No increased frequency, urgency. Musculoskeletal: No myalgia or arthralgia. Skin: No rashes or scarring or bruises. Neurological: No headache, paresthesia, or focal weakness. Endo/Heme/Allergies: Negative for itchy eyes or runny nose. Psychiatric/Behavioral: No anxiety or depressed mood. HPI  See H&P     sodium chloride  500 mL Intravenous Once    warfarin (COUMADIN) daily dosing (placeholder)   Other RX Placeholder    ferrous sulfate  325 mg Oral Daily with breakfast    sennosides-docusate sodium  2 tablet Oral Daily    aspirin  81 mg Oral Daily    methadone  120 mg Oral Daily    sodium chloride flush  5-40 mL Intravenous 2 times per day    atorvastatin  80 mg Oral Nightly       Past Medical History:   Diagnosis Date    Acid reflux     Anemia 10/18/2019    Drug abuse, IV ([address]     claims that she has previous iv drug dependency claims hasn' had recent usage.     Headache     History of pulmonary embolus (PE)     Marijuana smoker 10/18/2019    Smoker 10/18/2019       Past Surgical History:   Procedure Laterality Date    APPENDECTOMY       SECTION      COLONOSCOPY N/A 10/23/2019    COLORECTAL CANCER SCREENING, NOT HIGH RISK performed by Cristobal Heimlich, MD at [facility] [address] N/A 10/22/2019    SIGMOIDOSCOPY ABORTED COLONOSCOPY performed by Alex Gil Millan MD at [address] N/A 10/21/2019    EGD ESOPHAGOGASTRODUODENOSCOPY performed by Paris Amaya MD at [address]:      Blood pressure 126/87, pulse 92, temperature 97.6 °F (36.4 °C), temperature source Oral, resp. rate 16, height 5' 3\" (1.6 m), weight 148 lb 11.2 oz (67.4 kg), last menstrual period 05/22/2021, SpO2 100 %. General Examination    General Resting comfortably in bed   Head Normocephalic, without obvious abnormality   Neck Supple, symmetrical. Good ROM. No midline or paraspinal tenderness. Lungs Respirations unlabored, no wheezing   Chest Wall No deformity   Heart RRR, no murmur   Abdomen Soft. Non-tender, non-distended   Extremities No cyanosis or edema or warmth. Pulses 2+ and symmetric   Skin: Skin  turgor normal, no rashes or lesions     Mental status  Speech Alert. Oriented to person, place, and time. Speech is fluent without paraphasic errors  Good repetition and naming  Can do 1 step, 2 step, and cross-body commands  Can spell world backwards. Language appropriate. No hallucinations or delusions. No SI/HI. Cranial nerves   II - VFF, visual threat intact  III, IV, VI - extra-ocular muscles full. No nystagmus. Pupils symmetric and responsive.    V - sensation symmetric         VII -  No facial droop or asymmetric NLF  VIII - intact hearing to conversational tone          IX, X - symmetrical palate elevation   XI - 5/5 strength symmetric  XII - tongue midline   Motor function  Strength: grossly 5/5 in b/l              Deltoid, biceps, triceps, wrist flexion, wrist extension             Hip flexion/extension, knee flexion/extension, plantar flexion  Bulk: grossly normal no atrophy  Tone: symmetric b/l arms and legs  Abnormal movements: No abnormal movements or tremor   Sensory function Symmetric to touch in all extremities bilaterally   Cerebellar No dysmetria or dysdiadochocinesia    Reflex function DTR:        2+ b/l symmetric in #  [id number]  Interpreting Physician    [address] [place] [address]   Fellow                   Referring Nurse                           Practitioner   Interpreting             Referring Physician       Brad Hagen  Fellow                                             APRN-CNP  Type of Study   TTE procedure:2D Echocardiogram, M-Mode, Doppler, Color Doppler, Bubble  Study. Procedure Date Date: 05/26/2021 Start: 08:35 AM Study Location: [facility] Technical Quality: Adequate visualization Indications:CVA. History / Tech. Comments: Procedure explained to patient. Smoker PMHx: IV Drug use Patient Status: Inpatient Contrast Medium: Bubble Study. Height: 63 inches Weight: 145 pounds BSA: 1.69 m^2 BMI: 25.69 kg/m^2 HR: 82 bpm CONCLUSIONS Summary Left ventricle is normal in size, normal wall thickness. Global left ventricular systolic function is normal, calculated ejection fraction is 63% (by 3D Heart Model.) Normal Calculated global L. strain of -21.5 %. Negative bubble study, no shunt noted. Trivial mitral regurgitation. Mild tricuspid regurgitation. Estimated right ventricular systolic pressure is 30 mmHg. Trivial pulmonic insufficiency. Signature ----------------------------------------------------------------------------  Electronically signed by Tammy Encarnacion(Sonographer) on 05/26/2021 10:12 AM ---------------------------------------------------------------------------- ----------------------------------------------------------------------------  Electronically signed by Arturo Chris(Interpreting physician) on 05/26/2021  12:06 PM ---------------------------------------------------------------------------- FINDINGS Left Atrium Left atrium is normal in size. Inter-atrial septum is intact with no evidence for an atrial septal defect. Negative bubble study, no shunt noted.  Left Ventricle Left ventricle is normal in size, normal wall thickness, global left ventricular systolic function is normal, calculated ejection fraction is 63% (by 3D Heart Model.) Normal Calculated global L. strain of -21.5 %. Right Atrium Right atrium is normal in size. Right Ventricle Normal right ventricular size and function. Mitral Valve Normal mitral valve structure. Trivial mitral regurgitation. Aortic Valve Aortic valve is trileaflet. No evidence of aortic insufficiency or stenosis. Tricuspid Valve Normal tricuspid valve leaflets. Mild tricuspid regurgitation. Estimated right ventricular systolic pressure is 30 mmHg. Pulmonic Valve Pulmonic valve not well visualized but Doppler velocities are normal. Trivial pulmonic insufficiency. Pericardial Effusion No significant pericardial effusion is seen. Miscellaneous Normal aortic root dimension. E/E' average = 8.2. IVC normal diameter & inspiratory collapse indicating normal RA filling pressure .  M-mode / 2D Measurements & Calculations:   LVIDd:4.6 cm(3.7 - 5.6 cm)         Diastolic LSJMSW:75.1 ml  EQRE:2.7 cm(0.6 - 1.1 cm)          Systolic NHUUWM:37.5 ml  MVMDV:3.5 cm(0.6 - 1.1 cm)         Aortic Root:2.9 cm(2.0 - 3.7 cm)                                     LA Dimension: 3 cm(1.9 - 4.0 cm)  Calculated LVEF (%): 65.55 %       LA volume/Index: 50.1 ml /30m^2                                     LVOT:2.1 cm                                     RVDd:2.4 cm   Mitral:                                 Aortic   Valve Area (P1/2-Time): 4.4 cm^2        Peak Velocity: 1.41 m/s  Peak E-Wave: 1.17 m/s                   Mean Velocity: 1.03 m/s  Peak A-Wave: 0.54 m/s                   Peak Gradient: 7.95 mmHg  E/A Ratio: 2.15                         Mean Gradient: 5 mmHg  Peak Gradient: 5.48 mmHg  Mean Gradient: 3 mmHg  Deceleration Time: 165 msec             Area (continuity): 2.98 cm^2  P1/2t: 50 msec                          AV VTI: 30.7 cm   Area (continuity): 2.99 cm^2  Mean Velocity: 0.77 m/s   Tricuspid:                              Pulmonic:   Estimated RVSP: 30 mmHg                 Peak Velocity: 1.17 m/s  Peak TR Velocity: 2.50 m/s              Peak Gradient: 5.48 mmHg  Peak TR Gradient: 25 mmHg  Diastology / Tissue Doppler Septal Wall E' velocity:0.13 m/s Septal Wall E/E':9.1 Lateral Wall E' velocity:0.16 m/s Lateral Wall E/E':7.3    CT HEAD WO CONTRAST    Result Date: 5/24/2021  EXAMINATION: CT OF THE HEAD WITHOUT CONTRAST  5/24/2021 9:53 am TECHNIQUE: CT of the head was performed without the administration of intravenous contrast. Dose modulation, iterative reconstruction, and/or weight based adjustment of the mA/kV was utilized to reduce the radiation dose to as low as reasonably achievable. COMPARISON: None. HISTORY: ORDERING SYSTEM PROVIDED HISTORY: right-sided numbness TECHNOLOGIST PROVIDED HISTORY: right-sided numbness Decision Support Exception - unselect if not a suspected or confirmed emergency medical condition->Emergency Medical Condition (MA) Is the patient pregnant?->No Reason for Exam: Right side numbness, no injury Acuity: Acute Type of Exam: Initial FINDINGS: BRAIN/VENTRICLES: There is no acute intracranial hemorrhage, mass effect, or midline shift. There is satisfactory overall gray-white matter differentiation. The ventricular structures are symmetric and unremarkable. The infratentorial structures are unremarkable. ORBITS: The visualized portion of the orbits demonstrate no acute abnormality. SINUSES: The visualized paranasal sinuses and mastoid air cells demonstrate no acute abnormality. SOFT TISSUES/SKULL:  No acute abnormality of the visualized skull or soft tissues. No acute intracranial abnormality. CT CERVICAL SPINE WO CONTRAST    Result Date: 5/24/2021  EXAMINATION: CT OF THE CERVICAL SPINE WITHOUT CONTRAST 5/24/2021 9:53 am TECHNIQUE: CT of the cervical spine was performed without the administration of intravenous contrast. Multiplanar reformatted images are provided for review.  Dose modulation, iterative reconstruction, and/or weight based adjustment of the mA/kV was utilized to patent. VERTEBRAL ARTERIES: No dissection, arterial injury, or significant stenosis. SOFT TISSUES: The lung apices are clear. No cervical or superior mediastinal lymphadenopathy. The larynx and pharynx are unremarkable. No acute abnormality of the salivary and thyroid glands. BONES: No acute osseous abnormality. CTA HEAD: ANTERIOR CIRCULATION: No significant stenosis of the intracranial internal carotid, anterior cerebral, or middle cerebral arteries. No aneurysm. POSTERIOR CIRCULATION: No significant stenosis of the vertebral, basilar, or posterior cerebral arteries. No aneurysm. OTHER: No dural venous sinus thrombosis on this non-dedicated study. BRAIN: No mass effect or midline shift. No extra-axial fluid collection. The gray-white differentiation is maintained. Redemonstration of thrombus in the left carotid bulb posteriorly extending into the proximal left internal carotid artery. This is similar to slightly smaller compared to prior exam.     CTA HEAD NECK W CONTRAST    Result Date: 5/24/2021  EXAMINATION: CTA OF THE HEAD AND NECK WITH CONTRAST 5/24/2021 1:32 pm: TECHNIQUE: CTA of the head and neck was performed with the administration of intravenous contrast. Multiplanar reformatted images are provided for review. MIP images are provided for review. Stenosis of the internal carotid arteries measured using NASCET criteria. Dose modulation, iterative reconstruction, and/or weight based adjustment of the mA/kV was utilized to reduce the radiation dose to as low as reasonably achievable. COMPARISON: None.  HISTORY: ORDERING SYSTEM PROVIDED HISTORY: numbness, weakness RUE and RLE TECHNOLOGIST PROVIDED HISTORY: numbness, weakness RUE and RLE Decision Support Exception - unselect if not a suspected or confirmed emergency medical condition->Emergency Medical Condition (MA) Reason for Exam: numbness, weakness RUE and RLE Acuity: Acute Type of Exam: Initial FINDINGS: CTA NECK: AORTIC ARCH/ARCH VESSELS: No Additional signs and symptoms: pt states her whole right side went numb 5/24. FINDINGS: INTRACRANIAL STRUCTURES/VENTRICLES: Numerous small scattered acute infarcts are seen within the left MCA territory. This includes the left caudate head. No mass effect or midline shift. No evidence of an acute intracranial hemorrhage. The ventricles and sulci are normal in size and configuration. The sellar/suprasellar regions appear unremarkable. The normal signal voids within the major intracranial vessels appear maintained. ORBITS: The visualized portion of the orbits demonstrate no acute abnormality. SINUSES: The visualized paranasal sinuses and mastoid air cells are well aerated. BONES/SOFT TISSUES: The bone marrow signal intensity appears normal. The soft tissues demonstrate no acute abnormality. 1. Numerous small scattered acute infarcts within the left MCA territory including the left caudate head. Given the peripheral location of these infarcts, these may be embolic in nature. No significant mass effect or midline shift. 2. Otherwise, no acute intracranial abnormality. These results were sent to the [employer] [address] ([address] [place] [address]) on 5/25/2021 at 8:12 am to be communicated to the referring/covering health care provider/office.        Assessment & Differential Dx:      Primary Problem  <principal problem not specified>    Active Hospital Problems    Diagnosis Date Noted    Acute right hemiparesis ([facility] [G81.91]     Carotid artery stenosis with cerebral infarction [facility]) [I63.239]     Stroke due to occlusion of left carotid artery ([facility]) [I63.232]     Acute CVA (cerebrovascular accident) ([facility] [I63.9] 05/24/2021       Case of 82-year-old female presented with right-sided weakness and numbness, 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, PE in 2019 on Eliquis, acute onset of right sided numbness and weakness CT: Negative, CTA: Left ICA thrombus possible on the line dissection  -MRI of brain: Watershed infarction in the left hemisphere, patient given TPA resolved symptoms, heparin drip started along with aspirin bridging to warfarin  -LDL: 135, V B12: 322, folate: 9.2, homocystine signal: 6.4,    Impression:  -  Left hemispheric watershed infarct secondary to left ICA subocclusion thrombosis, s/p TPA infusion  -  History of PE previously on Eliquis  -  Homozygous MTHFR gene mutation not likely due to acute infarct      Plan:     -  On aspirin 81 mg, Lipitor 80 mg  -  Neuro endovascular:  Follow-up with the neuro endovascular  in 2 weeks, repeat CTA H/N with contrast prior to visit, follow-up with Dr. Bridger Hernández in 3 months  -  Lupus anticoagulant and anticardiolipin anticoagulant:  -  MTHFR mutation: Homozygous  -  Prothrombin gene mutation, factor V Leyden mutation, pending  -  On Coumadin INR: 1.6 today  -  We will discharge home once INR therapeutic        Morales Mahajan MD, MD, 6/2/2021 7:54 AM

## 2021-06-02 NOTE — PROGRESS NOTES
Pt given all discharge instructions and new medication information. Pt instructed on all follow up appointments. Pt demonstrated clear understanding of all information. Midline taken out, lovenox and warfarin both given prior to discharge.

## 2021-06-02 NOTE — PROGRESS NOTES
Physical Therapy  Facility/Department: [facility]  Daily Treatment Note  NAME: Kim Freeman  : 1985  MRN: [id number]    Date of Service: 2021    Discharge Recommendations:  Patient would benefit from continued therapy after discharge   PT Equipment Recommendations  Equipment Needed: No    Assessment   Body structures, Functions, Activity limitations: Decreased functional mobility ; Decreased ADL status; Decreased ROM; Decreased strength;Decreased endurance;Decreased balance  Assessment: Pt ambulated 300ft x2 independently and negotiated 3 stairs with supervision, no LOB t/o. Pt has met all PT goals and requires no further PT at this time. Prognosis: Good  REQUIRES PT FOLLOW UP: No  Activity Tolerance  Activity Tolerance: Patient limited by endurance; Patient limited by fatigue     Patient Diagnosis(es): The encounter diagnosis was Cerebrovascular accident (CVA), unspecified mechanism ([facility] has a past medical history of Acid reflux, Anemia, Drug abuse, IV ([facility] Headache, History of pulmonary embolus (PE), Marijuana smoker, and Smoker. has a past surgical history that includes  section; ovarian cyst removal; Appendectomy; Upper gastrointestinal endoscopy (N/A, 10/21/2019); sigmoidoscopy (N/A, 10/22/2019); and Colonoscopy (N/A, 10/23/2019). Restrictions  Restrictions/Precautions  Restrictions/Precautions: Fall Risk, General Precautions  Required Braces or Orthoses?: No  Position Activity Restriction  Other position/activity restrictions: SBP goal <180, TPA BR until OT/PT  Subjective   General  Chart Reviewed: Yes  Response To Previous Treatment: Patient with no complaints from previous session. Family / Caregiver Present: Yes (boyfriend was present)  Subjective  Subjective: Pt and RN were both agreeable to PT. Pt was in the hallway walking with boyfriend upon arrival.  General Comment  Comments: Pt was left in [place] with boyfriend to continue walking.   Pain Screening  Patient Currently in Pain: Denies  Vital Signs  Patient Currently in Pain: Denies       Orientation  Orientation  Overall Orientation Status: Within Functional Limits  Cognition      Objective   Bed mobility  Comment: VIDHYA pt was in hallway  Transfers  Comment: VIDHYA pt was in hallway  Ambulation  Ambulation?: Yes  Ambulation 1  Device: No Device  Assistance: Independent  Quality of Gait: no LOB  Gait Deviations: None  Distance: 300 ft x2  Stairs/Curb  Stairs?: Yes  Stairs  # Steps : 3  Stairs Height: 6\"  Rails: Left ascending  Device: No Device  Assistance: Supervision     Balance  Posture: Good  Sitting - Static: Good  Sitting - Dynamic: Good  Standing - Static: Good  Standing - Dynamic: Good  Comments: standing balance assessed without AD  Exercises  Comments: no exercises performed at this time   Goals  Short term goals  Time Frame for Short term goals: 14 visits  Short term goal 1: Ambulate 500ft CGA and least restrictive AD  Short term goal 2: Ascend/Descend 3 stairs with CGA without handrail  Short term goal 3: Demo Good- dynamic standing balance  Short term goal 4: Demo Good- dynamic sitting balance    Plan    Plan  Times per week: 5-6x/wk  Current Treatment Recommendations: Strengthening, Balance Training, Functional Mobility Training, ROM, Transfer Training, ADL/Self-care Training, Stair training, Gait Training, Endurance Training, Home Exercise Program, Safety Education & Training, Patient/Caregiver Education & Training  Safety Devices  Type of devices:  All fall risk precautions in place, Left in bed, Nurse notified  Restraints  Initially in place: No     Therapy Time   Individual Concurrent Group Co-treatment   Time In 0910         Time Out 0920         Minutes 10         Timed Code Treatment Minutes: [address], PTA

## 2021-06-02 NOTE — CARE COORDINATION
Discharge planning     Spoke with Florian Myers [facility]; patient set up with appointment. No needs. Has transportation.

## 2021-06-02 NOTE — PROGRESS NOTES
Pharmacy Note  Warfarin Consult follow-up      Recent Labs     06/02/21  0720   INR 1.8     Recent Labs     05/31/21  0447 06/01/21  0552 06/02/21  0720   HGB 7.5* 7.2* 8.1*   HCT 26.4* 25.0* 28.2*    277 329       Significant Drug-Drug Interactions:  New warfarin drug-drug interactions: none  Discontinued drug-drug interactions: none  Current warfarin drug-drug interactions: heparin gtt, aspirin      Date INR Dose   5/27/21 1.0 5 mg   5/28/21 1.0 5 mg   5/29/21 1.4 was not given  per MD d/t high PTT   5/30/21 1.3 5mg   6/1/21 1.6  7.5 mg   6.2.21 1.8 7.5 mg        Notes:           INR is trending up nicely, will order warfarin 7.5 mg for this evening. Daily PT/INR while inpatient. [address] [place] [address]  Ph., CACP, Clinical Pharmacist  Anticoagulation Services, [facility]  6/2/2021  9:43 AM

## 2021-06-02 NOTE — PLAN OF CARE
Patient remained free from injury. Patient verbalized understanding of need for the safety precautions. Demonstrates proper use of assistive devices. Bed remains in the lowest position. Call light remains within reach. [place] [facility] in use. Skin assessment complete. No breakdown noted. Interventions in place. See doc flowsheet for interventions initiated. Pt encouraged to turn.  Will continue to monitor for additional needs and skin breakdown    Problem: Falls - Risk of:  Goal: Will remain free from falls  Description: Will remain free from falls  Outcome: Ongoing  Goal: Absence of physical injury  Description: Absence of physical injury  Outcome: Ongoing     Problem: Skin Integrity:  Goal: Will show no infection signs and symptoms  Description: Will show no infection signs and symptoms  Outcome: Ongoing  Goal: Absence of new skin breakdown  Description: Absence of new skin breakdown  Outcome: Ongoing     Problem: ACTIVITY INTOLERANCE/IMPAIRED MOBILITY  Goal: Mobility/activity is maintained at optimum level for patient  Outcome: Ongoing

## 2021-06-02 NOTE — DISCHARGE SUMMARY
Neurology Discharge Summary     Patient ID: Sadaf Choi  :  1985   MRN: [id number]     ACCOUNT:  [de-identified]   Patient's PCP: No primary care provider on file. Admit Date: 2021   Discharge Date: 2021     Length of Stay: 9  Code Status:  Full Code  Admitting Physician: Neli Murray MD  Discharge Physician: Milka Zee MD     Active Discharge Diagnoses:       Primary Problem  <principal problem not specified>      Hospital Problems  Active Hospital Problems    Diagnosis Date Noted    Acute right hemiparesis (HCC) [G81.91]     Carotid artery stenosis with cerebral infarction [facility]) [I63.239]     Stroke due to occlusion of left carotid artery ([facility] [I63.232]     Acute CVA (cerebrovascular accident) ([facility] [I63.9] 2021       Condition on the discharge: good     Hospital Stay:       Hospital Course: 82-year-old female presented with right-sided weakness and numbness, 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, PE in 2019 on Eliquis, acute onset of right sided numbness and weakness CT: Negative, CTA: Left ICA thrombus possible dissection  -MRI of brain: Watershed infarction in the left hemisphere, patient given TPA improved symptoms, heparin drip started along with aspirin bridging to warfarin  -LDL: 135, vitamin B12: 322, folate: 9.2, homocystine signal: 6.4     Impression:  -  Left hemispheric watershed infarct secondary to left ICA subocclusion thrombosis, s/p TPA infusion  -  History of PE previously on Eliquis  -  Homozygous MTHFR gene mutation not likely due to acute infarct    -  On aspirin 81 mg, Lipitor 80 mg  -  Neuro endovascular: Follow-up with the neuro endovascular  in 2 weeks, repeat CTA H/N with contrast prior to visit, follow-up with Dr. Princess Palacios in 3 months  -  Prothrombin gene mutation, factor V Leyden mutation, pending  -  On Coumadin INR: 1.8 today.   She is to follow-up with Coumadin clinic.  -  We will discharge home, patient has a scheduled appointment with warfarin clinic, and she has been sent on warfarin 5 mg daily,      Significant Diagnostic Studies:   Labs / Micro:  CBC:   Lab Results   Component Value Date    WBC 6.0 06/02/2021    RBC 3.44 06/02/2021    HGB 8.1 06/02/2021    HCT 28.2 06/02/2021    MCV 82.0 06/02/2021    MCH 23.5 06/02/2021    MCHC 28.7 06/02/2021    RDW 19.5 06/02/2021     06/02/2021     BMP:    Lab Results   Component Value Date    GLUCOSE 88 06/02/2021     06/02/2021    K 3.6 06/02/2021     06/02/2021    CO2 26 06/02/2021    ANIONGAP 10 06/02/2021    BUN 4 06/02/2021    CREATININE 0.68 06/02/2021    BUNCRER NOT REPORTED 06/02/2021    CALCIUM 8.9 06/02/2021    LABGLOM >60 06/02/2021    GFRAA >60 06/02/2021    GFR      06/02/2021    GFR NOT REPORTED 06/02/2021     HFP:    Lab Results   Component Value Date    PROT 6.5 10/19/2019     CMP:    Lab Results   Component Value Date    GLUCOSE 88 06/02/2021     06/02/2021    K 3.6 06/02/2021     06/02/2021    CO2 26 06/02/2021    BUN 4 06/02/2021    CREATININE 0.68 06/02/2021    ANIONGAP 10 06/02/2021    ALKPHOS 71 10/19/2019    ALT 5 10/19/2019    AST 11 10/19/2019    BILITOT <0.10 10/19/2019    LABALBU 3.5 10/19/2019    ALBUMIN NOT REPORTED 10/19/2019    LABGLOM >60 06/02/2021    GFRAA >60 06/02/2021    GFR      06/02/2021    GFR NOT REPORTED 06/02/2021    PROT 6.5 10/19/2019    CALCIUM 8.9 06/02/2021     PT/INR:    Lab Results   Component Value Date    PROTIME 18.3 06/02/2021    INR 1.8 06/02/2021     PTT:   Lab Results   Component Value Date    APTT 76.9 06/02/2021     FLP:    Lab Results   Component Value Date    CHOL 93 05/29/2021    TRIG 88 05/29/2021    HDL 34 05/29/2021     U/A:    Lab Results   Component Value Date    COLORU earl 05/01/2020    COLORU YELLOW 10/16/2019    TURBIDITY CLEAR 10/16/2019    SPECGRAV 1.030 05/01/2020    SPECGRAV 1.010 10/16/2019    HGBUR NEGATIVE 10/16/2019    PHUR 5.5 05/01/2020    PHUR 5.5 10/16/2019    PROTEINU 1+ 05/01/2020    PROTEINU NEGATIVE 10/16/2019    GLUCOSEU neg 05/01/2020    GLUCOSEU NEGATIVE 10/16/2019    KETUA large 05/01/2020    KETUA NEGATIVE 10/16/2019    BILIRUBINUR small 05/01/2020    UROBILINOGEN Normal 10/16/2019    NITRU NEGATIVE 10/16/2019    LEUKOCYTESUR neg 05/01/2020    LEUKOCYTESUR NEGATIVE 10/16/2019     TSH:  No results found for: TSH       Radiology:    Echo Complete    Result Date: 5/26/2021  Transthoracic Echocardiography Report (TTE)  Patient Name Cathy Moreau   Date of Study             05/26/2021               IRINA   Date of      1985  Gender                    Female  Birth   Age          37 year(s)  Race                         Room Number  0530        Height:                   63 inch, 160.02 cm   Corporate ID [id number]    Weight:                   145 pounds, 65.8 kg  #   Patient Acct [de-identified]   BSA:         1.69 m^2     BMI:       25.69 kg/m^2  #   MR #         [id number]     Sonographer               [facility]   Accession #  [id number]  Interpreting Physician    [address] [place] [address]   Fellow                   Referring Nurse                           Practitioner   Interpreting             Referring Physician       Barbara Jarquin  Fellow                                             APRN-CNP  Type of Study   TTE procedure:2D Echocardiogram, M-Mode, Doppler, Color Doppler, Bubble  Study. Procedure Date Date: 05/26/2021 Start: 08:35 AM Study Location: [facility] Technical Quality: Adequate visualization Indications:CVA. History / Tech. Comments: Procedure explained to patient. Smoker PMHx: IV Drug use Patient Status: Inpatient Contrast Medium: Bubble Study. Height: 63 inches Weight: 145 pounds BSA: 1.69 m^2 BMI: 25.69 kg/m^2 HR: 82 bpm CONCLUSIONS Summary Left ventricle is normal in size, normal wall thickness. Global left ventricular systolic function is normal, calculated ejection fraction is 63% (by 3D Heart Model.) Normal Calculated global L. strain of -21.5 %. Negative bubble study, no shunt noted. Trivial mitral regurgitation. Mild tricuspid regurgitation. Estimated right ventricular systolic pressure is 30 mmHg. Trivial pulmonic insufficiency. Signature ----------------------------------------------------------------------------  Electronically signed by PsychiatricTammy(Sonographer) on 05/26/2021 10:12 AM ---------------------------------------------------------------------------- ----------------------------------------------------------------------------  Electronically signed by Arturo Chris(Interpreting physician) on 05/26/2021  12:06 PM ---------------------------------------------------------------------------- FINDINGS Left Atrium Left atrium is normal in size. Inter-atrial septum is intact with no evidence for an atrial septal defect. Negative bubble study, no shunt noted. Left Ventricle Left ventricle is normal in size, normal wall thickness, global left ventricular systolic function is normal, calculated ejection fraction is 63% (by 3D Heart Model.) Normal Calculated global L. strain of -21.5 %. Right Atrium Right atrium is normal in size. Right Ventricle Normal right ventricular size and function. Mitral Valve Normal mitral valve structure. Trivial mitral regurgitation. Aortic Valve Aortic valve is trileaflet. No evidence of aortic insufficiency or stenosis. Tricuspid Valve Normal tricuspid valve leaflets. Mild tricuspid regurgitation. Estimated right ventricular systolic pressure is 30 mmHg. Pulmonic Valve Pulmonic valve not well visualized but Doppler velocities are normal. Trivial pulmonic insufficiency. Pericardial Effusion No significant pericardial effusion is seen. Miscellaneous Normal aortic root dimension. E/E' average = 8.2. IVC normal diameter & inspiratory collapse indicating normal RA filling pressure .  M-mode / 2D Measurements & Calculations:   LVIDd:4.6 cm(3.7 - 5.6 cm)         Diastolic FTNIJT:92.8 ml  HPKX:5.1 cm(0.6 - 1.1 cm)          Systolic Volume:20.6 ml  LVPWd:0.7 cm(0.6 - 1.1 cm)         Aortic Root:2.9 cm(2.0 - 3.7 cm)                                     LA Dimension: 3 cm(1.9 - 4.0 cm)  Calculated LVEF (%): 65.55 %       LA volume/Index: 50.1 ml /30m^2                                     LVOT:2.1 cm                                     RVDd:2.4 cm   Mitral:                                 Aortic   Valve Area (P1/2-Time): 4.4 cm^2        Peak Velocity: 1.41 m/s  Peak E-Wave: 1.17 m/s                   Mean Velocity: 1.03 m/s  Peak A-Wave: 0.54 m/s                   Peak Gradient: 7.95 mmHg  E/A Ratio: 2.15                         Mean Gradient: 5 mmHg  Peak Gradient: 5.48 mmHg  Mean Gradient: 3 mmHg  Deceleration Time: 165 msec             Area (continuity): 2.98 cm^2  P1/2t: 50 msec                          AV VTI: 30.7 cm   Area (continuity): 2.99 cm^2  Mean Velocity: 0.77 m/s   Tricuspid:                              Pulmonic:   Estimated RVSP: 30 mmHg                 Peak Velocity: 1.17 m/s  Peak TR Velocity: 2.50 m/s              Peak Gradient: 5.48 mmHg  Peak TR Gradient: 25 mmHg  Diastology / Tissue Doppler Septal Wall E' velocity:0.13 m/s Septal Wall E/E':9.1 Lateral Wall E' velocity:0.16 m/s Lateral Wall E/E':7.3    CT HEAD WO CONTRAST    Result Date: 5/24/2021  EXAMINATION: CT OF THE HEAD WITHOUT CONTRAST  5/24/2021 9:53 am TECHNIQUE: CT of the head was performed without the administration of intravenous contrast. Dose modulation, iterative reconstruction, and/or weight based adjustment of the mA/kV was utilized to reduce the radiation dose to as low as reasonably achievable. COMPARISON: None.  HISTORY: ORDERING SYSTEM PROVIDED HISTORY: right-sided numbness TECHNOLOGIST PROVIDED HISTORY: right-sided numbness Decision Support Exception - unselect if not a suspected or confirmed emergency medical condition->Emergency Medical Condition (MA) Is the patient pregnant?->No Reason for Exam: Right side numbness, no injury Acuity: Acute Type of Exam: Initial FINDINGS: BRAIN/VENTRICLES: There is no acute intracranial hemorrhage, mass effect, or midline shift. There is satisfactory overall gray-white matter differentiation. The ventricular structures are symmetric and unremarkable. The infratentorial structures are unremarkable. ORBITS: The visualized portion of the orbits demonstrate no acute abnormality. SINUSES: The visualized paranasal sinuses and mastoid air cells demonstrate no acute abnormality. SOFT TISSUES/SKULL:  No acute abnormality of the visualized skull or soft tissues. No acute intracranial abnormality. CT CERVICAL SPINE WO CONTRAST    Result Date: 5/24/2021  EXAMINATION: CT OF THE CERVICAL SPINE WITHOUT CONTRAST 5/24/2021 9:53 am TECHNIQUE: CT of the cervical spine was performed without the administration of intravenous contrast. Multiplanar reformatted images are provided for review. Dose modulation, iterative reconstruction, and/or weight based adjustment of the mA/kV was utilized to reduce the radiation dose to as low as reasonably achievable. COMPARISON: None. HISTORY: ORDERING SYSTEM PROVIDED HISTORY: right-sided numbness TECHNOLOGIST PROVIDED HISTORY: right-sided numbness Decision Support Exception - unselect if not a suspected or confirmed emergency medical condition->Emergency Medical Condition (MA) Is the patient pregnant?->No Reason for Exam: Right side numbness, no injury Acuity: Acute Type of Exam: Initial FINDINGS: BONES/ALIGNMENT: There is no acute fracture or traumatic malalignment. DEGENERATIVE CHANGES: No significant degenerative changes. SOFT TISSUES: There is no prevertebral soft tissue swelling. No acute abnormality of the cervical spine.      CTA HEAD NECK W CONTRAST    Result Date: 5/27/2021  EXAMINATION: CTA OF THE HEAD AND NECK WITH CONTRAST 5/27/2021 12:31 pm: TECHNIQUE: CTA of the head and neck was performed with the administration of intravenous contrast. Multiplanar reformatted images are provided for review. MIP images are provided for review. Stenosis of the internal carotid arteries measured using NASCET criteria. Dose modulation, iterative reconstruction, and/or weight based adjustment of the mA/kV was utilized to reduce the radiation dose to as low as reasonably achievable. COMPARISON: CTA head and neck performed 05/24/2021. HISTORY: ORDERING SYSTEM PROVIDED HISTORY: re-eval L carotid thrombus TECHNOLOGIST PROVIDED HISTORY: re-eval L carotid thrombus Reason for Exam: re-eval L carotid thrombus Acuity: Unknown Type of Exam: Unknown FINDINGS: CTA NECK: AORTIC ARCH/ARCH VESSELS: No dissection or arterial injury. No significant stenosis of the brachiocephalic or subclavian arteries. CAROTID ARTERIES: The common carotid arteries are patent. There is redemonstration of thrombus in the left carotid bulb extending into the proximal left internal carotid artery. This is similar to slightly smaller compared to prior exam.  Internal carotid arteries are otherwise patent. VERTEBRAL ARTERIES: No dissection, arterial injury, or significant stenosis. SOFT TISSUES: The lung apices are clear. No cervical or superior mediastinal lymphadenopathy. The larynx and pharynx are unremarkable. No acute abnormality of the salivary and thyroid glands. BONES: No acute osseous abnormality. CTA HEAD: ANTERIOR CIRCULATION: No significant stenosis of the intracranial internal carotid, anterior cerebral, or middle cerebral arteries. No aneurysm. POSTERIOR CIRCULATION: No significant stenosis of the vertebral, basilar, or posterior cerebral arteries. No aneurysm. OTHER: No dural venous sinus thrombosis on this non-dedicated study. BRAIN: No mass effect or midline shift. No extra-axial fluid collection. The gray-white differentiation is maintained. Redemonstration of thrombus in the left carotid bulb posteriorly extending into the proximal left internal carotid artery.   This is similar to slightly smaller compared to prior exam. CTA HEAD NECK W CONTRAST    Result Date: 5/24/2021  EXAMINATION: CTA OF THE HEAD AND NECK WITH CONTRAST 5/24/2021 1:32 pm: TECHNIQUE: CTA of the head and neck was performed with the administration of intravenous contrast. Multiplanar reformatted images are provided for review. MIP images are provided for review. Stenosis of the internal carotid arteries measured using NASCET criteria. Dose modulation, iterative reconstruction, and/or weight based adjustment of the mA/kV was utilized to reduce the radiation dose to as low as reasonably achievable. COMPARISON: None. HISTORY: ORDERING SYSTEM PROVIDED HISTORY: numbness, weakness RUE and RLE TECHNOLOGIST PROVIDED HISTORY: numbness, weakness RUE and RLE Decision Support Exception - unselect if not a suspected or confirmed emergency medical condition->Emergency Medical Condition (MA) Reason for Exam: numbness, weakness RUE and RLE Acuity: Acute Type of Exam: Initial FINDINGS: CTA NECK: AORTIC ARCH/ARCH VESSELS: No dissection or arterial injury. No significant stenosis of the brachiocephalic or subclavian arteries. CAROTID ARTERIES: The common carotid arteries are patent. There is a large filling defect presumably representing thrombus within the posterior aspect of the left carotid bulb extending into the central aspect of the proximal left internal carotid artery. The remainder of the internal carotid arteries are patent bilaterally. VERTEBRAL ARTERIES: No dissection, arterial injury, or significant stenosis. SOFT TISSUES: The lung apices are clear. No cervical or superior mediastinal lymphadenopathy. The larynx and pharynx are unremarkable. No acute abnormality of the salivary and thyroid glands. BONES: No acute osseous abnormality. CTA HEAD: ANTERIOR CIRCULATION: No significant stenosis of the intracranial internal carotid, anterior cerebral, or middle cerebral arteries. No aneurysm.  POSTERIOR CIRCULATION: No significant stenosis of the vertebral, basilar, or posterior cerebral arteries. No aneurysm. OTHER: No dural venous sinus thrombosis on this non-dedicated study. BRAIN: No mass effect or midline shift. No extra-axial fluid collection. The gray-white differentiation is maintained. Large filling defect presumably representing thrombus in the posterior aspect of the left carotid bulb extending into the central aspect of the proximal left internal carotid artery. Findings were discussed with Dr. Abdiel Colunga At 2:03 pm on 5/24/2021. MRI brain without contrast    Result Date: 5/25/2021  EXAMINATION: MRI OF THE BRAIN WITHOUT CONTRAST  5/25/2021 7:25 am TECHNIQUE: Multiplanar multisequence MRI of the brain was performed without the administration of intravenous contrast. COMPARISON: None. HISTORY: ORDERING SYSTEM PROVIDED HISTORY: eval for stroke burden, SWI sequence please TECHNOLOGIST PROVIDED HISTORY: eval for stroke burden, SWI sequence please Is the patient pregnant?->No Reason for Exam: possible stroke. Additional signs and symptoms: pt states her whole right side went numb 5/24. FINDINGS: INTRACRANIAL STRUCTURES/VENTRICLES: Numerous small scattered acute infarcts are seen within the left MCA territory. This includes the left caudate head. No mass effect or midline shift. No evidence of an acute intracranial hemorrhage. The ventricles and sulci are normal in size and configuration. The sellar/suprasellar regions appear unremarkable. The normal signal voids within the major intracranial vessels appear maintained. ORBITS: The visualized portion of the orbits demonstrate no acute abnormality. SINUSES: The visualized paranasal sinuses and mastoid air cells are well aerated. BONES/SOFT TISSUES: The bone marrow signal intensity appears normal. The soft tissues demonstrate no acute abnormality. 1. Numerous small scattered acute infarcts within the left MCA territory including the left caudate head.   Given the peripheral location of these infarcts, these may be known as: Coumadin  Take 1 tablet by mouth daily        CONTINUE taking these medications    ferrous sulfate 325 (65 Fe) MG tablet  Commonly known as: IRON 325  Take 1 tablet by mouth 2 times daily     METHADONE HCL PO     pantoprazole 20 MG tablet  Commonly known as: Protonix  Take 1 tablet by mouth daily           Where to Get Your Medications      These medications were sent to [facility] [address] [facility], [address] [facility]  [address], [address] [address]    Phone: [phone]   · aspirin 81 MG chewable tablet  · atorvastatin 80 MG tablet  · warfarin 5 MG tablet         Time Spent on discharge is  31 mins in patient examination, evaluation, counseling as well as medication reconciliation, prescriptions for required medications, discharge plan and follow up. Electronically signed by   Rhina Dougherty MD  6/2/2021  1:59 PM      Thank you Dr. Haris Cabrera primary care provider on file. for the opportunity to be involved in this patient's care.

## 2021-06-03 NOTE — PROGRESS NOTES
history of PE in 2019. She presented 5/24/2021 with L MCA small embolic strokes, and L cervical ICA subocclusive thrombus, s/p tPA. Stroke etio possible 2/2 dissection (no trauma hx). ddx includes hypercoag, cardio embolic. Repeat CTA 5/27/2021 shows stable vs mildly improved thrombus. Pt has residual mild R joe weakness and numbness. Mild headache    Plan  --migraine rx/cocktail as per neuro. --warfarin bridge with heparin gtt. Last inr 1.8. Last aptt 62.2  --Aspirin 81 mg daily  --Neurology care, hypercoag workup  --sbp < 140  --Follow-up dr. Sole Jean  2 weeks after discharge, repeat cta head and neck w con prior to 2w visit. Follow-up with Dr. Segun Leonardo in 3 months. Case discussed with Dr. Segun Leonardo attending.     Tiara Valentin MD, MD  Stroke, [facility]  [address]  Electronically signed 6/2/2021 at 11:17 PM

## 2021-06-04 ENCOUNTER — TELEPHONE (OUTPATIENT)
Dept: PHARMACY | Age: 36
End: 2021-06-04

## 2021-06-04 NOTE — CARE COORDINATION
.Stroke Follow Up Phone Call    Called phone number on record - No answer. [x]Attempt #1 Answering machine pickup. Left message that will call back tomorrow.    []Attempt #2 (final attempt)      Electronically signed by Richard Moser RN on 6/4/21 at 10:55 AM EDT

## 2021-06-07 NOTE — CARE COORDINATION
..Stroke Follow Up Phone Call    Called phone number on record - No answer.      []Attempt #1    [x]Attempt #2 (final attempt)      Electronically signed by Joanie Castillo RN on 6/7/21 at 10:17 AM EDT

## 2021-06-21 NOTE — TELEPHONE ENCOUNTER
Called again today to reschedule, no answer, left vm. Will cancel referral if we do not hear from her by Thursday 6/24/2021.

## 2021-07-09 ENCOUNTER — OFFICE VISIT (OUTPATIENT)
Dept: NEUROLOGY | Age: 36
End: 2021-07-09
Payer: MEDICAID

## 2021-07-09 ENCOUNTER — HOSPITAL ENCOUNTER (OUTPATIENT)
Age: 36
Discharge: HOME | End: 2021-07-09
Payer: MEDICAID

## 2021-07-09 ENCOUNTER — HOSPITAL ENCOUNTER (OUTPATIENT)
Dept: CT IMAGING | Age: 36
Discharge: HOME | End: 2021-07-11
Payer: MEDICAID

## 2021-07-09 ENCOUNTER — TELEPHONE (OUTPATIENT)
Dept: PHARMACY | Age: 36
End: 2021-07-09

## 2021-07-09 VITALS
WEIGHT: 143 LBS | OXYGEN SATURATION: 99 % | DIASTOLIC BLOOD PRESSURE: 63 MMHG | SYSTOLIC BLOOD PRESSURE: 99 MMHG | HEIGHT: 68 IN | HEART RATE: 97 BPM | BODY MASS INDEX: 21.67 KG/M2

## 2021-07-09 DIAGNOSIS — I63.232 STROKE DUE TO OCCLUSION OF LEFT CAROTID ARTERY (HCC): Primary | ICD-10-CM

## 2021-07-09 DIAGNOSIS — I63.232 STROKE DUE TO OCCLUSION OF LEFT CAROTID ARTERY (HCC): ICD-10-CM

## 2021-07-09 DIAGNOSIS — I63.239 CAROTID ARTERY STENOSIS WITH CEREBRAL INFARCTION (HCC): ICD-10-CM

## 2021-07-09 LAB
BUN BLDV-MCNC: 7 MG/DL (ref 6–20)
CREAT SERPL-MCNC: 0.94 MG/DL (ref 0.5–0.9)
GFR AFRICAN AMERICAN: >60 ML/MIN
GFR NON-AFRICAN AMERICAN: >60 ML/MIN
GFR SERPL CREATININE-BSD FRML MDRD: ABNORMAL ML/MIN/{1.73_M2}
GFR SERPL CREATININE-BSD FRML MDRD: ABNORMAL ML/MIN/{1.73_M2}

## 2021-07-09 PROCEDURE — 36415 COLL VENOUS BLD VENIPUNCTURE: CPT

## 2021-07-09 PROCEDURE — 84520 ASSAY OF UREA NITROGEN: CPT

## 2021-07-09 PROCEDURE — 70496 CT ANGIOGRAPHY HEAD: CPT

## 2021-07-09 PROCEDURE — 6360000004 HC RX CONTRAST MEDICATION: Performed by: STUDENT IN AN ORGANIZED HEALTH CARE EDUCATION/TRAINING PROGRAM

## 2021-07-09 PROCEDURE — 99215 OFFICE O/P EST HI 40 MIN: CPT | Performed by: STUDENT IN AN ORGANIZED HEALTH CARE EDUCATION/TRAINING PROGRAM

## 2021-07-09 PROCEDURE — 76937 US GUIDE VASCULAR ACCESS: CPT

## 2021-07-09 PROCEDURE — 82565 ASSAY OF CREATININE: CPT

## 2021-07-09 RX ORDER — WARFARIN SODIUM 5 MG/1
5 TABLET ORAL DAILY
Qty: 30 TABLET | Refills: 5 | Status: SHIPPED | OUTPATIENT
Start: 2021-07-09 | End: 2021-09-21

## 2021-07-09 RX ADMIN — IOPAMIDOL 90 ML: 755 INJECTION, SOLUTION INTRAVENOUS at 12:20

## 2021-07-10 NOTE — PROGRESS NOTES
Endovascular Neurosurgery Clinic Note      Reason for evaluation: ed f/u L cervical ICA thrombus, L mca stroke    SUBJECTIVE:   History of Chief Complaint:    37-year-old female with past medical history of PE in 2019. She presented 2021 with L MCA small embolic strokes, and L cervical ICA subocclusive thrombus, s/p tPA. Stroke etio possible 2/2 dissection (no trauma hx). ddx includes hypercoag, cardio embolic. Since discharge pt reports having intermittent eps of worse R side numbness, lasting 2-10min, ~1x/week. She ran out warfarin 4-5 ago, and was taking half tabs before this. Pt reports calling for refills, but there does not appear to be documentation in the chart to this effect. She reports compliance with asa. Allergies  has No Known Allergies. Medications  Prior to Admission medications    Medication Sig Start Date End Date Taking? Authorizing Provider   warfarin (COUMADIN) 5 MG tablet Take 1 tablet by mouth daily 21  Yes Federico Madrigal MD   aspirin 81 MG chewable tablet Take 1 tablet by mouth daily 6/3/21  Yes Nuno Reese MD   atorvastatin (LIPITOR) 80 MG tablet Take 1 tablet by mouth nightly 21  Yes Nuno Reese MD   ferrous sulfate 325 (65 Fe) MG tablet Take 1 tablet by mouth 2 times daily 10/24/19  Yes Rosenda Rodrigues DO   METHADONE HCL PO Take 120 mg by mouth daily    Yes Historical Provider, MD    Scheduled Meds:  Continuous Infusions:  PRN Meds:.  Past Medical History   has a past medical history of Acid reflux, Anemia, Drug abuse, IV ([address] Headache, History of pulmonary embolus (PE), Marijuana smoker, and Smoker. Past Surgical History   has a past surgical history that includes  section; ovarian cyst removal; Appendectomy; Upper gastrointestinal endoscopy (N/A, 10/21/2019); sigmoidoscopy (N/A, 10/22/2019); and Colonoscopy (N/A, 10/23/2019). Social History   reports that she has been smoking cigarettes.  She has never used smokeless tobacco.   reports no history of alcohol use. reports previous drug use. Drug: Marijuana. Family History  family history includes No Known Problems in her father and mother. Review of Systems:  CONSTITUTIONAL:  negative for fevers, chills, fatigue and malaise    EYES:  negative for double vision, blurred vision and photophobia     HEENT:  negative for tinnitus, epistaxis and sore throat    RESPIRATORY:  negative for cough, shortness of breath, wheezing    CARDIOVASCULAR:  negative for chest pain, palpitations, syncope, edema    GASTROINTESTINAL:  negative for nausea, vomiting    GENITOURINARY:  negative for incontinence    MUSCULOSKELETAL:  negative for neck or back pain    NEUROLOGICAL:  Negative for weakness and tingling  negative for headaches and dizziness    PSYCHIATRIC:  negative for anxiety      Review of systems otherwise negative. OBJECTIVE:     Vitals:    07/09/21 1050   BP: 99/63   Pulse: 97   SpO2: 99%        General:  Gen: thin habitus, NAD. Smells of tobacco smoke  HEENT: NCAT, mucosa moist  Cvs: RRR, S1 S2 normal  Resp: symmetric unlabored breathing  Abd: s/nd/nt  Ext: no edema  Skin: multiple 5mm erythematous rashes on arms. warm and dry    Neuro:  Gen: awake and alert, oriented x3. Lang/speech: no aphasia or dysarthria. Follows commands. CN: PERRL, EOMI, VFF, V1-3 intact, face symmetric, hearing intact, shoulder shrug symmetric, tongue midline  Motor: RLE 4+/5. Otherwise grossly 5/5 UE and LE b/l  Sense: LT intact in all 4 ext. Coord: FTN and HTS intact b/l  DTR: deferred  Gait: narrow base gait    NIH Stroke Scale:   1a  Level of consciousness: 0 - alert; keenly responsive   1b. LOC questions:  0 - answers both questions correctly   1c. LOC commands: 0 - performs both tasks correctly   2. Best Gaze: 0 - normal   3. Visual: 0 - no visual loss   4. Facial Palsy: 0 - normal symmetric movement   5a. Motor left arm: 0 - no drift, limb holds 90 (or 45) degrees for full 10 seconds   5b.   Motor right arm: 0 - no warfarin, medication renewed. --re-referral to coumadin clinic  --continue asa 81  --CTA head and neck w con STAT. cre prior to imaging. Writer to follow up.  -- f/u dr. Jackelin Shah scheduled 10/28/2021    Case discussed with Dr. Jackelin Shah attending. Favian Thomas MD, PhD  Stroke, [facility]  Electronically signed 7/10/2021 at 5:12 PM         I reviewed the [address] [place] [address] note and agree with the documented findings and plan of care. Any areas of disagreement are noted on the chart. I agree with the chief complaint, past medical history, past surgical history, allergies, medications, social and family history as documented unless otherwise noted below. I have personally seen and evaluated the patient and images. I find the patient's history and physical exam are consistent with the [address] [place] [address] documentation. I agree with the care provided, treatment rendered, disposition and follow-up plan. 73-year-old woman with subocclusive thrombus at carotid bulb and discharged on Coumadin. Unclear INR patient has been on Coumadin she was not on Coumadin for the last 5 days. Occasional right paresthesias. Recommend referral to Coumadin clinic. Continue aspirin 81 mg daily. Recommend CT head and neck for follow-up due to new symptoms and to assess any changes in the thrombus. Also follow-up with Dr. Jackelin Shah October 28, 2021    42 minutes with greater than 50% of time spent face to face, counseling, coordinating care, examining patient, obtaining history, reviewing images, labs, and other notes personally. Audrey Mata MD  Office [phone].  Cell [phone]  Stroke, [facility]

## 2021-07-12 ENCOUNTER — TELEPHONE (OUTPATIENT)
Dept: PRIMARY CARE CLINIC | Age: 36
End: 2021-07-12

## 2021-07-12 NOTE — TELEPHONE ENCOUNTER
Left voicemail for patient to reschedule new patient appt with provider, appt cancelled due to patient current [place] of [place] and Virtual Visit with [facility] Provider is not allowed.

## 2021-07-21 ENCOUNTER — TELEPHONE (OUTPATIENT)
Dept: PHARMACY | Age: 36
End: 2021-07-21

## 2021-08-03 ENCOUNTER — OFFICE VISIT (OUTPATIENT)
Dept: NEUROLOGY | Age: 36
End: 2021-08-03
Payer: MEDICAID

## 2021-08-03 VITALS
HEART RATE: 102 BPM | WEIGHT: 151 LBS | SYSTOLIC BLOOD PRESSURE: 122 MMHG | DIASTOLIC BLOOD PRESSURE: 77 MMHG | RESPIRATION RATE: 18 BRPM | OXYGEN SATURATION: 99 % | TEMPERATURE: 97.3 F | HEIGHT: 63 IN | BODY MASS INDEX: 26.75 KG/M2

## 2021-08-03 DIAGNOSIS — I65.22 CAROTID ARTERY THROMBOSIS, LEFT: Primary | ICD-10-CM

## 2021-08-03 PROCEDURE — 99213 OFFICE O/P EST LOW 20 MIN: CPT | Performed by: STUDENT IN AN ORGANIZED HEALTH CARE EDUCATION/TRAINING PROGRAM

## 2021-08-03 PROCEDURE — 1111F DSCHRG MED/CURRENT MED MERGE: CPT | Performed by: STUDENT IN AN ORGANIZED HEALTH CARE EDUCATION/TRAINING PROGRAM

## 2021-08-03 ASSESSMENT — ENCOUNTER SYMPTOMS
GASTROINTESTINAL NEGATIVE: 1
STRIDOR: 0
EYES NEGATIVE: 1
APNEA: 0
SHORTNESS OF BREATH: 0
COUGH: 0
BACK PAIN: 0
CHOKING: 0

## 2021-08-03 NOTE — PATIENT INSTRUCTIONS
Patient Education      Follow up with INR clinic   Taking Warfarin Safely: Care Instructions  Your Care Instructions     Warfarin is a medicine that you take to prevent blood clots. It is often called a blood thinner. Doctors give warfarin (such as Coumadin) to reduce the risk of blood clots. You may be at risk for blood clots if you have atrial fibrillation or deep vein thrombosis. Some other health problems may also put you at risk. Warfarin slows the amount of time it takes for your blood to clot. It can cause bleeding problems. Even if you've been taking warfarin for a while, it's important to know how to take it safely. Foods and other medicines can affect the way warfarin works. Some can make warfarin work too well. This can cause bleeding problems. And some can make it work poorly, so that it does not prevent blood clots very well. You will need regular blood tests to check how long it takes for your blood to form a clot. This test is called a PT or prothrombin time test. The result of the test is called an INR level. Depending on the test results, your doctor or anticoagulation clinic may adjust your dose of warfarin. Follow-up care is a key part of your treatment and safety. Be sure to make and go to all appointments, and call your doctor if you are having problems. It's also a good idea to know your test results and keep a list of the medicines you take. How can you care for yourself at home? Take warfarin safely  · Take your warfarin at the same time each day. · If you miss a dose of warfarin, don't take an extra dose to make up for it. Your doctor can tell you exactly what to do so you don't take too much or too little. · Wear medical alert jewelry that lets others know that you take warfarin. You can buy this at most drugstores. · Don't take warfarin if you are pregnant or planning to get pregnant. Talk to your doctor about how you can prevent getting pregnant while you are taking it.   · Don't change your dose or stop taking warfarin unless your doctor tells you to. Effects of medicines and food on warfarin  · Don't start or stop taking any medicines, vitamins, or natural remedies unless you first talk to your doctor. Many medicines can affect how warfarin works. These include aspirin and other pain relievers, over-the-counter medicines, multivitamins, dietary supplements, and herbal products. · Tell all of your doctors and pharmacists that you take warfarin. Some prescription medicines can affect how warfarin works. · Keep the amount of vitamin K in your diet about the same from day to day. Do not suddenly eat a lot more or a lot less food that is rich in vitamin K than you usually do. Vitamin K affects how warfarin works and how your blood clots. Talk with your doctor before making big changes in your diet. Foods that have a lot of vitamin K include cooked green vegetables, such as:  ? Kale, spinach, turnip greens, iesha greens, Swiss chard, and mustard greens. ? [place] sprouts, broccoli, and cabbage. · Limit your use of alcohol. Avoid bleeding by preventing falls and injuries  · Wear slippers or shoes with nonskid soles. · Remove throw rugs and clutter. · Rearrange furniture and electrical cords to keep them out of walking paths. · Keep stairways, porches, and outside walkways well lit. Use night-lights in hallways and bathrooms. · Be extra careful when you work with sharp tools or knives. When should you call for help? Call 911 anytime you think you may need emergency care. For example, call if:    · You have a sudden, severe headache that is different from past headaches. Call your doctor now or seek immediate medical care if:    · You have any abnormal bleeding, such as:  ? Nosebleeds. ? Vaginal bleeding that is different (heavier, more frequent, at a different time of the month) than what you are used to.  ? Bloody or black stools, or rectal bleeding. ? Bloody or pink urine. Watch closely for changes in your health, and be sure to contact your doctor if you have any problems. Where can you learn more? Go to https://[email or web address]. org and sign in to your [employer] account. Enter U837 in the [employer] box to learn more about \"Taking Warfarin Safely: Care Instructions. \"     If you do not have an account, please click on the \"Sign Up Now\" link. Current as of: August 31, 2020               Content Version: 12.9  © 4335-2394 [employer]wise, Incorporated. Care instructions adapted under license by [facility] [place] ([facility]). If you have questions about a medical condition or this instruction, always ask your healthcare professional. [address] any warranty or liability for your use of this information.

## 2021-08-03 NOTE — PROGRESS NOTES
[address] [place] [address] [address] [facility]  Dept: [phone]  Dept Fax: [phone]    NEUROLOGY FOLLOW UP NOTE                                              PATIENT NAME: Howard Hansen   PATIENT MRN: [id number]  FOLLOW UP TODAY: 8/3/2021        INITIAL & INTERVAL HISTORY:     Howard Hansen is a 37 y.o. female here for follow up. She was admitted to the hospital on 5/24/2021 with acute onset right-sided weakness and numbness with NIHSS of 3 on presentation. She received TPA. CT head a head and neck showed left carotid bulb thrombus. MRI brain showed scattered left MCA territory acute infarcts. After 24 hours she was started on heparin drip and bridged to warfarin prior to discharge. Repeat CTA head and neck 3 days later showed persistence of left carotid bulb thrombus. Repeat CTA head and neck on 7/9/2021 showed resolution of left carotid bulb thrombus. Today, she reports no residual symptoms from her stroke. No weakness numbness tingling or visual symptoms. She has history of PE in 2019 and she was on Eliquis for 3 months at that time. PMH    has a past medical history of Acid reflux, Anemia, Drug abuse, IV ([address] Headache, History of pulmonary embolus (PE), Marijuana smoker, and Smoker. PSH/SH/FMH: Remain unchanged since last visit Visit date not found.     ALLERGIES:   No Known Allergies    MEDICATIONS:   Current Outpatient Medications   Medication Sig Dispense Refill    warfarin (COUMADIN) 5 MG tablet Take 1 tablet by mouth daily 30 tablet 5    aspirin 81 MG chewable tablet Take 1 tablet by mouth daily 30 tablet 3    atorvastatin (LIPITOR) 80 MG tablet Take 1 tablet by mouth nightly 30 tablet 3    ferrous sulfate 325 (65 Fe) MG tablet Take 1 tablet by mouth 2 times daily 180 tablet 1    METHADONE HCL PO Take 120 mg by mouth daily        No current facility-administered medications for this visit.        PREVIOUS WORKUP:    Thrombophilia work-up was unremarkable other than presence of 2 variants of the MTHFR mutation  Antithrombin III level was decreased however the patient was on heparin at that time  Antiphospholipid's antibodies: WNL  Protein C protein S: WNL  Factor V mutation: Negative  Homocysteine level   vitamin F27/ Folic acid: WNL    WILLIS  Left Atrium  Left atrium is normal in size. Left atrial appendage showed no evidence of clot. No intracardiac shunt via color Doppler. Negative bubble study, no shunt noted via injection of agitated saline. Left Ventricle  Normal left ventricular chamber dimension and function. Estimated left  ventricular ejection fraction 55 %. CTA head and neck 7/9/2021  Resolution of left carotid bulb/ICA nonocclusive thrombus  Otherwise unremarkable CTA of the head and neck    CTA head and neck 5/27/2021  Redemonstration of thrombus in the left carotid bulb posteriorly extending into the proximal left internal carotid artery. This is similar to slightly smaller compared to prior exam.     MRI brain without contrast 5/25/2021  1. Numerous small scattered acute infarcts within the left MCA territory including the left caudate head. Given the peripheral location of these infarcts, these may be embolic in nature. No significant mass effect or midline shift. 2. Otherwise, no acute intracranial abnormality. CTA head and neck 5/24/2021   Large filling defect presumably representing thrombus in the posterior aspect of the left carotid bulb extending into the central aspect of the proximal left internal carotid artery. LABS & TESTS:      Lab Results   Component Value Date    WBC 6.0 06/02/2021    HGB 8.1 (L) 06/02/2021    HCT 28.2 (L) 06/02/2021    MCV 82.0 (L) 06/02/2021     06/02/2021       REVIEW OF SYSTEMS:     Review of Systems   Constitutional: Negative. HENT: Negative. Eyes: Negative.     Respiratory: Negative for apnea, cough, choking, shortness of breath and stridor. Cardiovascular: Negative. Gastrointestinal: Negative. Genitourinary: Negative. Musculoskeletal: Negative for arthralgias, back pain, gait problem, joint swelling, myalgias, neck pain and neck stiffness. Skin: Negative. Neurological: Negative for dizziness, tremors, seizures, syncope, facial asymmetry, speech difficulty, weakness, light-headedness, numbness and headaches. Hematological: Negative. Psychiatric/Behavioral: Negative. VITALS  /77 (Site: Left Upper Arm, Position: Sitting, Cuff Size: Medium Adult)   Pulse 102   Temp 97.3 °F (36.3 °C) (Temporal)   Resp 18   Ht 5' 3\" (1.6 m) Comment: per pt  Wt 151 lb (68.5 kg)   SpO2 99%   BMI 26.75 kg/m²     PHYSICAL EXAMINATION:     Physical Exam  Vitals reviewed. Constitutional:       Appearance: Normal appearance. HENT:      Head: Normocephalic and atraumatic. Mouth/Throat:      Mouth: Mucous membranes are moist.   Eyes:      General: No visual field deficit. Extraocular Movements: Extraocular movements intact. Pupils: Pupils are equal, round, and reactive to light. Cardiovascular:      Rate and Rhythm: Normal rate and regular rhythm. Pulmonary:      Effort: Pulmonary effort is normal.      Breath sounds: Normal breath sounds. Abdominal:      General: Abdomen is flat. Palpations: Abdomen is soft. Musculoskeletal:         General: No swelling or tenderness. Cervical back: Normal range of motion and neck supple. Skin:     General: Skin is warm and dry. Neurological:      General: No focal deficit present. Mental Status: She is alert and oriented to person, place, and time. Mental status is at baseline. GCS: GCS eye subscore is 4. GCS verbal subscore is 5. GCS motor subscore is 6. Cranial Nerves: No cranial nerve deficit, dysarthria or facial asymmetry. Sensory: No sensory deficit.       Motor: No weakness, tremor, atrophy, abnormal muscle tone, seizure activity or pronator drift. Coordination: Romberg sign negative. Coordination normal. Finger-Nose-Finger Test and Heel to [facility] Test normal. Rapid alternating movements normal.      Gait: Gait is intact. Gait normal.      Deep Tendon Reflexes: Reflexes normal. Babinski sign absent on the right side. Babinski sign absent on the left side. Reflex Scores:       Bicep reflexes are 2+ on the right side. Patellar reflexes are 2+ on the right side and 2+ on the left side. 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 Pastor received counseling on the following healthy behaviors: medical compliance, smoking cessation, blood pressure control, regular follow up with primary doctor. Chandrika Chambers MD   PGY4, Neurology     This note is created with the assistance of a speech recognition program.  While intending to generate a document that actually reflects the content of the visit, the document can still have some errors including those of syntax and sound a like substitutions which may escape proof reading. In such instances, actual meaning can be extrapolated by contextual diversion.

## 2021-09-17 ENCOUNTER — OFFICE VISIT (OUTPATIENT)
Dept: NEUROLOGY | Age: 36
End: 2021-09-17
Payer: MEDICAID

## 2021-09-17 VITALS
DIASTOLIC BLOOD PRESSURE: 2 MMHG | HEART RATE: 97 BPM | HEIGHT: 63 IN | OXYGEN SATURATION: 99 % | SYSTOLIC BLOOD PRESSURE: 113 MMHG | WEIGHT: 151 LBS | BODY MASS INDEX: 26.75 KG/M2

## 2021-09-17 DIAGNOSIS — I63.232 STROKE DUE TO OCCLUSION OF LEFT CAROTID ARTERY (HCC): Primary | ICD-10-CM

## 2021-09-17 PROCEDURE — 99214 OFFICE O/P EST MOD 30 MIN: CPT | Performed by: PSYCHIATRY & NEUROLOGY

## 2021-09-17 NOTE — PROGRESS NOTES
Endovascular Neurosurgery Clinic Note      Reason for evaluation: f/u L cervical ICA thrombus    SUBJECTIVE:   History of Chief Complaint:    32yo female with past medical history of PE in 2019, poly sub abuse (mj, tobacco), L MCA puntate strokes 2/2 L cervical ICA thrombus s/p tPA 2021 on warfarin. ddx dissection hypercoag, cardio embolic. Pt has repeated transient R side numbness. Pt was last seen in clinic by writer 2021. At that time pt had complaint of eps of R side numbness x1 week. cta showed interval resolution of the L ICA clot. However since this time pt continues to have the same episodes, numbness starts in the R thigh and spreads down her leg and R arm, lasting 2-10min, occurring 1x/week. Allergies  has No Known Allergies. Medications  Prior to Admission medications    Medication Sig Start Date End Date Taking? Authorizing Provider   warfarin (COUMADIN) 5 MG tablet Take 1 tablet by mouth daily 21  Yes Valerie Augustin MD   aspirin 81 MG chewable tablet Take 1 tablet by mouth daily 6/3/21  Yes Grayson Diehl MD   atorvastatin (LIPITOR) 80 MG tablet Take 1 tablet by mouth nightly 21  Yes Grayson Diehl MD   ferrous sulfate 325 (65 Fe) MG tablet Take 1 tablet by mouth 2 times daily 10/24/19  Yes Jodi Schreiber DO   METHADONE HCL PO Take 120 mg by mouth daily    Yes Historical Provider, MD    Scheduled Meds:  Continuous Infusions:  PRN Meds:.  Past Medical History   has a past medical history of Acid reflux, Anemia, Drug abuse, IV ([address] Headache, History of pulmonary embolus (PE), Marijuana smoker, and Smoker. Past Surgical History   has a past surgical history that includes  section; ovarian cyst removal; Appendectomy; Upper gastrointestinal endoscopy (N/A, 10/21/2019); sigmoidoscopy (N/A, 10/22/2019); and Colonoscopy (N/A, 10/23/2019). Social History   reports that she has been smoking cigarettes.  She has never used smokeless tobacco.   reports no history of alcohol use. reports previous drug use. Drug: Marijuana. Family History  family history includes No Known Problems in her father and mother. Review of Systems:  CONSTITUTIONAL:  negative for fevers, chills, fatigue and malaise    EYES:  negative for double vision, blurred vision and photophobia     HEENT:  negative for tinnitus, epistaxis and sore throat    RESPIRATORY:  negative for cough, shortness of breath, wheezing    CARDIOVASCULAR:  negative for chest pain, palpitations, syncope, edema    GASTROINTESTINAL:  negative for nausea, vomiting    GENITOURINARY:  negative for incontinence    MUSCULOSKELETAL:  negative for neck or back pain    NEUROLOGICAL:  Negative for weakness and tingling  negative for headaches and dizziness    PSYCHIATRIC:  negative for anxiety      Review of systems otherwise negative. OBJECTIVE:     Vitals:    09/17/21 1028   BP: (!) 113/2   Pulse: 97   SpO2: 99%        General:  Gen: thin habitus, NAD. Smells of tobacco smoke  HEENT: NCAT, mucosa moist  Cvs: RRR, S1 S2 normal  Resp: symmetric unlabored breathing  Abd: s/nd/nt  Ext: no edema  Skin: multiple 5mm erythematous rashes/scars on arms. clammy skin     Neuro:  Gen: awake and alert, oriented x3. Lang/speech: no aphasia or dysarthria. Follows commands. CN: PERRL, EOMI, VFF, V1-3 intact, face symmetric, hearing intact, shoulder shrug symmetric, tongue midline  Motor: grossly 5/5 UE and LE b/l  Sense: LT intact in all 4 ext. Coord: FTN and HTS intact b/l  DTR: deferred  Gait: narrow base gait     NIH Stroke Scale:   1a  Level of consciousness: 0 - alert; keenly responsive   1b. LOC questions:  0 - answers both questions correctly   1c. LOC commands: 0 - performs both tasks correctly   2. Best Gaze: 0 - normal   3. Visual: 0 - no visual loss   4. Facial Palsy: 0 - normal symmetric movement   5a. Motor left arm: 0 - no drift, limb holds 90 (or 45) degrees for full 10 seconds   5b.   Motor right arm: 0 - no drift, limb holds 90 (or 45) degrees for full 10 seconds   6a. Motor left le - no drift; leg holds 30 degree position for full 5 seconds   6b  Motor right le - no drift; leg holds 30 degree position for full 5 seconds   7. Limb Ataxia: 0 - absent   8. Sensory: 0 - normal; no sensory loss   9. Best Language:  0 - no aphasia, normal   10. Dysarthria: 0 - normal   11. Extinction and Inattention: 0 - no abnormality             Total:   0      MRS: 0        LABS:   Reviewed. Lab Results   Component Value Date    HGB 8.1 (L) 2021    WBC 6.0 2021     2021     2021    BUN 7 2021    CREATININE 0.94 (H) 2021    AST 11 10/19/2019    ALT 5 10/19/2019    MG 2.3 2021    APTT 76.9 (H) 2021    INR 1.8 2021      Lab Results   Component Value Date    COVID19 Not Detected 2021       RADIOLOGY:   Images were personally reviewed including:  CTA head and neck w con 2021  Unremarkable CTA of the head and neck.       Interval resolution of the thrombus seen in the left carotid bulb and   proximal left internal carotid artery. MRI brain wo con 2021  1. Numerous small scattered acute infarcts within the left MCA territory   including the left caudate head.  Given the peripheral location of these   infarcts, these may be embolic in nature.  No significant mass effect or   midline shift. 2. Otherwise, no acute intracranial abnormality. ASSESSMENT:   32yo female with past medical history of PE in 2019, poly sub abuse (mj, tobacco), L MCA puntate strokes 2/2 L cervical ICA thrombus s/p tPA 2021 on warfarin. ddx dissection hypercoag, cardio embolic. Pt has repeated transient R side numbness, repeat CTA 2021 showed resolution of clot. Pt has issues with getting an appt at warfarin clinic.     PLAN:   --continue warfarin, last inr 1.8 2021  --check INR  --re-referral to coumadin clinic.  Pt instructed to call clinic immediately after clinic visit  --continue asa 80  --check MRI brain wo con  -- f/u dr. Ruth Ann Stockton scheduled 10/28/2021    Case discussed with Dr. Ruth Ann Stockton attending. Abraham Frey MD, PhD  Stroke, [facility]  Electronically signed 9/17/2021 at 10:50 AM        I reviewed the [address] [place] [address] note and agree with the documented findings and plan of care. Any areas of disagreement are noted on the chart. I agree with the chief complaint, past medical history, past surgical history, allergies, medications, social and family history as documented unless otherwise noted below. I have personally seen and evaluated the patient and images. I find the patient's history and physical exam are consistent with the [address] [place] [address] documentation. I agree with the care provided, treatment rendered, disposition and follow-up plan. Late Entry Note for Date of Endovascular Neurosurgery/Stroke Service rendered on 9-.    72-year-old woman with a history of PE in 2019, polysubstance abuse, and cervical left internal carotid artery thrombus status post TPA on 8/24/2021 being treated on Coumadin due to distal thromboembolic stroke. CTA from July 9, 2021 with resolution of the clot. 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 Recurrent episodes of right numbness. Plan to cont coumadin although embolus has resolved. followup with Dr. Ruth Ann Stockton 10-    39 minutes with greater than 50% of time spent face to face, examining patient, counseling, coordinating care, obtaining history, reviewing images, labs, and other notes personally. Elvis Edmond MD  Office [phone].  Cell [phone]  Stroke, [facility]

## 2021-09-21 ENCOUNTER — HOSPITAL ENCOUNTER (OUTPATIENT)
Dept: PHARMACY | Age: 36
Setting detail: THERAPIES SERIES
Discharge: HOME | End: 2021-09-21
Payer: MEDICAID

## 2021-09-21 DIAGNOSIS — I63.9 ACUTE CVA (CEREBROVASCULAR ACCIDENT) (HCC): Primary | ICD-10-CM

## 2021-09-21 DIAGNOSIS — I63.232 STROKE DUE TO OCCLUSION OF LEFT CAROTID ARTERY (HCC): ICD-10-CM

## 2021-09-21 LAB
INR BLD: 1.7
PROTIME: 20.4 SECONDS

## 2021-09-21 PROCEDURE — 99213 OFFICE O/P EST LOW 20 MIN: CPT

## 2021-09-21 PROCEDURE — 85610 PROTHROMBIN TIME: CPT

## 2021-09-21 RX ORDER — WARFARIN SODIUM 5 MG/1
TABLET ORAL
Qty: 40 TABLET | Refills: 1 | Status: SHIPPED | OUTPATIENT
Start: 2021-09-21 | End: 2021-11-09 | Stop reason: SDUPTHER

## 2021-09-21 NOTE — PROGRESS NOTES
Medication Management Service, Warfarin Management  AMAN CLARK [facility], [phone]  First visit to ACS Office. Date: 9/21/2021     Education provided on indication and mechanism of warfarin, compliance, appropriate follow-up & monitoring, dietary and medication interactions, potential thromboembolic & bleeding complications, when to seek medical care, and office policy. Patient has been on warfarin in the past:  NO.  Started warfarin post stroke in June. Patient states compliant with regimen. No bleeding or thromboembolic side effects noted. No significant med or dietary changes. No significant recent illness or disease state changes. Subjective:   Rhianna Collier is a 37 y.o. female who presents to clinic today for anticoagulation monitoring and adjustment. Patient seen in clinic for warfarin management due to  Indication:   CVA and H/O PE. INR goal: of 2.0-3.0. Duration of therapy: indefinite. Assessment and PLAN   PT/INR done in office per protocol. INR today is 1.7, subtherapeutic, new start likely secondary to regimen as patient denies missed doses and does not eat much in the way of green vegetables although she does eat some. Plan: Will increase regimen to 7.5mg on Tuesday, Thursday only and 5mg all other days of the week. Using warfarin 5 mg tablets. Recheck INR in 1 week(s). Patient seen in room # 3. ED. OP. = as above. Patient has not been taking atorvastatin so I encouraged her to contact MD to clarify / verify need. New prescription sent electronically to patient's preferred pharmacy. Patient signed CPA acknowledgement. Signed HIPAA today. COVID screening complete and temperature recorded. Patient verbalized understanding of dosing directions and information discussed. Dosing schedule given to patient. Progress note sent to referring office. Patient acknowledges working in consult agreement with pharmacist as referred by his/her physician. Electronically signed by Petey Rao RPh, CACP on 21 at 10:11 AM EDT      For Pharmacy Admin Tracking Only     Intervention Detail: Adherence Monitorin, Dose Adjustment: 1, reason: Improve Adherence, Therapy Optimization and Refill(s) Provided   Total # of Interventions Recommended: 3   Total # of Interventions Accepted: 3   Time Spent (min): 45

## 2021-09-29 ENCOUNTER — HOSPITAL ENCOUNTER (OUTPATIENT)
Dept: PHARMACY | Age: 36
Setting detail: THERAPIES SERIES
Discharge: HOME | End: 2021-09-29
Payer: MEDICAID

## 2021-09-29 DIAGNOSIS — I63.232 STROKE DUE TO OCCLUSION OF LEFT CAROTID ARTERY (HCC): ICD-10-CM

## 2021-09-29 DIAGNOSIS — I63.9 ACUTE CVA (CEREBROVASCULAR ACCIDENT) (HCC): Primary | ICD-10-CM

## 2021-09-29 LAB
INR BLD: 2.3
PROTIME: 27 SECONDS

## 2021-09-29 PROCEDURE — 99211 OFF/OP EST MAY X REQ PHY/QHP: CPT

## 2021-09-29 PROCEDURE — 85610 PROTHROMBIN TIME: CPT

## 2021-09-29 NOTE — PROGRESS NOTES
Medication Management Service, Warfarin Management  AMAN CLARK [facility], [phone]  Visit Date: 9/29/2021   Subjective:   Too Manning is a 37 y.o. female who presents to clinic today for anticoagulation monitoring and adjustment. Patient seen in clinic for warfarin management due to  Indication:   CVA and H/O PE. INR goal: of 2.0-3.0. Duration of therapy: indefinite. Assessment and PLAN   PT/INR done in office per protocol. INR today is 2.3, therapeutic. Plan: Will continue current regimen of warfarin 7.5mg on Tues, Thurs only and 5mg all other days of the week. Using warfarin 5 mg tablets. Recheck INR in 1.5 week(s). Patient seen in room # 3. ED. OP. = discussed green vegetables, encouraged Galileo Martínez to try to start having one serving of green vegetables each week, she likes broccoli, cabbage, and guacamole, very few or really no others. Patient attested to self screening for COVID - 19. Patient verbalized understanding of dosing directions and information discussed. Dosing schedule given to patient. Progress note sent to referring office. Patient acknowledges working in consult agreement with pharmacist as referred by his/her physician.       Mary Ellen Beck RPh, CACP  Clinical Pharmacist Medication Management  9/29/2021  2:31 PM       For Pharmacy Admin Tracking Only     Total # of Interventions Recommended: 0   Total # of Interventions Accepted: 0   Time Spent (min): 15

## 2021-10-11 ENCOUNTER — TELEPHONE (OUTPATIENT)
Dept: PHARMACY | Age: 36
End: 2021-10-11

## 2021-10-11 NOTE — TELEPHONE ENCOUNTER
Called patient due to them not showing up to their appt in the [facility]. Nik's anticoagulation service today. Left a message for them to call back to reschedule.

## 2021-11-03 ENCOUNTER — HOSPITAL ENCOUNTER (OUTPATIENT)
Dept: PHARMACY | Age: 36
Setting detail: THERAPIES SERIES
Discharge: HOME | End: 2021-11-03
Payer: MEDICAID

## 2021-11-03 DIAGNOSIS — I63.232 STROKE DUE TO OCCLUSION OF LEFT CAROTID ARTERY (HCC): ICD-10-CM

## 2021-11-03 DIAGNOSIS — I63.9 ACUTE CVA (CEREBROVASCULAR ACCIDENT) (HCC): Primary | ICD-10-CM

## 2021-11-03 LAB
INR BLD: 1.5
PROTIME: 18.5 SECONDS

## 2021-11-03 PROCEDURE — 85610 PROTHROMBIN TIME: CPT

## 2021-11-03 PROCEDURE — 99212 OFFICE O/P EST SF 10 MIN: CPT

## 2021-11-03 NOTE — PROGRESS NOTES
Medication Management Service, Warfarin Management  [facility], [phone]  Visit Date: 11/3/2021   Subjective:   Barby Arreguin is a 37 y.o. female who presents to clinic today for anticoagulation monitoring and adjustment. Patient seen in clinic for warfarin management due to  Indication:   CVA. INR goal: of 2.0-3.0. Duration of therapy: indefinite. Assessment and PLAN   PT/INR done in office per protocol. INR today is 1.5, subtherapeutic. Maintenance regimen still being determined. Plan: Will increase warfarin by 12.5% weekly. New regimen will be warfarin 5 mg Tuesday, , and 7.5mg all other days of the week. Using warfarin 5 mg tablets. Recheck INR in 1 week. Patient seen in room # 1. Patient attested to self screening for COVID - 19. Patient verbalized understanding of dosing directions and information discussed. Dosing schedule given to patient. Progress note sent to referring office. Patient acknowledges working in consult agreement with pharmacist as referred by his/her physician.       Electronically signed by Keri Anderson on 11/3/21 at 3:07 PM EDT    For Pharmacy Admin Tracking Only     Intervention Detail: Adherence Monitorin   Total # of Interventions Recommended: 1   Total # of Interventions Accepted: 1   Time Spent (min): 20

## 2021-11-09 ENCOUNTER — HOSPITAL ENCOUNTER (OUTPATIENT)
Dept: PHARMACY | Age: 36
Setting detail: THERAPIES SERIES
Discharge: HOME | End: 2021-11-09
Payer: MEDICAID

## 2021-11-09 DIAGNOSIS — I63.9 ACUTE CVA (CEREBROVASCULAR ACCIDENT) (HCC): Primary | ICD-10-CM

## 2021-11-09 DIAGNOSIS — I63.232 STROKE DUE TO OCCLUSION OF LEFT CAROTID ARTERY (HCC): ICD-10-CM

## 2021-11-09 LAB
INR BLD: 1.6
PROTIME: 19.5 SECONDS

## 2021-11-09 PROCEDURE — 6360000002 HC RX W HCPCS: Performed by: INTERNAL MEDICINE

## 2021-11-09 PROCEDURE — 99213 OFFICE O/P EST LOW 20 MIN: CPT

## 2021-11-09 PROCEDURE — 85610 PROTHROMBIN TIME: CPT

## 2021-11-09 PROCEDURE — 99212 OFFICE O/P EST SF 10 MIN: CPT

## 2021-11-09 PROCEDURE — G0008 ADMIN INFLUENZA VIRUS VAC: HCPCS | Performed by: INTERNAL MEDICINE

## 2021-11-09 PROCEDURE — 90686 IIV4 VACC NO PRSV 0.5 ML IM: CPT | Performed by: INTERNAL MEDICINE

## 2021-11-09 RX ORDER — WARFARIN SODIUM 5 MG/1
TABLET ORAL
Qty: 45 TABLET | Refills: 0 | Status: SHIPPED | OUTPATIENT
Start: 2021-11-09 | End: 2021-12-06

## 2021-11-09 RX ADMIN — INFLUENZA A VIRUS A/VICTORIA/2570/2019 IVR-215 (H1N1) ANTIGEN (PROPIOLACTONE INACTIVATED), INFLUENZA A VIRUS A/CAMBODIA/E0826360/2020 IVR-224 (H3N2) ANTIGEN (PROPIOLACTONE INACTIVATED), INFLUENZA B VIRUS B/VICTORIA/705/2018 BVR-11 ANTIGEN (PROPIOLACTONE INACTIVATED), INFLUENZA B VIRUS B/PHUKET/3073/2013 BVR-1B ANTIGEN (PROPIOLACTONE INACTIVATED) 0.5 ML: 15; 15; 15; 15 INJECTION, SUSPENSION INTRAMUSCULAR at 15:05

## 2021-11-09 NOTE — PROGRESS NOTES
Medication Management Service, Warfarin Management  AMAN CLARK [facility], [phone]  Visit Date: 11/9/2021   Subjective:   Rossana Augustin is a 37 y.o. female who presents to clinic today for anticoagulation monitoring and adjustment. Patient seen in clinic for warfarin management due to  Indication:   CVA. INR goal: of 2.0-3.0. Duration of therapy: indefinite. Assessment and PLAN   PT/INR done in office per protocol. INR today is 1.6, subtherapeutic, maintenance regimen still being determined. Plan: Will increase warfarin regimen by 5.6% weekly. New regimen will be warfarin 5 mg Thu, Sat, only and 7.5 mg all other days of the week. Using warfarin 5 mg tablets. Recheck INR in ~ 2 week(s). Patient seen in room # 1. Administered influenza vaccine to patient today. Refill sent to [facility] on [address]    Patient attested to self screening for COVID - 19. Patient verbalized understanding of dosing directions and information discussed. Dosing schedule given to patient. Progress note sent to referring office. Patient acknowledges working in consult agreement with pharmacist as referred by his/her physician.       Electronically signed by Becca Garza on 11/9/21 at 1:40 PM EST    For Pharmacy Admin Tracking Only     Intervention Detail: Dose Adjustment: 1, reason: Therapy Optimization, Refill(s) Provided and Vaccine Recommended/Administered   Total # of Interventions Recommended: 1   Total # of Interventions Accepted: 1   Time Spent (min): 30

## 2021-11-22 ENCOUNTER — HOSPITAL ENCOUNTER (OUTPATIENT)
Dept: PHARMACY | Age: 36
Setting detail: THERAPIES SERIES
Discharge: HOME | End: 2021-11-22
Payer: MEDICAID

## 2021-11-22 DIAGNOSIS — I63.232 STROKE DUE TO OCCLUSION OF LEFT CAROTID ARTERY (HCC): ICD-10-CM

## 2021-11-22 DIAGNOSIS — I63.9 ACUTE CVA (CEREBROVASCULAR ACCIDENT) (HCC): Primary | ICD-10-CM

## 2021-11-22 LAB
INR BLD: 1.8
PROTIME: 22 SECONDS

## 2021-11-22 PROCEDURE — 99212 OFFICE O/P EST SF 10 MIN: CPT

## 2021-11-22 PROCEDURE — 85610 PROTHROMBIN TIME: CPT

## 2021-11-22 NOTE — PROGRESS NOTES
Medication Management Service, Warfarin Management  AMAN CLARK [facility], [phone]  Visit Date: 2021   Subjective:   Ajay Hudson is a 31 y.o. female who presents to clinic today for anticoagulation monitoring and adjustment. Patient seen in clinic for warfarin management due to  Indication:   CVA. INR goal: of 2.0-3.0. Duration of therapy: indefinite. Assessment and PLAN   PT/INR done in office per protocol. INR today is 1.8, subtherapeutic, maintenance regimen still being determined. Plan: Will increase warfarin regimen by 10.5% to warfarin 7.5mg orally once daily. Using warfarin 5 mg tablets. Recheck INR in ~ 2 week(s). Patient seen in room # 3. ED OP = stressed importance of routine vitamin K in diet, patient agreed to add at least one serving, if not two per week. Patient attested to self screening for COVID - 19. Patient verbalized understanding of dosing directions and information discussed. Dosing schedule given to patient. Progress note sent to referring office. Patient acknowledges working in consult agreement with pharmacist as referred by his/her physician.       Paz Oropeza RP, CACP  Clinical Pharmacist Medication Management  2021  1:29 PM     For Pharmacy Admin Tracking Only     Intervention Detail: Adherence Monitorin and Dose Adjustment: 1, reason: Improve Adherence, Therapy Optimization   Total # of Interventions Recommended: 2   Total # of Interventions Accepted: 2   Time Spent (min): 20

## 2021-12-06 ENCOUNTER — HOSPITAL ENCOUNTER (OUTPATIENT)
Dept: PHARMACY | Age: 36
Setting detail: THERAPIES SERIES
Discharge: HOME | End: 2021-12-06
Payer: MEDICAID

## 2021-12-06 DIAGNOSIS — I63.9 ACUTE CVA (CEREBROVASCULAR ACCIDENT) (HCC): Primary | ICD-10-CM

## 2021-12-06 DIAGNOSIS — I63.232 STROKE DUE TO OCCLUSION OF LEFT CAROTID ARTERY (HCC): ICD-10-CM

## 2021-12-06 LAB
INR BLD: 2.2
PROTIME: 26.7 SECONDS

## 2021-12-06 PROCEDURE — 99212 OFFICE O/P EST SF 10 MIN: CPT

## 2021-12-06 PROCEDURE — 85610 PROTHROMBIN TIME: CPT

## 2021-12-06 RX ORDER — WARFARIN SODIUM 7.5 MG/1
TABLET ORAL
Qty: 90 TABLET | Refills: 1 | Status: SHIPPED | OUTPATIENT
Start: 2021-12-06 | End: 2022-05-25

## 2021-12-06 NOTE — PROGRESS NOTES
Medication Management Service, Warfarin Management  AMAN CLARK [facility], [phone]  Visit Date: 2021   Subjective:   Louisa Herrera is a 31 y.o. female who presents to clinic today for anticoagulation monitoring and adjustment. Patient seen in clinic for warfarin management due to  Indication:   CVA. INR goal: of 2.0-3.0. Duration of therapy: indefinite. Assessment and PLAN   PT/INR done in office per protocol. INR today is  2.2, therapeutic. Plan: Will continue current regimen of warfarin 7.5mg orally once daily. Currently using 5mg tablets, but new script is needed so changing to 7.5mg tablets. Recheck INR in 2 week(s). Patient seen in room # 3. ED OP = stressed importance of consistent vitamin K intake, do not increase intake. Also reviewed importance of being aware of which tablet strength she is using as she will be depleting 5mg tablets then converting to 7.5mg tablets. New prescription sent electronically to patient's preferred pharmacy. Patient attested to self screening for COVID - 19. Patient verbalized understanding of dosing directions and information discussed. Dosing schedule given to patient. Progress note sent to referring office. Patient acknowledges working in consult agreement with pharmacist as referred by his/her physician.       Aye Hudson, WILBUR, CACP  Clinical Pharmacist Medication Management  2021  1:32 PM     For Pharmacy Admin Tracking Only     Intervention Detail: Adherence Monitorin and Refill(s) Provided   Total # of Interventions Recommended: 3   Total # of Interventions Accepted: 3   Time Spent (min): 20

## 2021-12-09 DIAGNOSIS — I63.232 STROKE DUE TO OCCLUSION OF LEFT CAROTID ARTERY (HCC): ICD-10-CM

## 2021-12-30 ENCOUNTER — HOSPITAL ENCOUNTER (OUTPATIENT)
Dept: PHARMACY | Age: 36
Setting detail: THERAPIES SERIES
Discharge: HOME | End: 2021-12-30
Payer: MEDICAID

## 2021-12-30 DIAGNOSIS — I63.9 ACUTE CVA (CEREBROVASCULAR ACCIDENT) (HCC): Primary | ICD-10-CM

## 2021-12-30 DIAGNOSIS — I63.232 STROKE DUE TO OCCLUSION OF LEFT CAROTID ARTERY (HCC): ICD-10-CM

## 2021-12-30 LAB
INR BLD: 2.5
PROTIME: 30.3 SECONDS

## 2021-12-30 PROCEDURE — 85610 PROTHROMBIN TIME: CPT

## 2021-12-30 PROCEDURE — 99211 OFF/OP EST MAY X REQ PHY/QHP: CPT

## 2022-01-27 ENCOUNTER — HOSPITAL ENCOUNTER (OUTPATIENT)
Dept: PHARMACY | Age: 37
Setting detail: THERAPIES SERIES
Discharge: HOME | End: 2022-01-27
Payer: MEDICAID

## 2022-01-27 DIAGNOSIS — I63.232 STROKE DUE TO OCCLUSION OF LEFT CAROTID ARTERY (HCC): ICD-10-CM

## 2022-01-27 DIAGNOSIS — I63.9 ACUTE CVA (CEREBROVASCULAR ACCIDENT) (HCC): Primary | ICD-10-CM

## 2022-01-27 LAB
INR BLD: 2.6
PROTIME: 31.1 SECONDS

## 2022-01-27 PROCEDURE — 99211 OFF/OP EST MAY X REQ PHY/QHP: CPT

## 2022-01-27 PROCEDURE — 85610 PROTHROMBIN TIME: CPT

## 2022-01-27 NOTE — PROGRESS NOTES
Medication Management Service, Warfarin Management  [address], [phone]  Visit Date: 1/27/2022   Subjective:   Nelia Lew is a 31 y.o. female who presents to clinic today for anticoagulation monitoring and adjustment. Patient seen in clinic for warfarin management due to  Indication:   CVA. INR goal: of 2.0-3.0. Duration of therapy: indefinite. Assessment and PLAN   PT/INR done in office per protocol. INR today is  2.6, therapeutic. Plan: Will continue current regimen of warfarin 7.5mg orally once daily. Using 7.5mg tablets. Recheck INR in 4 week(s). Patient seen in room # 3. Patient attested to self screening for COVID - 19. Patient verbalized understanding of dosing directions and information discussed. Dosing schedule given to patient. Progress note sent to referring office. Patient acknowledges working in consult agreement with pharmacist as referred by his/her physician.       Tonja Tong RPh, CACP  Clinical Pharmacist Medication Management  1/27/2022  1:26 PM     For Pharmacy Admin Tracking Only     Total # of Interventions Recommended: 0   Total # of Interventions Accepted: 0   Time Spent (min): 15

## 2022-02-02 ENCOUNTER — TELEMEDICINE (OUTPATIENT)
Dept: NEUROLOGY | Age: 37
End: 2022-02-02
Payer: MEDICAID

## 2022-02-02 DIAGNOSIS — F19.11 HISTORY OF INTRAVENOUS DRUG ABUSE (HCC): ICD-10-CM

## 2022-02-02 DIAGNOSIS — R20.0 RIGHT SIDED NUMBNESS: ICD-10-CM

## 2022-02-02 DIAGNOSIS — Z15.89 MTHFR GENE MUTATION: ICD-10-CM

## 2022-02-02 DIAGNOSIS — I63.239 CAROTID ARTERY STENOSIS WITH CEREBRAL INFARCTION (HCC): Primary | ICD-10-CM

## 2022-02-02 PROCEDURE — 99214 OFFICE O/P EST MOD 30 MIN: CPT | Performed by: PSYCHIATRY & NEUROLOGY

## 2022-02-02 ASSESSMENT — ENCOUNTER SYMPTOMS
VOMITING: 0
PHOTOPHOBIA: 0
SHORTNESS OF BREATH: 0
VOICE CHANGE: 0
NAUSEA: 0
COLOR CHANGE: 0
COUGH: 0

## 2022-02-16 ENCOUNTER — OFFICE VISIT (OUTPATIENT)
Dept: NEUROLOGY | Age: 37
End: 2022-02-16
Payer: MEDICAID

## 2022-02-16 VITALS
HEIGHT: 63 IN | WEIGHT: 151 LBS | BODY MASS INDEX: 26.75 KG/M2 | OXYGEN SATURATION: 97 % | SYSTOLIC BLOOD PRESSURE: 108 MMHG | HEART RATE: 93 BPM | DIASTOLIC BLOOD PRESSURE: 73 MMHG

## 2022-02-16 DIAGNOSIS — D68.59 HYPERCOAGULABLE STATE (HCC): ICD-10-CM

## 2022-02-16 DIAGNOSIS — I63.00 CEREBROVASCULAR ACCIDENT (CVA) DUE TO THROMBOSIS OF PRECEREBRAL ARTERY (HCC): Primary | ICD-10-CM

## 2022-02-16 PROCEDURE — 99213 OFFICE O/P EST LOW 20 MIN: CPT | Performed by: STUDENT IN AN ORGANIZED HEALTH CARE EDUCATION/TRAINING PROGRAM

## 2022-02-16 NOTE — PROGRESS NOTES
[address] [place] [address], [address] [facility]  Dept: [phone]  Dept Fax: [phone]    NEUROLOGY FOLLOW-UP PATIENT NOTE       PATIENT NAME: Angie Guy  PATIENT MRN: [id number]  PRIMARY CARE PHYSICIAN: No primary care provider on file. HPI:      Angie Guy is a 31 y.o. female admitted to the hospital on 2021 with acute onset right-sided weakness and numbness NIH of 3, received TPA, CT head: Left carotid bulb thrombus, MRI: Showed left MCA territory acute infarct, started on heparin drip bridged to warfarin we will discharge,  -CTA head and neck 3 days later showed persistent of left carotid bulb with thrombus,  Repeat CTA head-and neck on 2021: Resolution of left carotid bulb thrombus,  Last visit to the office was on 8/3/2021: Reporting residual symptoms from her stroke, no weakness numbness tingling or visual symptoms,    Thrombophilia work-up: Unremarkable,  2 variants of MTHFR mutation-  -antiphospholipid antibodies within normal limits, protein C and S: WNL  -Factor V mutation: Negative, C08 and folic: Within normal limits    -WILLIS: Negative bubble study EF 55%,    -Patient is following with Coumadin clinic, last visit 2022: INR of 2.6 therapeutic, continue warfarin 7.5 mg orally, daily and recheck in 4 weeks      PREVIOUS WORKUP:     Lab Results   Component Value Date    WBC 6.0 2021    HGB 8.1 (L) 2021    HCT 28.2 (L) 2021    MCV 82.0 (L) 2021     2021       Past Medical History:   Diagnosis Date    Acid reflux     Anemia 10/18/2019    Drug abuse, IV ([address]     claims that she has previous iv drug dependency claims hasn' had recent usage.     Headache     History of pulmonary embolus (PE)     Marijuana smoker 10/18/2019    Smoker 10/18/2019        Past Surgical History:   Procedure Laterality Date    APPENDECTOMY       SECTION      COLONOSCOPY N/A 10/23/2019 COLORECTAL CANCER SCREENING, NOT HIGH RISK performed by Corwin Cline MD at [facility] [address] N/A 10/22/2019    SIGMOIDOSCOPY ABORTED COLONOSCOPY performed by Corwin Cline MD at [address] [facility] N/A 10/21/2019    EGD ESOPHAGOGASTRODUODENOSCOPY performed by Corwin Cline MD at [facility] Marital status:      Spouse name: Isaiah Freitas Number of children: 3    Years of education: Not on file    Highest education level: Not on file   Occupational History    Not on file   Tobacco Use    Smoking status: Current Every Day Smoker     Types: Cigarettes    Smokeless tobacco: Never Used   Vaping Use    Vaping Use: Never used   Substance and Sexual Activity    Alcohol use: No    Drug use: Not Currently     Types: Marijuana Eudelia Irina)    Sexual activity: Yes   Other Topics Concern    Not on file   Social History Narrative    Not on file     Social Determinants of Health     Financial Resource Strain:     Difficulty of Paying Living Expenses: Not on file   Food Insecurity:     Worried About Running Out of Food in the Last Year: Not on file    Denisha of Food in the Last Year: Not on file   Transportation Needs:     Lack of Transportation (Medical): Not on file    Lack of Transportation (Non-Medical):  Not on file   Physical Activity:     Days of Exercise per Week: Not on file    Minutes of Exercise per Session: Not on file   Stress:     Feeling of Stress : Not on file   Social Connections:     Frequency of Communication with Friends and Family: Not on file    Frequency of Social Gatherings with Friends and Family: Not on file    Attends Restoration Services: Not on file    Active Member of Clubs or Organizations: Not on file    Attends Club or Organization Meetings: Not on file    Marital Status: Not on file   Intimate Partner Violence:     Fear of Current or Ex-Partner: Not on file   Jeanine Gonsalez Emotionally Abused: Not on file    Physically Abused: Not on file    Sexually Abused: Not on file   Housing Stability:     Unable to Pay for Housing in the Last Year: Not on file    Number of Places Lived in the Last Year: Not on file    Unstable Housing in the Last Year: Not on file        Current Outpatient Medications   Medication Sig Dispense Refill    warfarin (COUMADIN) 7.5 MG tablet Take 1 tablet (or as directed by Medication Management) by mouth once daily. 90 tablet 1    aspirin 81 MG chewable tablet Take 1 tablet by mouth daily 30 tablet 3    atorvastatin (LIPITOR) 80 MG tablet Take 1 tablet by mouth nightly (Patient not taking: Reported on 9/21/2021) 30 tablet 3    ferrous sulfate 325 (65 Fe) MG tablet Take 1 tablet by mouth 2 times daily 180 tablet 1    METHADONE HCL PO Take 120 mg by mouth daily        No current facility-administered medications for this visit. No Known Allergies     REVIEW OF SYSTEMS:     Review of Systems     VITALS  There were no vitals taken for this visit. PHYSICAL EXAMINATION:     Physical Exam   General appearance: cooperative  Skin: no rash or skin lesions. HEENT: normocephalic  Optic Fundi: deferred  Neck: supple, no cervcical adenopathy or carotid bruit  Lungs: clear to auscultation  Heart: Regular rate and rhythm, normal S1, S2. No murmurs, clicks or gallops.   Peripheral pulses: radial pulses palpable  Abdominal: BS present, soft, NT, ND  Extremities: no edema    NEUROLOGICAL EXAMINATION:     GENERAL  Appears comfortable and in no distress   HEENT  NC/ AT   HEART  S1 and S2 heard; palpation of pulses: radial pulse    NECK  Supple and no bruits heard   MENTAL STATUS:  Alert, oriented, intact memory, no confusion, normal speech, normal language, no hallucination or delusion   CRANIAL NERVES: II     -      Visual fields intact to confrontation  III,IV,VI -  PERR, EOMs full, no ptosis  V     -     Normal facial sensation   VII    -     Normal facial symmetry  VIII   -     Intact hearing   IX,X -     Symmetrical palate  XI    -     Symmetrical shoulder shrug  XII   -     Midline tongue, no atrophy    MOTOR FUNCTION: RUE: Significant for good strength of grade 5/5 in proximal and distal muscle groups   LUE: Significant for good strength of grade 5/5 in proximal and distal muscle groups   RLE: Significant for good strength of grade 5/5 in proximal and distal muscle groups   LLE: Significant for good strength of grade 5/5 in proximal and distal muscle groups      Normal bulk, normal tone and no involuntary movements, no tremor   SENSORY FUNCTION:  Normal touch, normal pin, normal vibration, normal proprioception   CEREBELLAR FUNCTION:  Intact fine motor control over upper limbs and lower limbs   REFLEX FUNCTION:  Symmetric in upper and lower extremities, no Babinski sign   STATION and GAIT  Normal gait and tandem station, normal tip toes and heel walking       ASSESSMENT:     · S/p acute ischemic stroke due to thromboembolism from left carotid bulb thrombus  · Thrombophilia due to MTHFR mutation  · History of PE in 2019    On warfarin 7.5 mg daily, Lipitor 40 mg at bedtime,    PLAN:     1. No changes for her medication  2. Keep following with Coumadin clinic  3. Follow-up with us after 1 year    Ms. Pastor received counseling on the following healthy behaviors: medical compliance, smoking cessation, blood pressure control, regular follow up with primary doctor.         Electronically signed by Mani Morillo MD on 2/16/2022 at 1:57 PM

## 2022-02-24 ENCOUNTER — HOSPITAL ENCOUNTER (OUTPATIENT)
Dept: PHARMACY | Age: 37
Setting detail: THERAPIES SERIES
Discharge: HOME | End: 2022-02-24
Payer: MEDICAID

## 2022-02-24 DIAGNOSIS — I63.232 STROKE DUE TO OCCLUSION OF LEFT CAROTID ARTERY (HCC): ICD-10-CM

## 2022-02-24 DIAGNOSIS — I63.9 ACUTE CVA (CEREBROVASCULAR ACCIDENT) (HCC): Primary | ICD-10-CM

## 2022-02-24 LAB
INR BLD: 1.7
PROTIME: 20.6 SECONDS

## 2022-02-24 PROCEDURE — 99212 OFFICE O/P EST SF 10 MIN: CPT

## 2022-02-24 PROCEDURE — 85610 PROTHROMBIN TIME: CPT

## 2022-02-24 NOTE — PROGRESS NOTES
Medication Management Service, Warfarin Management  [facility], [phone]  Visit Date: 2/24/2022   Subjective:   Danny Nieves is a 31 y.o. female who presents to clinic today for anticoagulation monitoring and adjustment. Patient seen in clinic for warfarin management due to  Indication:   CVA. INR goal: of 2.0-3.0. Duration of therapy: indefinite. Assessment and PLAN   PT/INR done in office per protocol. INR today is  1.7, subtherapeutic; cause not determined, patient denies missed doses and other usual causes. Plan: Will boost dose today to 11.25mg then continue current regimen of warfarin 7.5mg orally once daily. Using 7.5mg tablets. Recheck INR in 2.5 week(s). Patient seen in room # 3. Patient attested to self screening for COVID - 19. Patient verbalized understanding of dosing directions and information discussed. Dosing schedule given to patient. Progress note sent to referring office. Patient acknowledges working in consult agreement with pharmacist as referred by his/her physician.       Mary Willingham RP, CACP  Clinical Pharmacist Medication Management  2/24/2022  11:51 AM     For Pharmacy Admin Tracking Only     Intervention Detail: Dose Adjustment: 1, reason: Therapy Optimization   Total # of Interventions Recommended: 1   Total # of Interventions Accepted: 1   Time Spent (min): 15

## 2022-03-15 ENCOUNTER — TELEPHONE (OUTPATIENT)
Dept: PHARMACY | Age: 37
End: 2022-03-15

## 2022-03-15 NOTE — TELEPHONE ENCOUNTER
Patient was a No Call No Show for [facility] appointment today. Called to reschedule, spoke with patient.

## 2022-03-16 ENCOUNTER — HOSPITAL ENCOUNTER (OUTPATIENT)
Dept: PHARMACY | Age: 37
Setting detail: THERAPIES SERIES
Discharge: HOME | End: 2022-03-16
Payer: MEDICAID

## 2022-03-16 DIAGNOSIS — I63.232 STROKE DUE TO OCCLUSION OF LEFT CAROTID ARTERY (HCC): ICD-10-CM

## 2022-03-16 DIAGNOSIS — I63.9 ACUTE CVA (CEREBROVASCULAR ACCIDENT) (HCC): Primary | ICD-10-CM

## 2022-03-16 LAB
INR BLD: 2.7
PROTIME: 30.2 SECONDS

## 2022-03-16 PROCEDURE — 99211 OFF/OP EST MAY X REQ PHY/QHP: CPT

## 2022-03-16 PROCEDURE — 85610 PROTHROMBIN TIME: CPT

## 2022-03-16 NOTE — PROGRESS NOTES
Medication Management Service, Warfarin Management  [facility], [phone]  Visit Date: 3/16/2022   Subjective:   Rafal Szymanski is a 31 y.o. female who presents to clinic today for anticoagulation monitoring and adjustment. Patient seen in clinic for warfarin management due to  Indication:   CVA. INR goal: of 2.0-3.0. Duration of therapy: indefinite. Assessment and PLAN   PT/INR done in office per protocol. INR today is  2.7, therapeutic. Plan: Will continue current regimen of warfarin 7.5mg orally once daily. Using 7.5mg tablets. Recheck INR in 4 week(s). Patient seen in room # 1. ED. OP. = Discussed vitamin K foods and the impact on INR. Also discussed alcohol impact on the INR but patient denies use. Educated the patient to stay away from NSAIDS like ibuprofen and naproxen and stick to acetaminophen (Tylenol) for OTC pain relief. Patient attested to self screening for COVID - 19. Patient verbalized understanding of dosing directions and information discussed. Dosing schedule given to patient. Progress note sent to referring office. Patient acknowledges working in consult agreement with pharmacist as referred by his/her physician.       Electronically signed by Shahid Morrow on 3/16/22 at 3:34 PM EDT    For Pharmacy Admin Tracking Only     · Total # of Interventions Recommended: 0  · Total # of Interventions Accepted: 0  · Time Spent (min): 15

## 2022-04-06 ENCOUNTER — APPOINTMENT (OUTPATIENT)
Dept: CT IMAGING | Age: 37
DRG: 045 | End: 2022-04-06
Payer: MEDICAID

## 2022-04-06 ENCOUNTER — HOSPITAL ENCOUNTER (INPATIENT)
Age: 37
LOS: 1 days | Discharge: LEFT BEFORE DISCHARGE | DRG: 045 | End: 2022-04-06
Attending: EMERGENCY MEDICINE
Payer: MEDICAID

## 2022-04-06 VITALS
TEMPERATURE: 98.4 F | HEART RATE: 111 BPM | SYSTOLIC BLOOD PRESSURE: 118 MMHG | RESPIRATION RATE: 17 BRPM | WEIGHT: 150 LBS | BODY MASS INDEX: 26.57 KG/M2 | DIASTOLIC BLOOD PRESSURE: 75 MMHG | OXYGEN SATURATION: 100 %

## 2022-04-06 DIAGNOSIS — R47.1 DYSARTHRIA: Primary | ICD-10-CM

## 2022-04-06 PROBLEM — I63.9 ACUTE CEREBROVASCULAR ACCIDENT (CVA) (HCC): Status: ACTIVE | Noted: 2022-04-06

## 2022-04-06 LAB
ABSOLUTE EOS #: 0.07 K/UL (ref 0–0.4)
ABSOLUTE IMMATURE GRANULOCYTE: 0 K/UL (ref 0–0.3)
ABSOLUTE LYMPH #: 1.52 K/UL (ref 1–4.8)
ABSOLUTE MONO #: 0.26 K/UL (ref 0.1–0.8)
ANION GAP SERPL CALCULATED.3IONS-SCNC: 10 MMOL/L (ref 9–17)
ANION GAP: 10 MMOL/L (ref 7–16)
BASOPHILS # BLD: 0 % (ref 0–2)
BASOPHILS ABSOLUTE: 0 K/UL (ref 0–0.2)
BUN BLDV-MCNC: 11 MG/DL (ref 6–20)
CALCIUM SERPL-MCNC: 9.1 MG/DL (ref 8.6–10.4)
CHLORIDE BLD-SCNC: 102 MMOL/L (ref 98–107)
CO2: 24 MMOL/L (ref 20–31)
CREAT SERPL-MCNC: 0.66 MG/DL (ref 0.5–0.9)
EOSINOPHILS RELATIVE PERCENT: 1 % (ref 1–4)
GFR AFRICAN AMERICAN: >60 ML/MIN
GFR NON-AFRICAN AMERICAN: >60 ML/MIN
GFR NON-AFRICAN AMERICAN: >60 ML/MIN
GFR SERPL CREATININE-BSD FRML MDRD: >60 ML/MIN
GFR SERPL CREATININE-BSD FRML MDRD: ABNORMAL ML/MIN/{1.73_M2}
GFR SERPL CREATININE-BSD FRML MDRD: NORMAL ML/MIN/{1.73_M2}
GLUCOSE BLD-MCNC: 99 MG/DL (ref 70–99)
GLUCOSE BLD-MCNC: 99 MG/DL (ref 74–100)
HCO3 VENOUS: 26.4 MMOL/L (ref 22–29)
HCT VFR BLD CALC: 26.1 % (ref 36.3–47.1)
HEMOGLOBIN: 6.9 G/DL (ref 11.9–15.1)
IMMATURE GRANULOCYTES: 0 %
INR BLD: 1.6
LYMPHOCYTES # BLD: 23 % (ref 24–44)
MCH RBC QN AUTO: 17.8 PG (ref 25.2–33.5)
MCHC RBC AUTO-ENTMCNC: 26.4 G/DL (ref 28.4–34.8)
MCV RBC AUTO: 67.4 FL (ref 82.6–102.9)
MONOCYTES # BLD: 4 % (ref 1–7)
MORPHOLOGY: ABNORMAL
MYOGLOBIN: <21 NG/ML (ref 25–58)
NRBC AUTOMATED: 0 PER 100 WBC
O2 SAT, VEN: 13 % (ref 60–85)
PARTIAL THROMBOPLASTIN TIME: 27.2 SEC (ref 20.5–30.5)
PCO2, VEN: 45.3 MM HG (ref 41–51)
PDW BLD-RTO: 19.9 % (ref 11.8–14.4)
PH VENOUS: 7.37 (ref 7.32–7.43)
PLATELET # BLD: 254 K/UL (ref 138–453)
PMV BLD AUTO: 10.7 FL (ref 8.1–13.5)
PO2, VEN: 12.2 MM HG (ref 30–50)
POC BUN: 9 MG/DL (ref 8–26)
POC CHLORIDE: 101 MMOL/L (ref 98–107)
POC CREATININE: 0.91 MG/DL (ref 0.51–1.19)
POC HEMATOCRIT: 25 % (ref 36–46)
POC HEMOGLOBIN: 8.4 G/DL (ref 12–16)
POC IONIZED CALCIUM: 1.2 MMOL/L (ref 1.15–1.33)
POC LACTIC ACID: 0.64 MMOL/L (ref 0.56–1.39)
POC POTASSIUM: 3.7 MMOL/L (ref 3.5–4.5)
POC SODIUM: 137 MMOL/L (ref 138–146)
POC TCO2: 27 MMOL/L (ref 22–30)
POSITIVE BASE EXCESS, VEN: 1 (ref 0–3)
POTASSIUM SERPL-SCNC: 3.8 MMOL/L (ref 3.7–5.3)
PROTHROMBIN TIME: 16.1 SEC (ref 9.1–12.3)
RBC # BLD: 3.87 M/UL (ref 3.95–5.11)
SEG NEUTROPHILS: 72 % (ref 36–66)
SEGMENTED NEUTROPHILS ABSOLUTE COUNT: 4.75 K/UL (ref 1.8–7.7)
SODIUM BLD-SCNC: 136 MMOL/L (ref 135–144)
TOTAL CK: 45 U/L (ref 26–192)
TROPONIN, HIGH SENSITIVITY: <6 NG/L (ref 0–14)
WBC # BLD: 6.6 K/UL (ref 3.5–11.3)

## 2022-04-06 PROCEDURE — 84520 ASSAY OF UREA NITROGEN: CPT

## 2022-04-06 PROCEDURE — 6360000004 HC RX CONTRAST MEDICATION: Performed by: HEALTH CARE PROVIDER

## 2022-04-06 PROCEDURE — 96365 THER/PROPH/DIAG IV INF INIT: CPT

## 2022-04-06 PROCEDURE — 83605 ASSAY OF LACTIC ACID: CPT

## 2022-04-06 PROCEDURE — 82803 BLOOD GASES ANY COMBINATION: CPT

## 2022-04-06 PROCEDURE — 85730 THROMBOPLASTIN TIME PARTIAL: CPT

## 2022-04-06 PROCEDURE — 2580000003 HC RX 258: Performed by: STUDENT IN AN ORGANIZED HEALTH CARE EDUCATION/TRAINING PROGRAM

## 2022-04-06 PROCEDURE — 85610 PROTHROMBIN TIME: CPT

## 2022-04-06 PROCEDURE — 6360000002 HC RX W HCPCS: Performed by: STUDENT IN AN ORGANIZED HEALTH CARE EDUCATION/TRAINING PROGRAM

## 2022-04-06 PROCEDURE — 1200000000 HC SEMI PRIVATE

## 2022-04-06 PROCEDURE — 99285 EMERGENCY DEPT VISIT HI MDM: CPT

## 2022-04-06 PROCEDURE — 99223 1ST HOSP IP/OBS HIGH 75: CPT | Performed by: PSYCHIATRY & NEUROLOGY

## 2022-04-06 PROCEDURE — 80051 ELECTROLYTE PANEL: CPT

## 2022-04-06 PROCEDURE — 93005 ELECTROCARDIOGRAM TRACING: CPT | Performed by: HEALTH CARE PROVIDER

## 2022-04-06 PROCEDURE — 83874 ASSAY OF MYOGLOBIN: CPT

## 2022-04-06 PROCEDURE — 82553 CREATINE MB FRACTION: CPT

## 2022-04-06 PROCEDURE — 82330 ASSAY OF CALCIUM: CPT

## 2022-04-06 PROCEDURE — 82565 ASSAY OF CREATININE: CPT

## 2022-04-06 PROCEDURE — 2580000003 HC RX 258: Performed by: HEALTH CARE PROVIDER

## 2022-04-06 PROCEDURE — 70498 CT ANGIOGRAPHY NECK: CPT

## 2022-04-06 PROCEDURE — 70450 CT HEAD/BRAIN W/O DYE: CPT

## 2022-04-06 PROCEDURE — G0378 HOSPITAL OBSERVATION PER HR: HCPCS

## 2022-04-06 PROCEDURE — 6370000000 HC RX 637 (ALT 250 FOR IP): Performed by: STUDENT IN AN ORGANIZED HEALTH CARE EDUCATION/TRAINING PROGRAM

## 2022-04-06 PROCEDURE — 82550 ASSAY OF CK (CPK): CPT

## 2022-04-06 PROCEDURE — 85025 COMPLETE CBC W/AUTO DIFF WBC: CPT

## 2022-04-06 PROCEDURE — 80048 BASIC METABOLIC PNL TOTAL CA: CPT

## 2022-04-06 PROCEDURE — 85014 HEMATOCRIT: CPT

## 2022-04-06 PROCEDURE — 84484 ASSAY OF TROPONIN QUANT: CPT

## 2022-04-06 PROCEDURE — 82947 ASSAY GLUCOSE BLOOD QUANT: CPT

## 2022-04-06 RX ORDER — ONDANSETRON 2 MG/ML
4 INJECTION INTRAMUSCULAR; INTRAVENOUS EVERY 6 HOURS PRN
Status: CANCELLED | OUTPATIENT
Start: 2022-04-06

## 2022-04-06 RX ORDER — SODIUM CHLORIDE 9 MG/ML
INJECTION, SOLUTION INTRAVENOUS CONTINUOUS
Status: CANCELLED | OUTPATIENT
Start: 2022-04-06

## 2022-04-06 RX ORDER — 0.9 % SODIUM CHLORIDE 0.9 %
1000 INTRAVENOUS SOLUTION INTRAVENOUS ONCE
Status: COMPLETED | OUTPATIENT
Start: 2022-04-06 | End: 2022-04-06

## 2022-04-06 RX ORDER — ACETAMINOPHEN 650 MG/1
650 SUPPOSITORY RECTAL EVERY 4 HOURS PRN
Status: CANCELLED | OUTPATIENT
Start: 2022-04-06

## 2022-04-06 RX ORDER — LABETALOL HYDROCHLORIDE 5 MG/ML
10 INJECTION, SOLUTION INTRAVENOUS EVERY 10 MIN PRN
Status: CANCELLED | OUTPATIENT
Start: 2022-04-06

## 2022-04-06 RX ORDER — ATORVASTATIN CALCIUM 80 MG/1
80 TABLET, FILM COATED ORAL NIGHTLY
Status: CANCELLED | OUTPATIENT
Start: 2022-04-06

## 2022-04-06 RX ORDER — ASPIRIN 81 MG/1
81 TABLET, CHEWABLE ORAL ONCE
Status: COMPLETED | OUTPATIENT
Start: 2022-04-06 | End: 2022-04-06

## 2022-04-06 RX ORDER — CLOPIDOGREL BISULFATE 75 MG/1
75 TABLET ORAL ONCE
Status: COMPLETED | OUTPATIENT
Start: 2022-04-06 | End: 2022-04-06

## 2022-04-06 RX ORDER — ONDANSETRON 4 MG/1
4 TABLET, ORALLY DISINTEGRATING ORAL EVERY 8 HOURS PRN
Status: CANCELLED | OUTPATIENT
Start: 2022-04-06

## 2022-04-06 RX ORDER — ACETAMINOPHEN 325 MG/1
650 TABLET ORAL EVERY 4 HOURS PRN
Status: CANCELLED | OUTPATIENT
Start: 2022-04-06

## 2022-04-06 RX ORDER — POLYETHYLENE GLYCOL 3350 17 G/17G
17 POWDER, FOR SOLUTION ORAL DAILY PRN
Status: CANCELLED | OUTPATIENT
Start: 2022-04-06

## 2022-04-06 RX ORDER — CLOPIDOGREL BISULFATE 75 MG/1
75 TABLET ORAL DAILY
Status: CANCELLED | OUTPATIENT
Start: 2022-04-06

## 2022-04-06 RX ORDER — WARFARIN SODIUM 7.5 MG/1
7.5 TABLET ORAL DAILY
Status: CANCELLED | OUTPATIENT
Start: 2022-04-06

## 2022-04-06 RX ADMIN — ASPIRIN 81 MG: 81 TABLET, CHEWABLE ORAL at 19:12

## 2022-04-06 RX ADMIN — CLOPIDOGREL BISULFATE 75 MG: 75 TABLET ORAL at 19:12

## 2022-04-06 RX ADMIN — SODIUM CHLORIDE 1000 ML: 9 INJECTION, SOLUTION INTRAVENOUS at 17:21

## 2022-04-06 RX ADMIN — IOPAMIDOL 90 ML: 755 INJECTION, SOLUTION INTRAVENOUS at 17:42

## 2022-04-06 RX ADMIN — HEPARIN SODIUM 12 UNITS/KG/HR: 5000 INJECTION INTRAVENOUS; SUBCUTANEOUS at 20:14

## 2022-04-06 ASSESSMENT — ENCOUNTER SYMPTOMS
VOMITING: 0
DIARRHEA: 0
ABDOMINAL PAIN: 0
CONSTIPATION: 0
SORE THROAT: 0
NAUSEA: 0
SHORTNESS OF BREATH: 0

## 2022-04-06 NOTE — Clinical Note
Discharge Plan[de-identified] Other/[facility])   Telemetry/Cardiac Monitoring Required?: Yes   Bed request comments: neymar neg

## 2022-04-06 NOTE — ED PROVIDER NOTES
[facility] ED  Emergency Department Encounter  Emergency Medicine Resident     Pt Name: Mel Pierre  MRN: [id number]  Eunicegfdon 1985  Date of evaluation: 22  PCP:  No primary care provider on file. CHIEF COMPLAINT       Chief Complaint   Patient presents with    Cerebrovascular Accident     Hx of CVA,, family noted left facial droop and slurred speech       HISTORY OFPRESENT ILLNESS  (Location/Symptom, Timing/Onset, Context/Setting, Quality, Duration, Modifying Factors,Severity.)      Mel Pierre is a 31 y.o. female who presents with concerns for stroke symptoms. Last known well time:     Patient is currently on Coumadin for history of recent stroke in . INR was checked 2 weeks ago when she was therapeutic at the time. Patient had a history of pulmonary embolus in May 2019 was on Eliquis. She presents today with right-sided neck pain and headache. Was noted to have some slurring of her speech as well as mild left-sided facial droop noted by her . Patient was diagnosed with a CVA on 2021 after she had presented with complaints of numbness and tingling and weakness in the right upper and right lower extremity. She was diagnosed with stroke due to occlusion of the left carotid artery. She has a history of IV drug abuse, currently on Suboxone. Previously on methadone, was switched over to Suboxone 2 weeks ago. Denies any other chest pain, shortness of breath, abdominal pain. PAST MEDICAL / SURGICAL / SOCIAL / FAMILY HISTORY      has a past medical history of Acid reflux, Anemia, Drug abuse, IV ([address] Headache, History of pulmonary embolus (PE), Marijuana smoker, and Smoker. has a past surgical history that includes  section; ovarian cyst removal; Appendectomy; Upper gastrointestinal endoscopy (N/A, 10/21/2019); sigmoidoscopy (N/A, 10/22/2019); and Colonoscopy (N/A, 10/23/2019).     Social History     Socioeconomic History  Marital status:      Spouse name: Ros Ellis Number of children: 3    Years of education: Not on file    Highest education level: Not on file   Occupational History    Not on file   Tobacco Use    Smoking status: Current Every Day Smoker     Types: Cigarettes    Smokeless tobacco: Never Used   Vaping Use    Vaping Use: Never used   Substance and Sexual Activity    Alcohol use: No    Drug use: Yes     Types: Marijuana Veryl Lazar)    Sexual activity: Yes   Other Topics Concern    Not on file   Social History Narrative    Not on file     Social Determinants of Health     Financial Resource Strain:     Difficulty of Paying Living Expenses: Not on file   Food Insecurity:     Worried About Running Out of Food in the Last Year: Not on file    Denisha of Food in the Last Year: Not on file   Transportation Needs:     Lack of Transportation (Medical): Not on file    Lack of Transportation (Non-Medical):  Not on file   Physical Activity:     Days of Exercise per Week: Not on file    Minutes of Exercise per Session: Not on file   Stress:     Feeling of Stress : Not on file   Social Connections:     Frequency of Communication with Friends and Family: Not on file    Frequency of Social Gatherings with Friends and Family: Not on file    Attends Religion Services: Not on file    Active Member of [address] [place] [address] [email or web address] or Organizations: Not on file    Attends Club or Organization Meetings: Not on file    Marital Status: Not on file   Intimate Partner Violence:     Fear of Current or Ex-Partner: Not on file    Emotionally Abused: Not on file    Physically Abused: Not on file    Sexually Abused: Not on file   Housing Stability:     Unable to Pay for Housing in the Last Year: Not on file    Number of [place] in the Last Year: Not on file    Unstable Housing in the Last Year: Not on file       Family History   Problem Relation Age of Onset    No Known Problems Mother     No Known Problems Father Allergies:  Patient has no known allergies. Home Medications:  Prior to Admission medications    Medication Sig Start Date End Date Taking? Authorizing Provider   Buprenorphine HCl-Naloxone HCl (SUBOXONE SL) Place under the tongue   Yes Historical Provider, MD   warfarin (COUMADIN) 7.5 MG tablet Take 1 tablet (or as directed by Medication Management) by mouth once daily. 12/6/21   Ezequiel Robin MD       REVIEW OF SYSTEMS    (2-9 systems for level 4, 10 or more for level 5)      Review of Systems   Constitutional: Negative for chills and fever. HENT: Negative for ear pain, hearing loss and sore throat. Eyes: Negative for visual disturbance. Respiratory: Negative for shortness of breath. Cardiovascular: Negative for chest pain. Gastrointestinal: Negative for abdominal pain, constipation, diarrhea, nausea and vomiting. Genitourinary: Negative for difficulty urinating and dysuria. Musculoskeletal: Negative for arthralgias and myalgias. Neurological: Positive for speech difficulty. Negative for numbness. Psychiatric/Behavioral: Negative for agitation and confusion. PHYSICAL EXAM   (up to 7 for level 4, 8 or more for level 5)     INITIAL VITALS:    weight is 150 lb (68 kg). Physical Exam  Vitals and nursing note reviewed. Constitutional:       General: She is not in acute distress. Appearance: She is well-developed. She is not diaphoretic. HENT:      Head: Normocephalic and atraumatic. Right Ear: External ear normal.      Left Ear: External ear normal.      Nose: Nose normal.   Eyes:      Conjunctiva/sclera: Conjunctivae normal.   Neck:      Trachea: No tracheal deviation. Cardiovascular:      Rate and Rhythm: Normal rate and regular rhythm. Heart sounds: Normal heart sounds. No murmur heard. No friction rub. No gallop. Pulmonary:      Effort: Pulmonary effort is normal. No respiratory distress. Breath sounds: Normal breath sounds.    Abdominal: General: Bowel sounds are normal.      Palpations: Abdomen is soft. Tenderness: There is no abdominal tenderness. Musculoskeletal:         General: No tenderness. Normal range of motion. Cervical back: Neck supple. Skin:     General: Skin is warm and dry. Capillary Refill: Capillary refill takes less than 2 seconds. Neurological:      Mental Status: She is alert and oriented to person, place, and time. GCS: GCS eye subscore is 4. GCS verbal subscore is 5. GCS motor subscore is 6. Sensory: Sensory deficit present. Motor: Motor function is intact. No weakness or abnormal muscle tone. Coordination: Coordination normal. Finger-Nose-Finger Test normal.      Gait: Gait normal.      Comments: Subtle left sided facial droop  Subjective decrease sensation right lateral thigh          DIFFERENTIAL  DIAGNOSIS     PLAN (LABS / IMAGING / EKG):  Orders Placed This Encounter   Procedures    CT HEAD WO CONTRAST    CTA HEAD NECK W CONTRAST    STROKE PANEL    ELECTROLYTES PLUS    Hemoglobin and hematocrit, blood    CALCIUM, IONIC (POC)    Protime-INR    Inpatient consult to Stroke Team    Venous Blood Gas, POC    Creatinine W/GFR Point of Care    POCT urea (BUN)    Lactic Acid, POC    POCT Glucose    EKG 12 Lead    Insert peripheral IV    Saline lock IV       MEDICATIONS ORDERED:  Orders Placed This Encounter   Medications    0.9 % sodium chloride bolus    iopamidol (ISOVUE-370) 76 % injection 90 mL       DDX: CVA    Initial MDM/Plan: 31 y.o. female who presents with concern for CVA symptoms. Presented with some slurred speech, similar to prior CVA symptoms. At time of initial examination patient did seem to have a very slight left-sided facial droop, otherwise was neurovascular intact without any strength deficits. Did have some subjective numbness along the lateral right thigh. We will plan for stroke alert noncritical, strep panel.     DIAGNOSTIC RESULTS / EMERGENCY DEPARTMENT COURSE / MDM     LABS:  Labs Reviewed   STROKE PANEL - Abnormal; Notable for the following components:       Result Value    RBC 3.87 (*)     Hemoglobin 6.9 (*)     Hematocrit 26.1 (*)     MCV 67.4 (*)     MCH 17.8 (*)     MCHC 26.4 (*)     RDW 19.9 (*)     Seg Neutrophils 72 (*)     Lymphocytes 23 (*)     Myoglobin <21 (*)     Protime 16.1 (*)     All other components within normal limits   ELECTROLYTES PLUS - Abnormal; Notable for the following components:    POC Sodium 137 (*)     All other components within normal limits   HGB/HCT - Abnormal; Notable for the following components:    POC Hemoglobin 8.4 (*)     POC Hematocrit 25 (*)     All other components within normal limits   VENOUS BLOOD GAS, POINT OF CARE - Abnormal; Notable for the following components:    pO2, Eduardo 12.2 (*)     O2 Sat, Eduardo 13 (*)     All other components within normal limits   CALCIUM, IONIC (POC)   PROTIME-INR   CREATININE W/GFR POINT OF CARE   UREA N (POC)   LACTIC ACID,POINT OF CARE   POCT GLUCOSE         RADIOLOGY:  No results found. EKG  All EKG's are interpreted by the Emergency Department Physician who either signs or Co-signs this chart in the absence of a cardiologist.    EMERGENCY DEPARTMENT COURSE:  ED Course as of 04/06/22 1812 Wed Apr 06, 2022 1806 Patient care transferred to Dr. Verónica Antonio. [JS]      ED Course User Index  [JS] Christiano Enriquez DO     PROCEDURES:  None    CONSULTS:  IP CONSULT TO STROKE TEAM    CRITICAL CARE:  Please see attending note    FINAL IMPRESSION      1. Dysarthria        DISPOSITION / PLAN     DISPOSITION  Pending. PATIENTREFERRED TO:  No follow-up provider specified.     DISCHARGE MEDICATIONS:  New Prescriptions    No medications on file       Diamond Qiu DO  EmergencyMedicine Resident    (Please note that portions of this note were completed with a voice recognition program.  Efforts were made to edit the dictations but occasionally words are mis-transcribed.)     Christiano Enriquez DO  Resident  04/06/22 [id number]

## 2022-04-06 NOTE — ED PROVIDER NOTES
[facility] ED  Emergency Department  Emergency Medicine Resident Sign-out     Care of Manuelito Burgos was assumed from Dr. Juan Jaeger and is being seen for Cerebrovascular Accident (Hx of CVA,, family noted left facial droop and slurred speech)  . The patient's initial evaluation and plan have been discussed with the prior provider who initially evaluated the patient. EMERGENCY DEPARTMENT COURSE / MEDICAL DECISION MAKING:       MEDICATIONS GIVEN:  Orders Placed This Encounter   Medications    0.9 % sodium chloride bolus    iopamidol (ISOVUE-370) 76 % injection 90 mL       LABS / RADIOLOGY:     Labs Reviewed   STROKE PANEL - Abnormal; Notable for the following components:       Result Value    RBC 3.87 (*)     Hemoglobin 6.9 (*)     Hematocrit 26.1 (*)     MCV 67.4 (*)     MCH 17.8 (*)     MCHC 26.4 (*)     RDW 19.9 (*)     Seg Neutrophils 72 (*)     Lymphocytes 23 (*)     Myoglobin <21 (*)     Protime 16.1 (*)     All other components within normal limits   ELECTROLYTES PLUS - Abnormal; Notable for the following components:    POC Sodium 137 (*)     All other components within normal limits   HGB/HCT - Abnormal; Notable for the following components:    POC Hemoglobin 8.4 (*)     POC Hematocrit 25 (*)     All other components within normal limits   VENOUS BLOOD GAS, POINT OF CARE - Abnormal; Notable for the following components:    pO2, Eduardo 12.2 (*)     O2 Sat, Eduardo 13 (*)     All other components within normal limits   CALCIUM, IONIC (POC)   PROTIME-INR   CREATININE W/GFR POINT OF CARE   UREA N (POC)   LACTIC ACID,POINT OF CARE   POCT GLUCOSE       CT HEAD WO CONTRAST    Result Date: 4/6/2022  EXAMINATION: CTA OF THE HEAD AND NECK WITH CONTRAST; CT OF THE HEAD WITHOUT CONTRAST 4/6/2022 5:20 pm; 4/6/2022 5:19 pm TECHNIQUE: CTA of the head and neck was performed with the administration of intravenous contrast. Multiplanar reformatted images are provided for review.   MIP images are provided for review. Stenosis of the internal carotid arteries measured using NASCET criteria. Dose modulation, iterative reconstruction, and/or weight based adjustment of the mA/kV was utilized to reduce the radiation dose to as low as reasonably achievable.; CT of the head was performed without the administration of intravenous contrast. Dose modulation, iterative reconstruction, and/or weight based adjustment of the mA/kV was utilized to reduce the radiation dose to as low as reasonably achievable. Noncontrast CT of the head with reconstructed 2-D images are also provided for review. COMPARISON: 07/09/2021 HISTORY: ORDERING SYSTEM PROVIDED HISTORY: Stroke TECHNOLOGIST PROVIDED HISTORY: Stroke Decision Support Exception - unselect if not a suspected or confirmed emergency medical condition->Emergency Medical Condition (MA) Reason for Exam: STROKE; ORDERING SYSTEM PROVIDED HISTORY: Stroke TECHNOLOGIST PROVIDED HISTORY: Stroke Decision Support Exception - unselect if not a suspected or confirmed emergency medical condition->Emergency Medical Condition (MA) Is the patient pregnant? No Reason for Exam: Stroke FINDINGS: CT HEAD: BRAIN/VENTRICLES:  There is no acute infarct or acute intracranial hemorrhage present. There is no mass effect or midline shift present. Areas of hypoattenuation within the left periventricular white matter and left parietal lobe are consistent with known remote infarcts. There is no ventriculomegaly or abnormal extra-axial fluid collection present. ORBITS: Limited evaluation of the orbits is unremarkable. SINUSES:  The paranasal sinuses and mastoid air cells are clear. SOFT TISSUES/SKULL: No lytic or blastic osseous lesions are identified. CTA NECK: AORTIC ARCH/ARCH VESSELS: The origins of the great vessels are normal.  There is no significant stenosis of the innominate or subclavian arteries.  CAROTID ARTERIES: The common carotid arteries are normal.  There is near occlusive thrombus at the origin of the left internal carotid artery resulting in a severe stenosis measuring approximately 90% based on NASCET criteria. The right internal carotid artery is normal without significant stenosis or dissection evident. VERTEBRAL ARTERIES: No dissection, arterial injury, or significant stenosis. SOFT TISSUES: The lung apices are clear. No cervical or superior mediastinal lymphadenopathy. The larynx and pharynx are unremarkable. No acute abnormality of the salivary and thyroid glands. BONES: No lytic or blastic osseous lesions are identified. CTA HEAD: ANTERIOR CIRCULATION: The intracranial segments of the internal carotid arteries are normal.  There is no significant stenosis or aneurysm identified. The anterior and middle cerebral arteries are normal in appearance. No significant stenosis or aneurysm is identified. POSTERIOR CIRCULATION: The distal vertebral arteries are normal in appearance. The basilar artery is normal.  No significant stenosis or aneurysm is identified. The posterior cerebral arteries are normal in appearance. OTHER: No dural venous sinus thrombosis on this non-dedicated study. 1. No acute infarct or acute intracranial hemorrhage evident. 2. Near occlusive thrombus at the origin of the left internal carotid artery resulting in a short segment 90% stenosis based on NASCET criteria, similar to the findings on the CT angiogram from 05/27/2021 which subsequently resolved on the exam from 07/09/2021. This may be secondary to an acute dissection. 3. No large vessel occlusion, significant stenosis or cerebral aneurysm identified within the brain. The above findings were discussed with Dr. Teja Kebede at 6:10 p.m. on 04/06/2022.      CTA HEAD NECK W CONTRAST    Result Date: 4/6/2022  EXAMINATION: CTA OF THE HEAD AND NECK WITH CONTRAST; CT OF THE HEAD WITHOUT CONTRAST 4/6/2022 5:20 pm; 4/6/2022 5:19 pm TECHNIQUE: CTA of the head and neck was performed with the administration of intravenous contrast. Multiplanar reformatted images are provided for review. MIP images are provided for review. Stenosis of the internal carotid arteries measured using NASCET criteria. Dose modulation, iterative reconstruction, and/or weight based adjustment of the mA/kV was utilized to reduce the radiation dose to as low as reasonably achievable.; CT of the head was performed without the administration of intravenous contrast. Dose modulation, iterative reconstruction, and/or weight based adjustment of the mA/kV was utilized to reduce the radiation dose to as low as reasonably achievable. Noncontrast CT of the head with reconstructed 2-D images are also provided for review. COMPARISON: 07/09/2021 HISTORY: ORDERING SYSTEM PROVIDED HISTORY: Stroke TECHNOLOGIST PROVIDED HISTORY: Stroke Decision Support Exception - unselect if not a suspected or confirmed emergency medical condition->Emergency Medical Condition (MA) Reason for Exam: STROKE; ORDERING SYSTEM PROVIDED HISTORY: Stroke TECHNOLOGIST PROVIDED HISTORY: Stroke Decision Support Exception - unselect if not a suspected or confirmed emergency medical condition->Emergency Medical Condition (MA) Is the patient pregnant? No Reason for Exam: Stroke FINDINGS: CT HEAD: BRAIN/VENTRICLES:  There is no acute infarct or acute intracranial hemorrhage present. There is no mass effect or midline shift present. Areas of hypoattenuation within the left periventricular white matter and left parietal lobe are consistent with known remote infarcts. There is no ventriculomegaly or abnormal extra-axial fluid collection present. ORBITS: Limited evaluation of the orbits is unremarkable. SINUSES:  The paranasal sinuses and mastoid air cells are clear. SOFT TISSUES/SKULL: No lytic or blastic osseous lesions are identified. CTA NECK: AORTIC ARCH/ARCH VESSELS: The origins of the great vessels are normal.  There is no significant stenosis of the innominate or subclavian arteries.  CAROTID ARTERIES: The common carotid arteries are normal.  There is near occlusive thrombus at the origin of the left internal carotid artery resulting in a severe stenosis measuring approximately 90% based on NASCET criteria. The right internal carotid artery is normal without significant stenosis or dissection evident. VERTEBRAL ARTERIES: No dissection, arterial injury, or significant stenosis. SOFT TISSUES: The lung apices are clear. No cervical or superior mediastinal lymphadenopathy. The larynx and pharynx are unremarkable. No acute abnormality of the salivary and thyroid glands. BONES: No lytic or blastic osseous lesions are identified. CTA HEAD: ANTERIOR CIRCULATION: The intracranial segments of the internal carotid arteries are normal.  There is no significant stenosis or aneurysm identified. The anterior and middle cerebral arteries are normal in appearance. No significant stenosis or aneurysm is identified. POSTERIOR CIRCULATION: The distal vertebral arteries are normal in appearance. The basilar artery is normal.  No significant stenosis or aneurysm is identified. The posterior cerebral arteries are normal in appearance. OTHER: No dural venous sinus thrombosis on this non-dedicated study. 1. No acute infarct or acute intracranial hemorrhage evident. 2. Near occlusive thrombus at the origin of the left internal carotid artery resulting in a short segment 90% stenosis based on NASCET criteria, similar to the findings on the CT angiogram from 05/27/2021 which subsequently resolved on the exam from 07/09/2021. This may be secondary to an acute dissection. 3. No large vessel occlusion, significant stenosis or cerebral aneurysm identified within the brain. The above findings were discussed with Dr. Basim Dexter at 6:10 p.m. on 04/06/2022. RECENT VITALS:      ,  Pulse: 104, Resp: 20, BP: 102/74, SpO2: 99 %    This patient is a 31 y.o.  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 Patient presenting with acute onset left facial droop, dysarthria, stroke alert not critical.  Pending imaging, labs, neurology recommendations. Imaging was concerning for an occlusive thrombus at the origin of the left internal carotid artery, neurology recommended neuro critical care admission, aspirin, Plavix, heparin, started all these medications, admitted patient to neuro critical care    On reevaluation, patient is alert and oriented, requesting to leave [address] as her  will not be allowed to stay with her overnight. The patient is clinically not intoxicated, free from distracting pain, appears to have intact insight, judgment and reason and in my medical opinion has the capacity to make decisions. The patient is also not under any duress to leave the hospital. In this scenario, it would be battery to subject a patient to treatment against his/her will. I have voiced my concerns for the patient's health given that a full evaluation and treatment had not occurred. I have discussed the need for continued evaluation to determine if their symptoms are caused by a condition that present risk of death or morbidity. Risks including but not limited to death, permanent disability, prolonged hospitalization, prolonged illness, were discussed. I tried offering alternative options in hopes that the patient might be amenable to partial evaluation and treatment which would be medically beneficial to the patient, though the patient declined my options and insisted on leaving. Because I have been unable to convince the patient to stay, I answered all of their questions about their condition and asked them to return to the ED as soon as possible to complete their evaluation, especially if their symptoms worsen or do not improve.  I emphasized that leaving against medical advice does not preclude returning here for further evaluation. I asked the patient to return if they change their mind about the further evaluation and treatment. I strongly encouraged the patient to return to this Emergency Department or any Emergency Department at any time, particularly with worsening symptoms. Had numerous repeat conversations with patient regarding importance of staying for medication, therapy, observation, patient continues to desire to leave [address], has capacity, will allow patient to leave, will recommend patient follow-up immediately with neuro endovascular or return to the emergency department immediately. OUTSTANDING TASKS / RECOMMENDATIONS:    1. Imaging, labs  2. Neurology recommendations  3. Disposition     FINAL IMPRESSION:     1. Dysarthria        DISPOSITION:         DISPOSITION:  []  Discharge   []  Transfer -    []  Admission -     [x]  Against Medical Advice   []  Eloped   FOLLOW-UP: No follow-up provider specified.    DISCHARGE MEDICATIONS: New Prescriptions    No medications on file           Devan Chan MD  Emergency Medicine Resident  [facility]        Devan Chan MD  Resident  04/06/22 [id number]

## 2022-04-06 NOTE — ED PROVIDER NOTES
[facility]  Emergency Department  Faculty Attestation     I performed a history and physical examination of the patient and discussed management with the resident. I reviewed the residents note and agree with the documented findings and plan of care. Any areas of disagreement are noted on the chart. I was personally present for the key portions of any procedures. I have documented in the chart those procedures where I was not present during the key portions. I have reviewed the emergency nurses triage note. I agree with the chief complaint, past medical history, past surgical history, allergies, medications, social and family history as documented unless otherwise noted below. For Physician Assistant/ Nurse Practitioner cases/documentation I have personally evaluated this patient and have completed at least one if not all key elements of the E/M (history, physical exam, and MDM). Additional findings are as noted. Primary Care Physician:  No primary care provider on file. Screenings:  [unfilled]    CHIEF COMPLAINT       Chief Complaint   Patient presents with    Cerebrovascular Accident     Hx of CVA,, family noted left facial droop and slurred speech       RECENT VITALS:    ,   ,  ,      LABS:  Labs Reviewed   STROKE PANEL       Radiology  No orders to display       CRITICAL CARE: There was a high probability of clinically significant/life threatening deterioration in this patient's condition which required my urgent intervention. Total critical care time was 10 minutes. This excludes any time for separately reportable procedures.      EKG:   EKG Interpretation    Interpreted by me    Rhythm: normal sinus   Rate: Tachycardia  Axis: normal  Ectopy: none  Conduction: normal  ST Segments: no acute change  T Waves: no acute change  Q Waves: none    Clinical Impression: Tachycardia, no acute changes  Attending Physician Additional  Notes    Patient is on Coumadin, therapeutic 2 weeks ago, for recent stroke. For the past hour she has had slurring of her speech and left facial droop. There is mild right-sided headache. There is mild nausea but no photophobia. No peripheral clumsiness or weakness. 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 On exam she is nontoxic but uncomfortable, afebrile, no respiratory distress. Neck is supple. Speech is altered. There is subtle left facial droop that clears with smiling. Tongue is midline. Normal motor strength without drift. There is mild pallor noted. No respiratory distress. Impression is possible stroke versus TIA versus other cause. Plan is stroke alert, CT brain, CT angiogram, INR, laboratory studies, fluids, reassess. Nayely He.  Yolanda Hines MD, [address] [facility]  Attending Emergency  Physician               Oliverio Owens MD  04/06/22 [id number]

## 2022-04-06 NOTE — H&P
Neuro ICU History & Physical    Patient Name: Darylene Augusta  Patient : 1985  Room/Bed: /  Code Status: Prior  Allergies: No Known Allergies    CHIEF COMPLAINT     Left-sided facial droop, dysarthria    HPI    History Obtained From: Patient, EMR    The patient is a 31 y.o. female presented with acute onset left-sided facial droop and dysarthria around 4 PM this afternoon. She has a medical history significant for CVA in May 2021 and was found to have thrombus of left carotid bulb and left MCA territory infarct on MRI. She was discharged on warfarin and repeat CTA in 2021 showed resolution of carotid bulb thrombus. Residual deficits from that stroke include right sided numbness. Patient reports compliance with her home medications and had an INR of 2.7 in March. In the emergency department, a stroke alert noncritical was called. CTA head/neck demonstrating near occlusive thrombus resulting in severe stenosis of left ICA. Initial NIH 1 for right sided numbness. Patient was loaded with aspirin and Plavix and started on a heparin drip. Stroke team evaluated the patient with no endovascular intervention planned. Patient admitted to neuro critical care for management of triple anticoagulative therapy and close blood pressure monitoring overnight. Admitted to ICU From: ER  Reason for ICU Admission: Left ICA thrombus       PATIENT HISTORY   Past Medical History:        Diagnosis Date    Acid reflux     Anemia 10/18/2019    Drug abuse, IV ([address]     claims that she has previous iv drug dependency claims hasn' had recent usage.     Headache     History of pulmonary embolus (PE)     Marijuana smoker 10/18/2019    Smoker 10/18/2019       Past Surgical History:        Procedure Laterality Date    APPENDECTOMY       SECTION      COLONOSCOPY N/A 10/23/2019    COLORECTAL CANCER SCREENING, NOT HIGH RISK performed by Rosie Headley MD at [facility] SIGMOIDOSCOPY N/A 10/22/2019    SIGMOIDOSCOPY ABORTED COLONOSCOPY performed by Michelle Bhatt MD at [address] N/A 10/21/2019    EGD ESOPHAGOGASTRODUODENOSCOPY performed by Michelle Bhatt MD at  [facility] beds at the [facility] History:   Social History     Socioeconomic History    Marital status:      Spouse name: Augustine Antonio Number of children: 3    Years of education: Not on file    Highest education level: Not on file   Occupational History    Not on file   Tobacco Use    Smoking status: Current Every Day Smoker     Types: Cigarettes    Smokeless tobacco: Never Used   Vaping Use    Vaping Use: Never used   Substance and Sexual Activity    Alcohol use: No    Drug use: Yes     Types: Marijuana Dimitry Relic)    Sexual activity: Yes   Other Topics Concern    Not on file   Social History Narrative    Not on file     Social Determinants of Health     Financial Resource Strain:     Difficulty of Paying Living Expenses: Not on file   Food Insecurity:     Worried About Running Out of Food in the Last Year: Not on file    Denisha of Food in the Last Year: Not on file   Transportation Needs:     Lack of Transportation (Medical): Not on file    Lack of Transportation (Non-Medical):  Not on file   Physical Activity:     Days of Exercise per Week: Not on file    Minutes of Exercise per Session: Not on file   Stress:     Feeling of Stress : Not on file   Social Connections:     Frequency of Communication with Friends and Family: Not on file    Frequency of Social Gatherings with Friends and Family: Not on file    Attends Ep[employer] Services: Not on file    Active Member of Clubs or Organizations: Not on file    Attends Club or Organization Meetings: Not on file    Marital Status: Not on file   Intimate Partner Violence:     Fear of Current or Ex-Partner: Not on file    Emotionally Abused: Not on file    Physically Abused: Not on file    Sexually Abused: Not on file   Housing Stability:     Unable to Pay for Housing in the Last Year: Not on file    Number of Places Lived in the Last Year: Not on file    Unstable Housing in the Last Year: Not on file       Family History:       Problem Relation Age of Onset    No Known Problems Mother     No Known Problems Father        Allergies:    Patient has no known allergies. Medications Prior to Admission:    Not in a hospital admission. Current Medications:  Current Facility-Administered Medications: heparin 25,000 units in 0.9% sodium chloride 250 mL infusion, 12 Units/kg/hr, IntraVENous, Continuous    REVIEW OF SYSTEMS     CONSTITUTIONAL: negative for fatigue   EYES: negative for double vision, blurry vision, and photophobia    HEENT: negative for tinnitus and sore throat   RESPIRATORY: negative for cough, shortness of breath   CARDIOVASCULAR: negative for chest pain, palpitations, or syncope   GASTROINTESTINAL: negative for abdominal pain, nausea, vomiting   GENITOURINARY: negative for incontinence or retention    MUSCULOSKELETAL: negative for neck or back pain, negative for extremity pain   NEUROLOGICAL: Negative for seizures, headaches, weakness, numbness, confusion, aphasia, + dysarthria    PSYCHIATRIC: negative for agitation, hallucination, SI/HI   SKIN Negative for spontaneous contusions, rashes, or lesions      PHYSICAL EXAM:     /74   Pulse 104   Resp 20   Wt 150 lb (68 kg)   LMP 03/23/2022 (Approximate)   SpO2 99%   BMI 26.57 kg/m²     PHYSICAL EXAM:  CONSTITUTIONAL:  Well developed, well nourished, alert and oriented x 3, in no acute distress. GCS 15. Nontoxic. No dysarthria. No aphasia. HEAD:  normocephalic, atraumatic    EYES:  PERRLA, EOMI.  Visual acuity and peripheral vision intact b/l   ENT:  moist mucous membranes   NECK:  supple, symmetric   LUNGS:  Equal air entry bilaterally   CARDIOVASCULAR:  normal s1 / s2, RRR, distal pulses intact   ABDOMEN:  Soft, no rigidity   NEUROLOGIC:  Mental Status:  A & O x3,awake             Cranial Nerves:    II: Visual acuity:  normal  II: Visual fields:  normal  III: Pupils:  equal, round, reactive to light  III,IV,VI: Extra Ocular Movements: intact  V: Facial sensation:  intact  VII: Facial strength: intact  VIII: Hearing:  intact  IX: Palate:  intact  XI: Shoulder shrug:  intact  XII: Tongue movement:  normal    Motor Exam:    Drift:  absent  Tone:  normal    MOTOR:  RUE: 5/5  LUE: 5/5  RLE: 5/5  LLE: 5/5    Sensory:    Touch:    Right Upper Extremity:  abnormal -numbness  Left Upper Extremity:  normal  Right Lower Extremity:  normal  Left Lower Extremity:  normal    Deep Tendon Reflexes:    Right Bicep:  2+  Left Bicep:  2+  Right Knee:  2+  Left Knee:  2+    Plantar Response:  Right:  equivocal  Left:  equivocal    Clonus:  absent    Coordination/Dysmetria:  Heel to Shin:  Right:  normal  Left:  normal  Finger to Nose:   Right:  normal  Left:  normal        NIH Stroke Scale Total (if not done complete detailed one below):    1a.  Level of consciousness:  0 - alert; keenly responsive  1b. Level of consciousness questions:  0 - answers both questions correctly  1c. Level of consciousness questions:  0 - performs both tasks correctly  2. Best Gaze:  0 - normal  3. Visual:  0 - no visual loss  4. Facial Palsy:  0 - normal symmetric movement  5a. Motor left arm:  0 - no drift, limb holds 90 (or 45) degrees for full 10 seconds  5b. Motor right arm:  0 - no drift, limb holds 90 (or 45) degrees for full 10 seconds  6a. Motor left le - no drift; leg holds 30 degree position for full 5 seconds  6b. Motor right le - no drift; leg holds 30 degree position for full 5 seconds  7. Limb Ataxia:  0 - absent  8. Sensory:  1 - mild to moderate sensory loss; patient feels pinprick is less sharp or is dull on the affected side; there is a loss of superficial pain with pinprick but patient is aware of being touched   9. Best Language:  0 - no aphasia, normal  10. Dysarthria:  0 - normal  11. Extinction and Inattention:  0 - no abnormality    LABS AND IMAGING:     RECENT LABS:  CBC with Differential:    Lab Results   Component Value Date    WBC 6.6 04/06/2022    RBC 3.87 04/06/2022    HGB 6.9 04/06/2022    HCT 26.1 04/06/2022     04/06/2022    MCV 67.4 04/06/2022    MCH 17.8 04/06/2022    MCHC 26.4 04/06/2022    RDW 19.9 04/06/2022    LYMPHOPCT 23 04/06/2022    MONOPCT 4 04/06/2022    BASOPCT 0 04/06/2022    MONOSABS 0.26 04/06/2022    LYMPHSABS 1.52 04/06/2022    EOSABS 0.07 04/06/2022    BASOSABS 0.00 04/06/2022    DIFFTYPE NOT REPORTED 06/02/2021     BMP:    Lab Results   Component Value Date     04/06/2022    K 3.8 04/06/2022     04/06/2022    CO2 24 04/06/2022    BUN 11 04/06/2022    LABALBU 3.5 10/19/2019    CREATININE 0.91 04/06/2022    CREATININE 0.66 04/06/2022    CALCIUM 9.1 04/06/2022    GFRAA >60 04/06/2022    LABGLOM >60 04/06/2022    GLUCOSE 99 04/06/2022       RADIOLOGY:  CTA HEAD NECK W CONTRAST   Preliminary Result   1. No acute infarct or acute intracranial hemorrhage evident. 2. Near occlusive thrombus at the origin of the left internal carotid artery   resulting in a short segment 90% stenosis based on NASCET criteria, similar   to the findings on the CT angiogram from 05/27/2021 which subsequently   resolved on the exam from 07/09/2021. This may be secondary to an acute   dissection. 3. No large vessel occlusion, significant stenosis or cerebral aneurysm   identified within the brain. The above findings were discussed with Dr. Flor Gutierrez at 6:10 p.m. on 04/06/2022. CT HEAD WO CONTRAST   Preliminary Result   1. No acute infarct or acute intracranial hemorrhage evident.    2. Near occlusive thrombus at the origin of the left internal carotid artery   resulting in a short segment 90% stenosis based on NASCET criteria, similar   to the findings on the CT angiogram from 05/27/2021 which subsequently   resolved on the exam from 2021. This may be secondary to an acute   dissection. 3. No large vessel occlusion, significant stenosis or cerebral aneurysm   identified within the brain. The above findings were discussed with Dr. Constanza Mcadams at 6:10 p.m. on 2022. Labs and Images reviewed with:    [] Hema Garcia MD    [x] Priyank Bonilla MD  [] Roby Calderon MD  --[] there are no new interval images to review. ASSESSMENT AND PLAN:       ASSESSMENT:     This is a 31 y.o. female with acute onset of left-sided facial droop and dysarthria along with residual right-sided numbness from previous CTA in May 2021. CTA in the emergency department showing left ICA thrombus with severe stenosis. NIH 1 for residual right-sided numbness. Patient care will be discussed with attending, will reevaluate patient along with attending. PLAN/MEDICAL DECISION MAKING:    NEUROLOGIC:  - Imagin/6 CTA-near occlusive left internal carotid artery thrombus resulting in short segment 90% stenosis  - MRI Brain w/o contrast pending  - ASA and Plavix  - Lipitor 80mg nightly  - Heparin gtt  - Folic acid 1mg BID  - Analgesics: Tylenol prn  - Labs: A1C, fasting lipid panel  - Neuro checks per protocol  - Goal SBP < 200    CARDIOVASCULAR:  - Goal SBP < 200    PULMONARY:  Maintaining saturations on RA    RENAL/FLUID/ELECTROLYTE:  - BUN 11 / Creatinine 0.66  - I/O: No intake/output data recorded.   - IVF: NS @ 75cc.hr    GI/NUTRITION:  NUTRITION:  Diet NPO Exceptions are: Sips of Water with Meds  - Bowel regimen: Glycolax prn  - GI prophylaxis: Not indicated    ID:  -Afebrile  - WBC 6.6  - Antimicrobials:  not indicated     HEME:   - H/H 6.9 / 26.1  - No evidence of acute blood loss, hemodynamically stable  - Platelets 348  - Coags pending  - Heparin gtt- most recent INR 2.7    ENDOCRINE:  - Continue to monitor blood glucose, goal <180  - glucose controlled    OTHER:  - PT/OT/ST   - Code Status: Prior    PROPHYLAXIS:    DVT PROPHYLAXIS:  - SCD sleeves - Thigh High         Majo Bruce MD  Neuro Critical Care Service   4/6/2022     7:28 PM

## 2022-04-06 NOTE — FLOWSHEET NOTE
[facility] CARE DEPARTMENT - [address]     Emergency/Trauma Note    PATIENT NAME: Bon Nelson    Shift date: 04/06/2022  Shift day: Wednesday   Shift # 2    Room # 34/34   Name: Bon Nelson            Age: 31 y.o. Gender: female          Yarsani: Other   Place of Denominational:     Trauma/Incident type: Stroke Alert  Admit Date & Time: 4/6/2022  4:39 PM  TRAUMA NAME: Stroke Alert    ADVANCE DIRECTIVES IN CHART? No    NAME OF DECISION MAKER: Pt.    RELATIONSHIP OF DECISION MAKER TO PATIENT: Self    PATIENT/EVENT DESCRIPTION:  Bon Nelson is a 31 y.o. female who arrived with stroke like symptoms. Pt. Is accompanied by the spouse Sebastian. Pt to be admitted to 34/34. SPIRITUAL ASSESSMENT/INTERVENTION:   stopped in pt's ED room to offer support for pt. And spouse. Pt. Was in CT and  spoke with spouse Annetta Hodgkin who reports that pt. Has history of stroke issues and that he is hopeful that pt. Has gotten attention early. He declines need for spiritual support at this time but appreciates emotional support. PATIENT BELONGINGS:  No belongings noted    ANY BELONGINGS OF SIGNIFICANT VALUE NOTED:  This  did not handle any belongings. REGISTRATION STAFF NOTIFIED? No      WHAT IS YOUR SPIRITUAL CARE PLAN FOR THIS PATIENT?:   Provide spiritual support for pt. And family. Electronically signed by Giovanny Lew on 4/6/2022 at 7:00 PM.  [facility]  [phone]     04/06/22 1812   Encounter Summary   Services provided to: Patient and family together   Referral/Consult From: Multi-disciplinary team   Support System Spouse   Continue Visiting   (04/06/2022)   Complexity of Encounter Moderate   Length of Encounter 30 minutes   Spiritual Assessment Completed Yes   Crisis   Type Stroke Alert   Assessment Calm; Approachable; Hopeful   Intervention Active listening;Explored feelings, thoughts, concerns;Explored coping resources;Nurtured hope;Sustaining presence/ Ministry of presence   Outcome Comfort;Expressed gratitude

## 2022-04-06 NOTE — CONSULTS
Department of Endovascular Neurosurgery                                         Resident Consult Note    Reason for Consult:  dysarthria and left facial droop  Endovascular Neurosurgeon:   []Dr. Carol Larose  [x]Dr. Verduzco [facility]    History Obtained From:  patient    CHIEF COMPLAINT:       dysarthria and left facial droop    HISTORY OF PRESENT ILLNESS:       31-year-old female with a previous history of PE in 2019, polysubstance abuse, admission to the hospital on 2021 with acute onset right-sided weakness and numbness NIH of 3, received TPA, CT head showed left carotid bulb with thrombus, MRI showed left MCA territory acute infarct, started on heparin drip bridged to warfarin and discharged,  -CTA head and neck 3 days later showed persistent of left carotid bulb with a thrombus, repeat CTA on 2021 showed the resolution of this thrombus,    -Thrombophilia work-up done unremarkable but positive for variant of MTHFR mutation, with normal homocysteine level, otherwise negative, WILLIS, negative for bubble study EF of 55%,  -Patient following with Coumadin clinic, patient takes 7.5 mg of warfarin daily, last INR on 3/16/2022: 2.7    - Today she presented to the hospital with new symptoms of subjective dysarthria and facial droop, and the recurrent symptom of right-sided numbness, LK W0 4:00 PM, however upon arrival to the hospital NIH was 1 for only right-sided numbness, dysarthria or facial droop was not noted,       PAST MEDICAL HISTORY :       Past Medical History:        Diagnosis Date    Acid reflux     Anemia 10/18/2019    Drug abuse, IV ([facility] [address]     claims that she has previous iv drug dependency claims hasn' had recent usage.     Headache     History of pulmonary embolus (PE)     Marijuana smoker 10/18/2019    Smoker 10/18/2019       Past Surgical History:        Procedure Laterality Date    APPENDECTOMY       SECTION      COLONOSCOPY N/A 10/23/2019    COLORECTAL CANCER SCREENING, NOT HIGH RISK performed by Caroline Guevara MD at [place] [address] N/A 10/22/2019    SIGMOIDOSCOPY ABORTED COLONOSCOPY performed by Caroline Guevara MD at [facility] N/A 10/21/2019    EGD ESOPHAGOGASTRODUODENOSCOPY performed by Caroline Guevara MD at [address] [place] [address] History:   Social History     Socioeconomic History    Marital status:      Spouse name: Lamonte Judd Number of children: 3    Years of education: Not on file    Highest education level: Not on file   Occupational History    Not on file   Tobacco Use    Smoking status: Current Every Day Smoker     Types: Cigarettes    Smokeless tobacco: Never Used   Vaping Use    Vaping Use: Never used   Substance and Sexual Activity    Alcohol use: No    Drug use: Yes     Types: Marijuana Veldon Breath)    Sexual activity: Yes   Other Topics Concern    Not on file   Social History Narrative    Not on file     Social Determinants of Health     Financial Resource Strain:     Difficulty of Paying Living Expenses: Not on file   Food Insecurity:     Worried About Running Out of Food in the Last Year: Not on file    Denisha of Food in the Last Year: Not on file   Transportation Needs:     Lack of Transportation (Medical): Not on file    Lack of Transportation (Non-Medical):  Not on file   Physical Activity:     Days of Exercise per Week: Not on file    Minutes of Exercise per Session: Not on file   Stress:     Feeling of Stress : Not on file   Social Connections:     Frequency of Communication with Friends and Family: Not on file    Frequency of Social Gatherings with Friends and Family: Not on file    Attends U[employer] Services: Not on file    Active Member of Clubs or Organizations: Not on file    Attends Club or Organization Meetings: Not on file    Marital Status: Not on file   Intimate Partner Violence:     Fear of Current or Ex-Partner: Not on file    Emotionally Abused: Not on file    Physically Abused: Not on file    Sexually Abused: Not on file   Housing Stability:     Unable to Pay for Housing in the Last Year: Not on file    Number of Places Lived in the Last Year: Not on file    Unstable Housing in the Last Year: Not on file       Family History:       Problem Relation Age of Onset    No Known Problems Mother     No Known Problems Father        Allergies:  Patient has no known allergies. Home Medications:  Prior to Admission medications    Medication Sig Start Date End Date Taking? Authorizing Provider   Buprenorphine HCl-Naloxone HCl (SUBOXONE SL) Place under the tongue   Yes Historical Provider, MD   warfarin (COUMADIN) 7.5 MG tablet Take 1 tablet (or as directed by Medication Management) by mouth once daily. 12/6/21   Rashard Gill MD       Current Medications:   Current Facility-Administered Medications: 0.9 % sodium chloride bolus, 1,000 mL, IntraVENous, Once    REVIEW OF SYSTEMS:       CONSTITUTIONAL: negative for fatigue and malaise   EYES: negative for double vision and photophobia    HEENT: negative for tinnitus and sore throat   RESPIRATORY: negative for cough, shortness of breath   CARDIOVASCULAR: negative for chest pain, palpitations   GASTROINTESTINAL: negative for nausea, vomiting   GENITOURINARY: negative for incontinence   MUSCULOSKELETAL: negative for neck or back pain   NEUROLOGICAL: negative for seizures   PSYCHIATRIC: negative for fatigue     Review of systems otherwise negative. PHYSICAL EXAM:       Wt 150 lb (68 kg)   LMP 03/23/2022 (Approximate)   BMI 26.57 kg/m²     CONSTITUTIONAL:  Well developed, well nourished, alert and oriented x 3, in no acute distress. GCS 15, nontoxic. No dysarthria, no aphasia. EOMI.     HEAD:  normocephalic, atraumatic    EYES:  PERRLA, EOMI.   ENT:  moist mucous membranes   NECK:  supple, symmetric, no midline tenderness to palpation    BACK:  without midline tenderness, step-offs or deformities    LUNGS: Equal air entry bilaterally   CARDIOVASCULAR:  normal s1 / s2   ABDOMEN:  Soft, no rigidity   NEUROLOGIC:  Mental Status:  A & O x3,awake             Cranial Nerves:    cranial nerves II-XII are grossly intact    Motor Exam:    Drift:  absent  Tone:  normal      Motor exam is symmetrical 5 out of 5 all extremities bilaterally    Sensory:    Touch:    Right Upper Extremity:  abnormal - numbness  Left Upper Extremity:  normal  Right Lower Extremity:  normal  Left Lower Extremity:  normal    Deep Tendon Reflexes:    Right Bicep:  2+  Left Bicep:  2+  Right Knee:  2+  Left Knee:  2+    Plantar Response:  Right:  equivocal  Left:  equivocal    Clonus:  absent  Florence's:  N/A    Coordination/Dysmetria:  Heel to Shin:  Right:  normal  Finger to Nose:   Right:  normal   Dysdiadochokinesia:  N/A    Gait:  Not assessed     INITIAL NIH STROKE SCALE:      1a.  Level of consciousness:  0 - alert; keenly responsive  1b. Level of consciousness questions:  0 - answers both questions correctly  1c. Level of consciousness questions:  0 - performs both tasks correctly  2. Best Gaze:  0 - normal  3. Visual:  0 - no visual loss  4. Facial Palsy:  0 - normal symmetric movement  5a. Motor left arm:  0 - no drift, limb holds 90 (or 45) degrees for full 10 seconds  5b. Motor right arm:  0 - no drift, limb holds 90 (or 45) degrees for full 10 seconds  6a. Motor left le - no drift; leg holds 30 degree position for full 5 seconds  6b. Motor right le - no drift; leg holds 30 degree position for full 5 seconds  7. Limb Ataxia:  0 - absent  8. Sensory:  1 - mild to moderate sensory loss; patient feels pinprick is less sharp or is dull on the affected side; there is a loss of superficial pain with pinprick but patient is aware of being touched   9. Best Language:  0 - no aphasia, normal  10. Dysarthria:  0 - normal  11.   Extinction and Inattention:  0 - no abnormality    TOTAL:  1     SKIN:  no rash      LABS AND IMAGING:     CBC with Differential:    Lab Results   Component Value Date    WBC 6.0 2021    RBC 3.44 2021    HGB 8.1 2021    HCT 28.2 2021     2021    MCV 82.0 2021    MCH 23.5 2021    MCHC 28.7 2021    RDW 19.5 2021    LYMPHOPCT 33 2021    MONOPCT 8 2021    BASOPCT 0 2021    MONOSABS 0.50 2021    LYMPHSABS 2.01 2021    EOSABS 0.25 2021    BASOSABS <0.03 2021    DIFFTYPE NOT REPORTED 2021     BMP:    Lab Results   Component Value Date     2021    K 3.6 2021     2021    CO2 26 2021    BUN 7 2021    LABALBU 3.5 10/19/2019    CREATININE 0.91 2022    CREATININE 0.94 2021    CALCIUM 8.9 2021    GFRAA >60 2021    LABGLOM >60 2022    GLUCOSE 88 2021             ASSESSMENT AND PLAN:       Patient Active Problem List   Diagnosis    Acute pulmonary embolism (HCC)    Anemia    Smoker    Marijuana smoker    Iron deficiency anemia    History of intravenous drug abuse ([address]    Occult blood positive stool    HH (hiatus hernia)    Acute CVA (cerebrovascular accident) ([address]    Stroke due to occlusion of left carotid artery ([address]    Carotid artery stenosis with cerebral infarction ([address]    Acute right hemiparesis ([address]    Right sided numbness    MTHFR gene mutation          This is a 31 y.o. female with acute onset of left-sided facial droop and dysarthria along with residual right-sided numbness from previous CTA in May 2021. CTA in the emergency department showing left ICA thrombus with severe stenosis. NIH 1 for residual right-sided numbness.      Case discussed with       - Imagin/6 CTA-near occlusive left internal carotid artery thrombus resulting in short segment 90% stenosis  - MRI Brain w/o contrast pending  - ASA and Plavix  - Lipitor 80mg nightly  - Heparin gtt  - Folic acid 1mg BID  - Analgesics: Tylenol prn  - Labs: A1C, fasting lipid panel  - Neuro checks per protocol  - Goal SBP < 200    Additional recommendations may follow    Please contact [facility] with any changes in patients neurologic status.            Jameel Silverio MD   4/6/2022  5:44 PM

## 2022-04-06 NOTE — CARE COORDINATION
Case Management Initial Discharge Plan  Anahi Pastor,             Met with:patient to discuss discharge plans. Information verified: address, contacts, phone number, , insurance Yes  Insurance Provider: [facility]    Emergency Contact/Next of Kin name & number: spouse Dottie Rudolph as per face sheet  Who are involved in patient's support system? spouse    PCP: No primary care provider on file. Date of last visit: none will need list      Discharge Planning    Living Arrangements:    lives with spouse in his parents home    Home has 1 stories  4 stairs to climb to get into front door, no stairs to climb to reach second floor  Location of bedroom/bathroom in home main    Patient able to perform ADL's:Independent    Current Services (outpatient & in home) DME  DME equipment: ramp  DME provider: paulina    Is patient receiving oral anticoagulation therapy? Yes    If indicated:   Physician managing anticoagulation treatment: [facility]   Where does patient obtain lab work for ATC treatment? [facility]    Does patient have any issues/concerns obtaining medications? No  If yes, what are patient's concerns? Is there a preferred Pharmacy after hours or on weekends? Yes    If yes, which pharmacy? [facility]    Potential Assistance Needed:       Patient agreeable to home care: No  Willow of choice provided:  n/a    Prior SNF/Rehab Placement and Facility: none  Agreeable to SNF/Rehab: No  Willow of choice provided: n/a     Evaluation: no    Expected Discharge date:       Patient expects to be discharged to:   home    If home: is the family and/or caregiver wiling & able to provide support at home? yes  Who will be providing this support?  In laws and spouse    Follow Up Appointment: Best Day/ Time:      Transportation provider: spouse  Transportation arrangements needed for discharge: No    Readmission Risk              Risk of Unplanned Readmission:  0 Does patient have a readmission risk score greater than 14?: No  If yes, follow-up appointment must be made within 7 days of discharge.      Goals of Care: self care      Educated pt on transitional options, provided freedom of choice and are agreeable with plan      Discharge Plan: home independent, has transport          Electronically signed by Rosaura Herron RN on 4/6/22 at 7:13 PM EDT

## 2022-04-07 LAB
EKG ATRIAL RATE: 103 BPM
EKG P AXIS: 53 DEGREES
EKG P-R INTERVAL: 124 MS
EKG Q-T INTERVAL: 350 MS
EKG QRS DURATION: 78 MS
EKG QTC CALCULATION (BAZETT): 458 MS
EKG R AXIS: 26 DEGREES
EKG T AXIS: 57 DEGREES
EKG VENTRICULAR RATE: 103 BPM

## 2022-04-07 PROCEDURE — 93010 ELECTROCARDIOGRAM REPORT: CPT | Performed by: INTERNAL MEDICINE

## 2022-04-07 NOTE — SIGNIFICANT EVENT
At 2100, I was paged to bedside because the patient wanted to leave against medical advice because her  could not stay the night with her in the critical care unit. I discussed with the patient that the Neuro ICU is an open unit with many critically ill patients and it is hospital policy that there are no overnight visitors allowed. I also discussed with the patient the risks of leaving prior to completing treatment, including propagation of ICA thrombus which could cause worsening of symptoms, severe stroke, permanent disability, and death. I also discussed with the patient that she was placed on multiple blood-thinning medications, which could cause a brain bleed, permanent disability, and/or death. The patient repeated these risks to me in her own words. She is of sound decision making capacity and chooses to leave against medical advice. I advised the patient that she may return at any time for continued evaluation and treatment. Pt indicated understanding and acceptance of these risks and still wishes to sign out AMA.     Electronically signed by Josselyn Grover MD on 4/6/2022 at 9:17 PM

## 2022-04-07 NOTE — ED NOTES
Pt refusing to stay. Dr. Jazmine Box and Ena Neither notified.  Pt made aware of risks of leaving       [facility] RN  04/06/22 6350

## 2022-04-07 NOTE — DISCHARGE SUMMARY
Neuro Critical Care   Discharge Summary      PATIENT NAME: Rhianna Collier  YOB: 1985  MEDICAL RECORD NO. [id number]  DATE: 4/7/2022  PRIMARY CARE PHYSICIAN: No primary care provider on file. DISCHARGE DATE:  4/6/2022  DISCHARGE DIAGNOSIS:   Patient Active Problem List   Diagnosis Code    Acute pulmonary embolism (HCC) I26.99    Anemia D64.9    Smoker F17.200    Marijuana smoker F12.90    Iron deficiency anemia D50.9    History of intravenous drug abuse (HCC) F19.11    Occult blood positive stool R19.5    HH (hiatus hernia) K44.9    Acute CVA (cerebrovascular accident) ([facility] [address] I63.9    Stroke due to occlusion of left carotid artery ([facility]) D79.773    Carotid artery stenosis with cerebral infarction [facility]) I63.239    Acute right hemiparesis ([facility]) G81.91    Right sided numbness R20.0    MTHFR gene mutation Z15.89    Acute cerebrovascular accident (CVA) [facility]) I63.9       HOSPITAL COURSE     Rhianna Collier is a 31 y.o. yo female who presented to [address] [place] [address] on 4/6/2022  4:39 PM with acute onset left-sided facial droop and dysarthria around 4 PM this afternoon. She has a medical history significant for CVA in May 2021 and was found to have thrombus of left carotid bulb and left MCA territory infarct on MRI. She was discharged on warfarin and repeat CTA in July 2021 showed resolution of carotid bulb thrombus. Residual deficits from that stroke include right sided numbness. Patient reports compliance with her home medications and had an INR of 2.7 in March. In the emergency department, a stroke alert noncritical was called. CTA head/neck demonstrating near occlusive thrombus resulting in severe stenosis of left ICA. Initial NIH 1 for right sided numbness. Patient was loaded with aspirin and Plavix and started on a heparin drip. Stroke team evaluated the patient with no endovascular intervention planned.      Patient admitted to neuro critical care for management of triple anticoagulative therapy and close blood pressure monitoring overnight. Two hours after admitting the patient, she chose to leave against medical advice because her  could not stay the night with her in the critical care unit. I discussed with the patient that the Neuro ICU is an open unit with many critically ill patients and it is hospital policy that there are no overnight visitors allowed. I also discussed with the patient the risks of leaving prior to completing treatment, including propagation of ICA thrombus which could cause worsening of symptoms, severe stroke, permanent disability, and death. I also discussed with the patient that she was placed on multiple blood-thinning medications, which could cause a brain bleed, permanent disability, and/or death. The patient repeated these risks to me in her own words. She is of sound decision making capacity and chooses to leave against medical advice. I advised the patient that she may return at any time for continued evaluation and treatment. Pt indicated understanding and acceptance of these risks and still wishes to sign out AMA. PROCEDURES:    None    PHYSICAL EXAMINATION        Discharge Vitals:  weight is 150 lb (68 kg). Her oral temperature is 98.4 °F (36.9 °C). Her blood pressure is 118/75 and her pulse is 111. Her respiration is 17 and oxygen saturation is 100%. CONSTITUTIONAL:  Well developed, well nourished, alert and oriented x 3, in no acute distress. GCS 15. Nontoxic. No dysarthria. No aphasia. HEAD:  normocephalic, atraumatic    EYES:  PERRLA, EOMI.  Visual acuity and peripheral vision intact b/l   ENT:  moist mucous membranes   NECK:  supple, symmetric   LUNGS:  Equal air entry bilaterally   CARDIOVASCULAR:  normal s1 / s2, RRR, distal pulses intact   ABDOMEN:  Soft, no rigidity   NEUROLOGIC:  Mental Status:  A & O x3,awake             Cranial Nerves:    II: Visual acuity:  normal  II: Visual fields:  normal  III: Pupils:  equal, round, reactive to light  III,IV,VI: Extra Ocular Movements: intact  V: Facial sensation:  intact  VII: Facial strength: intact  VIII: Hearing:  intact  IX: Palate:  intact  XI: Shoulder shrug:  intact  XII: Tongue movement:  normal     Motor Exam:    Drift:  absent  Tone:  normal     MOTOR:  RUE: 5/5  LUE: 5/5  RLE: 5/5  LLE: 5/5     Sensory:    Touch:    Right Upper Extremity:  abnormal -numbness  Left Upper Extremity:  normal  Right Lower Extremity:  normal  Left Lower Extremity:  normal     Deep Tendon Reflexes:    Right Bicep:  2+  Left Bicep:  2+  Right Knee:  2+  Left Knee:  2+     Plantar Response:  Right:  equivocal  Left:  equivocal     Clonus:  absent     Coordination/Dysmetria:  Heel to Shin:  Right:  normal  Left:  normal  Finger to Nose:   Right:  normal  Left:  normal          NIH Stroke Scale Total (if not done complete detailed one below):    1a.  Level of consciousness:  0 - alert; keenly responsive  1b. Level of consciousness questions:  0 - answers both questions correctly  1c. Level of consciousness questions:  0 - performs both tasks correctly  2. Best Gaze:  0 - normal  3. Visual:  0 - no visual loss  4. Facial Palsy:  0 - normal symmetric movement  5a. Motor left arm:  0 - no drift, limb holds 90 (or 45) degrees for full 10 seconds  5b. Motor right arm:  0 - no drift, limb holds 90 (or 45) degrees for full 10 seconds  6a. Motor left le - no drift; leg holds 30 degree position for full 5 seconds  6b. Motor right le - no drift; leg holds 30 degree position for full 5 seconds  7. Limb Ataxia:  0 - absent  8. Sensory:  1 - mild to moderate sensory loss; patient feels pinprick is less sharp or is dull on the affected side; there is a loss of superficial pain with pinprick but patient is aware of being touched   9. Best Language:  0 - no aphasia, normal  10. Dysarthria:  0 - normal  11.   Extinction and Inattention:  0 - no abnormality      LABS/IMAGING     Recent Labs     04/06/22  1725 04/06/22  1726   WBC 6.6  --    HGB 6.9*  --    HCT 26.1*  --      --      --    K 3.8  --      --    CO2 24  --    BUN 11  --    CREATININE 0.66 0.91       CT HEAD WO CONTRAST    Result Date: 4/6/2022  EXAMINATION: CTA OF THE HEAD AND NECK WITH CONTRAST; CT OF THE HEAD WITHOUT CONTRAST 4/6/2022 5:20 pm; 4/6/2022 5:19 pm TECHNIQUE: CTA of the head and neck was performed with the administration of intravenous contrast. Multiplanar reformatted images are provided for review. MIP images are provided for review. Stenosis of the internal carotid arteries measured using NASCET criteria. Dose modulation, iterative reconstruction, and/or weight based adjustment of the mA/kV was utilized to reduce the radiation dose to as low as reasonably achievable.; CT of the head was performed without the administration of intravenous contrast. Dose modulation, iterative reconstruction, and/or weight based adjustment of the mA/kV was utilized to reduce the radiation dose to as low as reasonably achievable. Noncontrast CT of the head with reconstructed 2-D images are also provided for review. COMPARISON: 07/09/2021 HISTORY: ORDERING SYSTEM PROVIDED HISTORY: Stroke TECHNOLOGIST PROVIDED HISTORY: Stroke Decision Support Exception - unselect if not a suspected or confirmed emergency medical condition->Emergency Medical Condition (MA) Reason for Exam: STROKE; ORDERING SYSTEM PROVIDED HISTORY: Stroke TECHNOLOGIST PROVIDED HISTORY: Stroke Decision Support Exception - unselect if not a suspected or confirmed emergency medical condition->Emergency Medical Condition (MA) Is the patient pregnant? No Reason for Exam: Stroke FINDINGS: CT HEAD: BRAIN/VENTRICLES:  There is no acute infarct or acute intracranial hemorrhage present. There is no mass effect or midline shift present.   Areas of hypoattenuation within the left periventricular white matter and left parietal lobe are consistent with known remote infarcts. There is no ventriculomegaly or abnormal extra-axial fluid collection present. ORBITS: Limited evaluation of the orbits is unremarkable. SINUSES:  The paranasal sinuses and mastoid air cells are clear. SOFT TISSUES/SKULL: No lytic or blastic osseous lesions are identified. CTA NECK: AORTIC ARCH/ARCH VESSELS: The origins of the great vessels are normal.  There is no significant stenosis of the innominate or subclavian arteries. CAROTID ARTERIES: The common carotid arteries are normal.  There is near occlusive thrombus at the origin of the left internal carotid artery resulting in a severe stenosis measuring approximately 90% based on NASCET criteria. The right internal carotid artery is normal without significant stenosis or dissection evident. VERTEBRAL ARTERIES: No dissection, arterial injury, or significant stenosis. SOFT TISSUES: The lung apices are clear. No cervical or superior mediastinal lymphadenopathy. The larynx and pharynx are unremarkable. No acute abnormality of the salivary and thyroid glands. BONES: No lytic or blastic osseous lesions are identified. CTA HEAD: ANTERIOR CIRCULATION: The intracranial segments of the internal carotid arteries are normal.  There is no significant stenosis or aneurysm identified. The anterior and middle cerebral arteries are normal in appearance. No significant stenosis or aneurysm is identified. POSTERIOR CIRCULATION: The distal vertebral arteries are normal in appearance. The basilar artery is normal.  No significant stenosis or aneurysm is identified. The posterior cerebral arteries are normal in appearance. OTHER: No dural venous sinus thrombosis on this non-dedicated study. 1. No acute infarct or acute intracranial hemorrhage evident.  2. Near occlusive thrombus at the origin of the left internal carotid artery resulting in a short segment 90% stenosis based on NASCET criteria, similar to the findings on the CT angiogram from 05/27/2021 which subsequently resolved on the exam from 07/09/2021. This may be secondary to an acute dissection. 3. No large vessel occlusion, significant stenosis or cerebral aneurysm identified within the brain. The above findings were discussed with Dr. Melony Mccauley at 6:10 p.m. on 04/06/2022. CTA HEAD NECK W CONTRAST    Result Date: 4/6/2022  EXAMINATION: CTA OF THE HEAD AND NECK WITH CONTRAST; CT OF THE HEAD WITHOUT CONTRAST 4/6/2022 5:20 pm; 4/6/2022 5:19 pm TECHNIQUE: CTA of the head and neck was performed with the administration of intravenous contrast. Multiplanar reformatted images are provided for review. MIP images are provided for review. Stenosis of the internal carotid arteries measured using NASCET criteria. Dose modulation, iterative reconstruction, and/or weight based adjustment of the mA/kV was utilized to reduce the radiation dose to as low as reasonably achievable.; CT of the head was performed without the administration of intravenous contrast. Dose modulation, iterative reconstruction, and/or weight based adjustment of the mA/kV was utilized to reduce the radiation dose to as low as reasonably achievable. Noncontrast CT of the head with reconstructed 2-D images are also provided for review. COMPARISON: 07/09/2021 HISTORY: ORDERING SYSTEM PROVIDED HISTORY: Stroke TECHNOLOGIST PROVIDED HISTORY: Stroke Decision Support Exception - unselect if not a suspected or confirmed emergency medical condition->Emergency Medical Condition (MA) Reason for Exam: STROKE; ORDERING SYSTEM PROVIDED HISTORY: Stroke TECHNOLOGIST PROVIDED HISTORY: Stroke Decision Support Exception - unselect if not a suspected or confirmed emergency medical condition->Emergency Medical Condition (MA) Is the patient pregnant? No Reason for Exam: Stroke FINDINGS: CT HEAD: BRAIN/VENTRICLES:  There is no acute infarct or acute intracranial hemorrhage present. There is no mass effect or midline shift present.   Areas of hypoattenuation within the left periventricular white matter and left parietal lobe are consistent with known remote infarcts. There is no ventriculomegaly or abnormal extra-axial fluid collection present. ORBITS: Limited evaluation of the orbits is unremarkable. SINUSES:  The paranasal sinuses and mastoid air cells are clear. SOFT TISSUES/SKULL: No lytic or blastic osseous lesions are identified. CTA NECK: AORTIC ARCH/ARCH VESSELS: The origins of the great vessels are normal.  There is no significant stenosis of the innominate or subclavian arteries. CAROTID ARTERIES: The common carotid arteries are normal.  There is near occlusive thrombus at the origin of the left internal carotid artery resulting in a severe stenosis measuring approximately 90% based on NASCET criteria. The right internal carotid artery is normal without significant stenosis or dissection evident. VERTEBRAL ARTERIES: No dissection, arterial injury, or significant stenosis. SOFT TISSUES: The lung apices are clear. No cervical or superior mediastinal lymphadenopathy. The larynx and pharynx are unremarkable. No acute abnormality of the salivary and thyroid glands. BONES: No lytic or blastic osseous lesions are identified. CTA HEAD: ANTERIOR CIRCULATION: The intracranial segments of the internal carotid arteries are normal.  There is no significant stenosis or aneurysm identified. The anterior and middle cerebral arteries are normal in appearance. No significant stenosis or aneurysm is identified. POSTERIOR CIRCULATION: The distal vertebral arteries are normal in appearance. The basilar artery is normal.  No significant stenosis or aneurysm is identified. The posterior cerebral arteries are normal in appearance. OTHER: No dural venous sinus thrombosis on this non-dedicated study. 1. No acute infarct or acute intracranial hemorrhage evident.  2. Near occlusive thrombus at the origin of the left internal carotid artery resulting in a short segment 90% stenosis based on NASCET criteria, similar to the findings on the CT angiogram from 05/27/2021 which subsequently resolved on the exam from 07/09/2021. This may be secondary to an acute dissection. 3. No large vessel occlusion, significant stenosis or cerebral aneurysm identified within the brain. The above findings were discussed with Dr. Peter Marcial at 6:10 p.m. on 04/06/2022. DISCHARGE INSTRUCTIONS     The patient left against medical advice 2 hours after her admission because her  was not allowed to stay the night with her in the neuro ICU. She was not intoxicated and was of sound decision making capacity. I discussed with her that leaving prior to completing treatment in the context of a near-occlusive thrombus could result in worsening of the thrombus which could cause worsening stroke, permanent disability, or death. I also discussed with her that she is at increased risk for excessive bleeding due to heparin therapy, which puts her at increased risk of intracranial bleed which could also cause permanent disability or death. She was able to repeat these risks back to me in her own words and still requested to leave AMA. The phone number for the endovascular neurosurgery clinic was provided to her, along with strict return precautions. The patient was highly encourage to return to the ER for treatment should she have recurrence or worsening of symptoms. Discharge Medications:        Medication List        ASK your doctor about these medications      SUBOXONE SL     warfarin 7.5 MG tablet  Commonly known as: Coumadin  Take as directed. If you are unsure how to take this medication, talk to your nurse or doctor. Original instructions: Take 1 tablet (or as directed by Medication Management) by mouth once daily.             Diet: Regular diet   Activity: as tolerated  Follow-up:  Call endovascular neurosurgery ASAP for follow-up  Time Spent for discharge: 30 minutes    Disha Jose MD  Neuro Critical Care  4/7/2022, 6:30 PM

## 2022-04-07 NOTE — ED NOTES
Patient presents to ER room 34 with complaint of slurred speech, slight expressive aphasia, slight right mouth droop, right temporal headache and right lateral neck pain  Patient has a history of prior CVA in 2021  Patient's  is at bedside  Patient is not happy about being at hospital but  encouraging patient to be evaluated  Patient placed on cardiac monitor, BP cuff and pulse ox. Alarms set.        Bradly Roland RN  04/06/22 2030

## 2022-04-13 ENCOUNTER — HOSPITAL ENCOUNTER (OUTPATIENT)
Dept: PHARMACY | Age: 37
Setting detail: THERAPIES SERIES
Discharge: HOME | End: 2022-04-13
Payer: MEDICAID

## 2022-04-13 DIAGNOSIS — I63.232 STROKE DUE TO OCCLUSION OF LEFT CAROTID ARTERY (HCC): ICD-10-CM

## 2022-04-13 DIAGNOSIS — I63.9 ACUTE CVA (CEREBROVASCULAR ACCIDENT) (HCC): Primary | ICD-10-CM

## 2022-04-13 LAB
INR BLD: 2.9
PROTIME: 35.1 SECONDS

## 2022-04-13 PROCEDURE — 99211 OFF/OP EST MAY X REQ PHY/QHP: CPT

## 2022-04-13 PROCEDURE — 85610 PROTHROMBIN TIME: CPT

## 2022-04-21 ENCOUNTER — TELEPHONE (OUTPATIENT)
Dept: NEUROSURGERY | Age: 37
End: 2022-04-21

## 2022-04-21 DIAGNOSIS — I82.90 THROMBOSIS: Primary | ICD-10-CM

## 2022-04-21 NOTE — TELEPHONE ENCOUNTER
Patient stopped in requesting a referral be placed for them to go to [facility] due to insurance issues.  Fax # [phone]

## 2022-04-27 ENCOUNTER — APPOINTMENT (OUTPATIENT)
Dept: CT IMAGING | Age: 37
DRG: 663 | End: 2022-04-27
Payer: MEDICAID

## 2022-04-27 ENCOUNTER — HOSPITAL ENCOUNTER (INPATIENT)
Age: 37
LOS: 1 days | Discharge: HOME | DRG: 663 | End: 2022-04-28
Attending: EMERGENCY MEDICINE | Admitting: INTERNAL MEDICINE
Payer: MEDICAID

## 2022-04-27 DIAGNOSIS — D64.9 ANEMIA, UNSPECIFIED TYPE: Primary | ICD-10-CM

## 2022-04-27 LAB
ABSOLUTE EOS #: 0.15 K/UL (ref 0–0.4)
ABSOLUTE IMMATURE GRANULOCYTE: 0 K/UL (ref 0–0.3)
ABSOLUTE LYMPH #: 1.63 K/UL (ref 1–4.8)
ABSOLUTE MONO #: 0.15 K/UL (ref 0.1–0.8)
ALBUMIN SERPL-MCNC: 4.1 G/DL (ref 3.5–5.2)
ALBUMIN/GLOBULIN RATIO: 1.3 (ref 1–2.5)
ALP BLD-CCNC: 83 U/L (ref 35–104)
ALT SERPL-CCNC: 6 U/L (ref 5–33)
ANION GAP SERPL CALCULATED.3IONS-SCNC: 12 MMOL/L (ref 9–17)
AST SERPL-CCNC: 15 U/L
BASOPHILS # BLD: 0 % (ref 0–2)
BASOPHILS ABSOLUTE: 0 K/UL (ref 0–0.2)
BILIRUB SERPL-MCNC: <0.1 MG/DL (ref 0.3–1.2)
BUN BLDV-MCNC: 9 MG/DL (ref 6–20)
CALCIUM SERPL-MCNC: 8.9 MG/DL (ref 8.6–10.4)
CHLORIDE BLD-SCNC: 103 MMOL/L (ref 98–107)
CO2: 22 MMOL/L (ref 20–31)
CREAT SERPL-MCNC: 0.67 MG/DL (ref 0.5–0.9)
EOSINOPHILS RELATIVE PERCENT: 2 % (ref 1–4)
GFR AFRICAN AMERICAN: >60 ML/MIN
GFR NON-AFRICAN AMERICAN: >60 ML/MIN
GFR SERPL CREATININE-BSD FRML MDRD: ABNORMAL ML/MIN/{1.73_M2}
GLUCOSE BLD-MCNC: 89 MG/DL (ref 70–99)
HCG QUALITATIVE: NEGATIVE
HCT VFR BLD CALC: 24.8 % (ref 36.3–47.1)
HEMOGLOBIN: 6.6 G/DL (ref 11.9–15.1)
IMMATURE GRANULOCYTES: 0 %
INR BLD: 3.1
LYMPHOCYTES # BLD: 22 % (ref 24–44)
MAGNESIUM: 1.9 MG/DL (ref 1.6–2.6)
MCH RBC QN AUTO: 17.9 PG (ref 25.2–33.5)
MCHC RBC AUTO-ENTMCNC: 26.6 G/DL (ref 28.4–34.8)
MCV RBC AUTO: 67.4 FL (ref 82.6–102.9)
MONOCYTES # BLD: 2 % (ref 1–7)
MORPHOLOGY: ABNORMAL
NRBC AUTOMATED: 0 PER 100 WBC
PDW BLD-RTO: 20.8 % (ref 11.8–14.4)
PLATELET # BLD: 328 K/UL (ref 138–453)
PMV BLD AUTO: 9.8 FL (ref 8.1–13.5)
POTASSIUM SERPL-SCNC: 3.2 MMOL/L (ref 3.7–5.3)
PROTHROMBIN TIME: 29.8 SEC (ref 9.1–12.3)
RBC # BLD: 3.68 M/UL (ref 3.95–5.11)
SEG NEUTROPHILS: 74 % (ref 36–66)
SEGMENTED NEUTROPHILS ABSOLUTE COUNT: 5.47 K/UL (ref 1.8–7.7)
SODIUM BLD-SCNC: 137 MMOL/L (ref 135–144)
TOTAL PROTEIN: 7.3 G/DL (ref 6.4–8.3)
TROPONIN, HIGH SENSITIVITY: <6 NG/L (ref 0–14)
TROPONIN, HIGH SENSITIVITY: <6 NG/L (ref 0–14)
WBC # BLD: 7.4 K/UL (ref 3.5–11.3)

## 2022-04-27 PROCEDURE — 86901 BLOOD TYPING SEROLOGIC RH(D): CPT

## 2022-04-27 PROCEDURE — 96375 TX/PRO/DX INJ NEW DRUG ADDON: CPT

## 2022-04-27 PROCEDURE — 99222 1ST HOSP IP/OBS MODERATE 55: CPT | Performed by: PSYCHIATRY & NEUROLOGY

## 2022-04-27 PROCEDURE — 2060000000 HC ICU INTERMEDIATE R&B

## 2022-04-27 PROCEDURE — 85610 PROTHROMBIN TIME: CPT

## 2022-04-27 PROCEDURE — 99285 EMERGENCY DEPT VISIT HI MDM: CPT

## 2022-04-27 PROCEDURE — 86870 RBC ANTIBODY IDENTIFICATION: CPT

## 2022-04-27 PROCEDURE — 86920 COMPATIBILITY TEST SPIN: CPT

## 2022-04-27 PROCEDURE — 6370000000 HC RX 637 (ALT 250 FOR IP): Performed by: STUDENT IN AN ORGANIZED HEALTH CARE EDUCATION/TRAINING PROGRAM

## 2022-04-27 PROCEDURE — 84484 ASSAY OF TROPONIN QUANT: CPT

## 2022-04-27 PROCEDURE — 93005 ELECTROCARDIOGRAM TRACING: CPT | Performed by: STUDENT IN AN ORGANIZED HEALTH CARE EDUCATION/TRAINING PROGRAM

## 2022-04-27 PROCEDURE — 86880 COOMBS TEST DIRECT: CPT

## 2022-04-27 PROCEDURE — 6360000002 HC RX W HCPCS: Performed by: STUDENT IN AN ORGANIZED HEALTH CARE EDUCATION/TRAINING PROGRAM

## 2022-04-27 PROCEDURE — 86850 RBC ANTIBODY SCREEN: CPT

## 2022-04-27 PROCEDURE — 6370000000 HC RX 637 (ALT 250 FOR IP): Performed by: GENERAL PRACTICE

## 2022-04-27 PROCEDURE — 86900 BLOOD TYPING SEROLOGIC ABO: CPT

## 2022-04-27 PROCEDURE — 80053 COMPREHEN METABOLIC PANEL: CPT

## 2022-04-27 PROCEDURE — 6360000002 HC RX W HCPCS: Performed by: GENERAL PRACTICE

## 2022-04-27 PROCEDURE — 70450 CT HEAD/BRAIN W/O DYE: CPT

## 2022-04-27 PROCEDURE — P9016 RBC LEUKOCYTES REDUCED: HCPCS

## 2022-04-27 PROCEDURE — 86905 BLOOD TYPING RBC ANTIGENS: CPT

## 2022-04-27 PROCEDURE — 85025 COMPLETE CBC W/AUTO DIFF WBC: CPT

## 2022-04-27 PROCEDURE — 96365 THER/PROPH/DIAG IV INF INIT: CPT

## 2022-04-27 PROCEDURE — 84703 CHORIONIC GONADOTROPIN ASSAY: CPT

## 2022-04-27 PROCEDURE — 6360000004 HC RX CONTRAST MEDICATION: Performed by: GENERAL PRACTICE

## 2022-04-27 PROCEDURE — 70496 CT ANGIOGRAPHY HEAD: CPT

## 2022-04-27 PROCEDURE — 83735 ASSAY OF MAGNESIUM: CPT

## 2022-04-27 PROCEDURE — 36430 TRANSFUSION BLD/BLD COMPNT: CPT

## 2022-04-27 RX ORDER — ACETAMINOPHEN 325 MG/1
650 TABLET ORAL ONCE
Status: COMPLETED | OUTPATIENT
Start: 2022-04-27 | End: 2022-04-27

## 2022-04-27 RX ORDER — DIPHENHYDRAMINE HCL 25 MG
25 TABLET ORAL ONCE
Status: COMPLETED | OUTPATIENT
Start: 2022-04-27 | End: 2022-04-27

## 2022-04-27 RX ORDER — PROCHLORPERAZINE EDISYLATE 5 MG/ML
10 INJECTION INTRAMUSCULAR; INTRAVENOUS ONCE
Status: COMPLETED | OUTPATIENT
Start: 2022-04-27 | End: 2022-04-27

## 2022-04-27 RX ORDER — MAGNESIUM SULFATE IN WATER 40 MG/ML
2000 INJECTION, SOLUTION INTRAVENOUS ONCE
Status: COMPLETED | OUTPATIENT
Start: 2022-04-27 | End: 2022-04-27

## 2022-04-27 RX ORDER — SODIUM CHLORIDE 9 MG/ML
INJECTION, SOLUTION INTRAVENOUS PRN
Status: DISCONTINUED | OUTPATIENT
Start: 2022-04-27 | End: 2022-04-28 | Stop reason: HOSPADM

## 2022-04-27 RX ADMIN — MAGNESIUM SULFATE HEPTAHYDRATE 2000 MG: 40 INJECTION, SOLUTION INTRAVENOUS at 18:56

## 2022-04-27 RX ADMIN — DIPHENHYDRAMINE HCL 25 MG: 25 TABLET ORAL at 17:47

## 2022-04-27 RX ADMIN — POTASSIUM BICARBONATE 40 MEQ: 782 TABLET, EFFERVESCENT ORAL at 18:56

## 2022-04-27 RX ADMIN — IOPAMIDOL 90 ML: 755 INJECTION, SOLUTION INTRAVENOUS at 19:16

## 2022-04-27 RX ADMIN — ACETAMINOPHEN 650 MG: 325 TABLET ORAL at 17:47

## 2022-04-27 RX ADMIN — PROCHLORPERAZINE EDISYLATE 10 MG: 5 INJECTION INTRAMUSCULAR; INTRAVENOUS at 17:47

## 2022-04-27 ASSESSMENT — PAIN SCALES - GENERAL: PAINLEVEL_OUTOF10: 4

## 2022-04-27 ASSESSMENT — ENCOUNTER SYMPTOMS
TROUBLE SWALLOWING: 1
WHEEZING: 0
VOMITING: 0
DIARRHEA: 0
RHINORRHEA: 0
SORE THROAT: 0
CONSTIPATION: 0
BLOOD IN STOOL: 0
NAUSEA: 0
COUGH: 0
SHORTNESS OF BREATH: 1
ABDOMINAL PAIN: 0
FACIAL SWELLING: 0

## 2022-04-27 NOTE — CONSULTS
[id number] [facility] Neurology   IN-PATIENT SERVICE      ENDOVASCULAR CONSULT  NOTE            Date:   4/27/2022  Patient name:  Miguelangel Bauer  Date of admission:  4/27/2022  YOB: 1985      Chief Complaint:     Chief Complaint   Patient presents with    Migraine     states migraine with photophobia for 1-2 days.  Neck Pain     reports lt sided neck pain    Numbness     states rt arm numbness that started over 24 hrs ago    Dysphagia     also reports diff speaking, speaks clearly in triage    Chest Pain     states lt sided, denies radiation, also c/o irregular beats     Reason for Consult:      Left ICA thrombus     History of Present Illness: The patient is a 31 y.o. female presents to [facility] initially on 4/6/2022, with acute onset left-sided facial droop dysarthria, PMH significant for CVA in May 2021 was found to have thrombosis of left carotid bulb and left MCA territory infarct on MRI, discharged on warfarin repeat CTA in July 2021: Resolution of carotid bulb thrombus residual deficits from the stroke, right-sided numbness    -In April CTA showed near occlusive thrombus resulting in severe stenosis of left ICA, NIH of 1 residual right-sided numbness, patient was loaded with aspirin Plavix and put on heparin drip, patient admitted to neuro critical care and left AMA    Patient presented back to the ED today with complaining of migraine for the past few days, neck pain with the numbness of right arm, dysphagia for the past few days, difficulty swallowing, she also was complaining of chest pain, patient on Coumadin INR of 3.1, patient has a history of PE on warfarin,    Past Medical History:     Past Medical History:   Diagnosis Date    Acid reflux     Anemia 10/18/2019    Drug abuse, IV ([facility] [address]     claims that she has previous iv drug dependency claims hasn' had recent usage.     Headache     History of pulmonary embolus (PE)     Marijuana smoker 10/18/2019    Smoker 10/18/2019 Past Surgical History:     Past Surgical History:   Procedure Laterality Date    APPENDECTOMY       SECTION      COLONOSCOPY N/A 10/23/2019    COLORECTAL CANCER SCREENING, NOT HIGH RISK performed by Mikhail Martinez MD at [place] [address] N/A 10/22/2019    SIGMOIDOSCOPY ABORTED COLONOSCOPY performed by Mikhail Martinez MD at [address] [place] [address] N/A 10/21/2019    EGD ESOPHAGOGASTRODUODENOSCOPY performed by Mikhail Martinez MD at [address] [place] [address]        Medications Prior to Admission:     Prior to Admission medications    Medication Sig Start Date End Date Taking? Authorizing Provider   Buprenorphine HCl-Naloxone HCl (SUBOXONE SL) Place under the tongue    Historical Provider, MD   warfarin (COUMADIN) 7.5 MG tablet Take 1 tablet (or as directed by Medication Management) by mouth once daily. 21   Rubbie Closs, MD        Allergies:     Patient has no known allergies. Social History:     Tobacco:    reports that she has been smoking cigarettes. She has never used smokeless tobacco.  Alcohol:      reports no history of alcohol use. Drug Use:  reports current drug use. Drug: Marijuana Abril Ing).     Family History:     Family History   Problem Relation Age of Onset    No Known Problems Mother     No Known Problems Father        Review of Systems:       Constitutional Negative for fever and chills   HEENT Negative for ear discharge, ear pain, nosebleed   Eyes Negative for photophobia, pain and discharge   Respiratory Negative for hemoptysis and sputum   Cardiovascular Negative for orthopnea, claudication and PND   Gastrointestinal Negative for abdominal pain, diarrhea, blood in stool   Musculoskeletal Negative for joint pain, negative for myalgia   Skin Negative for rash or itching   hematology Negative for ecchymosis, anemia   Psychiatric Negative for suicidal ideation, anxiety, depression, hallucinations       Physical Exam:   /64   Pulse 102   Temp 99.5 °F (37.5 °C) (Oral)   Resp 20   Ht 5' 3\" (1.6 m)   SpO2 100%   BMI 26.57 kg/m²   Temp (24hrs), Av.5 °F (37.5 °C), Min:99.5 °F (37.5 °C), Max:99.5 °F (37.5 °C)        General examination:      General Appearance:  alert, well appearing, and in no acute distress  HEENT: Normocephalic, atraumatic, moist mucus membranes  Neck: supple, no carotid bruits, (-) nuchal rigidity  Lungs:  Respirations unlabored, chest wall no deformity, BS normal  Cardiovascular: normal rate, regular rhythm  Abdomen: Soft, nontender, nondistended, normal bowel sounds  Skin: No gross lesions, rashes, bruising or bleeding on exposed skin area  Extremities:  peripheral pulses palpable, no cyanosis, clubbing or edema  Psych: normal affect      Neurological examination:      Mental status   Alert and oriented x 3; following all commands;   speech is fluent, no dysarthria, aphasia. Cranial nerves   II - visual fields intact to confrontation; pupils reactive  III, IV, VI - extraocular muscles intact; no URIEL; no nystagmus; no ptosis   V - normal facial sensation                                                               VII - normal facial symmetry                                                             VIII - intact hearing                                                                             IX, X - symmetrical palate elevation                                               XI - symmetrical shoulder shrug                                                       XII - midline tongue without atrophy or fasciculation     Motor function  Strength:   5/5 RUE, 5/5 RLE  5/5 LUE, 5/5  LLE  Normal bulk and tone. Sensory function Intact to touch, pin, vibration, proprioception throughout, minor right lower extremity numbness, at baseline status post previous  CVA     Cerebellar Intact finger-nose-finger testing. Intact heel-shin testing. No dysdiadochokinesia present.    No tremors                        Reflex function 2/4 symmetric throughout . Downgoing plantar response bilaterally. (-)Florence's sign bilaterally      Gait                   not assessed           Diagnostics:      Laboratory Testing:  CBC:   Recent Labs     04/27/22  1747   WBC 7.4   HGB 6.6*        BMP:    Recent Labs     04/27/22  1747      K 3.2*      CO2 22   BUN 9   CREATININE 0.67   GLUCOSE 89         Lab Results   Component Value Date    CHOL 93 05/29/2021    LDLCHOLESTEROL 41 05/29/2021    HDL 34 (L) 05/29/2021    TRIG 88 05/29/2021    ALT 6 04/27/2022    AST 15 04/27/2022    INR 3.1 04/27/2022    LABA1C 5.3 05/25/2021    VAXCOVLT88 322 05/26/2021       No results found for: PHENYTOIN, PHENYTOIN, VALPROATE, CBMZ      Imaging/Diagnostics:  CT HEAD WO CONTRAST    Result Date: 4/6/2022  EXAMINATION: CTA OF THE HEAD AND NECK WITH CONTRAST; CT OF THE HEAD WITHOUT CONTRAST 4/6/2022 5:20 pm; 4/6/2022 5:19 pm TECHNIQUE: CTA of the head and neck was performed with the administration of intravenous contrast. Multiplanar reformatted images are provided for review. MIP images are provided for review. Stenosis of the internal carotid arteries measured using NASCET criteria. Dose modulation, iterative reconstruction, and/or weight based adjustment of the mA/kV was utilized to reduce the radiation dose to as low as reasonably achievable.; CT of the head was performed without the administration of intravenous contrast. Dose modulation, iterative reconstruction, and/or weight based adjustment of the mA/kV was utilized to reduce the radiation dose to as low as reasonably achievable. Noncontrast CT of the head with reconstructed 2-D images are also provided for review.  COMPARISON: 07/09/2021 HISTORY: ORDERING SYSTEM PROVIDED HISTORY: Stroke TECHNOLOGIST PROVIDED HISTORY: Stroke Decision Support Exception - unselect if not a suspected or confirmed emergency medical condition->Emergency Medical Condition (MA) Reason for Exam: STROKE; [address] HISTORY: Stroke TECHNOLOGIST PROVIDED HISTORY: Stroke Decision Support Exception - unselect if not a suspected or confirmed emergency medical condition->Emergency Medical Condition (MA) Is the patient pregnant? No Reason for Exam: Stroke FINDINGS: CT HEAD: BRAIN/VENTRICLES:  There is no acute infarct or acute intracranial hemorrhage present. There is no mass effect or midline shift present. Areas of hypoattenuation within the left periventricular white matter and left parietal lobe are consistent with known remote infarcts. There is no ventriculomegaly or abnormal extra-axial fluid collection present. ORBITS: Limited evaluation of the orbits is unremarkable. SINUSES:  The paranasal sinuses and mastoid air cells are clear. SOFT TISSUES/SKULL: No lytic or blastic osseous lesions are identified. CTA NECK: AORTIC ARCH/ARCH VESSELS: The origins of the great vessels are normal.  There is no significant stenosis of the innominate or subclavian arteries. CAROTID ARTERIES: The common carotid arteries are normal.  There is near occlusive thrombus at the origin of the left internal carotid artery resulting in a severe stenosis measuring approximately 90% based on NASCET criteria. The right internal carotid artery is normal without significant stenosis or dissection evident. VERTEBRAL ARTERIES: No dissection, arterial injury, or significant stenosis. SOFT TISSUES: The lung apices are clear. No cervical or superior mediastinal lymphadenopathy. The larynx and pharynx are unremarkable. No acute abnormality of the salivary and thyroid glands. BONES: No lytic or blastic osseous lesions are identified. CTA HEAD: ANTERIOR CIRCULATION: The intracranial segments of the internal carotid arteries are normal.  There is no significant stenosis or aneurysm identified. The anterior and middle cerebral arteries are normal in appearance. No significant stenosis or aneurysm is identified.  POSTERIOR CIRCULATION: The distal vertebral arteries are normal in appearance. The basilar artery is normal.  No significant stenosis or aneurysm is identified. The posterior cerebral arteries are normal in appearance. OTHER: No dural venous sinus thrombosis on this non-dedicated study. 1. No acute infarct or acute intracranial hemorrhage evident. 2. Near occlusive thrombus at the origin of the left internal carotid artery resulting in a short segment 90% stenosis based on NASCET criteria, similar to the findings on the CT angiogram from 05/27/2021 which subsequently resolved on the exam from 07/09/2021. This may be secondary to an acute dissection. 3. No large vessel occlusion, significant stenosis or cerebral aneurysm identified within the brain. The above findings were discussed with Dr. Chelsea Joya at 6:10 p.m. on 04/06/2022. CTA HEAD NECK W CONTRAST    Result Date: 4/6/2022  EXAMINATION: CTA OF THE HEAD AND NECK WITH CONTRAST; CT OF THE HEAD WITHOUT CONTRAST 4/6/2022 5:20 pm; 4/6/2022 5:19 pm TECHNIQUE: CTA of the head and neck was performed with the administration of intravenous contrast. Multiplanar reformatted images are provided for review. MIP images are provided for review. Stenosis of the internal carotid arteries measured using NASCET criteria. Dose modulation, iterative reconstruction, and/or weight based adjustment of the mA/kV was utilized to reduce the radiation dose to as low as reasonably achievable.; CT of the head was performed without the administration of intravenous contrast. Dose modulation, iterative reconstruction, and/or weight based adjustment of the mA/kV was utilized to reduce the radiation dose to as low as reasonably achievable. Noncontrast CT of the head with reconstructed 2-D images are also provided for review.  COMPARISON: 07/09/2021 HISTORY: ORDERING SYSTEM PROVIDED HISTORY: Stroke TECHNOLOGIST PROVIDED HISTORY: Stroke Decision Support Exception - unselect if not a suspected or confirmed emergency medical condition->Emergency Medical Condition (MA) Reason for Exam: STROKE; ORDERING SYSTEM PROVIDED HISTORY: Stroke TECHNOLOGIST PROVIDED HISTORY: Stroke Decision Support Exception - unselect if not a suspected or confirmed emergency medical condition->Emergency Medical Condition (MA) Is the patient pregnant? No Reason for Exam: Stroke FINDINGS: CT HEAD: BRAIN/VENTRICLES:  There is no acute infarct or acute intracranial hemorrhage present. There is no mass effect or midline shift present. Areas of hypoattenuation within the left periventricular white matter and left parietal lobe are consistent with known remote infarcts. There is no ventriculomegaly or abnormal extra-axial fluid collection present. ORBITS: Limited evaluation of the orbits is unremarkable. SINUSES:  The paranasal sinuses and mastoid air cells are clear. SOFT TISSUES/SKULL: No lytic or blastic osseous lesions are identified. CTA NECK: AORTIC ARCH/ARCH VESSELS: The origins of the great vessels are normal.  There is no significant stenosis of the innominate or subclavian arteries. CAROTID ARTERIES: The common carotid arteries are normal.  There is near occlusive thrombus at the origin of the left internal carotid artery resulting in a severe stenosis measuring approximately 90% based on NASCET criteria. The right internal carotid artery is normal without significant stenosis or dissection evident. VERTEBRAL ARTERIES: No dissection, arterial injury, or significant stenosis. SOFT TISSUES: The lung apices are clear. No cervical or superior mediastinal lymphadenopathy. The larynx and pharynx are unremarkable. No acute abnormality of the salivary and thyroid glands. BONES: No lytic or blastic osseous lesions are identified. CTA HEAD: ANTERIOR CIRCULATION: The intracranial segments of the internal carotid arteries are normal.  There is no significant stenosis or aneurysm identified. The anterior and middle cerebral arteries are normal in appearance.   No significant stenosis or aneurysm is identified. POSTERIOR CIRCULATION: The distal vertebral arteries are normal in appearance. The basilar artery is normal.  No significant stenosis or aneurysm is identified. The posterior cerebral arteries are normal in appearance. OTHER: No dural venous sinus thrombosis on this non-dedicated study. 1. No acute infarct or acute intracranial hemorrhage evident. 2. Near occlusive thrombus at the origin of the left internal carotid artery resulting in a short segment 90% stenosis based on NASCET criteria, similar to the findings on the CT angiogram from 05/27/2021 which subsequently resolved on the exam from 07/09/2021. This may be secondary to an acute dissection. 3. No large vessel occlusion, significant stenosis or cerebral aneurysm identified within the brain. The above findings were discussed with Dr. Constanza Mcadams at 6:10 p.m. on 04/06/2022.            Impression:      63-year-old female, admitted for headache, left-sided numbness, patient had previous admission to the hospital for acute onset of left-sided facial droop and dysarthria, along with residual right-sided numbness, from previous stroke in May 2021, CTA in the emergency department showing left ICA thrombus that is better than the last scan earlier this month, NIH of 1 for residual right-sided numbness,  -Case discussed with  and images reviewed    Plan:     -Keep on warfarin  -INR: 3.1 therapeutic  -Lipitor 80 mg  -We will follow in the morning  -Further recommendation may follow  -No acute intervention at the moment  -Patient is stable neurologically      Electronically signed by Nuris Sung MD on 4/27/2022 at 6:56 PM      Electronically signed by   Nuris Sung MD  4/27/2022  6:56 PM

## 2022-04-27 NOTE — ED NOTES
Pt presents to the ED with C/O having migraine for the past few days. Pt stated that she had neck pain, numbness in the right arm. Pt had Dysphagia for the past few days. Pt stated that she is having difficulty swallowing. Pt has been having chest pain. Pt stated that she had a stroke about a year ago. Pt stated she was here 2 weeks ago with for left carotid artery. PT left AMA. Pt takes coumadin. Pt placed on cardiac monitor. Will continue to monitor and reassess.       Carleen Minor RN  04/27/22 SHAYLA Edwards  04/27/22 [id number]

## 2022-04-27 NOTE — ED NOTES
Lab calls stated that pt has antibodies, it will be another 45 mins until blood is sent.       Hanny Rosas RN  04/27/22 [id number]

## 2022-04-27 NOTE — ED PROVIDER NOTES
[facility] ED  Emergency Department  Emergency Medicine Resident Sign-out     Care of Manuelito Burgos was assumed from Dr. Celsa Enriquez and is being seen for Migraine (states migraine with photophobia for 1-2 days.  ), Neck Pain (reports lt sided neck pain), Numbness (states rt arm numbness that started over 24 hrs ago), Dysphagia (also reports diff speaking, speaks clearly in triage), and Chest Pain (states lt sided, denies radiation, also c/o irregular beats)   . The patient's initial evaluation and plan have been discussed with the prior provider who initially evaluated the patient.      EMERGENCY DEPARTMENT COURSE / MEDICAL DECISION MAKING:       MEDICATIONS GIVEN:  Orders Placed This Encounter   Medications    prochlorperazine (COMPAZINE) injection 10 mg    diphenhydrAMINE (BENADRYL) tablet 25 mg    acetaminophen (TYLENOL) tablet 650 mg    0.9 % sodium chloride infusion    potassium bicarb-citric acid (EFFER-K) effervescent tablet 40 mEq    magnesium sulfate 2000 mg in 50 mL IVPB premix    iopamidol (ISOVUE-370) 76 % injection 90 mL       LABS / RADIOLOGY:     Labs Reviewed   CBC WITH AUTO DIFFERENTIAL - Abnormal; Notable for the following components:       Result Value    RBC 3.68 (*)     Hemoglobin 6.6 (*)     Hematocrit 24.8 (*)     MCV 67.4 (*)     MCH 17.9 (*)     MCHC 26.6 (*)     RDW 20.8 (*)     Seg Neutrophils 74 (*)     Lymphocytes 22 (*)     All other components within normal limits   COMPREHENSIVE METABOLIC PANEL W/ REFLEX TO MG FOR LOW K - Abnormal; Notable for the following components:    Potassium 3.2 (*)     Total Bilirubin <0.10 (*)     All other components within normal limits   PROTIME-INR - Abnormal; Notable for the following components:    Protime 29.8 (*)     All other components within normal limits   CULTURE, URINE   TROPONIN   TROPONIN   HCG, SERUM, QUALITATIVE   MAGNESIUM   URINALYSIS WITH MICROSCOPIC   TYPE AND SCREEN   PREPARE RBC (CROSSMATCH)       CTA HEAD NECK W CONTRAST   Final Result   1. Compared to the most recent CT angiogram head and neck from April 6, 2022   there is interval marked improvement of previously described thrombus in the   proximal left cervical ICA. However, there is focal 60% stenosis at the   origin of the left cervical ICA per NASCET criteria. 2. No stenosis in the right cervical ICA per NASCET criteria. 3. No focal hemodynamically significant stenosis, occlusion or aneurysm in   the proximal large intracranial arteries. 4. Patent bilateral vertebral arteries. 5. No acute intracranial hemorrhage or mass effect. CT HEAD WO CONTRAST   Final Result   1. Compared to the most recent CT angiogram head and neck from April 6, 2022   there is interval marked improvement of previously described thrombus in the   proximal left cervical ICA. However, there is focal 60% stenosis at the   origin of the left cervical ICA per NASCET criteria. 2. No stenosis in the right cervical ICA per NASCET criteria. 3. No focal hemodynamically significant stenosis, occlusion or aneurysm in   the proximal large intracranial arteries. 4. Patent bilateral vertebral arteries. 5. No acute intracranial hemorrhage or mass effect. RECENT VITALS:     Temp: 98.4 °F (36.9 °C),  Pulse: 76, Resp: 16, BP: 108/64, SpO2: 100 %    This patient is a 31 y.o. Female with headache, chest pain. Patient has history of drug abuse, stroke, persistent right leg weakness, history of PE and left ICA thrombus, on warfarin. Patient's hemoglobin was found to be 6.6, being transfused. Consulted with neuro endovascular, recommended CTA, will provide further recommendations for admission.   Narrative & Impression  EXAMINATION:  CTA OF THE HEAD AND NECK WITH CONTRAST; CT OF THE HEAD WITHOUT CONTRAST  4/27/2022 6:58 pm:     TECHNIQUE:  CTA of the head and neck was performed with the administration of intravenous  contrast. Multiplanar reformatted images are provided for review. MIP images  are provided for review. Stenosis of the internal carotid arteries measured  using NASCET criteria. Dose modulation, iterative reconstruction, and/or  weight based adjustment of the mA/kV was utilized to reduce the radiation  dose to as low as reasonably achievable.; CT of the head was performed  without the administration of intravenous contrast. Dose modulation,  iterative reconstruction, and/or weight based adjustment of the mA/kV was  utilized to reduce the radiation dose to as low as reasonably achievable. Noncontrast CT of the head with reconstructed 2-D images are also provided  for review.     COMPARISON:  CT angiogram head and neck done 04/06/2022.     HISTORY:  ORDERING SYSTEM PROVIDED HISTORY: Left carotid bruit  TECHNOLOGIST PROVIDED HISTORY:  Left carotid bruit     Decision Support Exception - unselect if not a suspected or confirmed  emergency medical condition->Emergency Medical Condition (MA); ORDERING  SYSTEM PROVIDED HISTORY: L ICA thrombus  TECHNOLOGIST PROVIDED HISTORY:     L ICA thrombus  Decision Support Exception - unselect if not a suspected or confirmed  emergency medical condition->Emergency Medical Condition (MA)  Is the patient pregnant?->No     FINDINGS:     CT HEAD:     BRAIN/VENTRICLES:  No acute intracranial hemorrhage or extraaxial fluid  collection. Grey-white differentiation is maintained. No evidence of mass,  mass effect or midline shift. No evidence of hydrocephalus.     ORBITS: The visualized portion of the orbits demonstrate no acute abnormality.     SINUSES:  The visualized paranasal sinuses and mastoid air cells demonstrate  no acute abnormality.     SOFT TISSUES/SKULL: No acute abnormality of the visualized skull or soft  tissues.        CTA NECK:     AORTIC ARCH/ARCH VESSELS: No dissection or arterial injury.   No significant  stenosis of the brachiocephalic or subclavian arteries.     CAROTID ARTERIES: Patent bilateral common carotid arteries without  hemodynamically significant stenosis, acute injury or dissection. Interval  marked improvement of the previously described thrombus in the proximal left  cervical internal carotid artery. However, there is focal 60% stenosis at  the origin of the left cervical internal carotid artery per NASCET criteria. No stenosis in the right cervical internal carotid artery per NASCET  criteria. No acute injury or dissection in the bilateral cervical internal  carotid arteries.     VERTEBRAL ARTERIES: No dissection, arterial injury, or significant stenosis.     SOFT TISSUES: Right upper lobe 6 mm pulmonary nodule is stable dating back to  chest CT done October 18, 2019 consistent with a benign granuloma. No  cervical or superior mediastinal lymphadenopathy. The larynx and pharynx are  unremarkable. No acute abnormality of the salivary and thyroid glands.     BONES: No acute osseous abnormality.        CTA HEAD:     ANTERIOR CIRCULATION: No significant stenosis of the intracranial internal  carotid, anterior cerebral, or middle cerebral arteries. No aneurysm.     POSTERIOR CIRCULATION: No significant stenosis of the vertebral, basilar, or  posterior cerebral arteries. No aneurysm.     OTHER: No dural venous sinus thrombosis on this non-dedicated study.     IMPRESSION:  1. Compared to the most recent CT angiogram head and neck from April 6, 2022  there is interval marked improvement of previously described thrombus in the  proximal left cervical ICA. However, there is focal 60% stenosis at the  origin of the left cervical ICA per NASCET criteria. 2. No stenosis in the right cervical ICA per NASCET criteria. 3. No focal hemodynamically significant stenosis, occlusion or aneurysm in  the proximal large intracranial arteries. 4. Patent bilateral vertebral arteries. 5. No acute intracranial hemorrhage or mass effect.     Patient mated to [facility] for anemia continue work-up, neuro endovascular will continue to follow at this time no surgical intervention. OUTSTANDING TASKS / RECOMMENDATIONS:      1. CTA  2. Labs  3. Nonvascular recommendations  4. Admission     FINAL IMPRESSION:     1. Anemia, unspecified type        DISPOSITION:       DISPOSITION:  []  Discharge   []  Transfer -    [x]  Admission -  intermed   []  Against Medical Advice   []  Eloped   FOLLOW-UP: No follow-up provider specified.    DISCHARGE MEDICATIONS: New Prescriptions    No medications on file          Effie Liao DO  Emergency Medicine Resident  [address] [place]  Resident  04/27/22 0725

## 2022-04-27 NOTE — ED PROVIDER NOTES
[facility] ED  Emergency Department Encounter  EmergencyMedicine Resident     Pt Michelle Hickman  MRN: [id number]  Re[place]aldo 1985  Date of evaluation: 4/27/22  PCP:  No primary care provider on file. This patient was evaluated in the Emergency Department for symptoms described in the history of present illness. The patient was evaluated in the context of the global COVID-19 pandemic, which necessitated consideration that the patient might be at risk for infection with the SARS-CoV-2 virus that causes COVID-19. Institutional protocols and algorithms that pertain to the evaluation of patients at risk for COVID-19 are in a state of rapid change based on information released by regulatory bodies including the CDC and federal and state organizations. These policies and algorithms were followed during the patient's care in the ED. CHIEF COMPLAINT       Chief Complaint   Patient presents with    Migraine     states migraine with photophobia for 1-2 days.  Neck Pain     reports lt sided neck pain    Numbness     states rt arm numbness that started over 24 hrs ago    Dysphagia     also reports diff speaking, speaks clearly in triage    Chest Pain     states lt sided, denies radiation, also c/o irregular beats       HISTORY OF PRESENT ILLNESS  (Location/Symptom, Timing/Onset, Context/Setting, Quality, Duration, Modifying Factors, Severity.)      Manuelito Burgos is a 31 y.o. female who presents with headache. Patient presents with 2 days of headache, throbbing, frontal, nonradiating, associated with photophobia. Patient is also reporting left-sided neck pain, difficulty swallowing, chest pain, anterior, throbbing, nonradiating, mild to moderate severity, associated with shortness of breath and palpitations. Patient denies lightheadedness, dizziness, diaphoresis, abdominal pain, nausea, vomiting, diarrhea, lower extremity swelling or pain.   Patient has history of drug abuse, PE on warfarin, CVA with persistent right leg deficit, left carotid thrombus. Patient was recently admitted to the neuro critical care unit for left carotid thrombus, on heparin, left AMA, has not followed up. PAST MEDICAL / SURGICAL / SOCIAL / FAMILY HISTORY      has a past medical history of Acid reflux, Anemia, Drug abuse, IV ([address] Headache, History of pulmonary embolus (PE), Marijuana smoker, and Smoker. has a past surgical history that includes  section; ovarian cyst removal; Appendectomy; Upper gastrointestinal endoscopy (N/A, 10/21/2019); sigmoidoscopy (N/A, 10/22/2019); and Colonoscopy (N/A, 10/23/2019). Social History     Socioeconomic History    Marital status:      Spouse name: Oriana Red Number of children: 3    Years of education: Not on file    Highest education level: Not on file   Occupational History    Not on file   Tobacco Use    Smoking status: Current Every Day Smoker     Types: Cigarettes    Smokeless tobacco: Never Used   Vaping Use    Vaping Use: Never used   Substance and Sexual Activity    Alcohol use: No    Drug use: Yes     Types: Marijuana Federico Narberth)    Sexual activity: Yes   Other Topics Concern    Not on file   Social History Narrative    Not on file     Social Determinants of Health     Financial Resource Strain:     Difficulty of Paying Living Expenses: Not on file   Food Insecurity:     Worried About Running Out of Food in the Last Year: Not on file    Denisha of Food in the Last Year: Not on file   Transportation Needs:     Lack of Transportation (Medical): Not on file    Lack of Transportation (Non-Medical):  Not on file   Physical Activity:     Days of Exercise per Week: Not on file    Minutes of Exercise per Session: Not on file   Stress:     Feeling of Stress : Not on file   Social Connections:     Frequency of Communication with Friends and Family: Not on file    Frequency of Social Gatherings with Friends and Family: Not on file  Attends Islam Services: Not on file    Active Member of Clubs or Organizations: Not on file    Attends Club or Organization Meetings: Not on file    Marital Status: Not on file   Intimate Partner Violence:     Fear of Current or Ex-Partner: Not on file    Emotionally Abused: Not on file    Physically Abused: Not on file    Sexually Abused: Not on file   Housing Stability:     Unable to Pay for Housing in the Last Year: Not on file    Number of [place] in the Last Year: Not on file    Unstable Housing in the Last Year: Not on file       Family History   Problem Relation Age of Onset    No Known Problems Mother     No Known Problems Father        Allergies:  Patient has no known allergies. Home Medications:  Prior to Admission medications    Medication Sig Start Date End Date Taking? Authorizing Provider   Buprenorphine HCl-Naloxone HCl (SUBOXONE SL) Place under the tongue    Historical Provider, MD   warfarin (COUMADIN) 7.5 MG tablet Take 1 tablet (or as directed by Medication Management) by mouth once daily. 12/6/21   Greta Garcia MD       REVIEW OF SYSTEMS    (2-9 systems for level 4, 10 or more for level 5)      Review of Systems   Constitutional: Positive for fatigue. Negative for chills, diaphoresis and fever. HENT: Positive for trouble swallowing. Negative for congestion, dental problem, drooling, facial swelling, rhinorrhea and sore throat. Eyes: Negative for visual disturbance. Respiratory: Positive for shortness of breath. Negative for cough and wheezing. Cardiovascular: Positive for chest pain and palpitations. Negative for leg swelling. Gastrointestinal: Negative for abdominal pain, blood in stool, constipation, diarrhea, nausea and vomiting. Genitourinary: Negative for dysuria and hematuria. Musculoskeletal: Positive for neck pain. Negative for neck stiffness. Skin: Negative for pallor and rash. Neurological: Positive for weakness and headaches.  Negative for dizziness, seizures, syncope, facial asymmetry, speech difficulty, light-headedness and numbness. Psychiatric/Behavioral: Negative for confusion. PHYSICAL EXAM   (up to 7 for level 4, 8 or more for level 5)      INITIAL VITALS:   /64   Pulse 102   Temp 99.5 °F (37.5 °C) (Oral)   Resp 20   Ht 5' 3\" (1.6 m)   SpO2 100%   BMI 26.57 kg/m²     Physical Exam  Constitutional:       General: She is not in acute distress. Appearance: Normal appearance. She is well-developed. She is not ill-appearing, toxic-appearing or diaphoretic. HENT:      Head: Normocephalic and atraumatic. Right Ear: External ear normal.      Left Ear: External ear normal.      Nose: Nose normal. No congestion or rhinorrhea. Mouth/Throat:      Mouth: Mucous membranes are moist.      Pharynx: Oropharynx is clear. No oropharyngeal exudate or posterior oropharyngeal erythema. Eyes:      General:         Right eye: No discharge. Left eye: No discharge. Extraocular Movements: Extraocular movements intact. Pupils: Pupils are equal, round, and reactive to light. Neck:      Vascular: Carotid bruit present. No JVD. Trachea: No tracheal deviation. Cardiovascular:      Rate and Rhythm: Normal rate and regular rhythm. Pulses: Normal pulses. Heart sounds: Normal heart sounds. No murmur heard. No friction rub. No gallop. Pulmonary:      Effort: Pulmonary effort is normal. No respiratory distress. Breath sounds: Normal breath sounds. No stridor. No wheezing, rhonchi or rales. Chest:      Chest wall: No tenderness. Abdominal:      General: There is no distension. Palpations: Abdomen is soft. There is no mass. Tenderness: There is no abdominal tenderness. There is no right CVA tenderness, left CVA tenderness or guarding. Musculoskeletal:         General: No tenderness. Normal range of motion. Cervical back: Normal range of motion and neck supple.  No rigidity or tenderness. Right lower leg: No edema. Left lower leg: No edema. Lymphadenopathy:      Cervical: No cervical adenopathy. Skin:     General: Skin is warm. Capillary Refill: Capillary refill takes less than 2 seconds. Neurological:      Mental Status: She is alert and oriented to person, place, and time. Cranial Nerves: No cranial nerve deficit. Sensory: No sensory deficit. Motor: Weakness present.       Coordination: Coordination normal.      Gait: Gait normal.      Comments: Chronic right leg weakness, no other deficits   Psychiatric:         Mood and Affect: Mood normal.         Behavior: Behavior normal.         DIFFERENTIAL  DIAGNOSIS     PLAN (LABS / IMAGING / EKG):  Orders Placed This Encounter   Procedures    Culture, Urine    CTA HEAD NECK W CONTRAST    CT HEAD WO CONTRAST    CBC with Auto Differential    Comprehensive Metabolic Panel w/ Reflex to MG    Protime-INR    Troponin    HCG Qualitative, Serum    Urinalysis with Microscopic    Hemoglobin and Hematocrit    Magnesium    Verify hospital blood product consent form has been signed and witnessed    Vital Signs For Blood Product Transfusion    Transfusion Reaction Management    Inpatient Consult to Endovascular Neurosurgery    EKG 12 Lead    TYPE AND SCREEN    PREPARE RBC (CROSSMATCH), 1 Units    Type and Screen       MEDICATIONS ORDERED:  Orders Placed This Encounter   Medications    prochlorperazine (COMPAZINE) injection 10 mg    diphenhydrAMINE (BENADRYL) tablet 25 mg    acetaminophen (TYLENOL) tablet 650 mg    0.9 % sodium chloride infusion    potassium bicarb-citric acid (EFFER-K) effervescent tablet 40 mEq    magnesium sulfate 2000 mg in 50 mL IVPB premix    iopamidol (ISOVUE-370) 76 % injection 90 mL       DDX: Carotid thrombus, ACS, anemia, electrolyte abnormality    DIAGNOSTIC RESULTS / EMERGENCY DEPARTMENT COURSE / MDM   LAB RESULTS:  Results for orders placed or performed during the hospital encounter of 04/27/22   CBC with Auto Differential   Result Value Ref Range    WBC 7.4 3.5 - 11.3 k/uL    RBC 3.68 (L) 3.95 - 5.11 m/uL    Hemoglobin 6.6 (LL) 11.9 - 15.1 g/dL    Hematocrit 24.8 (L) 36.3 - 47.1 %    MCV 67.4 (L) 82.6 - 102.9 fL    MCH 17.9 (L) 25.2 - 33.5 pg    MCHC 26.6 (L) 28.4 - 34.8 g/dL    RDW 20.8 (H) 11.8 - 14.4 %    Platelets 907 931 - 478 k/uL    MPV 9.8 8.1 - 13.5 fL    NRBC Automated 0.0 0.0 per 100 WBC    Immature Granulocytes 0 0 %    Seg Neutrophils 74 (H) 36 - 66 %    Lymphocytes 22 (L) 24 - 44 %    Monocytes 2 1 - 7 %    Eosinophils % 2 1 - 4 %    Basophils 0 0 - 2 %    Absolute Immature Granulocyte 0.00 0.00 - 0.30 k/uL    Segs Absolute 5.47 1.8 - 7.7 k/uL    Absolute Lymph # 1.63 1.0 - 4.8 k/uL    Absolute Mono # 0.15 0.1 - 0.8 k/uL    Absolute Eos # 0.15 0.0 - 0.4 k/uL    Basophils Absolute 0.00 0.0 - 0.2 k/uL    Morphology ANISOCYTOSIS PRESENT     Morphology MICROCYTOSIS PRESENT     Morphology HYPOCHROMIA PRESENT     Morphology 1+ ELLIPTOCYTES    Comprehensive Metabolic Panel w/ Reflex to MG   Result Value Ref Range    Glucose 89 70 - 99 mg/dL    BUN 9 6 - 20 mg/dL    CREATININE 0.67 0.50 - 0.90 mg/dL    Calcium 8.9 8.6 - 10.4 mg/dL    Sodium 137 135 - 144 mmol/L    Potassium 3.2 (L) 3.7 - 5.3 mmol/L    Chloride 103 98 - 107 mmol/L    CO2 22 20 - 31 mmol/L    Anion Gap 12 9 - 17 mmol/L    Alkaline Phosphatase 83 35 - 104 U/L    ALT 6 5 - 33 U/L    AST 15 <32 U/L    Total Bilirubin <0.10 (L) 0.3 - 1.2 mg/dL    Total Protein 7.3 6.4 - 8.3 g/dL    Albumin 4.1 3.5 - 5.2 g/dL    Albumin/Globulin Ratio 1.3 1.0 - 2.5    GFR Non-African American >60 >60 mL/min    GFR African American >60 >60 mL/min    GFR Comment         Protime-INR   Result Value Ref Range    Protime 29.8 (H) 9.1 - 12.3 sec    INR 3.1    Troponin   Result Value Ref Range    Troponin, High Sensitivity <6 0 - 14 ng/L   Troponin   Result Value Ref Range    Troponin, High Sensitivity <6 0 - 14 ng/L   HCG Qualitative, Serum   Result Value Ref Range    hCG Qual NEGATIVE NEGATIVE   Magnesium   Result Value Ref Range    Magnesium 1.9 1.6 - 2.6 mg/dL   TYPE AND SCREEN   Result Value Ref Range    Expiration Date 04/30/2022,2359     Arm Band Number BE [id number]     ABO/Rh A POSITIVE     Antibody Screen POSITIVE     Antibody ID PENDING     RAFAEL IgG NEGATIVE        IMPRESSION: 51-year-old female with known carotid thrombus, bruit of the left carotid vessel, presenting with headache, will obtain CT head, CTA head and neck for further evaluation. Patient also having chest pain, will obtain EKG, troponins evaluate for ACS. Will obtain basic labs to evaluate for anemia, electrolyte abnormality. RADIOLOGY:  Pending    EKG  EKG Interpretation    Interpreted by me    Rhythm: Sinus tachycardia  Rate: 102  Axis: normal  Ectopy: none  Conduction: normal  ST Segments: no acute change  T Waves: no acute change  Q Waves: none    Clinical Impression: no acute changes and normal EKG    All EKG's are interpreted by the Emergency Department Physician who either signs or Co-signs this chart in the absence of a cardiologist.    EMERGENCY DEPARTMENT COURSE:  Patient came to emergency department, HPI and physical exam were conducted. All nursing notes were reviewed. Consulted with neuro endovascular, agree with work-up, will provide recommendations after CTA. Patient is anemic, will transfuse 1 unit packed red blood cells, explained risks and benefits, patient provided verbal consent. Pending further work-up and admission, care of patient is handed off to Dr. Aime Vincent. No notes of EC Admission Criteria type on file. PROCEDURES:      CONSULTS:  IP CONSULT TO ENDOVASCULAR NEUROSURGERY    CRITICAL CARE:      FINAL IMPRESSION      1. Anemia, unspecified type          DISPOSITION / PLAN     DISPOSITION Decision To Admit 04/27/2022 06:18:52 PM      PATIENT REFERRED TO:  No follow-up provider specified.     DISCHARGE MEDICATIONS:  New Prescriptions    No medications on file       Nilam Valdez MD  Emergency Medicine Resident    (Please note that portions of thisnote were completed with a voice recognition program.  Efforts were made to edit the dictations but occasionally words are mis-transcribed.)        Nilam Valdez MD  Resident  04/27/22 [id number]

## 2022-04-27 NOTE — ED PROVIDER NOTES
[facility]     Emergency Department     Faculty Attestation    I performed a history and physical examination of the patient and discussed management with the resident. I reviewed the residents note and agree with the documented findings and plan of care. Any areas of disagreement are noted on the chart. I was personally present for the key portions of any procedures. I have documented in the chart those procedures where I was not present during the key portions. I have reviewed the emergency nurses triage note. I agree with the chief complaint, past medical history, past surgical history, allergies, medications, social and family history as documented unless otherwise noted below. Documentation of the HPI, Physical Exam and Medical Decision Making performed by medical students or scribes is based on my personal performance of the HPI, PE and MDM. For Physician Assistant/ Nurse Practitioner cases/documentation I have personally evaluated this patient and have completed at least one if not all key elements of the E/M (history, physical exam, and MDM). Additional findings are as noted. Vital signs:   Vitals:    04/27/22 1732   BP: 117/64   Pulse: 102   Resp: 20   Temp: 99.5 °F (37.5 °C)   SpO2: 8%      73-year-old female with past medical history significant for stroke, carotid artery stenosis, pulmonary embolism, IV drug abuse, and MTHFR gene mutation presents with a migraine headache with photophobia that has been present for 1 to 2 days. She also reports left-sided neck pain. She further reports numbness to her right arm that started over 24 hours ago. She also reports chest pain and shortness of breath. Patient is on Coumadin. The patient states that he has a history of migraines, but this migraine is different. She states that it is lasting longer. It is diffuse, and behind her eyes. She also reports left-sided chest pressure with palpitations and shortness of breath.   On physical exam, she is alert and afebrile. Breath sounds are clear and equal.  Cardiac exam with a normal rate, regular rhythm. Abdomen is soft and nontender. No neurologic deficits apart from the patient's residual right leg weakness.     EKG Interpretation    Interpreted by emergency department physician    Rhythm: sinus tachycardia  Rate: tachycardia and 100-110  Axis: normal  Ectopy: none  Conduction: normal  ST Segments: nonspecific changes  T Waves: non specific changes  Q Waves: none    Clinical Impression: non-specific EKG and sinus tachycardia    MD Marcelino Castro M.D,  Attending Emergency  Physician           Dacia Orr MD  04/27/22 62 [facility], [place]  04/27/22 9089

## 2022-04-28 VITALS
SYSTOLIC BLOOD PRESSURE: 122 MMHG | HEIGHT: 63 IN | RESPIRATION RATE: 17 BRPM | DIASTOLIC BLOOD PRESSURE: 69 MMHG | HEART RATE: 85 BPM | TEMPERATURE: 98 F | OXYGEN SATURATION: 100 % | BODY MASS INDEX: 26.57 KG/M2

## 2022-04-28 PROBLEM — Z51.89 ENCOUNTER FOR BLOOD TRANSFUSION: Status: ACTIVE | Noted: 2022-04-28

## 2022-04-28 LAB
ABSOLUTE RETIC #: 0.05 M/UL (ref 0.03–0.08)
ALBUMIN SERPL-MCNC: 3.9 G/DL (ref 3.5–5.2)
ALBUMIN/GLOBULIN RATIO: 1.2 (ref 1–2.5)
ALP BLD-CCNC: 81 U/L (ref 35–104)
ALT SERPL-CCNC: 8 U/L (ref 5–33)
ANION GAP SERPL CALCULATED.3IONS-SCNC: 14 MMOL/L (ref 9–17)
AST SERPL-CCNC: 19 U/L
BILIRUB SERPL-MCNC: 0.26 MG/DL (ref 0.3–1.2)
BUN BLDV-MCNC: 6 MG/DL (ref 6–20)
CALCIUM SERPL-MCNC: 8.4 MG/DL (ref 8.6–10.4)
CHLORIDE BLD-SCNC: 106 MMOL/L (ref 98–107)
CHOLESTEROL/HDL RATIO: 6.4
CHOLESTEROL: 172 MG/DL
CO2: 17 MMOL/L (ref 20–31)
CREAT SERPL-MCNC: 0.6 MG/DL (ref 0.5–0.9)
CULTURE: NORMAL
EKG ATRIAL RATE: 72 BPM
EKG P AXIS: 29 DEGREES
EKG P-R INTERVAL: 110 MS
EKG Q-T INTERVAL: 412 MS
EKG QRS DURATION: 78 MS
EKG QTC CALCULATION (BAZETT): 451 MS
EKG R AXIS: 38 DEGREES
EKG T AXIS: 35 DEGREES
EKG VENTRICULAR RATE: 72 BPM
FERRITIN: 6 NG/ML (ref 13–150)
GFR AFRICAN AMERICAN: >60 ML/MIN
GFR NON-AFRICAN AMERICAN: >60 ML/MIN
GFR SERPL CREATININE-BSD FRML MDRD: ABNORMAL ML/MIN/{1.73_M2}
GLUCOSE BLD-MCNC: 87 MG/DL (ref 70–99)
HCT VFR BLD CALC: 30.5 % (ref 36.3–47.1)
HCT VFR BLD CALC: 30.6 % (ref 36.3–47.1)
HDLC SERPL-MCNC: 27 MG/DL
HEMOGLOBIN: 8.6 G/DL (ref 11.9–15.1)
HEMOGLOBIN: 8.7 G/DL (ref 11.9–15.1)
IMMATURE RETIC FRACT: 31.4 % (ref 2.7–18.3)
IRON SATURATION: 4 % (ref 20–55)
IRON: 17 UG/DL (ref 37–145)
LDL CHOLESTEROL: 113 MG/DL (ref 0–130)
MAGNESIUM: 2.4 MG/DL (ref 1.6–2.6)
MCH RBC QN AUTO: 19.6 PG (ref 25.2–33.5)
MCHC RBC AUTO-ENTMCNC: 28.4 G/DL (ref 28.4–34.8)
MCV RBC AUTO: 69.1 FL (ref 82.6–102.9)
NRBC AUTOMATED: 0 PER 100 WBC
PDW BLD-RTO: 22.3 % (ref 11.8–14.4)
PLATELET # BLD: ABNORMAL K/UL (ref 138–453)
PLATELET, FLUORESCENCE: 344 K/UL (ref 138–453)
PLATELET, IMMATURE FRACTION: 5.5 % (ref 1.1–10.3)
POTASSIUM SERPL-SCNC: 4.2 MMOL/L (ref 3.7–5.3)
RBC # BLD: 4.43 M/UL (ref 3.95–5.11)
RETIC %: 1 % (ref 0.5–1.9)
RETIC HEMOGLOBIN: 18.1 PG (ref 28.2–35.7)
SODIUM BLD-SCNC: 137 MMOL/L (ref 135–144)
SPECIMEN DESCRIPTION: NORMAL
TOTAL IRON BINDING CAPACITY: 379 UG/DL (ref 250–450)
TOTAL PROTEIN: 7.1 G/DL (ref 6.4–8.3)
TRIGL SERPL-MCNC: 161 MG/DL
TROPONIN, HIGH SENSITIVITY: <6 NG/L (ref 0–14)
TROPONIN, HIGH SENSITIVITY: <6 NG/L (ref 0–14)
UNSATURATED IRON BINDING CAPACITY: 362 UG/DL (ref 112–347)
WBC # BLD: 6.9 K/UL (ref 3.5–11.3)

## 2022-04-28 PROCEDURE — 6360000002 HC RX W HCPCS: Performed by: NURSE PRACTITIONER

## 2022-04-28 PROCEDURE — 85027 COMPLETE CBC AUTOMATED: CPT

## 2022-04-28 PROCEDURE — 83550 IRON BINDING TEST: CPT

## 2022-04-28 PROCEDURE — 2580000003 HC RX 258: Performed by: NURSE PRACTITIONER

## 2022-04-28 PROCEDURE — 82728 ASSAY OF FERRITIN: CPT

## 2022-04-28 PROCEDURE — 6370000000 HC RX 637 (ALT 250 FOR IP): Performed by: NURSE PRACTITIONER

## 2022-04-28 PROCEDURE — 93005 ELECTROCARDIOGRAM TRACING: CPT | Performed by: NURSE PRACTITIONER

## 2022-04-28 PROCEDURE — 99233 SBSQ HOSP IP/OBS HIGH 50: CPT | Performed by: PSYCHIATRY & NEUROLOGY

## 2022-04-28 PROCEDURE — 83540 ASSAY OF IRON: CPT

## 2022-04-28 PROCEDURE — 85018 HEMOGLOBIN: CPT

## 2022-04-28 PROCEDURE — 85055 RETICULATED PLATELET ASSAY: CPT

## 2022-04-28 PROCEDURE — 85045 AUTOMATED RETICULOCYTE COUNT: CPT

## 2022-04-28 PROCEDURE — 84484 ASSAY OF TROPONIN QUANT: CPT

## 2022-04-28 PROCEDURE — 80053 COMPREHEN METABOLIC PANEL: CPT

## 2022-04-28 PROCEDURE — 83735 ASSAY OF MAGNESIUM: CPT

## 2022-04-28 PROCEDURE — 99223 1ST HOSP IP/OBS HIGH 75: CPT | Performed by: INTERNAL MEDICINE

## 2022-04-28 PROCEDURE — 85014 HEMATOCRIT: CPT

## 2022-04-28 PROCEDURE — 80061 LIPID PANEL: CPT

## 2022-04-28 PROCEDURE — 93010 ELECTROCARDIOGRAM REPORT: CPT | Performed by: INTERNAL MEDICINE

## 2022-04-28 PROCEDURE — 36415 COLL VENOUS BLD VENIPUNCTURE: CPT

## 2022-04-28 RX ORDER — SODIUM CHLORIDE 0.9 % (FLUSH) 0.9 %
5-40 SYRINGE (ML) INJECTION EVERY 12 HOURS SCHEDULED
Status: DISCONTINUED | OUTPATIENT
Start: 2022-04-28 | End: 2022-04-28 | Stop reason: HOSPADM

## 2022-04-28 RX ORDER — FERROUS SULFATE 325(65) MG
325 TABLET ORAL
Qty: 30 TABLET | Refills: 0 | Status: SHIPPED | OUTPATIENT
Start: 2022-04-28

## 2022-04-28 RX ORDER — POTASSIUM CHLORIDE 7.45 MG/ML
10 INJECTION INTRAVENOUS PRN
Status: DISCONTINUED | OUTPATIENT
Start: 2022-04-28 | End: 2022-04-28 | Stop reason: HOSPADM

## 2022-04-28 RX ORDER — ONDANSETRON 2 MG/ML
4 INJECTION INTRAMUSCULAR; INTRAVENOUS EVERY 6 HOURS PRN
Status: DISCONTINUED | OUTPATIENT
Start: 2022-04-28 | End: 2022-04-28 | Stop reason: HOSPADM

## 2022-04-28 RX ORDER — NITROGLYCERIN 0.4 MG/1
0.4 TABLET SUBLINGUAL EVERY 5 MIN PRN
Status: DISCONTINUED | OUTPATIENT
Start: 2022-04-28 | End: 2022-04-28 | Stop reason: HOSPADM

## 2022-04-28 RX ORDER — POTASSIUM CHLORIDE 20 MEQ/1
40 TABLET, EXTENDED RELEASE ORAL PRN
Status: DISCONTINUED | OUTPATIENT
Start: 2022-04-28 | End: 2022-04-28 | Stop reason: HOSPADM

## 2022-04-28 RX ORDER — SODIUM CHLORIDE 9 MG/ML
INJECTION, SOLUTION INTRAVENOUS PRN
Status: DISCONTINUED | OUTPATIENT
Start: 2022-04-28 | End: 2022-04-28 | Stop reason: HOSPADM

## 2022-04-28 RX ORDER — SODIUM CHLORIDE 0.9 % (FLUSH) 0.9 %
10 SYRINGE (ML) INJECTION PRN
Status: DISCONTINUED | OUTPATIENT
Start: 2022-04-28 | End: 2022-04-28 | Stop reason: HOSPADM

## 2022-04-28 RX ORDER — ACETAMINOPHEN 325 MG/1
650 TABLET ORAL EVERY 6 HOURS PRN
Status: DISCONTINUED | OUTPATIENT
Start: 2022-04-28 | End: 2022-04-28 | Stop reason: HOSPADM

## 2022-04-28 RX ORDER — MAGNESIUM SULFATE 1 G/100ML
1000 INJECTION INTRAVENOUS PRN
Status: DISCONTINUED | OUTPATIENT
Start: 2022-04-28 | End: 2022-04-28 | Stop reason: HOSPADM

## 2022-04-28 RX ORDER — ENOXAPARIN SODIUM 100 MG/ML
40 INJECTION SUBCUTANEOUS DAILY
Status: DISCONTINUED | OUTPATIENT
Start: 2022-04-28 | End: 2022-04-28 | Stop reason: HOSPADM

## 2022-04-28 RX ORDER — ATORVASTATIN CALCIUM 80 MG/1
40 TABLET, FILM COATED ORAL NIGHTLY
Status: DISCONTINUED | OUTPATIENT
Start: 2022-04-28 | End: 2022-04-28 | Stop reason: HOSPADM

## 2022-04-28 RX ORDER — ATORVASTATIN CALCIUM 80 MG/1
80 TABLET, FILM COATED ORAL NIGHTLY
Qty: 30 TABLET | Refills: 0 | Status: SHIPPED | OUTPATIENT
Start: 2022-04-28 | End: 2022-08-01

## 2022-04-28 RX ORDER — ONDANSETRON 4 MG/1
4 TABLET, ORALLY DISINTEGRATING ORAL EVERY 8 HOURS PRN
Status: DISCONTINUED | OUTPATIENT
Start: 2022-04-28 | End: 2022-04-28 | Stop reason: HOSPADM

## 2022-04-28 RX ORDER — ACETAMINOPHEN 650 MG/1
650 SUPPOSITORY RECTAL EVERY 6 HOURS PRN
Status: DISCONTINUED | OUTPATIENT
Start: 2022-04-28 | End: 2022-04-28 | Stop reason: HOSPADM

## 2022-04-28 RX ADMIN — SODIUM CHLORIDE, PRESERVATIVE FREE 10 ML: 5 INJECTION INTRAVENOUS at 08:04

## 2022-04-28 RX ADMIN — SODIUM CHLORIDE: 9 INJECTION, SOLUTION INTRAVENOUS at 12:36

## 2022-04-28 RX ADMIN — ENOXAPARIN SODIUM 40 MG: 100 INJECTION SUBCUTANEOUS at 08:05

## 2022-04-28 RX ADMIN — IRON SUCROSE 200 MG: 20 INJECTION, SOLUTION INTRAVENOUS at 12:36

## 2022-04-28 RX ADMIN — ASPIRIN 325 MG: 325 TABLET, COATED ORAL at 04:05

## 2022-04-28 ASSESSMENT — PAIN DESCRIPTION - LOCATION: LOCATION: HEAD

## 2022-04-28 ASSESSMENT — PAIN SCALES - GENERAL: PAINLEVEL_OUTOF10: 4

## 2022-04-28 NOTE — PROGRESS NOTES
Endovascular Neurosurgery Progress Note    SUBJECTIVE:   No reported events since last note. Pt this am has no acte complaints. She wants to go home. Review of Systems:  CONSTITUTIONAL:  negative for fevers, chills, fatigue and malaise    EYES:  negative for double vision, blurred vision and photophobia     HEENT:  negative for tinnitus, epistaxis and sore throat    RESPIRATORY:  negative for cough, shortness of breath, wheezing    CARDIOVASCULAR:  negative for chest pain, palpitations, syncope, edema    GASTROINTESTINAL:  negative for nausea, vomiting    GENITOURINARY:  negative for incontinence    MUSCULOSKELETAL:  negative for neck or back pain    NEUROLOGICAL:  Negative for weakness and tingling  negative for headaches and dizziness    PSYCHIATRIC:  negative for anxiety      Review of systems otherwise negative. OBJECTIVE:     Vitals:    04/28/22 1052   BP: 122/69   Pulse: 85   Resp: 17   Temp: 98 °F (36.7 °C)   SpO2: 100%        General:  Gen: normal habitus, NAD  HEENT: NCAT, mucosa moist  Cvs: RRR, S1 S2 normal  Resp: symmetric unlabored breathing  Abd: s/nd/nt  Ext: no edema  Skin: no lesions seen, warm and dry    Neuro:  Gen: awake and alert, oriented x3. Lang/speech: no aphasia or dysarthria. Follows commands. CN: PERRL, EOMI, VFF, V1-3 intact, face symmetric, hearing intact, shoulder shrug symmetric, tongue midline  Motor: RUE 5-/5, otherwise grossly 5/5 UE and LE b/l  Sense: LT intact in all 4 ext. Coord: FTN and HTS intact b/l  DTR: deferred  Gait: deferred    NIH Stroke Scale:   1a  Level of consciousness: 0 - alert; keenly responsive   1b. LOC questions:  0 - answers both questions correctly   1c. LOC commands: 0 - performs both tasks correctly   2. Best Gaze: 0 - normal   3. Visual: 0 - no visual loss   4. Facial Palsy: 0 - normal symmetric movement   5a. Motor left arm: 0 - no drift, limb holds 90 (or 45) degrees for full 10 seconds   5b.   Motor right arm: 0 - no drift, limb holds 90 (or 45) degrees for full 10 seconds   6a. Motor left le - no drift; leg holds 30 degree position for full 5 seconds   6b  Motor right le - no drift; leg holds 30 degree position for full 5 seconds   7. Limb Ataxia: 0 - absent   8. Sensory: 0 - normal; no sensory loss   9. Best Language:  0 - no aphasia, normal   10. Dysarthria: 0 - normal   11. Extinction and Inattention: 0 - no abnormality         Total:   0     MRS: 1      LABS:   Reviewed. Lab Results   Component Value Date    HGB 8.7 (L) 2022    WBC 6.9 2022    PLT See Reflexed IPF Result 2022     2022    BUN 6 2022    CREATININE 0.60 2022    AST 19 2022    ALT 8 2022    MG 2.4 2022    APTT 27.2 2022    INR 3.1 2022      Lab Results   Component Value Date    COVID19 Not Detected 2021       RADIOLOGY:   Images were personally reviewed including:  CT/A head and neck w con 2022  1. Compared to the most recent CT angiogram head and neck from 2022   there is interval marked improvement of previously described thrombus in the   proximal left cervical ICA.  However, there is focal 60% stenosis at the   origin of the left cervical ICA per NASCET criteria. 2. No stenosis in the right cervical ICA per NASCET criteria. 3. No focal hemodynamically significant stenosis, occlusion or aneurysm in   the proximal large intracranial arteries. 4. Patent bilateral vertebral arteries. 5. No acute intracranial hemorrhage or mass effect. ASSESSMENT:   34yo female with past medical history of PE in 2019, poly sub abuse (opioids, mj, tobacco), L MCA punctate strokes 2/2 L cervical ICA thrombus 2021 on warfarin. ddx dissection hypercoag, cardio embolic. Thrombus resolved with AC, but recurred again 2022 in setting of subtherapeutic INR.      Pt presents 2022 with h/a neck pain and c/p with hgb 6.6, symptoms resolved with prbc.  Team consulted given history of thrombus. CTA shows near complete resolution of the thrombus. There is residual stenosis 60%. Given recurrence of thrombus, may be reasonable to consider stenting lesion on nonurgent basis. Pt has residual mild R side weakness. PLAN:   --no dx angio or acute intervention at this time. --continue warfarin, last inr 3.1  --continue lipitor 40  --sbp 100-140  --f/u dr. Carlos Whitfield in 2w, plan do discuss possible L ICA stenting given multiple thrombus recurrence at the site. Will need to review pt medication compliance as pt will need to be on dapt. F/u dr. Mercy Cabot in 3 mo    Case discussed with Dr. Mercy Cabot attending.     Maury Sandhoff, MD, PhD  Stroke, [facility]  [facility]  Electronically signed 4/28/2022 at 12:18 PM

## 2022-04-28 NOTE — PLAN OF CARE
Problem: Discharge Planning  Goal: Discharge to home or other facility with appropriate resources  4/28/2022 1339 by Dawit Martines RN  Outcome: Adequate for Discharge  4/28/2022 3748 by Brigitte Og RN  Outcome: Progressing     Problem: Pain  Goal: Verbalizes/displays adequate comfort level or baseline comfort level  4/28/2022 1339 by Dawit Martines RN  Outcome: Adequate for Discharge  4/28/2022 0613 by Brigitte Og RN  Outcome: Progressing     Problem: Chronic Conditions and Co-morbidities  Goal: Patient's chronic conditions and co-morbidity symptoms are monitored and maintained or improved  Outcome: Adequate for Discharge

## 2022-04-28 NOTE — CARE COORDINATION
Case Management Initial Discharge Plan  Anahi Pastor,             Met with:patient to discuss discharge plans. Information verified: address, contacts, phone number, , insurance Yes  Insurance Provider: [facility]    Emergency Contact/Next of Kin name & number: Janessa Lopez  Who are involved in patient's support system? spouse    PCP: No primary care provider on file. Date of last visit: She is currently seen at [facility] for her suboxone and plans to have her PCP there as well       Discharge Planning    Living Arrangements:    lives with her spouse and his family    Home has 1 stories  4 steps and a ramp stairs to climb to get into front door, stairs to climb to reach second floor  Location of bedroom/bathroom in home     Patient able to perform ADL's:Independent    Current Services (outpatient & in home) [facility]oxone clinic [facility]  DME equipment: none  DME provider:     Is patient receiving oral anticoagulation therapy? Yes    If indicated:   Physician managing anticoagulation treatment:   Where does patient obtain lab work for ATC treatment? [facility]    Does patient have any issues/concerns obtaining medications? No  If yes, what are patient's concerns? Is there a preferred Pharmacy after hours or on weekends? Yes [address]    If yes, which pharmacy? Potential Assistance Needed:       Patient agreeable to home care: No  [place] of choice provided:  n/a    Prior SNF/Rehab Placement and Facility: none  Agreeable to SNF/Rehab: No  [place] of choice provided: n/a     Evaluation: no    Expected Discharge date:       Patient expects to be discharged to: If home: is the family and/or caregiver wiling & able to provide support at home? Spouse Ede Nur  Who will be providing this support?  yes    Follow Up Appointment: Best Day/ Time:      Transportation provider: spouse  Transportation arrangements needed for discharge: No    Readmission Risk              Risk of Unplanned Readmission:  19             Does patient have a readmission risk score greater than 14?: No  If yes, follow-up appointment must be made within 7 days of discharge.      Goals of Care: to go home      Educated pt on transitional options, provided freedom of choice and are agreeable with plan      Discharge Plan: home w/spouse current with [facility] for SUboxone and [facility]          Electronically signed by Kyle Trujillo RN on 4/28/22 at 12:06 PM EDT

## 2022-04-28 NOTE — PROGRESS NOTES
Pt given discharge paperwork by Dariela Correa. Writer informed pt that all meds would be sent to her main pharmacy, IV was removed and all questions answered. Pt took all belongings with her.

## 2022-04-28 NOTE — H&P
[facility]  Office: [address], DO, Yaakov Lane, DO, Ehsan Mirian, DO, Ana Maria Montalvojennifer Camacho, DO, Britni Granados MD, Nikita Martino MD, Jan Loja MD, Ubaldo Ponce MD, Beryle Marseille, MD, Ciro Pallas, MD, Mark Anthony Benedict MD, Eduardo Duke, DO, Mali Jerry, DO, Dandy Cox MD,  Noemi Hernandez, DO, Niki Rascon MD, Claudia Barnett MD, Kerry Carrillo MD, Eagle Degroot DO, Toña Kim MD, Larisa Eason MD, Susan Callejas, [facility], [facility] Sakina, CNP, Benjie Appiah, CNP, Yusra Bailey, CNP, Minesh Jordan, CNP, Erickson Leach, CNP, JUDD BlockC, Mortimer Guardian, DNP, Galileo Martínez, CNP, Mehrdad Allen, CNP, Megan Sanchez, CNP, Juan J Matias, CNS, Lavonne Frankel, [facility], Kate Bear, CNP, Pablo Moon, CNP, Etta Carrillo, [facility]    HISTORY AND PHYSICAL EXAMINATION            Date:   4/28/2022  Patient name:  Too Manning  Date of admission:  4/27/2022  5:09 PM  MRN:   [id number]  Account:  [de-identified]  YOB: 1985  PCP:    No primary care provider on file. Room:   2016/2016-01  Code Status:    Full Code    Chief Complaint:     Chief Complaint   Patient presents with    Migraine     states migraine with photophobia for 1-2 days.  Neck Pain     reports lt sided neck pain    Numbness     states rt arm numbness that started over 24 hrs ago    Dysphagia     also reports diff speaking, speaks clearly in triage    Chest Pain     states lt sided, denies radiation, also c/o irregular beats       History Obtained From:     patient, electronic medical record    History of Present Illness:     Too Manning is a 72-year-old female who initially presented to the hospital with complaints of headaches and fatigue/lightheadedness. 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   Patient has been on Coumadin since that time and follows with [facility] anticoagulation clinic. Repeat CT of her head showed marked improvement in previously described thrombus compared to previous and no stenosis in the right cervical ICA or focal hemodynamically significant stenosis or occlusion or aneurysm of the proximal large intercranial arteries. Patient was seen by neurology who recommended continuing warfarin and having patient continue outpatient follow-up. Did not recommend any acute invention. On admission, patient hemoglobin was 6.6. She was given a unit of blood with improvement 8.6. Her ferritin was less than 6 and TSAT less than 4. Patient did not have any signs or symptoms of bleeding. Did admit to heavy periods which been ongoing for significantly time. Denies any GI bleeding or dark-colored stools. No fevers, chills, nausea, vomiting, diarrhea    Past Medical History:     Past Medical History:   Diagnosis Date    Acid reflux     Anemia 10/18/2019    Drug abuse, IV ([address]     claims that she has previous iv drug dependency claims hasn' had recent usage.  Headache     History of pulmonary embolus (PE)     Marijuana smoker 10/18/2019    Smoker 10/18/2019        Past Surgical History:     Past Surgical History:   Procedure Laterality Date    APPENDECTOMY       SECTION      COLONOSCOPY N/A 10/23/2019    COLORECTAL CANCER SCREENING, NOT HIGH RISK performed by Susan Rutledge MD at [place] [address] N/A 10/22/2019    SIGMOIDOSCOPY ABORTED COLONOSCOPY performed by Susan Rutledge MD at [address] [place] [address] N/A 10/21/2019    EGD ESOPHAGOGASTRODUODENOSCOPY performed by Susan Rutledge MD at [facility]        Medications Prior to Admission:     Prior to Admission medications    Medication Sig Start Date End Date Taking?  Authorizing Provider   ferrous sulfate (IRON 325) 325 (65 Fe) MG tablet Take 1 tablet by mouth daily (with breakfast) 22  Yes [facility], DO   atorvastatin (LIPITOR) 80 MG tablet Take 1 tablet by mouth nightly 4/28/22  Yes Hadley Terrazas, DO   Buprenorphine HCl-Naloxone HCl (SUBOXONE SL) Place under the tongue    Historical Provider, MD   warfarin (COUMADIN) 7.5 MG tablet Take 1 tablet (or as directed by Medication Management) by mouth once daily. 12/6/21   Jerad Randall MD        Allergies:     Patient has no known allergies. Social History:     Tobacco:    reports that she has been smoking cigarettes. She has never used smokeless tobacco.  Alcohol:      reports no history of alcohol use. Drug Use:  reports current drug use. Drug: Marijuana Eric White). Family History:     Family History   Problem Relation Age of Onset    No Known Problems Mother     No Known Problems Father        Review of Systems:     Positive and Negative as described in HPI.     CONSTITUTIONAL:  negative for fevers, chills, sweats, fatigue, weight loss  HEENT:  negative for vision, hearing changes, runny nose, throat pain  RESPIRATORY:  negative for shortness of breath, cough, congestion, wheezing  CARDIOVASCULAR:  negative for chest pain, palpitations  GASTROINTESTINAL:  negative for nausea, vomiting, diarrhea, constipation, change in bowel habits, abdominal pain   GENITOURINARY:  negative for difficulty of urination, burning with urination, frequency   INTEGUMENT:  negative for rash, skin lesions, easy bruising   HEMATOLOGIC/LYMPHATIC:  negative for swelling/edema   ALLERGIC/IMMUNOLOGIC:  negative for urticaria , itching  ENDOCRINE:  negative increase in drinking, increase in urination, hot or cold intolerance  MUSCULOSKELETAL:  negative joint pains, muscle aches, swelling of joints  NEUROLOGICAL:  negative for headaches, dizziness, lightheadedness, numbness, pain, tingling extremities  BEHAVIOR/PSYCH:  negative for depression, anxiety    Physical Exam:   /69   Pulse 85   Temp 98 °F (36.7 °C) (Oral)   Resp 17   Ht 5' 3\" (1.6 m)   SpO2 100%   BMI 26.57 kg/m²   Temp (24hrs), Av.4 °F (36.9 °C), Min:98 °F (36.7 °C), Max:99.5 °F (37.5 °C)    No results for input(s): POCGLU in the last 72 hours.     Intake/Output Summary (Last 24 hours) at 2022 1214  Last data filed at 2022 2251  Gross per 24 hour   Intake 335 ml   Output --   Net 335 ml       General Appearance: alert, well appearing, and in no acute distress  Mental status: oriented to person, place, and time  Head: normocephalic, atraumatic  Eye: no icterus, redness, pupils equal and reactive, extraocular eye movements intact, conjunctiva clear  Ear: normal external ear, no discharge, hearing intact  Nose: no drainage noted  Mouth: mucous membranes moist  Neck: supple, no carotid bruits, thyroid not palpable  Lungs: Bilateral equal air entry, clear to ausculation, no wheezing, rales or rhonchi, normal effort  Cardiovascular: normal rate, regular rhythm, no murmur, gallop, rub  Abdomen: Soft, nontender, nondistended, normal bowel sounds, no hepatomegaly or splenomegaly  Neurologic: There are no new focal motor or sensory deficits, normal muscle tone and bulk, no abnormal sensation, normal speech, cranial nerves II through XII grossly intact  Skin: No gross lesions, rashes, bruising or bleeding on exposed skin area  Extremities: peripheral pulses palpable, no pedal edema or calf pain with palpation  Psych: normal affect    Investigations:      Laboratory Testing:  Recent Results (from the past 24 hour(s))   EKG 12 Lead    Collection Time: 22  5:32 PM   Result Value Ref Range    Ventricular Rate 102 BPM    Atrial Rate 102 BPM    P-R Interval 124 ms    QRS Duration 86 ms    Q-T Interval 338 ms    QTc Calculation (Bazett) 440 ms    P Axis 53 degrees    R Axis 39 degrees    T Axis 62 degrees   CBC with Auto Differential    Collection Time: 22  5:47 PM   Result Value Ref Range    WBC 7.4 3.5 - 11.3 k/uL    RBC 3.68 (L) 3.95 - 5.11 m/uL    Hemoglobin 6.6 (LL) 11.9 - 15.1 g/dL    Hematocrit 24.8 (L) 36.3 - 47.1 %    MCV 67.4 (L) 82.6 - 102.9 fL    MCH 17.9 (L) 25.2 - 33.5 pg    MCHC 26.6 (L) 28.4 - 34.8 g/dL    RDW 20.8 (H) 11.8 - 14.4 %    Platelets 025 586 - 752 k/uL    MPV 9.8 8.1 - 13.5 fL    NRBC Automated 0.0 0.0 per 100 WBC    Immature Granulocytes 0 0 %    Seg Neutrophils 74 (H) 36 - 66 %    Lymphocytes 22 (L) 24 - 44 %    Monocytes 2 1 - 7 %    Eosinophils % 2 1 - 4 %    Basophils 0 0 - 2 %    Absolute Immature Granulocyte 0.00 0.00 - 0.30 k/uL    Segs Absolute 5.47 1.8 - 7.7 k/uL    Absolute Lymph # 1.63 1.0 - 4.8 k/uL    Absolute Mono # 0.15 0.1 - 0.8 k/uL    Absolute Eos # 0.15 0.0 - 0.4 k/uL    Basophils Absolute 0.00 0.0 - 0.2 k/uL    Morphology ANISOCYTOSIS PRESENT     Morphology MICROCYTOSIS PRESENT     Morphology HYPOCHROMIA PRESENT     Morphology 1+ ELLIPTOCYTES    Comprehensive Metabolic Panel w/ Reflex to MG    Collection Time: 04/27/22  5:47 PM   Result Value Ref Range    Glucose 89 70 - 99 mg/dL    BUN 9 6 - 20 mg/dL    CREATININE 0.67 0.50 - 0.90 mg/dL    Calcium 8.9 8.6 - 10.4 mg/dL    Sodium 137 135 - 144 mmol/L    Potassium 3.2 (L) 3.7 - 5.3 mmol/L    Chloride 103 98 - 107 mmol/L    CO2 22 20 - 31 mmol/L    Anion Gap 12 9 - 17 mmol/L    Alkaline Phosphatase 83 35 - 104 U/L    ALT 6 5 - 33 U/L    AST 15 <32 U/L    Total Bilirubin <0.10 (L) 0.3 - 1.2 mg/dL    Total Protein 7.3 6.4 - 8.3 g/dL    Albumin 4.1 3.5 - 5.2 g/dL    Albumin/Globulin Ratio 1.3 1.0 - 2.5    GFR Non-African American >60 >60 mL/min    GFR African American >60 >60 mL/min    GFR Comment         Protime-INR    Collection Time: 04/27/22  5:47 PM   Result Value Ref Range    Protime 29.8 (H) 9.1 - 12.3 sec    INR 3.1    Troponin    Collection Time: 04/27/22  5:47 PM   Result Value Ref Range    Troponin, High Sensitivity <6 0 - 14 ng/L   HCG Qualitative, Serum    Collection Time: 04/27/22  5:47 PM   Result Value Ref Range    hCG Qual NEGATIVE NEGATIVE   Magnesium    Collection Time: 04/27/22  5:47 PM   Result Value Ref Range    Magnesium 1.9 1.6 - 2.6 mg/dL   Troponin    Collection Time: 04/27/22  6:33 PM   Result Value Ref Range    Troponin, High Sensitivity <6 0 - 14 ng/L   TYPE AND SCREEN    Collection Time: 04/27/22  6:33 PM   Result Value Ref Range    Expiration Date 04/30/2022,2359     Arm Band Number [id number]     ABO/Rh A POSITIVE     Antibody Screen POSITIVE     Antibody ID Anti-c Present     RAFAEL IgG NEGATIVE     Antigen Type, Patient Negative for c Antigen     Unit Number [id number]     Product Code Leukocyte Reduced Red Cell     Unit Divison 00     Dispense Status TRANSFUSED     Unit Issue Date/Time [id number]     Product Code Blood Bank [id number]     Blood Bank Unit Type and Rh O POS     Blood Bank ISBT Product Blood Type 5100     Blood Bank Blood Product Expiration Date 118352746589     Transfusion Status OK TO TRANSFUSE     Crossmatch Result COMPATIBLE     Unit Number [id number]     Product Code Leukocyte Reduced Red Cell     Unit Divison 00     Dispense Status ALLOCATED     Transfusion Status OK TO TRANSFUSE     Crossmatch Result COMPATIBLE    Troponin    Collection Time: 04/28/22  3:11 AM   Result Value Ref Range    Troponin, High Sensitivity <6 0 - 14 ng/L   Hemoglobin and Hematocrit    Collection Time: 04/28/22  3:11 AM   Result Value Ref Range    Hemoglobin 8.6 (L) 11.9 - 15.1 g/dL    Hematocrit 30.5 (L) 36.3 - 47.1 %   Comprehensive Metabolic Panel w/ Reflex to MG    Collection Time: 04/28/22  6:17 AM   Result Value Ref Range    Glucose 87 70 - 99 mg/dL    BUN 6 6 - 20 mg/dL    CREATININE 0.60 0.50 - 0.90 mg/dL    Calcium 8.4 (L) 8.6 - 10.4 mg/dL    Sodium 137 135 - 144 mmol/L    Potassium 4.2 3.7 - 5.3 mmol/L    Chloride 106 98 - 107 mmol/L    CO2 17 (L) 20 - 31 mmol/L    Anion Gap 14 9 - 17 mmol/L    Alkaline Phosphatase 81 35 - 104 U/L    ALT 8 5 - 33 U/L    AST 19 <32 U/L    Total Bilirubin 0.26 (L) 0.3 - 1.2 mg/dL    Total Protein 7.1 6.4 - 8.3 g/dL    Albumin 3.9 3.5 - 5.2 g/dL    Albumin/Globulin Ratio 1.2 1.0 - 2.5 GFR Non-African American >60 >60 mL/min    GFR African American >60 >60 mL/min    GFR Comment         Lipid panel - fasting    Collection Time: 04/28/22  6:17 AM   Result Value Ref Range    Cholesterol 172 <200 mg/dL    HDL 27 (L) >40 mg/dL    LDL Cholesterol 113 0 - 130 mg/dL    Chol/HDL Ratio 6.4 (H) <5    Triglycerides 161 (H) <150 mg/dL   Magnesium    Collection Time: 04/28/22  6:17 AM   Result Value Ref Range    Magnesium 2.4 1.6 - 2.6 mg/dL   Troponin    Collection Time: 04/28/22  6:17 AM   Result Value Ref Range    Troponin, High Sensitivity <6 0 - 14 ng/L   Iron and TIBC    Collection Time: 04/28/22  6:24 AM   Result Value Ref Range    Iron 17 (L) 37 - 145 ug/dL    TIBC 379 250 - 450 ug/dL    Iron Saturation 4 (L) 20 - 55 %    UIBC 362 (H) 112 - 347 ug/dL   Ferritin    Collection Time: 04/28/22  6:24 AM   Result Value Ref Range    Ferritin 6 (L) 13 - 150 ng/mL   EKG 12 lead    Collection Time: 04/28/22  6:27 AM   Result Value Ref Range    Ventricular Rate 72 BPM    Atrial Rate 72 BPM    P-R Interval 110 ms    QRS Duration 78 ms    Q-T Interval 412 ms    QTc Calculation (Bazett) 451 ms    P Axis 29 degrees    R Axis 38 degrees    T Axis 35 degrees   CBC    Collection Time: 04/28/22  7:19 AM   Result Value Ref Range    WBC 6.9 3.5 - 11.3 k/uL    RBC 4.43 3.95 - 5.11 m/uL    Hemoglobin 8.7 (L) 11.9 - 15.1 g/dL    Hematocrit 30.6 (L) 36.3 - 47.1 %    MCV 69.1 (L) 82.6 - 102.9 fL    MCH 19.6 (L) 25.2 - 33.5 pg    MCHC 28.4 28.4 - 34.8 g/dL    RDW 22.3 (H) 11.8 - 14.4 %    Platelets See Reflexed IPF Result 138 - 453 k/uL    NRBC Automated 0.0 0.0 per 100 WBC   Immature Platelet Fraction    Collection Time: 04/28/22  7:19 AM   Result Value Ref Range    Platelet, Immature Fraction 5.5 1.1 - 10.3 %    Platelet, Fluorescence 344 138 - 453 k/uL   Reticulocytes    Collection Time: 04/28/22  7:19 AM   Result Value Ref Range    Retic % 1.0 0.5 - 1.9 %    Absolute Retic # 0.050 0.030 - 0.080 M/uL    Immature Retic Fract 31.400 (H) 2.7 - 18.3 %    Retic Hemoglobin 18.1 (L) 28.2 - 35.7 pg       Imaging/Diagnostics:  CT HEAD WO CONTRAST    Result Date: 4/27/2022  1. Compared to the most recent CT angiogram head and neck from April 6, 2022 there is interval marked improvement of previously described thrombus in the proximal left cervical ICA. However, there is focal 60% stenosis at the origin of the left cervical ICA per NASCET criteria. 2. No stenosis in the right cervical ICA per NASCET criteria. 3. No focal hemodynamically significant stenosis, occlusion or aneurysm in the proximal large intracranial arteries. 4. Patent bilateral vertebral arteries. 5. No acute intracranial hemorrhage or mass effect. CTA HEAD NECK W CONTRAST    Result Date: 4/27/2022  1. Compared to the most recent CT angiogram head and neck from April 6, 2022 there is interval marked improvement of previously described thrombus in the proximal left cervical ICA. However, there is focal 60% stenosis at the origin of the left cervical ICA per NASCET criteria. 2. No stenosis in the right cervical ICA per NASCET criteria. 3. No focal hemodynamically significant stenosis, occlusion or aneurysm in the proximal large intracranial arteries. 4. Patent bilateral vertebral arteries. 5. No acute intracranial hemorrhage or mass effect. Assessment :      Hospital Problems           Last Modified POA    * (Principal) Symptomatic anemia 4/28/2022 Yes    Encounter for blood transfusion 4/28/2022 Yes    Marijuana smoker 4/28/2022 Yes    Iron deficiency anemia 4/28/2022 Yes    History of intravenous drug abuse ([facility] [address] 4/28/2022 Yes    Carotid artery stenosis with cerebral infarction ([facility] 4/28/2022 Yes    MTHFR gene mutation 4/28/2022 Yes          Plan:     Patient status inpatient in the Progressive Unit/Step down    1. Microcytic hypochromic anemia: Hemoglobin 6.6 on admission. Patient historically runs in the 7.0-7.5 range.   Transfuse 1 unit packed red blood cells, hemodynamically stable without increased oxygenation requirements. Does have significant iron deficiency. Will give dose of IV iron. No evidence of bleeding. 2. Prior history of pulmonary embolism: 2019. Thrombophilia work-up done positive for variant of MTHFR mutation. On Coumadin, INR 3.1 on admission  3. Recent CVA: CTA at that time on 4/6/2022 showing a near occlusive left ICA thrombus. Patient left AMA at that time. Neuroendovascular reconsulted on admission  4. History of opioid/IVDU dependence: On Suboxone      Consultations:   IP CONSULT TO ENDOVASCULAR NEUROSURGERY  IP CONSULT TO HOSPITALIST    Patient is admitted as inpatient status because of co-morbidities listed above, severity of signs and symptoms as outlined, requirement for current medical therapies and most importantly because of direct risk to patient if care not provided in a hospital setting. Expected length of stay > 48 hours. Lizzeth Sanchez DO  4/28/2022  12:14 PM    Copy sent to Dr. Britta Florence primary care provider on file.

## 2022-04-28 NOTE — DISCHARGE SUMMARY
[facility]  Office: [address], DO, Vandana Sellers, DO, Reddtrenton Meehan, DO, Miriam Liriano Blood, DO, Valiant Sicard, MD, Yandel Vora MD, Lefty Paez MD, Martha Joyner MD, Rose Griffiths MD, Alena Lambert MD, Ricky Mccabe MD, Mary Carmen Beck, DO, Lana Bergman, DO, Paz Alvarez MD,  Sathish Garg, DO, Karen Rodriguez MD, Kelly Mohan MD, Benjy Degroot MD, Igor Angeles DO, Dick Balderrama MD, Zaki Yang MD, Freedom Lopez, [facility], [facility], CNP, Elicia Engel, CNP, Roselyn Suarez, CNP, Andrew Roca, CNP, Aguilar Patel, CNP, Mike Kwong, PA-C, Sathish Howard, [facility], Madison Davis, CNP, Yariel Heaton, CNP, Madisyn Arzate, CNP, Farrukh Martines, CNS, Sav Guzmán, [facility], Alexandro Babb, CNP, Deepak Teran, CNP, Gerson Tijerina, CNP         [facility]    Discharge Summary     Patient ID: Lisa Ibanez  :  1985   MRN: [id number]     ACCOUNT:  [de-identified]   Patient's PCP: No primary care provider on file. Admit Date: 2022   Discharge Date: 2022     Length of Stay: 1  Code Status:  Full Code  Admitting Physician: Rolando Yeboah DO  Discharge Physician: Rolando Yeboah DO     Active Discharge Diagnoses:     Hospital Problem Lists:  Principal Problem:    Microcytic hypochromic anemia  Active Problems:    Marijuana smoker    History of intravenous drug abuse ([facility]    Carotid artery stenosis with cerebral infarction ([facility]    MTHFR gene mutation  Resolved Problems:    * No resolved hospital problems. *      Admission Condition:  stable     Discharged Condition: stable    Hospital Stay:     Hospital Course:  Lisa Ibanez is a 68-year-old female who initially presented to the hospital with complaints of headaches and fatigue/lightheadedness. 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   Patient has been on Coumadin since that time and follows with [address] [place] [address] anticoagulation clinic. Repeat CT of her head showed marked improvement in previously described thrombus compared to previous and no stenosis in the right cervical ICA or focal hemodynamically significant stenosis or occlusion or aneurysm of the proximal large intercranial arteries. Patient was seen by neurology who recommended continuing warfarin and having patient continue outpatient follow-up. Did not recommend any acute invention. On admission, patient hemoglobin was 6.6. She was given a unit of blood with improvement 8.6. Her ferritin was less than 6 and TSAT less than 4. Patient did not have any signs or symptoms of bleeding. Did admit to heavy periods which been ongoing for significantly time. Denies any GI bleeding or dark-colored stools. Pt was given one dose of IV iron and discharged home on oral iron . She will need to follow up with PCP in one week for reevaluation. Will need repeat labs at that time. Also recommended she see her OBGYN physician to discuss birth control options for heavy menses. Significant therapeutic interventions: see above    Significant Diagnostic Studies:   Labs / Micro:  CBC:   Lab Results   Component Value Date    WBC 6.9 04/28/2022    RBC 4.43 04/28/2022    HGB 8.7 04/28/2022    HCT 30.6 04/28/2022    MCV 69.1 04/28/2022    MCH 19.6 04/28/2022    MCHC 28.4 04/28/2022    RDW 22.3 04/28/2022    PLT See Reflexed IPF Result 04/28/2022     BMP:    Lab Results   Component Value Date    GLUCOSE 87 04/28/2022     04/28/2022    K 4.2 04/28/2022     04/28/2022    CO2 17 04/28/2022    ANIONGAP 14 04/28/2022    BUN 6 04/28/2022    CREATININE 0.60 04/28/2022    BUNCRER NOT REPORTED 06/02/2021    CALCIUM 8.4 04/28/2022    LABGLOM >60 04/28/2022    GFRAA >60 04/28/2022    GFR      04/28/2022     HFP:    Lab Results   Component Value Date    PROT 7.1 04/28/2022        Radiology:  CT HEAD WO CONTRAST    Result Date: 4/27/2022  1.  Compared to the most recent CT angiogram head and neck from April 6, 2022 there is interval marked improvement of previously described thrombus in the proximal left cervical ICA. However, there is focal 60% stenosis at the origin of the left cervical ICA per NASCET criteria. 2. No stenosis in the right cervical ICA per NASCET criteria. 3. No focal hemodynamically significant stenosis, occlusion or aneurysm in the proximal large intracranial arteries. 4. Patent bilateral vertebral arteries. 5. No acute intracranial hemorrhage or mass effect. CTA HEAD NECK W CONTRAST    Result Date: 4/27/2022  1. Compared to the most recent CT angiogram head and neck from April 6, 2022 there is interval marked improvement of previously described thrombus in the proximal left cervical ICA. However, there is focal 60% stenosis at the origin of the left cervical ICA per NASCET criteria. 2. No stenosis in the right cervical ICA per NASCET criteria. 3. No focal hemodynamically significant stenosis, occlusion or aneurysm in the proximal large intracranial arteries. 4. Patent bilateral vertebral arteries. 5. No acute intracranial hemorrhage or mass effect. Consultations:    Consults:     Final Specialist Recommendations/Findings:   IP CONSULT TO ENDOVASCULAR NEUROSURGERY  IP CONSULT TO HOSPITALIST      The patient was seen and examined on day of discharge and this discharge summary is in conjunction with any daily progress note from day of discharge.     Discharge plan:     Disposition: Home    Physician Follow Up:     [address] [address]  [phone]  Schedule an appointment as soon as possible for a visit  Heavy henry Mclean MD  [address]  [phone]    Schedule an appointment as soon as possible for a visit in 1 week  Follow up       Requiring Further Evaluation/Follow Up POST HOSPITALIZATION/Incidental Findings:   - Please follow-up with primary care physician within 1 week of discharge for posthospitalization follow-up.  -Continue taking iron submission as prescribed  -Recommend repeat CBC in 1 week  -Follow-up with OB/GYN to evaluate possible birth control given heavy menses  -Return to hospital for lightheadedness, dizziness, chest pain, shortness of breath consequently breathing, lower extremity pain, fevers, chills  -Continue taking Coumadin    Diet: regular diet    Activity: As tolerated    Instructions to Patient: see above    Discharge Medications:      Medication List      START taking these medications    atorvastatin 80 MG tablet  Commonly known as: LIPITOR  Take 1 tablet by mouth nightly     ferrous sulfate 325 (65 Fe) MG tablet  Commonly known as: IRON 325  Take 1 tablet by mouth daily (with breakfast)        CONTINUE taking these medications    SUBOXONE SL     warfarin 7.5 MG tablet  Commonly known as: Coumadin  Take as directed. If you are unsure how to take this medication, talk to your nurse or doctor. Original instructions: Take 1 tablet (or as directed by Medication Management) by mouth once daily. Where to Get Your Medications      These medications were sent to [facility] [facility], [address] [place] [address]    Phone: [phone]   · atorvastatin 80 MG tablet  · ferrous sulfate 325 (65 Fe) MG tablet         Discharge Procedure Orders   CBC with Auto Differential   Standing Status: Future Standing Exp. Date: 05/28/22       Time Spent on discharge is  20 mins in patient examination, evaluation, counseling as well as medication reconciliation, prescriptions for required medications, discharge plan and follow up. Electronically signed by   Blaise Stanley DO  4/28/2022  12:13 PM      Thank you Dr. Lyle Cabrera primary care provider on file. for the opportunity to be involved in this patient's care.

## 2022-04-29 LAB
ABO/RH: NORMAL
ANTIBODY IDENTIFICATION: NORMAL
ANTIBODY SCREEN: POSITIVE
ANTIGEN TYPE, PATIENT: NORMAL
ARM BAND NUMBER: NORMAL
BLD PROD TYP BPU: NORMAL
BLD PROD TYP BPU: NORMAL
BLOOD BANK BLOOD PRODUCT EXPIRATION DATE: NORMAL
BLOOD BANK ISBT PRODUCT BLOOD TYPE: 5100
BLOOD BANK PRODUCT CODE: NORMAL
BLOOD BANK UNIT TYPE AND RH: NORMAL
BPU ID: NORMAL
BPU ID: NORMAL
CROSSMATCH RESULT: NORMAL
CROSSMATCH RESULT: NORMAL
DAT IGG: NEGATIVE
DISPENSE STATUS BLOOD BANK: NORMAL
DISPENSE STATUS BLOOD BANK: NORMAL
EKG ATRIAL RATE: 102 BPM
EKG P AXIS: 53 DEGREES
EKG P-R INTERVAL: 124 MS
EKG Q-T INTERVAL: 338 MS
EKG QRS DURATION: 86 MS
EKG QTC CALCULATION (BAZETT): 440 MS
EKG R AXIS: 39 DEGREES
EKG T AXIS: 62 DEGREES
EKG VENTRICULAR RATE: 102 BPM
EXPIRATION DATE: NORMAL
TRANSFUSION STATUS: NORMAL
TRANSFUSION STATUS: NORMAL
UNIT DIVISION: 0
UNIT DIVISION: 0
UNIT ISSUE DATE/TIME: NORMAL

## 2022-04-29 PROCEDURE — 93010 ELECTROCARDIOGRAM REPORT: CPT | Performed by: INTERNAL MEDICINE

## 2022-05-10 ENCOUNTER — HOSPITAL ENCOUNTER (OUTPATIENT)
Dept: PHARMACY | Age: 37
Setting detail: THERAPIES SERIES
Discharge: HOME | End: 2022-05-10
Payer: MEDICAID

## 2022-05-10 DIAGNOSIS — I63.9 ACUTE CVA (CEREBROVASCULAR ACCIDENT) (HCC): Primary | ICD-10-CM

## 2022-05-10 DIAGNOSIS — I63.232 STROKE DUE TO OCCLUSION OF LEFT CAROTID ARTERY (HCC): ICD-10-CM

## 2022-05-10 LAB
INR BLD: 2.9
PROTIME: 35.1 SECONDS

## 2022-05-10 PROCEDURE — 99211 OFF/OP EST MAY X REQ PHY/QHP: CPT

## 2022-05-10 PROCEDURE — 85610 PROTHROMBIN TIME: CPT

## 2022-05-10 NOTE — PROGRESS NOTES
Medication Management Service, Warfarin Management  [facility], [phone]  Visit Date: 5/10/2022   Subjective:   Fransisca Ken is a 31 y.o. female who presents to clinic today for anticoagulation monitoring and adjustment. Patient seen in clinic for warfarin management due to  Indication:   CVA. INR goal: of 2.0-3.0. Duration of therapy: indefinite. Assessment and PLAN   PT/INR done in office per protocol. INR today is 2.9, therapeutic. Patient was recently hospitalized for anemia where she received a blood transfusion. Plan: Will continue current regimen of warfarin 7.5 mg every day. Using warfarin 7.5 mg tablets. Recheck INR in 6 week(s). Patient seen in room # 1. ED. OP. = Instructed patient to call the office with any changes in medications or health between now and next visit. Patient attested to self screening for COVID - 19. Patient verbalized understanding of dosing directions and information discussed. Dosing schedule given to patient. Progress note sent to referring office. Patient acknowledges working in consult agreement with pharmacist as referred by his/her physician.       Electronically signed by Luz Torres [address] [place] [address] on 5/10/22 at 12:03 PM EDT    For Pharmacy Admin Tracking Only     Intervention Detail: 0   Total # of Interventions Recommended: 0   Total # of Interventions Accepted: 0   Time Spent (min): 20

## 2022-05-25 RX ORDER — WARFARIN SODIUM 7.5 MG/1
TABLET ORAL
Qty: 90 TABLET | Refills: 1 | Status: SHIPPED | OUTPATIENT
Start: 2022-05-25

## 2022-06-21 ENCOUNTER — HOSPITAL ENCOUNTER (OUTPATIENT)
Dept: PHARMACY | Age: 37
Setting detail: THERAPIES SERIES
Discharge: HOME | End: 2022-06-21
Payer: COMMERCIAL

## 2022-06-21 DIAGNOSIS — I63.232 STROKE DUE TO OCCLUSION OF LEFT CAROTID ARTERY (HCC): ICD-10-CM

## 2022-06-21 DIAGNOSIS — I63.9 ACUTE CVA (CEREBROVASCULAR ACCIDENT) (HCC): Primary | ICD-10-CM

## 2022-06-21 LAB
INR BLD: 1.7
PROTIME: 20.7 SECONDS

## 2022-06-21 PROCEDURE — 85610 PROTHROMBIN TIME: CPT

## 2022-06-21 PROCEDURE — 99212 OFFICE O/P EST SF 10 MIN: CPT

## 2022-06-21 NOTE — PROGRESS NOTES
Medication Management Service, Warfarin Management  [address], [phone]  Visit Date: 2022   Subjective:   Jaret Avelar is a [address] y.o. female who presents to clinic today for anticoagulation monitoring and adjustment. Patient seen in clinic for warfarin management due to  Indication:   CVA. INR goal: of 2.0-3.0. Duration of therapy: indefinite. Assessment and PLAN   PT/INR done in office per protocol. INR today is 1.7, subtherapeutic. After INR resulted, patient reports she may have missed a dose while on vacation last week, although unsure of the exact day. May have had deviations from normal diet during that time as well. Plan: Will give a boost dose today only of 11.25 mg. Then will continue current regimen of warfarin 7.5 mg every day. Using warfarin 7.5 mg tablets. Recheck INR in 4 week(s). Patient seen in room # 1. ED. OP. = Patient reports having 2-3 servings per week of green vegetables (I.e. green beans, broccoli). Educated her to keep this intake consistent. Informed patient to call clinic at the time of any medication changes. Patient attested to self screening for COVID - 19. Patient verbalized understanding of dosing directions and information discussed. Dosing schedule given to patient. Progress note sent to referring office. Patient acknowledges working in consult agreement with pharmacist as referred by his/her physician.       Electronically signed by Shalonda Iglesias on 22 at 11:27 AM EDT    For Pharmacy Admin Tracking Only     Intervention Detail: Adherence Monitorin and Dose Adjustment: 1, reason: Therapy Optimization   Total # of Interventions Recommended: 2   Total # of Interventions Accepted: 2   Time Spent (min): 30

## 2022-07-19 ENCOUNTER — HOSPITAL ENCOUNTER (OUTPATIENT)
Dept: PHARMACY | Age: 37
Setting detail: THERAPIES SERIES
Discharge: HOME | End: 2022-07-19
Payer: COMMERCIAL

## 2022-07-19 DIAGNOSIS — I63.9 ACUTE CVA (CEREBROVASCULAR ACCIDENT) (HCC): Primary | ICD-10-CM

## 2022-07-19 DIAGNOSIS — I63.232 STROKE DUE TO OCCLUSION OF LEFT CAROTID ARTERY (HCC): ICD-10-CM

## 2022-07-19 LAB
INR BLD: 4
PROTIME: 47.8 SECONDS

## 2022-07-19 PROCEDURE — 85610 PROTHROMBIN TIME: CPT

## 2022-07-19 PROCEDURE — 99212 OFFICE O/P EST SF 10 MIN: CPT

## 2022-07-19 NOTE — PROGRESS NOTES
Medication Management Service, Warfarin Management  [facility], [phone]  Visit Date: 2022   Subjective:   Louisa Herrera is a 31 y.o. female who presents to clinic today for anticoagulation monitoring and adjustment. Patient seen in clinic for warfarin management due to  Indication:   CVA. INR goal: of 2.0-3.0. Duration of therapy: indefinite. Assessment and PLAN   PT/INR done in office per protocol. INR today is 4.0, supratherapeutic. Increase in INR due to warfarin interaction with Augmentin and a decrease in her overall consumption of food when she had an abscess in her cheek. .   Plan: Will decrease warfarin from 7.5 mg to 3.75 mg for today only. Then continue warfarin 7.5 mg daily. Using warfarin 5 mg tablets. Recheck INR in 2 weeks   Patient seen in room # 2. Patient attested to self screening for COVID - 19. ED OP:  Reminded patient to call clinic with any new medications that she is started on. Patient verbalized understanding of dosing directions and information discussed. Dosing schedule given to patient. Progress note sent to referring office. Patient acknowledges working in consult agreement with pharmacist as referred by his/her physician. [address] [place] [address]  Ph., CACP, Clinical Pharmacist  Anticoagulation Services, [address] [place] [address] Coumadin Clinic  2022  11:40 AM      For Pharmacy Admin Tracking Only    Intervention Detail: Adherence Monitorin and Dose Adjustment: 1, reason: Therapy Optimization  Total # of Interventions Recommended: 2  Total # of Interventions Accepted: 2  Time Spent (min): 30

## 2022-07-24 ENCOUNTER — HOSPITAL ENCOUNTER (EMERGENCY)
Age: 37
Discharge: HOME | End: 2022-07-24
Attending: EMERGENCY MEDICINE
Payer: COMMERCIAL

## 2022-07-24 VITALS
OXYGEN SATURATION: 100 % | HEART RATE: 87 BPM | SYSTOLIC BLOOD PRESSURE: 110 MMHG | RESPIRATION RATE: 16 BRPM | HEIGHT: 63 IN | TEMPERATURE: 97.9 F | BODY MASS INDEX: 26.58 KG/M2 | WEIGHT: 150 LBS | DIASTOLIC BLOOD PRESSURE: 79 MMHG

## 2022-07-24 DIAGNOSIS — K02.9 DENTAL CARIES: ICD-10-CM

## 2022-07-24 DIAGNOSIS — K04.7 DENTAL ABSCESS: ICD-10-CM

## 2022-07-24 DIAGNOSIS — N93.9 VAGINAL BLEEDING: Primary | ICD-10-CM

## 2022-07-24 LAB
ABSOLUTE EOS #: 0.25 K/UL (ref 0–0.44)
ABSOLUTE IMMATURE GRANULOCYTE: 0.02 K/UL (ref 0–0.3)
ABSOLUTE LYMPH #: 1.4 K/UL (ref 1.1–3.7)
ABSOLUTE MONO #: 0.32 K/UL (ref 0.1–1.2)
ALBUMIN SERPL-MCNC: 4.1 G/DL (ref 3.5–5.2)
ALP BLD-CCNC: 77 U/L (ref 35–104)
ALT SERPL-CCNC: 6 U/L (ref 5–33)
ANION GAP SERPL CALCULATED.3IONS-SCNC: 8 MMOL/L (ref 9–17)
AST SERPL-CCNC: 13 U/L
BASOPHILS # BLD: 1 % (ref 0–2)
BASOPHILS ABSOLUTE: 0.03 K/UL (ref 0–0.2)
BILIRUB SERPL-MCNC: 0.11 MG/DL (ref 0.3–1.2)
BUN BLDV-MCNC: 9 MG/DL (ref 6–20)
BUN/CREAT BLD: 13 (ref 9–20)
CALCIUM SERPL-MCNC: 9.2 MG/DL (ref 8.6–10.4)
CHLORIDE BLD-SCNC: 101 MMOL/L (ref 98–107)
CO2: 25 MMOL/L (ref 20–31)
CREAT SERPL-MCNC: 0.72 MG/DL (ref 0.5–0.9)
EOSINOPHILS RELATIVE PERCENT: 4 % (ref 1–4)
GFR AFRICAN AMERICAN: >60 ML/MIN
GFR NON-AFRICAN AMERICAN: >60 ML/MIN
GFR SERPL CREATININE-BSD FRML MDRD: ABNORMAL ML/MIN/{1.73_M2}
GLUCOSE BLD-MCNC: 85 MG/DL (ref 70–99)
HCG QUANTITATIVE: <1 MIU/ML
HCT VFR BLD CALC: 33.9 % (ref 36.3–47.1)
HEMOGLOBIN: 9.9 G/DL (ref 11.9–15.1)
IMMATURE GRANULOCYTES: 0 %
INR BLD: 1.4
LYMPHOCYTES # BLD: 22 % (ref 24–43)
MCH RBC QN AUTO: 22.9 PG (ref 25.2–33.5)
MCHC RBC AUTO-ENTMCNC: 29.2 G/DL (ref 28.4–34.8)
MCV RBC AUTO: 78.3 FL (ref 82.6–102.9)
MONOCYTES # BLD: 5 % (ref 3–12)
NRBC AUTOMATED: 0 PER 100 WBC
PARTIAL THROMBOPLASTIN TIME: 30.8 SEC (ref 23.9–33.8)
PDW BLD-RTO: 19.9 % (ref 11.8–14.4)
PLATELET # BLD: 225 K/UL (ref 138–453)
PMV BLD AUTO: 10.1 FL (ref 8.1–13.5)
POTASSIUM SERPL-SCNC: 4 MMOL/L (ref 3.7–5.3)
PROTHROMBIN TIME: 17.6 SEC (ref 11.5–14.2)
RBC # BLD: 4.33 M/UL (ref 3.95–5.11)
RBC # BLD: ABNORMAL 10*6/UL
SEG NEUTROPHILS: 68 % (ref 36–65)
SEGMENTED NEUTROPHILS ABSOLUTE COUNT: 4.49 K/UL (ref 1.5–8.1)
SODIUM BLD-SCNC: 134 MMOL/L (ref 135–144)
TOTAL PROTEIN: 7.3 G/DL (ref 6.4–8.3)
WBC # BLD: 6.5 K/UL (ref 3.5–11.3)

## 2022-07-24 PROCEDURE — 80053 COMPREHEN METABOLIC PANEL: CPT

## 2022-07-24 PROCEDURE — 84702 CHORIONIC GONADOTROPIN TEST: CPT

## 2022-07-24 PROCEDURE — 85610 PROTHROMBIN TIME: CPT

## 2022-07-24 PROCEDURE — 85025 COMPLETE CBC W/AUTO DIFF WBC: CPT

## 2022-07-24 PROCEDURE — 85730 THROMBOPLASTIN TIME PARTIAL: CPT

## 2022-07-24 PROCEDURE — 99283 EMERGENCY DEPT VISIT LOW MDM: CPT

## 2022-07-24 RX ORDER — PENICILLIN V POTASSIUM 500 MG/1
500 TABLET ORAL 4 TIMES DAILY
Qty: 40 TABLET | Refills: 0 | Status: SHIPPED | OUTPATIENT
Start: 2022-07-24

## 2022-07-24 ASSESSMENT — PAIN - FUNCTIONAL ASSESSMENT: PAIN_FUNCTIONAL_ASSESSMENT: 0-10

## 2022-07-24 ASSESSMENT — PAIN SCALES - GENERAL: PAINLEVEL_OUTOF10: 6

## 2022-07-24 NOTE — ED PROVIDER NOTES
[facility] ED  eMERGENCY dEPARTMENT eNCOUnter      Pt Name: Carmen Hills  MRN: [id number]  [place] 1985  Date of evaluation: 7/24/2022  Provider: Marta Vann PA-C    CHIEF COMPLAINT       Chief Complaint   Patient presents with    Dental Pain    Facial Swelling     L side    Vaginal Bleeding     HISTORY OF PRESENT ILLNESS  (Location/Symptom, Timing/Onset, Context/Setting, Quality, Duration, Modifying Factors, Severity.)   Carmen Hills is a 31 y.o. female who presents to the emergency department via private car. Patient states that she has been having pain and swelling at the left jaw area. Patient with a history of extremely poor dental hygiene. Patient with states that she was recently on abx but she is not sure when that was. She has not had any fever or chills. Patient is also on coumadin for what she thinks is a clotting disorder. She states that she is being checked every 2 weeks and her last check was on the 19th. She was high at that time around 4 and was told to half her coumadin for a night and then continue her normal dose. Two days ago she started to bleed and she has been having an exteremly heavy menses since then. She states that she has been going through super sized tampons and noted thick dark clots. Nursing Notes were reviewed. ALLERGIES     Patient has no known allergies.     CURRENT MEDICATIONS       Previous Medications    ATORVASTATIN (LIPITOR) 80 MG TABLET    Take 1 tablet by mouth nightly    BUPRENORPHINE HCL-NALOXONE HCL (SUBOXONE SL)    Place under the tongue    FERROUS SULFATE (IRON 325) 325 (65 FE) MG TABLET    Take 1 tablet by mouth daily (with breakfast)    WARFARIN (COUMADIN) 7.5 MG TABLET    TAKE ONE TABLET (OR AS DIRECTED BY MEDICATION MANAGEMENT) BY MOUTH ONCE DAILY     PAST MEDICAL HISTORY         Diagnosis Date    Acid reflux     Anemia 10/18/2019    Drug abuse, IV ([facility]     claims that she has previous iv drug dependency claims hasn' had recent usage. Headache     History of pulmonary embolus (PE)     Marijuana smoker 10/18/2019    Smoker 10/18/2019     SURGICAL HISTORY           Procedure Laterality Date    APPENDECTOMY       SECTION      COLONOSCOPY N/A 10/23/2019    COLORECTAL CANCER SCREENING, NOT HIGH RISK [facility] N/A 10/22/2019    SIGMOIDOSCOPY ABORTED COLONOSCOPY performed by Valentino Flannery MD at [address] [place] [address] 10/21/2019    EGD ESOPHAGOGASTRODUODENOSCOPY performed by Valentino Flannery MD at [place] [address]           Problem Relation Age of Onset    No Known Problems Mother     No Known Problems Father      Family Status   Relation Name Status    Mother  (Not Specified)    Father  (Not Specified)      SOCIAL HISTORY      reports that she has been smoking cigarettes. She has never used smokeless tobacco. She reports current drug use. Drug: Marijuana Juanetta Stevens). She reports that she does not drink alcohol. REVIEW OF SYSTEMS    (2-9 systems for level 4, 10 or more for level 5)     Constitutional: Denies recent fever, chills, weakness. Visual: Denies recent change in vision, discharge or pain. ENT: Denies recent sore throat, nasal congestion, ear pain. + dental hygiene  Respiratory: Denies recent shortness of breath,  cough , wheezing or pleuritic chest pain. Cardiac:  Denies recent chest pain palpitations dyspnea on exertion or swelling in the lower extremities. GI: denies any recent abdominal pain nausea vomiting or diarrhea. : denies any recent difficulty urinating or pain in the genitals. + vaginal bleeding. Musculoskeletal :  Denies neck pain back pain joint pain or recent trauma. Neurologic: denies any seizure activity headaches stroke like symptoms or syncope. Skin:  Denies any recent new rash itching or change in skin color. Psychiatric:  Denies any thoughts of suicide homicide.   Patient does not voice any problems with anxiety or depression     Except as noted above the remainder of the review of systems was reviewed and negative. PHYSICAL EXAM    (up to 7 for level 4, 8 or more for level 5)     ED Triage Vitals [07/24/22 1413]   BP Temp Temp Source Heart Rate Resp SpO2 Height Weight   110/79 97.9 °F (36.6 °C) Oral 87 16 100 % 5' 3\" (1.6 m) 150 lb (68 kg)       Physical Exam  Vitals and nursing note reviewed. Constitutional:       Appearance: Normal appearance. She is normal weight. HENT:      Head: Normocephalic and atraumatic. Nose: Nose normal.      Mouth/Throat:      Dentition: Abnormal dentition. Dental tenderness, dental caries and dental abscesses present. Comments: Extremely poor dental hygiene with multiple rotted teeth down to the gum. Eyes:      Extraocular Movements: Extraocular movements intact. Conjunctiva/sclera: Conjunctivae normal.      Pupils: Pupils are equal, round, and reactive to light. Cardiovascular:      Rate and Rhythm: Normal rate and regular rhythm. Pulses: Normal pulses. Pulmonary:      Effort: Pulmonary effort is normal.      Breath sounds: Normal breath sounds. Abdominal:      General: Abdomen is flat. Bowel sounds are normal.      Palpations: Abdomen is soft. Musculoskeletal:         General: Normal range of motion. Cervical back: Normal range of motion and neck supple. Skin:     General: Skin is warm and dry. Neurological:      General: No focal deficit present. Mental Status: She is alert and oriented to person, place, and time. Mental status is at baseline. Psychiatric:         Mood and Affect: Mood normal.         Behavior: Behavior normal.       DIAGNOSTIC RESULTS     EKG: Not clinically indicated. RADIOLOGY:  Not clinically indicated.      LABS:  Labs Reviewed   CBC WITH AUTO DIFFERENTIAL - Abnormal; Notable for the following components:       Result Value    Hemoglobin 9.9 (*)     Hematocrit 33.9 (*)     MCV intravenous drug abuse (HCC) F19.11    Occult blood positive stool R19.5    HH (hiatus hernia) K44.9    Acute CVA (cerebrovascular accident) ([facility] I63.9    Stroke due to occlusion of left carotid artery ([facility] K40.686    Carotid artery stenosis with cerebral infarction [facility]) I63.239    Acute right hemiparesis (HCC) G81.91    Right sided numbness R20.0    MTHFR gene mutation Z15.89    Acute cerebrovascular accident (CVA) ([facility] I63.9    Dysarthria R47.1    Encounter for blood transfusion Z51.89     DISPOSITION/PLAN   DISPOSITION  home    PATIENT REFERRED TO:   PCP and OBGYN    Schedule an appointment as soon as possible for a visit       DISCHARGE MEDICATIONS:     New Prescriptions    PENICILLIN V POTASSIUM (VEETID) 500 MG TABLET    Take 1 tablet by mouth in the morning and 1 tablet at noon and 1 tablet in the evening and 1 tablet before bedtime.      (Please note that portions of this note were completed with a voice recognition program.  Efforts were made to edit the dictations but occasionally words are mis-transcribed.)    CLARE Duran PA-C  07/24/22 7529

## 2022-07-24 NOTE — DISCHARGE INSTRUCTIONS
Continue to take home medications. Please call your coumadin physician and let them know that you are on antibiotics for your coumadin dosing. Please return to the ED if you vaginal bleeding increases and you go through more than one pad/tampon an hour.

## 2022-07-26 ENCOUNTER — TELEPHONE (OUTPATIENT)
Dept: PHARMACY | Age: 37
End: 2022-07-26

## 2022-07-26 NOTE — TELEPHONE ENCOUNTER
Late documentation:    Patient called yesterday to report that she has a new script for Veetids 500mg QID x 10 days which she will be starting today. Recently she had an elevated INR after using PCN so I have instructed her to reduce her dose of warfarin to 3.75mg on Tues, Thursday of this week and changed her next INR check to Monday 8/1/2022. Patient expressed understanding, verified instructions with read back method.       Noemi Cadet RPh, RAFAP  Clinical Pharmacist Medication Management  7/26/2022  3:53 PM

## 2022-07-29 NOTE — ED PROVIDER NOTES
eMERGENCY dEPARTMENT eNCOUnter   Independent Attestation     Pt Name: Meme Ovalles  MRN: [id number]  [place] 1985  Date of evaluation: 7/29/22     Meme Ovalles is a 31 y.o. female with CC: Dental Pain, Facial Swelling (L side), and Vaginal Bleeding        This visit was performed by both a physician and an APC. I performed all aspects of the MDM as documented. The care is provided during an unprecedented national emergency due to the novel coronavirus, COVID 19.     Ysabel Griffin MD  Attending Emergency Physician            Ysabel Griffin MD  07/29/22 4412

## 2022-08-01 ENCOUNTER — HOSPITAL ENCOUNTER (OUTPATIENT)
Dept: PHARMACY | Age: 37
Setting detail: THERAPIES SERIES
Discharge: HOME | End: 2022-08-01
Payer: COMMERCIAL

## 2022-08-01 DIAGNOSIS — I63.9 ACUTE CVA (CEREBROVASCULAR ACCIDENT) (HCC): Primary | ICD-10-CM

## 2022-08-01 DIAGNOSIS — I63.232 STROKE DUE TO OCCLUSION OF LEFT CAROTID ARTERY (HCC): ICD-10-CM

## 2022-08-01 LAB
INR BLD: 1.4
PROTIME: 16.9 SECONDS

## 2022-08-01 PROCEDURE — 85610 PROTHROMBIN TIME: CPT

## 2022-08-01 PROCEDURE — 99212 OFFICE O/P EST SF 10 MIN: CPT

## 2022-08-01 NOTE — PROGRESS NOTES
Medication Management Service, Warfarin Management  [facility], [phone]  Visit Date: 2022   Subjective:   Art Morales is a 31 y.o. female who presents to clinic today for anticoagulation monitoring and adjustment. Patient seen in clinic for warfarin management due to  Indication:   CVA. INR goal: of 2.0-3.0. Duration of therapy: indefinite. Assessment and PLAN   PT/INR done in office per protocol. INR today is 1.4, subtherapeutic, likely due to dose reduction as instructed due to recent start of antibiotics. Plan: Will boost warfarin dose today to 15mg then continue current regimen of warfarin 7.5mg orally once daily. Using warfarin 7.5mg tablets. Recheck INR in 10 days. Patient seen in room # 2. Patient verbalized understanding of dosing directions and information discussed. Dosing schedule given to patient. Progress note sent to referring office. Patient acknowledges working in consult agreement with pharmacist as referred by his/her physician.       Flora Saleem RPh, CACP  Clinical Pharmacist Medication Management  2022  10:53 AM    For Pharmacy Admin Tracking Only    Intervention Detail: Adherence Monitorin and Dose Adjustment: 1, reason: Improve Adherence, Therapy Optimization  Total # of Interventions Recommended: 2  Total # of Interventions Accepted: 2  Time Spent (min): 20

## 2022-08-11 ENCOUNTER — HOSPITAL ENCOUNTER (OUTPATIENT)
Dept: PHARMACY | Age: 37
Setting detail: THERAPIES SERIES
Discharge: HOME | End: 2022-08-11
Payer: COMMERCIAL

## 2022-08-11 DIAGNOSIS — I63.232 STROKE DUE TO OCCLUSION OF LEFT CAROTID ARTERY (HCC): ICD-10-CM

## 2022-08-11 DIAGNOSIS — I63.9 ACUTE CVA (CEREBROVASCULAR ACCIDENT) (HCC): Primary | ICD-10-CM

## 2022-08-11 LAB
INR BLD: 2.1
PROTIME: 25.4 SECONDS

## 2022-08-11 PROCEDURE — 99211 OFF/OP EST MAY X REQ PHY/QHP: CPT

## 2022-08-11 PROCEDURE — 85610 PROTHROMBIN TIME: CPT

## 2022-08-11 NOTE — PROGRESS NOTES
Medication Management Service, Warfarin Management  [address] , [phone]  Visit Date: 8/11/2022   Subjective:   Evonne Deal is a 31 y.o. female who presents to clinic today for anticoagulation monitoring and adjustment. Patient seen in clinic for warfarin management due to  Indication:   CVA. INR goal: of 2.0-3.0. Duration of therapy: indefinite. Assessment and PLAN   PT/INR done in office per protocol. INR today is 2.1, therapeutic. Plan: Will continue current regimen of warfarin 7.5mg every day of the week. Using warfarin 7.5 mg tablets. Recheck INR in 4 week(s). Patient seen in room # 1. Patient attested to self screening for COVID - 19. Patient verbalized understanding of dosing directions and information discussed. Dosing schedule given to patient. Progress note sent to referring office. Patient acknowledges working in consult agreement with pharmacist as referred by his/her physician.       Electronically signed by Randolph Adams on 8/11/22 at 10:53 AM EDT    ========================================================================================  For Pharmacy Admin Tracking Only    Intervention Detail:   Total # of Interventions Recommended: 0  Total # of Interventions Accepted: 0  Time Spent (min): 20

## 2022-08-17 ENCOUNTER — TELEPHONE (OUTPATIENT)
Dept: PHARMACY | Age: 37
End: 2022-08-17

## 2022-08-17 NOTE — TELEPHONE ENCOUNTER
Jaguar Tariq called today to reschedule her recently missed appt but she did not actually miss an appt. She also states she will be resuming a PCN antibiotic although she does not have it yet, plans to start today. If she does resume PCN I have instructed her to take 3.75mg dose of warfarin this Saturday only, otherwise continue current plan of warfarin 7.5mg orally once daily. Reschedule next f/u appt. to 8/25/2022. Stressed that if she does not start the PCN then do not reduce dose on Saturday.       Perlita Wilekrson, [facility], CACP  Clinical Pharmacist Medication Management  8/17/2022  11:36 AM

## 2022-08-25 ENCOUNTER — TELEPHONE (OUTPATIENT)
Dept: PHARMACY | Age: 37
End: 2022-08-25

## 2022-08-25 NOTE — TELEPHONE ENCOUNTER
Patient was a No Call No Show for [address] appointment today. Called to reschedule, left voicemail.

## 2022-08-30 ENCOUNTER — HOSPITAL ENCOUNTER (EMERGENCY)
Age: 37
Discharge: HOME | End: 2022-08-30
Attending: EMERGENCY MEDICINE
Payer: COMMERCIAL

## 2022-08-30 ENCOUNTER — APPOINTMENT (OUTPATIENT)
Dept: GENERAL RADIOLOGY | Age: 37
End: 2022-08-30
Payer: COMMERCIAL

## 2022-08-30 ENCOUNTER — APPOINTMENT (OUTPATIENT)
Dept: CT IMAGING | Age: 37
End: 2022-08-30
Payer: COMMERCIAL

## 2022-08-30 VITALS
SYSTOLIC BLOOD PRESSURE: 118 MMHG | HEART RATE: 105 BPM | RESPIRATION RATE: 16 BRPM | TEMPERATURE: 98.2 F | DIASTOLIC BLOOD PRESSURE: 87 MMHG | WEIGHT: 150 LBS | BODY MASS INDEX: 26.57 KG/M2 | OXYGEN SATURATION: 99 %

## 2022-08-30 DIAGNOSIS — J06.9 VIRAL URI WITH COUGH: Primary | ICD-10-CM

## 2022-08-30 LAB
ABSOLUTE EOS #: 0.17 K/UL (ref 0–0.44)
ABSOLUTE IMMATURE GRANULOCYTE: 0.02 K/UL (ref 0–0.3)
ABSOLUTE LYMPH #: 1.84 K/UL (ref 1.1–3.7)
ABSOLUTE MONO #: 0.32 K/UL (ref 0.1–1.2)
ANION GAP SERPL CALCULATED.3IONS-SCNC: 10 MMOL/L (ref 9–17)
BASOPHILS # BLD: 1 % (ref 0–2)
BASOPHILS ABSOLUTE: 0.03 K/UL (ref 0–0.2)
BILIRUBIN URINE: NEGATIVE
BUN BLDV-MCNC: 7 MG/DL (ref 6–20)
BUN/CREAT BLD: 10 (ref 9–20)
CALCIUM SERPL-MCNC: 9.2 MG/DL (ref 8.6–10.4)
CHLORIDE BLD-SCNC: 100 MMOL/L (ref 98–107)
CO2: 27 MMOL/L (ref 20–31)
COLOR: YELLOW
CREAT SERPL-MCNC: 0.67 MG/DL (ref 0.5–0.9)
EOSINOPHILS RELATIVE PERCENT: 3 % (ref 1–4)
EPITHELIAL CELLS UA: NORMAL /HPF (ref 0–5)
GFR AFRICAN AMERICAN: >60 ML/MIN
GFR NON-AFRICAN AMERICAN: >60 ML/MIN
GFR SERPL CREATININE-BSD FRML MDRD: NORMAL ML/MIN/{1.73_M2}
GLUCOSE BLD-MCNC: 96 MG/DL (ref 70–99)
GLUCOSE URINE: NEGATIVE
HCG(URINE) PREGNANCY TEST: NEGATIVE
HCT VFR BLD CALC: 30.8 % (ref 36.3–47.1)
HEMOGLOBIN: 9.3 G/DL (ref 11.9–15.1)
IMMATURE GRANULOCYTES: 0 %
INR BLD: 2.3
KETONES, URINE: NEGATIVE
LEUKOCYTE ESTERASE, URINE: NEGATIVE
LYMPHOCYTES # BLD: 28 % (ref 24–43)
MCH RBC QN AUTO: 22.7 PG (ref 25.2–33.5)
MCHC RBC AUTO-ENTMCNC: 30.2 G/DL (ref 28.4–34.8)
MCV RBC AUTO: 75.1 FL (ref 82.6–102.9)
MONOCYTES # BLD: 5 % (ref 3–12)
MYOGLOBIN: 22 NG/ML (ref 25–58)
NITRITE, URINE: NEGATIVE
NRBC AUTOMATED: 0 PER 100 WBC
PARTIAL THROMBOPLASTIN TIME: 36.9 SEC (ref 23.9–33.8)
PDW BLD-RTO: 18.2 % (ref 11.8–14.4)
PH UA: 7 (ref 5–8)
PLATELET # BLD: 238 K/UL (ref 138–453)
PMV BLD AUTO: 9.4 FL (ref 8.1–13.5)
POTASSIUM SERPL-SCNC: 3.8 MMOL/L (ref 3.7–5.3)
PRO-BNP: 178 PG/ML
PROTEIN UA: NEGATIVE
PROTHROMBIN TIME: 25.6 SEC (ref 11.5–14.2)
RBC # BLD: 4.1 M/UL (ref 3.95–5.11)
RBC # BLD: ABNORMAL 10*6/UL
RBC UA: NORMAL /HPF (ref 0–2)
SARS-COV-2, RAPID: NOT DETECTED
SEG NEUTROPHILS: 63 % (ref 36–65)
SEGMENTED NEUTROPHILS ABSOLUTE COUNT: 4.17 K/UL (ref 1.5–8.1)
SODIUM BLD-SCNC: 137 MMOL/L (ref 135–144)
SPECIFIC GRAVITY UA: 1.01 (ref 1–1.03)
SPECIMEN DESCRIPTION: NORMAL
TROPONIN, HIGH SENSITIVITY: <6 NG/L (ref 0–14)
TURBIDITY: CLEAR
URINE HGB: NEGATIVE
UROBILINOGEN, URINE: NORMAL
WBC # BLD: 6.6 K/UL (ref 3.5–11.3)
WBC UA: NORMAL /HPF (ref 0–5)

## 2022-08-30 PROCEDURE — 85610 PROTHROMBIN TIME: CPT

## 2022-08-30 PROCEDURE — 81001 URINALYSIS AUTO W/SCOPE: CPT

## 2022-08-30 PROCEDURE — 96361 HYDRATE IV INFUSION ADD-ON: CPT

## 2022-08-30 PROCEDURE — 84484 ASSAY OF TROPONIN QUANT: CPT

## 2022-08-30 PROCEDURE — 71045 X-RAY EXAM CHEST 1 VIEW: CPT

## 2022-08-30 PROCEDURE — 83874 ASSAY OF MYOGLOBIN: CPT

## 2022-08-30 PROCEDURE — 80048 BASIC METABOLIC PNL TOTAL CA: CPT

## 2022-08-30 PROCEDURE — 93005 ELECTROCARDIOGRAM TRACING: CPT | Performed by: NURSE PRACTITIONER

## 2022-08-30 PROCEDURE — 99285 EMERGENCY DEPT VISIT HI MDM: CPT

## 2022-08-30 PROCEDURE — 6360000004 HC RX CONTRAST MEDICATION: Performed by: EMERGENCY MEDICINE

## 2022-08-30 PROCEDURE — 85730 THROMBOPLASTIN TIME PARTIAL: CPT

## 2022-08-30 PROCEDURE — 85025 COMPLETE CBC W/AUTO DIFF WBC: CPT

## 2022-08-30 PROCEDURE — 96360 HYDRATION IV INFUSION INIT: CPT

## 2022-08-30 PROCEDURE — 83880 ASSAY OF NATRIURETIC PEPTIDE: CPT

## 2022-08-30 PROCEDURE — 87635 SARS-COV-2 COVID-19 AMP PRB: CPT

## 2022-08-30 PROCEDURE — 81025 URINE PREGNANCY TEST: CPT

## 2022-08-30 PROCEDURE — 2580000003 HC RX 258: Performed by: EMERGENCY MEDICINE

## 2022-08-30 PROCEDURE — 71260 CT THORAX DX C+: CPT | Performed by: NURSE PRACTITIONER

## 2022-08-30 RX ORDER — 0.9 % SODIUM CHLORIDE 0.9 %
80 INTRAVENOUS SOLUTION INTRAVENOUS ONCE
Status: COMPLETED | OUTPATIENT
Start: 2022-08-30 | End: 2022-08-30

## 2022-08-30 RX ORDER — ALBUTEROL SULFATE 90 UG/1
2 AEROSOL, METERED RESPIRATORY (INHALATION) EVERY 6 HOURS PRN
Qty: 18 G | Refills: 0 | Status: SHIPPED | OUTPATIENT
Start: 2022-08-30

## 2022-08-30 RX ORDER — FAMOTIDINE 40 MG/1
40 TABLET, FILM COATED ORAL DAILY
COMMUNITY

## 2022-08-30 RX ORDER — PREDNISONE 20 MG/1
40 TABLET ORAL DAILY
Qty: 10 TABLET | Refills: 0 | Status: SHIPPED | OUTPATIENT
Start: 2022-08-30 | End: 2022-09-04

## 2022-08-30 RX ORDER — TRAZODONE HYDROCHLORIDE 50 MG/1
50 TABLET ORAL NIGHTLY
COMMUNITY

## 2022-08-30 RX ORDER — BENZONATATE 100 MG/1
100-200 CAPSULE ORAL 3 TIMES DAILY PRN
Qty: 40 CAPSULE | Refills: 0 | Status: SHIPPED | OUTPATIENT
Start: 2022-08-30 | End: 2022-09-06

## 2022-08-30 RX ORDER — SODIUM CHLORIDE 0.9 % (FLUSH) 0.9 %
10 SYRINGE (ML) INJECTION ONCE
Status: COMPLETED | OUTPATIENT
Start: 2022-08-30 | End: 2022-08-30

## 2022-08-30 RX ADMIN — IOPAMIDOL 75 ML: 755 INJECTION, SOLUTION INTRAVENOUS at 22:24

## 2022-08-30 RX ADMIN — SODIUM CHLORIDE, PRESERVATIVE FREE 10 ML: 5 INJECTION INTRAVENOUS at 22:24

## 2022-08-30 RX ADMIN — SODIUM CHLORIDE 80 ML: 9 INJECTION, SOLUTION INTRAVENOUS at 22:25

## 2022-08-30 ASSESSMENT — PAIN - FUNCTIONAL ASSESSMENT: PAIN_FUNCTIONAL_ASSESSMENT: 0-10

## 2022-08-30 ASSESSMENT — ENCOUNTER SYMPTOMS
SORE THROAT: 0
BACK PAIN: 1
VOMITING: 0
NAUSEA: 0
SHORTNESS OF BREATH: 1
RHINORRHEA: 0
DIARRHEA: 0
COLOR CHANGE: 0
ABDOMINAL PAIN: 0
COUGH: 1

## 2022-08-30 ASSESSMENT — PAIN DESCRIPTION - FREQUENCY: FREQUENCY: CONTINUOUS

## 2022-08-30 ASSESSMENT — PAIN DESCRIPTION - LOCATION: LOCATION: BACK

## 2022-08-30 ASSESSMENT — PAIN DESCRIPTION - DESCRIPTORS: DESCRIPTORS: STABBING;ACHING

## 2022-08-30 ASSESSMENT — PAIN SCALES - GENERAL: PAINLEVEL_OUTOF10: 9

## 2022-08-31 LAB
EKG ATRIAL RATE: 81 BPM
EKG P AXIS: 28 DEGREES
EKG P-R INTERVAL: 114 MS
EKG Q-T INTERVAL: 380 MS
EKG QRS DURATION: 86 MS
EKG QTC CALCULATION (BAZETT): 441 MS
EKG R AXIS: 60 DEGREES
EKG T AXIS: 49 DEGREES
EKG VENTRICULAR RATE: 81 BPM

## 2022-08-31 PROCEDURE — 93010 ELECTROCARDIOGRAM REPORT: CPT | Performed by: INTERNAL MEDICINE

## 2022-08-31 NOTE — ED NOTES
Pt presenting to the ED with upper back pain, chest pain and shortness of breath. Pt states that she was laying down earlier, and she started to feel a \"tightness\" in her chest going into her back. Pt states that she is worried about a possible PE, DVT, or pneumonia since the pain feels like all of the pain she has had in the past. Pt is A&OX4.       Omkar Brand RN  08/30/22 2100

## 2022-08-31 NOTE — ED PROVIDER NOTES
[facility] ED  eMERGENCY dEPARTMENT eNCOUnter      Pt Name: Yeison Crabtree  MRN: [id number]  [place] 1985  Date of evaluation: 8/30/2022  Provider: EMILY Mai CNP    CHIEF COMPLAINT       Chief Complaint   Patient presents with    Shortness of Breath     Onset this am, states concern for pneumonia/PE    Back Pain     Onset two days    Urinary Frequency         HISTORY OF PRESENT ILLNESS  (Location/Symptom, Timing/Onset, Context/Setting, Quality, Duration, Modifying Factors, Severity.)   Yeison Crabtree is a 31 y.o. female who presents to the emergency department. Reports upper back pain, cough, SOB. Onset was within the past 2 days. Denies fever, chills, weakness. Denies abd pain, N/V/D. States she was living on the streets of [place] for 2 weeks this month. She is concerned she developed pneumonia. She also has a hx of PEs. She states she was not able to take her coumadin while she was not at home. Denies chance of pregnancy. Rates her pain 9/10 at this time. Nursing Notes were reviewed. ALLERGIES     Patient has no known allergies. CURRENT MEDICATIONS       Previous Medications    BUPRENORPHINE HCL-NALOXONE HCL (SUBOXONE SL)    Place under the tongue    FAMOTIDINE (PEPCID) 40 MG TABLET    Take 40 mg by mouth daily    FERROUS SULFATE (IRON 325) 325 (65 FE) MG TABLET    Take 1 tablet by mouth daily (with breakfast)    PENICILLIN V POTASSIUM (VEETID) 500 MG TABLET    Take 1 tablet by mouth in the morning and 1 tablet at noon and 1 tablet in the evening and 1 tablet before bedtime. TRAZODONE (DESYREL) 50 MG TABLET    Take 50 mg by mouth nightly    WARFARIN (COUMADIN) 7.5 MG TABLET    TAKE ONE TABLET (OR AS DIRECTED BY MEDICATION MANAGEMENT) BY MOUTH ONCE DAILY       PAST MEDICAL HISTORY         Diagnosis Date    Acid reflux     Anemia 10/18/2019    Drug abuse, IV ([facility] [address]     claims that she has previous iv drug dependency claims hasn' had recent usage. Headache     History of pulmonary embolus (PE)     Marijuana smoker 10/18/2019    Smoker 10/18/2019       SURGICAL HISTORY           Procedure Laterality Date    APPENDECTOMY       SECTION      COLONOSCOPY N/A 10/23/2019    COLORECTAL CANCER SCREENING, NOT HIGH RISK performed by Emelyn Celis MD at [address] N/A 10/22/2019    SIGMOIDOSCOPY ABORTED COLONOSCOPY performed by Emelyn Celis MD at [address] 10/21/2019    EGD ESOPHAGOGASTRODUODENOSCOPY performed by Emelyn Celis MD at [place] [address]           Problem Relation Age of Onset    No Known Problems Mother     No Known Problems Father      Family Status   Relation Name Status    Mother  (Not Specified)    Father  (Not Specified)        SOCIAL HISTORY      reports that she has been smoking cigarettes. She has never used smokeless tobacco. She reports current drug use. Drug: Marijuana Donya Kugel). She reports that she does not drink alcohol. REVIEW OF SYSTEMS    (2-9 systems for level 4, 10 or more for level 5)     Review of Systems   Constitutional:  Negative for activity change, appetite change, chills, diaphoresis, fatigue and fever. HENT:  Negative for congestion, rhinorrhea and sore throat. Respiratory:  Positive for cough and shortness of breath. Cardiovascular:  Negative for chest pain and palpitations. Gastrointestinal:  Negative for abdominal pain, diarrhea, nausea and vomiting. Genitourinary:  Positive for frequency. Negative for dysuria, hematuria and urgency. Musculoskeletal:  Positive for arthralgias, back pain and myalgias. Negative for neck pain and neck stiffness. Skin:  Negative for color change and rash. Neurological:  Negative for dizziness, speech difficulty, weakness, light-headedness, numbness and headaches. Except as noted above the remainder of the review of systems was reviewed and negative.      PHYSICAL EXAM    (up to 7 for level 4, 8 or more for level 5)     ED Triage Vitals [08/30/22 1844]   BP Temp Temp Source Heart Rate Resp SpO2 Height Weight   118/87 98.2 °F (36.8 °C) Oral (!) 105 16 99 % -- 150 lb (68 kg)     Physical Exam  Vitals reviewed. Constitutional:       General: She is not in acute distress. Appearance: She is well-developed. She is not diaphoretic. Comments: Pt speaking in full sentences, handling secretions well. Eyes:      General: No scleral icterus. Conjunctiva/sclera: Conjunctivae normal.   Cardiovascular:      Rate and Rhythm: Regular rhythm. Tachycardia present. Pulmonary:      Effort: Pulmonary effort is normal. No respiratory distress. Breath sounds: Normal breath sounds. No stridor. No wheezing or rales. Musculoskeletal:      Cervical back: Neck supple. Comments: Moves extremities. Skin:     General: Skin is warm and dry. Findings: No rash. Neurological:      Mental Status: She is alert and oriented to person, place, and time. Psychiatric:         Behavior: Behavior normal.          DIAGNOSTIC RESULTS     RADIOLOGY:   Non-plain film images such as CT, Ultrasound and MRI are read by the radiologist. Plain radiographic images are visualized and preliminarily interpreted by the emergency physician with the below findings:    Interpretation per the Radiologist below, if available at the time of this note:    XR CHEST PORTABLE    Result Date: 8/30/2022  EXAMINATION: ONE XRAY VIEW OF THE CHEST 8/30/2022 8:32 pm COMPARISON: None. HISTORY: ORDERING SYSTEM PROVIDED HISTORY: SOB TECHNOLOGIST PROVIDED HISTORY: SOB Reason for Exam: SOB, back pain FINDINGS: Moderate hiatal hernia. The lungs are without acute focal process. There is no effusion or pneumothorax. The cardiomediastinal silhouette is without acute process. The osseous structures are without acute process. No acute process. Hiatal hernia.      CT CHEST PULMONARY EMBOLISM W CONTRAST    Result Date: 8/30/2022  EXAMINATION: CTA OF THE CHEST 8/30/2022 10:13 pm TECHNIQUE: CTA of the chest was performed after the administration of intravenous contrast.  Multiplanar reformatted images are provided for review. MIP images are provided for review. Automated exposure control, iterative reconstruction, and/or weight based adjustment of the mA/kV was utilized to reduce the radiation dose to as low as reasonably achievable. COMPARISON: 10/18/2019 HISTORY: ORDERING SYSTEM PROVIDED HISTORY: r/o PE TECHNOLOGIST PROVIDED HISTORY: r/o PE Decision Support Exception - unselect if not a suspected or confirmed emergency medical condition->Emergency Medical Condition (MA) Shortness of breath. Back pain. FINDINGS: Pulmonary Arteries: Pulmonary arteries are adequately opacified for evaluation. No evidence of intraluminal filling defect to suggest pulmonary embolism. Main pulmonary artery is normal in caliber. Mediastinum: No thoracic aortic aneurysm is identified. No aortic dissection. No cardiomegaly is identified. No mediastinal lymphadenopathy. No focal esophageal thickening is identified. Small to moderate hiatal hernia. Lungs/pleura: No pleural effusion. No pneumothorax is identified. Minimal subsegmental atelectasis. No spiculated lung mass. Upper Abdomen: No acute process seen in the upper abdomen. No focal inflammatory change. No mass is identified. Soft Tissues/Bones: No acute subcutaneous soft tissue abnormality. No axillary or supraclavicular lymphadenopathy. Osseous structures of the shoulders appear intact. No rib fractures are identified. 1. No acute pulmonary emboli are identified. 2. Small to moderate hiatal hernia. 3. Minimal subsegmental atelectasis. Otherwise no acute cardiopulmonary process.          LABS:  Labs Reviewed   CBC WITH AUTO DIFFERENTIAL - Abnormal; Notable for the following components:       Result Value    Hemoglobin 9.3 (*)     Hematocrit 30.8 (*)     MCV 75.1 (*)     MCH 22.7 (*) Problem List  Patient Active Problem List   Diagnosis Code    Acute pulmonary embolism (HCC) I26.99    Anemia D64.9    Smoker F17.200    Marijuana smoker F12.90    Iron deficiency anemia D50.9    History of intravenous drug abuse ([facility] [address] F19.11    Occult blood positive stool R19.5    HH (hiatus hernia) K44.9    Acute CVA (cerebrovascular accident) ([facility] [address] I63.9    Stroke due to occlusion of left carotid artery ([facility] [address] W37.236    Carotid artery stenosis with cerebral infarction [facility]) I63.239    Acute right hemiparesis (HCC) G81.91    Right sided numbness R20.0    MTHFR gene mutation Z15.89    Acute cerebrovascular accident (CVA) ([facility] [address] I63.9    Dysarthria R47.1    Encounter for blood transfusion Z51.89         DISPOSITION/PLAN   DISPOSITION Decision To Discharge 08/30/2022 11:41:59 PM      PATIENT REFERRED TO:   Call Charley Hurley to establish care for follow up    Schedule an appointment as soon as possible for a visit       [facility] ED  [address] [facility]  [phone]    As needed, If symptoms worsen    DISCHARGE MEDICATIONS:     New Prescriptions    ALBUTEROL SULFATE HFA (PROVENTIL HFA) 108 (90 BASE) MCG/ACT INHALER    Inhale 2 puffs into the lungs every 6 hours as needed for Wheezing or Shortness of Breath    BENZONATATE (TESSALON) 100 MG CAPSULE    Take 1-2 capsules by mouth 3 times daily as needed for Cough    PREDNISONE (DELTASONE) 20 MG TABLET    Take 2 tablets by mouth daily for 5 days           (Please note that portions of this note were completed with a voice recognition program.  Efforts were made to edit the dictations but occasionally words are mis-transcribed.)    EMILY Wahl CNP, APRN - CNP  08/31/22 6866

## 2022-08-31 NOTE — ED PROVIDER NOTES
eMERGENCY dEPARTMENT eNCOUnter   Independent Attestation     Pt Name: Eduardo Tate  MRN: [id number]  [place] 1985  Date of evaluation: 8/31/22     Eduardo Tate is a 31 y.o. female with CC: Shortness of Breath (Onset this am, states concern for pneumonia/PE), Back Pain (Onset two days), and Urinary Frequency        This visit was performed by both a physician and an APC. I performed all aspects of the MDM as documented. The care is provided during an unprecedented national emergency due to the novel coronavirus, COVID 19.     Nancy Walsh MD  Attending Emergency Physician         EKG sinus rhythm ventricular rate 81  No significant ST or T wave changes    QTc 441  Overall unremarkable EKG     Amarilis Hameed MD  08/31/22 2155

## 2022-09-02 ENCOUNTER — APPOINTMENT (OUTPATIENT)
Dept: CT IMAGING | Age: 37
End: 2022-09-02
Payer: COMMERCIAL

## 2022-09-02 ENCOUNTER — HOSPITAL ENCOUNTER (EMERGENCY)
Age: 37
Discharge: HOME | End: 2022-09-02
Attending: EMERGENCY MEDICINE
Payer: COMMERCIAL

## 2022-09-02 ENCOUNTER — APPOINTMENT (OUTPATIENT)
Dept: ULTRASOUND IMAGING | Age: 37
End: 2022-09-02
Payer: COMMERCIAL

## 2022-09-02 VITALS
SYSTOLIC BLOOD PRESSURE: 103 MMHG | HEIGHT: 62 IN | TEMPERATURE: 97.5 F | DIASTOLIC BLOOD PRESSURE: 76 MMHG | RESPIRATION RATE: 20 BRPM | WEIGHT: 142 LBS | OXYGEN SATURATION: 100 % | BODY MASS INDEX: 26.13 KG/M2 | HEART RATE: 110 BPM

## 2022-09-02 DIAGNOSIS — B96.89 BACTERIAL VAGINOSIS: ICD-10-CM

## 2022-09-02 DIAGNOSIS — R10.2 PELVIC PAIN: Primary | ICD-10-CM

## 2022-09-02 DIAGNOSIS — N83.202 CYST OF LEFT OVARY: ICD-10-CM

## 2022-09-02 DIAGNOSIS — N76.0 BACTERIAL VAGINOSIS: ICD-10-CM

## 2022-09-02 LAB
ABSOLUTE EOS #: 0.07 K/UL (ref 0–0.4)
ABSOLUTE IMMATURE GRANULOCYTE: 0 K/UL (ref 0–0.3)
ABSOLUTE LYMPH #: 2.34 K/UL (ref 1–4.8)
ABSOLUTE MONO #: 0.44 K/UL (ref 0.2–0.8)
ALBUMIN SERPL-MCNC: 3.7 G/DL (ref 3.5–5.2)
ALP BLD-CCNC: 61 U/L (ref 35–104)
ALT SERPL-CCNC: <5 U/L (ref 5–33)
ANION GAP SERPL CALCULATED.3IONS-SCNC: 9 MMOL/L (ref 9–17)
AST SERPL-CCNC: 10 U/L
BACTERIA: ABNORMAL
BASOPHILS # BLD: 0 %
BASOPHILS ABSOLUTE: 0 K/UL (ref 0–0.2)
BILIRUB SERPL-MCNC: 0.1 MG/DL (ref 0.3–1.2)
BILIRUBIN URINE: NEGATIVE
BUN BLDV-MCNC: 9 MG/DL (ref 6–20)
BUN/CREAT BLD: 14 (ref 9–20)
CALCIUM SERPL-MCNC: 8.8 MG/DL (ref 8.6–10.4)
CANDIDA SPECIES, DNA PROBE: NEGATIVE
CHLORIDE BLD-SCNC: 104 MMOL/L (ref 98–107)
CO2: 25 MMOL/L (ref 20–31)
COLOR: YELLOW
CREAT SERPL-MCNC: 0.64 MG/DL (ref 0.5–0.9)
EOSINOPHILS RELATIVE PERCENT: 1 % (ref 1–4)
EPITHELIAL CELLS UA: ABNORMAL /HPF (ref 0–5)
GARDNERELLA VAGINALIS, DNA PROBE: POSITIVE
GFR AFRICAN AMERICAN: >60 ML/MIN
GFR NON-AFRICAN AMERICAN: >60 ML/MIN
GFR SERPL CREATININE-BSD FRML MDRD: ABNORMAL ML/MIN/{1.73_M2}
GLUCOSE BLD-MCNC: 87 MG/DL (ref 70–99)
GLUCOSE URINE: NEGATIVE
HCG(URINE) PREGNANCY TEST: NEGATIVE
HCT VFR BLD CALC: 29.5 % (ref 36.3–47.1)
HEMOGLOBIN: 8.5 G/DL (ref 11.9–15.1)
IMMATURE GRANULOCYTES: 0 %
KETONES, URINE: NEGATIVE
LEUKOCYTE ESTERASE, URINE: NEGATIVE
LIPASE: 10 U/L (ref 13–60)
LYMPHOCYTES # BLD: 32 % (ref 24–44)
MCH RBC QN AUTO: 22.8 PG (ref 25.2–33.5)
MCHC RBC AUTO-ENTMCNC: 28.8 G/DL (ref 28.4–34.8)
MCV RBC AUTO: 79.3 FL (ref 82.6–102.9)
MONOCYTES # BLD: 6 % (ref 1–7)
MORPHOLOGY: ABNORMAL
MORPHOLOGY: ABNORMAL
MUCUS: ABNORMAL
NITRITE, URINE: NEGATIVE
NRBC AUTOMATED: 0 PER 100 WBC
PDW BLD-RTO: 19.4 % (ref 11.8–14.4)
PH UA: 6 (ref 5–8)
PLATELET # BLD: 169 K/UL (ref 138–453)
PMV BLD AUTO: 10.5 FL (ref 8.1–13.5)
POTASSIUM SERPL-SCNC: 3.7 MMOL/L (ref 3.7–5.3)
PROTEIN UA: NEGATIVE
RBC # BLD: 3.72 M/UL (ref 3.95–5.11)
RBC UA: ABNORMAL /HPF (ref 0–2)
SEG NEUTROPHILS: 61 % (ref 36–66)
SEGMENTED NEUTROPHILS ABSOLUTE COUNT: 4.45 K/UL (ref 1.8–7.7)
SODIUM BLD-SCNC: 138 MMOL/L (ref 135–144)
SOURCE: ABNORMAL
SPECIFIC GRAVITY UA: 1.03 (ref 1–1.03)
TOTAL PROTEIN: 6.5 G/DL (ref 6.4–8.3)
TRICHOMONAS VAGINALIS DNA: NEGATIVE
TURBIDITY: CLEAR
URINE HGB: NEGATIVE
UROBILINOGEN, URINE: NORMAL
WBC # BLD: 7.3 K/UL (ref 3.5–11.3)
WBC UA: ABNORMAL /HPF (ref 0–5)

## 2022-09-02 PROCEDURE — 87480 CANDIDA DNA DIR PROBE: CPT

## 2022-09-02 PROCEDURE — 85025 COMPLETE CBC W/AUTO DIFF WBC: CPT

## 2022-09-02 PROCEDURE — 2580000003 HC RX 258: Performed by: EMERGENCY MEDICINE

## 2022-09-02 PROCEDURE — 83690 ASSAY OF LIPASE: CPT

## 2022-09-02 PROCEDURE — 81001 URINALYSIS AUTO W/SCOPE: CPT

## 2022-09-02 PROCEDURE — 87591 N.GONORRHOEAE DNA AMP PROB: CPT

## 2022-09-02 PROCEDURE — 87660 TRICHOMONAS VAGIN DIR PROBE: CPT

## 2022-09-02 PROCEDURE — 99284 EMERGENCY DEPT VISIT MOD MDM: CPT

## 2022-09-02 PROCEDURE — 96374 THER/PROPH/DIAG INJ IV PUSH: CPT

## 2022-09-02 PROCEDURE — 74176 CT ABD & PELVIS W/O CONTRAST: CPT

## 2022-09-02 PROCEDURE — 80053 COMPREHEN METABOLIC PANEL: CPT

## 2022-09-02 PROCEDURE — 87491 CHLMYD TRACH DNA AMP PROBE: CPT

## 2022-09-02 PROCEDURE — 81025 URINE PREGNANCY TEST: CPT

## 2022-09-02 PROCEDURE — 87510 GARDNER VAG DNA DIR PROBE: CPT

## 2022-09-02 PROCEDURE — 76856 US EXAM PELVIC COMPLETE: CPT

## 2022-09-02 PROCEDURE — 6360000002 HC RX W HCPCS: Performed by: EMERGENCY MEDICINE

## 2022-09-02 RX ORDER — 0.9 % SODIUM CHLORIDE 0.9 %
80 INTRAVENOUS SOLUTION INTRAVENOUS ONCE
Status: DISCONTINUED | OUTPATIENT
Start: 2022-09-02 | End: 2022-09-02 | Stop reason: HOSPADM

## 2022-09-02 RX ORDER — ACETAMINOPHEN 500 MG
1000 TABLET ORAL EVERY 6 HOURS PRN
Qty: 30 TABLET | Refills: 0 | Status: SHIPPED | OUTPATIENT
Start: 2022-09-02 | End: 2022-10-17

## 2022-09-02 RX ORDER — METRONIDAZOLE 500 MG/1
500 TABLET ORAL 2 TIMES DAILY
Qty: 20 TABLET | Refills: 0 | Status: SHIPPED | OUTPATIENT
Start: 2022-09-02 | End: 2022-09-12

## 2022-09-02 RX ORDER — 0.9 % SODIUM CHLORIDE 0.9 %
1000 INTRAVENOUS SOLUTION INTRAVENOUS ONCE
Status: COMPLETED | OUTPATIENT
Start: 2022-09-02 | End: 2022-09-02

## 2022-09-02 RX ORDER — KETOROLAC TROMETHAMINE 30 MG/ML
30 INJECTION, SOLUTION INTRAMUSCULAR; INTRAVENOUS ONCE
Status: COMPLETED | OUTPATIENT
Start: 2022-09-02 | End: 2022-09-02

## 2022-09-02 RX ORDER — IBUPROFEN 800 MG/1
800 TABLET ORAL EVERY 8 HOURS PRN
Qty: 30 TABLET | Refills: 0 | Status: SHIPPED | OUTPATIENT
Start: 2022-09-02

## 2022-09-02 RX ORDER — SODIUM CHLORIDE 0.9 % (FLUSH) 0.9 %
10 SYRINGE (ML) INJECTION ONCE
Status: DISCONTINUED | OUTPATIENT
Start: 2022-09-02 | End: 2022-09-02 | Stop reason: HOSPADM

## 2022-09-02 RX ADMIN — KETOROLAC TROMETHAMINE 30 MG: 30 INJECTION, SOLUTION INTRAMUSCULAR at 03:10

## 2022-09-02 RX ADMIN — SODIUM CHLORIDE 1000 ML: 9 INJECTION, SOLUTION INTRAVENOUS at 03:09

## 2022-09-02 ASSESSMENT — ENCOUNTER SYMPTOMS
VOMITING: 0
DIARRHEA: 0
CONSTIPATION: 0
ABDOMINAL PAIN: 1
BACK PAIN: 1
NAUSEA: 0

## 2022-09-02 ASSESSMENT — PAIN SCALES - GENERAL: PAINLEVEL_OUTOF10: 7

## 2022-09-02 NOTE — DISCHARGE INSTRUCTIONS
[address] ED Clinic List    Healthcare Providers Services [facility]  [address] [place] [address]  [phone] Pediatric Primary Care  Adult Primary Care  OB/GYN/Prenatal/Specialty Clinics Monday - Friday  8:00a - 4:30p   [address] [place] [address]  Adult Medicine, Pediatrics, OB/GYN Monday - Friday  8:30a - [address] [facility]  [phone]  Wednesday 8:30a - 11:00a   [address] Adult Internal Medicine   hospitals  [phone]  [facility]  [phone]    Pediatric Clinic  [phone]   Monday, Tuesday, Thursday, Friday  8:00a - 4:30p  Wednesday 1:00p - 4:30p  Monday, Tuesday, Thursday 8:00a - 5:00p;  Friday 8:00a - 12:30p  Wednesday 1p - 4p  Monday, Tuesday, Thursday, Friday  8:30a - 4:15p  Wednesday 12:30p - [address] [facility]  [phone] Pediatric Primary Care  Adult Primary Care  OB/Prenatal Monday - Wednesday, Friday Monday - Friday 8a - 12p  Thursday 8a - 4:45p   Heartbeat  [address] [facility]  [phone]  [address] [place] [address] [address]   [phone] Pregnancy  Pre & Post Adoption Counseling  Pregnancy Support  Prenatal / nutrition Care  [employer] Program GIANA [address], Fri 10:00a - 4:30p  Thur 10:00a - [address]  Monday - Friday [address] [facility]   OB/GYN  [address] [facility],   [address]  [phone]  Adult Internal Medicine  [address] [facility]  [phone] [address], [address] [facility]  [phone]  Neuro / Headache  [address] [facility],   [facility]   [phone] Monday - Friday  8:30a - 5p   [place] [address]  [phone] [facility] [place]NA [place], Fri  9:00a - 4:30p  Wed 1:00p - 4:30p   [facility] [address]  [phone] Family [facility] Monday - SW/CM Discharge Plan  Informed patient is ready for discharge. Patient’s discharge destination is home. Patient to be picked up by friend.  Patient has been counseled for post hospitalization care.  Patient agrees and understands goals and plan. Initial implementation of the patient’s discharge plan has been arranged, including any devices/equipment needed for discharge. Discharge plan communicated to RN.    1015: Met with pt, he is hoping to go home but would like to make sure he can tolerate an advanced diet first.  He has a friend who can pick him up.  He has assistance available if needed.       Friday  8:30a - 5:00p     [facility]  [address], [facility] Building [address]  [phone] Burn/Plastic, ENT, GI, Orthopedics, Surgical / Trauma, Urology, Vascular Monday - Friday 8:00 - 4:30p    Call for an appointment   [facility]  [address]  [phone] Adult Medicine, [facility], Dental  Patient must be certified homeless  Under age 25 not accepted Monday - Friday 8:00 - 101 Dates   [address]   [phone]  OB   [phone] Monday, Tuesday, Wednesday, Friday 9a - 5p  Thursday 9a - 6p  Wednesday (OB only)     [facility]  [address]  [phone] OB/Prenatal Monday 11a -7p  [facility] 9a - 5p  Friday 8a - 4p  1st Saturday 9a -1p   Podiatry Clinic  [address], [facility] [address]  [phone]  Monday - Friday 8:00 - 4:30 p   [facility]  [phone] Free nurse visits  [place] programs  Counseling  Pregnancy Class Call or walk in   [facility]  [phone] [address] [address], [address]  [phone] Family Practice Monday - Friday  8a - 4:30p   [facility]  [address] [facility], [address]  [phone] [address] [facility], [address]  [phone]    Monday - Friday  8a - 4:30p    Monday - Friday 8a - 4:30p  Thursday [address] [facility]  [phone]  Monday - Friday  9a - 5p   Diabetic Education Services  [phone]  Call for an appointment   Renetta Roach  [address]  [phone]  Monday - Friday  8:30a - [address] [facility] [address] [facility]  [phone] Must have source of income and must bring (2) recent check stubs to appointment By appointment only   [address] for the [facility]  [address] [facility]  [phone] Patient must be homeless, call for   eligibility guidelines.   Under age 25 NOT accepted Days and hours vary (Doors open at 8:30a - day of week varies)  Call for an appointment   [address] [place] [address] [facility]  [phone])  Call for an appointment   CAL   ([facility])  [phone]   toll free [phone] For financial assistance

## 2022-09-02 NOTE — ED TRIAGE NOTES
Pt has been having lower pelic pain x3 days. Pt thought it was a yeast infection from a current antibiotic but states had urine test a couple days ago and urine was clean. Pt has hx of cyst and states it feels the same.  Pt is a&o x4 VSS

## 2022-09-02 NOTE — ED PROVIDER NOTES
[facility]  Emergency Department Encounter     Pt Name: Murphy Hatchet  MRN: [id number]  [place] 1985  Date of evaluation: 22  PCP:  No primary care provider on file. CHIEF COMPLAINT       Chief Complaint   Patient presents with    Pelvic Pain     X3 days. Fells like it is a cyst on ovary        HISTORY OF PRESENT ILLNESS  (Location/Symptom, Timing/Onset, Context/Setting, Quality, Duration, Modifying Factors, Severity.)    Murphy Hatchet is a 31 y.o. female who presents with lower abdominal and pelvic pain and cramping that is been going on for the past week but has been progressively worsening for the past 3 days and states that this morning she could not sleep because the pain was so bad. She does have a history of ovarian cyst in the past and had to have it removed and she is concerned this might be going on again. She is status post  section as well as prior appendectomy. States that she feels like she really has to strain when she attempts to urinate but is not going more frequently and is not having any pain with urination or blood with urination. States that the pain does somewhat radiate into her back. No abnormal vaginal discharge or vaginal bleeding. PAST MEDICAL / SURGICAL / SOCIAL / FAMILY HISTORY    has a past medical history of Acid reflux, Anemia, Drug abuse, IV ([address] Headache, History of pulmonary embolus (PE), Marijuana smoker, and Smoker. has a past surgical history that includes  section; ovarian cyst removal; Appendectomy; Upper gastrointestinal endoscopy (N/A, 10/21/2019); sigmoidoscopy (N/A, 10/22/2019); and Colonoscopy (N/A, 10/23/2019).     Social History     Socioeconomic History    Marital status:      Spouse name: Rah Perez    Number of children: 4    Years of education: Not on file    Highest education level: Not on file   Occupational History    Not on file   Tobacco Use    Smoking status: Every Day     Types: Cigarettes    Smokeless tobacco: Never   Vaping Use    Vaping Use: Never used   Substance and Sexual Activity    Alcohol use: No    Drug use: Yes     Types: Marijuana [place])    Sexual activity: Yes   Other Topics Concern    Not on file   Social History Narrative    Not on file     Social Determinants of Health     Financial Resource Strain: Not on file   Food Insecurity: Not on file   Transportation Needs: Not on file   Physical Activity: Not on file   Stress: Not on file   Social Connections: Not on file   Intimate Partner Violence: Not on file   Housing Stability: Not on file       Family History   Problem Relation Age of Onset    No Known Problems Mother     No Known Problems Father        Allergies:    Patient has no known allergies. Home Medications:  Prior to Admission medications    Medication Sig Start Date End Date Taking?  Authorizing Provider   ibuprofen (IBU) 800 MG tablet Take 1 tablet by mouth every 8 hours as needed for Pain 9/2/22  Yes Cuca Fink 1721, DO   acetaminophen (TYLENOL) 500 MG tablet Take 2 tablets by mouth every 6 hours as needed for Pain 9/2/22  Yes Katerina Lamar, DO   metroNIDAZOLE (FLAGYL) 500 MG tablet Take 1 tablet by mouth 2 times daily for 10 days 9/2/22 9/12/22 Yes Cuca Fink 1721, DO   famotidine (PEPCID) 40 MG tablet Take 40 mg by mouth daily    Historical Provider, MD   traZODone (DESYREL) 50 MG tablet Take 50 mg by mouth nightly    Historical Provider, MD   albuterol sulfate HFA (PROVENTIL HFA) 108 (90 Base) MCG/ACT inhaler Inhale 2 puffs into the lungs every 6 hours as needed for Wheezing or Shortness of Breath 8/30/22   EMILY Sanchez CNP   predniSONE (DELTASONE) 20 MG tablet Take 2 tablets by mouth daily for 5 days 8/30/22 9/4/22  EMILY Sanchez CNP   benzonatate (TESSALON) 100 MG capsule Take 1-2 capsules by mouth 3 times daily as needed for Cough 8/30/22 9/6/22  EMILY Sanchez CNP   penicillin v potassium (VEETID) 500 MG tablet Take 1 tablet by mouth in the morning and 1 tablet at noon and 1 tablet in the evening and 1 tablet before bedtime. 7/24/22   Jasmin Foss PA-C   warfarin (COUMADIN) 7.5 MG tablet TAKE ONE TABLET (OR AS DIRECTED BY MEDICATION MANAGEMENT) BY MOUTH ONCE DAILY 5/25/22   Estelle Deal MD   ferrous sulfate (IRON 325) 325 (65 Fe) MG tablet Take 1 tablet by mouth daily (with breakfast)  Patient not taking: No sig reported 4/28/22   Kay Kaye DO   Buprenorphine HCl-Naloxone HCl (SUBOXONE SL) Place under the tongue    Historical Provider, MD       REVIEW OF SYSTEMS    (2-9 systems for level 4, 10 or more for level 5)    Review of Systems   Constitutional:  Negative for chills, diaphoresis and fever. Gastrointestinal:  Positive for abdominal pain. Negative for constipation, diarrhea, nausea and vomiting. Endocrine: Negative for polyuria. Genitourinary:  Positive for difficulty urinating and pelvic pain. Negative for decreased urine volume, dysuria, flank pain, frequency, hematuria, urgency, vaginal bleeding, vaginal discharge and vaginal pain. Musculoskeletal:  Positive for back pain. PHYSICAL EXAM   (up to 7 for level 4, 8 or more for level 5)    VITALS:   Vitals:    09/02/22 0143   BP: 103/76   Pulse: (!) 110   Resp: 20   Temp: 97.5 °F (36.4 °C)   TempSrc: Temporal   SpO2: 100%   Weight: 142 lb (64.4 kg)   Height: 5' 2\" (1.575 m)       Physical Exam  Vitals and nursing note reviewed. Constitutional:       General: She is not in acute distress. Appearance: She is well-developed. She is not diaphoretic. HENT:      Head: Normocephalic and atraumatic. Eyes:      Conjunctiva/sclera: Conjunctivae normal.   Cardiovascular:      Rate and Rhythm: Normal rate and regular rhythm. Heart sounds: Normal heart sounds. Pulmonary:      Effort: Pulmonary effort is normal. No respiratory distress. Breath sounds: Normal breath sounds. No wheezing, rhonchi or rales.    Abdominal: General: Abdomen is flat. There is no distension. Palpations: Abdomen is soft. Tenderness: There is abdominal tenderness in the right lower quadrant, suprapubic area and left lower quadrant. There is no guarding or rebound. Genitourinary:     Comments: Patient refused pelvic exam  Musculoskeletal:         General: Normal range of motion. Cervical back: Normal range of motion. Right lower leg: No edema. Left lower leg: No edema. Skin:     General: Skin is warm and dry. Neurological:      General: No focal deficit present. Mental Status: She is alert.    Psychiatric:         Behavior: Behavior normal.       DIFFERENTIAL  DIAGNOSIS   PLAN (LABS / IMAGING / EKG):  Orders Placed This Encounter   Procedures    Vaginitis DNA Probe    C.trachomatis N.gonorrhoeae DNA    CT ABDOMEN PELVIS WO CONTRAST Additional Contrast? None    US PELVIS COMPLETE    US DUP ABD PEL RETRO SCROT LIMITED    Urinalysis with Microscopic    PREGNANCY, URINE    CBC with Auto Differential    Comprehensive Metabolic Panel w/ Reflex to MG    Lipase    Vaginal exam    Insert peripheral IV       MEDICATIONS ORDERED:  Orders Placed This Encounter   Medications    0.9 % sodium chloride bolus    ketorolac (TORADOL) injection 30 mg    0.9 % sodium chloride bolus    iopamidol (ISOVUE-370) 76 % injection 75 mL    sodium chloride flush 0.9 % injection 10 mL    ibuprofen (IBU) 800 MG tablet     Sig: Take 1 tablet by mouth every 8 hours as needed for Pain     Dispense:  30 tablet     Refill:  0    acetaminophen (TYLENOL) 500 MG tablet     Sig: Take 2 tablets by mouth every 6 hours as needed for Pain     Dispense:  30 tablet     Refill:  0    metroNIDAZOLE (FLAGYL) 500 MG tablet     Sig: Take 1 tablet by mouth 2 times daily for 10 days     Dispense:  20 tablet     Refill:  0     DIAGNOSTIC RESULTS / EMERGENCYDEPARTMENT COURSE / MDM   LABS:  Labs Reviewed   VAGINITIS DNA PROBE - Abnormal; Notable for the following components: Result Value    GARDNERELLA VAGINALIS, DNA PROBE POSITIVE (*)     All other components within normal limits   URINALYSIS WITH MICROSCOPIC - Abnormal; Notable for the following components:    Specific Gravity, UA 1.032 (*)     Bacteria, UA MODERATE (*)     Mucus, UA 2+ (*)     All other components within normal limits   CBC WITH AUTO DIFFERENTIAL - Abnormal; Notable for the following components:    RBC 3.72 (*)     Hemoglobin 8.5 (*)     Hematocrit 29.5 (*)     MCV 79.3 (*)     MCH 22.8 (*)     RDW 19.4 (*)     All other components within normal limits   COMPREHENSIVE METABOLIC PANEL W/ REFLEX TO MG FOR LOW K - Abnormal; Notable for the following components:    ALT <5 (*)     Total Bilirubin 0.1 (*)     All other components within normal limits   LIPASE - Abnormal; Notable for the following components:    Lipase 10 (*)     All other components within normal limits   C.TRACHOMATIS N.GONORRHOEAE DNA   PREGNANCY, URINE       RADIOLOGY:  CT ABDOMEN PELVIS WO CONTRAST Additional Contrast? None    Result Date: 9/2/2022  EXAMINATION: CT OF THE ABDOMEN AND PELVIS WITHOUT CONTRAST 9/2/2022 4:13 am TECHNIQUE: CT of the abdomen and pelvis was performed without the administration of intravenous contrast. Multiplanar reformatted images are provided for review. Automated exposure control, iterative reconstruction, and/or weight based adjustment of the mA/kV was utilized to reduce the radiation dose to as low as reasonably achievable. COMPARISON: 10/17/2019 HISTORY: ORDERING SYSTEM PROVIDED HISTORY: lower abd pain and cramping, hx ovarian cyst in the past TECHNOLOGIST PROVIDED HISTORY: lower abd pain and cramping, hx ovarian cyst in the past FINDINGS: Lower Chest: No parenchymal infiltrate or pleural effusion is identified. No pericardial effusion. Small hiatal hernia. Low-attenuation within the cardiac chambers relative to myocardium can be seen in the setting of anemia.  Organs: Liver density appears somewhat mildly increased relative to splenic density, though in the range of mid 60s Hounsfield units, where as hemochromatosis is associated with Hounsfield unit densities of 75 Hounsfield units and greater. No adrenal mass. No pancreatic mass is identified. No peripancreatic inflammatory process. Small splenule. No urinary tract calculi or obstruction. Gallbladder appears unremarkable. GI/Bowel: No ileus or obstruction. No focal inflammatory bowel process is identified. Moderate stool volume noted within the colon. No inflammatory changes are identified. Pelvis: The bladder and uterus appear unremarkable. Enlarged left ovary measuring 4.0 x 3.4 cm. Peritoneum/Retroperitoneum: No aortic aneurysm. No retroperitoneal or mesenteric lymphadenopathy is identified. Bones/Soft Tissues: No acute subcutaneous soft tissue abnormality is identified. No acute osseous abnormality is seen. No osseous destructive process. 1. Moderate stool volume seen in the colon, without ileus, obstruction or acute inflammatory bowel process. 2. The left ovary appears enlarged when compared to the previous examination, measuring 4.0 x 3.4 cm. No discrete cyst is identified. Consider further evaluation with pelvic ultrasound as well as Doppler imaging to exclude a possible ovarian mass or torsion. 3. Small hiatal hernia. 4. Other incidental findings as above. US PELVIS COMPLETE    Result Date: 9/2/2022  EXAMINATION: PELVIC ULTRASOUND 9/2/2022 TECHNIQUE: Grayscale color flow spectral analysis images with transabdominal probe imaging submitted. COMPARISON: None HISTORY: ORDERING SYSTEM PROVIDED HISTORY: pelvic pain abnrml CT scan L sided ovary TECHNOLOGIST PROVIDED HISTORY: pelvic pain abnrml CT scan L sided ovary FINDINGS: Measurements: Uterus: 9.1 x 4.8 x 5.7 cm Endometrial stripe: 1.2 cm Right Ovary:Not visualized Left Ovary: 4.4 x 3.1 x 3.8 cm Ultrasound Findings: Uterus: Uterus demonstrates normal myometrial echotexture.  Endometrial stripe: Endometrial stripe is within normal limits. Right Ovary: Nonvisualized. Left Ovary:  Unremarkable. No evidence for ovarian torsion. Free Fluid: No evidence of free fluid. Unremarkable uterus and left ovary. Nonvisualized right ovary. EMERGENCY DEPARTMENT COURSE:  ED Course as of 09/02/22 0708   Fri Sep 02, 2022   0242 PREGNANCY, URINE:    HCG(Urine) Pregnancy Test NEGATIVE [AO]   0304 Urinalysis with Microscopic(!):    Color, UA Yellow   Turbidity UA Clear   Glucose, UA NEGATIVE   Bilirubin, Urine NEGATIVE   Ketones, Urine NEGATIVE   Specific [place], UA 1.032(!)   Urine Hgb NEGATIVE   pH, UA 6.0   Protein, UA NEGATIVE   Urobilinogen, Urine Normal   Nitrite, Urine NEGATIVE   Leukocyte Esterase, Urine NEGATIVE   WBC, UA None   RBC, UA None   Epithelial Cells, UA 5 TO 10   Bacteria, UA MODERATE(!)   Mucus, UA 2+(!) [AO]   0352 Pt now refusing pelvic exam, will have her self swab [AO]   0430 Comprehensive Metabolic Panel w/ Reflex to MG(!):    Glucose, Random 87   BUN,BUNPL 9   Creatinine 0.64   Bun/Cre Ratio 14   CALCIUM, SERUM, 840348 8.8   Sodium 138   Potassium 3.7   Chloride 104   CO2 25   Anion Gap 9   Alk Phos 61   ALT <5(!)   AST 10   Bilirubin 0.1(!)   Total Protein 6.5   Albumin 3.7   GFR Non- >60   GFR  >60   GFR Comment      [AO]   0430 Lipase(!):    Lipase 10(!) [AO]   0520 CT ABDOMEN PELVIS WO CONTRAST Additional Contrast? None [AO]   0552 Vaginitis DNA Probe(!):    SOURCE, [id number] . VAGINAL SWAB   Trichomonas Vaginalis DNA NEGATIVE   GARDNERELLA VAGINALIS, DNA PROBE POSITIVE(!)   JESS SPECIES, DNA PROBE NEGATIVE [AO]   0558 Pt updated US bedside  [AO]   0703 US PELVIS COMPLETE [AO]      ED Course User Index  [AO] Cuca Ellis Fink 1721, DO       MDM     Amount and/or Complexity of Data Reviewed  Clinical lab tests: ordered and reviewed  Tests in the radiology section of CPT®: ordered and reviewed  Review and summarize past medical records: yes  Independent visualization of images, tracings, or specimens: yes    Patient Progress  Patient progress: stable      PROCEDURES:  Procedures     CONSULTS:  None    CRITICAL CARE:  NONE    FINAL IMPRESSION     1. Pelvic pain    2. Bacterial vaginosis    3. Cyst of left ovary          DISPOSITION / PLAN   DISPOSITION Decision To Discharge 09/02/2022 07:05:56 AM      Evaluation and treatment course in the ED, and plan of care upon discharge was discussed in length with the patient. Patient had no further questions prior to being discharged and was instructed to return to the ED for new or worsening symptoms. Any changes to existing medications or new prescriptions were reviewed with patient and they expressed understanding of how to correctly take their medications and the possible side effects. PATIENT REFERRED TO:  [facility] ED  [address] [facility]  [phone]    As needed, If symptoms worsen    DISCHARGE MEDICATIONS:  New Prescriptions    ACETAMINOPHEN (TYLENOL) 500 MG TABLET    Take 2 tablets by mouth every 6 hours as needed for Pain    IBUPROFEN (IBU) 800 MG TABLET    Take 1 tablet by mouth every 8 hours as needed for Pain    METRONIDAZOLE (FLAGYL) 500 MG TABLET    Take 1 tablet by mouth 2 times daily for 10 days       Katerina Mcgowan DO  Emergency Medicine Physician    (Please note that portions of this note were completed with a voice recognition program.  Efforts were made to edit the dictations but occasionally words are mis-transcribed.)       Rose Hubbard DO  09/02/22 0741

## 2022-09-04 ENCOUNTER — HOSPITAL ENCOUNTER (EMERGENCY)
Age: 37
Discharge: HOME | End: 2022-09-04
Attending: EMERGENCY MEDICINE
Payer: COMMERCIAL

## 2022-09-04 VITALS
RESPIRATION RATE: 20 BRPM | TEMPERATURE: 98.6 F | DIASTOLIC BLOOD PRESSURE: 60 MMHG | OXYGEN SATURATION: 100 % | HEIGHT: 62 IN | HEART RATE: 79 BPM | WEIGHT: 142 LBS | SYSTOLIC BLOOD PRESSURE: 100 MMHG | BODY MASS INDEX: 26.13 KG/M2

## 2022-09-04 DIAGNOSIS — R06.02 SHORTNESS OF BREATH: Primary | ICD-10-CM

## 2022-09-04 PROCEDURE — 99283 EMERGENCY DEPT VISIT LOW MDM: CPT

## 2022-09-04 RX ORDER — AMOXICILLIN 500 MG/1
500 CAPSULE ORAL 2 TIMES DAILY
COMMUNITY
Start: 2022-08-17

## 2022-09-04 RX ORDER — ALBUTEROL SULFATE 90 UG/1
1-2 AEROSOL, METERED RESPIRATORY (INHALATION) EVERY 4 HOURS PRN
Qty: 18 G | Refills: 0 | Status: SHIPPED | OUTPATIENT
Start: 2022-09-04

## 2022-09-04 ASSESSMENT — PAIN - FUNCTIONAL ASSESSMENT: PAIN_FUNCTIONAL_ASSESSMENT: NONE - DENIES PAIN

## 2022-09-04 ASSESSMENT — ENCOUNTER SYMPTOMS
SORE THROAT: 0
NAUSEA: 0
VOMITING: 0
RHINORRHEA: 0
ABDOMINAL PAIN: 1
SHORTNESS OF BREATH: 1
DIARRHEA: 0
COUGH: 1

## 2022-09-04 NOTE — ED TRIAGE NOTES
PT was previously seen 8/30/22 for URI , was sent home on medications for treatment    Pt here today for increased shortness of breath   For 1 week; has a cough which is not productive   Denies fevers, not eating has a lot of stress in her life going on; also complains of nausea          pt is a smoker  States she lost her inhaler,

## 2022-09-04 NOTE — ED PROVIDER NOTES
[facility] ED  Emergency Department Encounter  Emergency Medicine Resident     Pt Christiano Bowman  MRN: [id number]  [place] 1985  Date of evaluation: 22  PCP:  No primary care provider on file. CHIEF COMPLAINT       Chief Complaint   Patient presents with    Shortness of Breath     Pt stated she needs an inhaler        HISTORY OF PRESENT ILLNESS  (Location/Symptom, Timing/Onset, Context/Setting, Quality, Duration, Modifying Factors, Severity.)      Barby Arreguin is a 31 y.o. female who presents with shortness of breath. Patient states that she developed URI symptoms 2 weeks ago when she was in [place]. When she returned from her trip [place] she went to [place] and where she was diagnosed with URI, given steroid and albuterol inhaler. She states that the inhaler assisted her shortness of breath however she has lost an inhaler and has been developing shortness of breath since then. Patient states that she has a \"blood clotting disorder\" that she takes warfarin for. She states she has a history of PEs however has been taking her warfarin as prescribed. PAST MEDICAL / SURGICAL / SOCIAL / FAMILY HISTORY      has a past medical history of Acid reflux, Anemia, Drug abuse, IV ([address] Headache, History of pulmonary embolus (PE), Marijuana smoker, and Smoker. has a past surgical history that includes  section; ovarian cyst removal; Appendectomy; Upper gastrointestinal endoscopy (N/A, 10/21/2019); sigmoidoscopy (N/A, 10/22/2019); and Colonoscopy (N/A, 10/23/2019).       Social History     Socioeconomic History    Marital status:      Spouse name: Trena Cast    Number of children: 4    Years of education: Not on file    Highest education level: Not on file   Occupational History    Not on file   Tobacco Use    Smoking status: Every Day     Types: Cigarettes    Smokeless tobacco: Never   Vaping Use    Vaping Use: Never used   Substance and Sexual Activity    Alcohol use: No    Drug use: Yes     Types: Marijuana [place])    Sexual activity: Yes   Other Topics Concern    Not on file   Social History Narrative    Not on file     Social Determinants of Health     Financial Resource Strain: Not on file   Food Insecurity: Not on file   Transportation Needs: Not on file   Physical Activity: Not on file   Stress: Not on file   Social Connections: Not on file   Intimate Partner Violence: Not on file   Housing Stability: Not on file       Family History   Problem Relation Age of Onset    No Known Problems Mother     No Known Problems Father        Allergies:  Patient has no known allergies. Home Medications:  Prior to Admission medications    Medication Sig Start Date End Date Taking? Authorizing Provider   albuterol sulfate HFA (PROVENTIL HFA) 108 (90 Base) MCG/ACT inhaler Inhale 1-2 puffs into the lungs every 4 hours as needed for Wheezing or Shortness of Breath (Space out to every 6 hours as symptoms improve) Space out to every 6 hours as symptoms improve.  9/4/22  Yes Jacques Babb MD   amoxicillin (AMOXIL) 500 MG capsule Take 500 mg by mouth in the morning and at bedtime 8/17/22   Historical Provider, MD   ibuprofen (IBU) 800 MG tablet Take 1 tablet by mouth every 8 hours as needed for Pain 9/2/22   Katerina Theodore Broody, DO   acetaminophen (TYLENOL) 500 MG tablet Take 2 tablets by mouth every 6 hours as needed for Pain 9/2/22   Katerina Theodore Broody, DO   metroNIDAZOLE (FLAGYL) 500 MG tablet Take 1 tablet by mouth 2 times daily for 10 days 9/2/22 9/12/22  Katerina Theodore Broody, DO   famotidine (PEPCID) 40 MG tablet Take 40 mg by mouth daily    Historical Provider, MD   traZODone (DESYREL) 50 MG tablet Take 50 mg by mouth nightly    Historical Provider, MD   albuterol sulfate HFA (PROVENTIL HFA) 108 (90 Base) MCG/ACT inhaler Inhale 2 puffs into the lungs every 6 hours as needed for Wheezing or Shortness of Breath 8/30/22   EMILY Rosen - CNP   predniSONE atraumatic. Cardiovascular:      Rate and Rhythm: Normal rate and regular rhythm. Pulmonary:      Effort: Pulmonary effort is normal. No tachypnea or respiratory distress. Breath sounds: Normal breath sounds. No decreased breath sounds. Abdominal:      Palpations: Abdomen is soft. Musculoskeletal:      Cervical back: Normal range of motion. Right lower leg: No tenderness. No edema. Left lower leg: No tenderness. No edema. Skin:     General: Skin is warm and dry. Capillary Refill: Capillary refill takes less than 2 seconds. Neurological:      General: No focal deficit present. Mental Status: She is alert and oriented to person, place, and time. DIFFERENTIAL  DIAGNOSIS     PLAN (LABS / IMAGING / EKG):  No orders of the defined types were placed in this encounter. MEDICATIONS ORDERED:  Orders Placed This Encounter   Medications    albuterol sulfate HFA (PROVENTIL HFA) 108 (90 Base) MCG/ACT inhaler     Sig: Inhale 1-2 puffs into the lungs every 4 hours as needed for Wheezing or Shortness of Breath (Space out to every 6 hours as symptoms improve) Space out to every 6 hours as symptoms improve. Dispense:  18 g     Refill:  0       DDX: Shortness of breath secondary to URI, PE is unlikely due to CT PE within the past week ruling out, and patient is therapeutic on Coumadin. DIAGNOSTIC RESULTS / EMERGENCY DEPARTMENT COURSE / MDM   LAB RESULTS:  No results found for this visit on 09/04/22. RADIOLOGY:  No orders to display     MDM: Patient presents to due to shortness of breath over the past 2 weeks. She has history of PE on warfarin. She states she was diagnosed with a URI at scene and on 8/30/2022. Chart review shows that at [facility] they did a full work-up including CT PE, chest x-ray, and coagulation studies. Work-up ruled out PE, no acute processes on chest x-ray. They supplied her with albuterol and steroid Dosepak.   She states that she lost her albuterol inhaler and feels that that was helping her shortness of breath. Patient was therapeutic with Coumadin. Not indicated to repeat CT PE at this time. Patient does not clinically appear short of breath, talking full sentences, satting well on room air. Will refill albuterol inhaler and discharged home. EKG  None    All EKG's are interpreted by the Emergency Department Physician who either signs or Co-signs this chart in the absence of a cardiologist.    EMERGENCY DEPARTMENT COURSE:      ED Course as of 09/04/22 1553   Sun Sep 04, 2022   1548 Patient updated on decision to discharge, informed to fill albuterol prescription and call OB/GYN clinic as soon as possible for management of her ovarian cyst. [AK]      ED Course User Index  [AK] Chin Johnston MD       No notes of [facility] Admission Criteria type on file. PROCEDURES:  None    CONSULTS:  None    CRITICAL CARE:  None      FINAL IMPRESSION      1. Shortness of breath          DISPOSITION / PLAN     DISPOSITION Decision To Discharge 09/04/2022 03:46:31 PM      PATIENT REFERRED TO:  [address] [facility]  [address] [address]  [phone]  Schedule an appointment as soon as possible for a visit in 1 day      DISCHARGE MEDICATIONS:  New Prescriptions    ALBUTEROL SULFATE HFA (PROVENTIL HFA) 108 (90 BASE) MCG/ACT INHALER    Inhale 1-2 puffs into the lungs every 4 hours as needed for Wheezing or Shortness of Breath (Space out to every 6 hours as symptoms improve) Space out to every 6 hours as symptoms improve.        Chin Johnston MD  Emergency Medicine Resident    (Please note that portions of thisnote were completed with a voice recognition program.  Efforts were made to edit the dictations but occasionally words are mis-transcribed.)       Chin Johnston MD  Resident  09/04/22 1681

## 2022-09-04 NOTE — DISCHARGE INSTRUCTIONS
You are seen emergency department today for your shortness of breath. We examined you and determined it would be appropriate to discharge you home with a new albuterol prescription. Please fill the prescription and take 1 to 2 puffs every 4 hours as needed for shortness of breath or wheezing. Please return to the emergency department if you develop worsening shortness of breath, chest pain, swelling or pain of the legs, confusion, or any other concerning signs or symptoms. We have also provided you with a referral to the [facility].   Please call to make an appointment as soon as possible for management of your ovarian cyst.

## 2022-09-06 ENCOUNTER — HOSPITAL ENCOUNTER (EMERGENCY)
Age: 37
Discharge: HOME | End: 2022-09-06

## 2022-09-06 LAB
C TRACH DNA GENITAL QL NAA+PROBE: NEGATIVE
N. GONORRHOEAE DNA: NEGATIVE
SPECIMEN DESCRIPTION: NORMAL

## 2022-09-30 ENCOUNTER — APPOINTMENT (OUTPATIENT)
Dept: CT IMAGING | Age: 37
End: 2022-09-30
Payer: COMMERCIAL

## 2022-09-30 ENCOUNTER — HOSPITAL ENCOUNTER (EMERGENCY)
Age: 37
Discharge: HOME | End: 2022-09-30
Attending: EMERGENCY MEDICINE
Payer: COMMERCIAL

## 2022-09-30 ENCOUNTER — APPOINTMENT (OUTPATIENT)
Dept: GENERAL RADIOLOGY | Age: 37
End: 2022-09-30
Payer: COMMERCIAL

## 2022-09-30 VITALS
WEIGHT: 145 LBS | HEART RATE: 83 BPM | SYSTOLIC BLOOD PRESSURE: 116 MMHG | TEMPERATURE: 98.8 F | BODY MASS INDEX: 25.69 KG/M2 | OXYGEN SATURATION: 97 % | DIASTOLIC BLOOD PRESSURE: 82 MMHG | HEIGHT: 63 IN | RESPIRATION RATE: 16 BRPM

## 2022-09-30 DIAGNOSIS — M54.50 ACUTE BILATERAL LOW BACK PAIN WITHOUT SCIATICA: Primary | ICD-10-CM

## 2022-09-30 DIAGNOSIS — R79.1 ELEVATED INR: ICD-10-CM

## 2022-09-30 LAB
ABSOLUTE EOS #: 0.12 K/UL (ref 0–0.44)
ABSOLUTE IMMATURE GRANULOCYTE: 0.01 K/UL (ref 0–0.3)
ABSOLUTE LYMPH #: 1.38 K/UL (ref 1.1–3.7)
ABSOLUTE MONO #: 0.31 K/UL (ref 0.1–1.2)
ANION GAP SERPL CALCULATED.3IONS-SCNC: 12 MMOL/L (ref 9–17)
BASOPHILS # BLD: 1 % (ref 0–2)
BASOPHILS ABSOLUTE: 0.04 K/UL (ref 0–0.2)
BILIRUBIN URINE: NEGATIVE
BUN BLDV-MCNC: 12 MG/DL (ref 6–20)
BUN/CREAT BLD: 18 (ref 9–20)
CALCIUM SERPL-MCNC: 9.7 MG/DL (ref 8.6–10.4)
CHLORIDE BLD-SCNC: 98 MMOL/L (ref 98–107)
CO2: 24 MMOL/L (ref 20–31)
COLOR: YELLOW
COMMENT UA: NORMAL
CREAT SERPL-MCNC: 0.66 MG/DL (ref 0.5–0.9)
EKG ATRIAL RATE: 73 BPM
EKG P AXIS: 59 DEGREES
EKG P-R INTERVAL: 134 MS
EKG Q-T INTERVAL: 388 MS
EKG QRS DURATION: 80 MS
EKG QTC CALCULATION (BAZETT): 427 MS
EKG R AXIS: 51 DEGREES
EKG T AXIS: 47 DEGREES
EKG VENTRICULAR RATE: 73 BPM
EOSINOPHILS RELATIVE PERCENT: 2 % (ref 1–4)
GFR AFRICAN AMERICAN: >60 ML/MIN
GFR NON-AFRICAN AMERICAN: >60 ML/MIN
GFR SERPL CREATININE-BSD FRML MDRD: ABNORMAL ML/MIN/{1.73_M2}
GLUCOSE BLD-MCNC: 87 MG/DL (ref 70–99)
GLUCOSE URINE: NEGATIVE
HCG QUALITATIVE: NEGATIVE
HCT VFR BLD CALC: 32.5 % (ref 36.3–47.1)
HEMOGLOBIN: 9.6 G/DL (ref 11.9–15.1)
IMMATURE GRANULOCYTES: 0 %
INR BLD: 4.9
KETONES, URINE: NEGATIVE
LEUKOCYTE ESTERASE, URINE: NEGATIVE
LYMPHOCYTES # BLD: 22 % (ref 24–43)
MCH RBC QN AUTO: 22 PG (ref 25.2–33.5)
MCHC RBC AUTO-ENTMCNC: 29.5 G/DL (ref 28.4–34.8)
MCV RBC AUTO: 74.5 FL (ref 82.6–102.9)
MONOCYTES # BLD: 5 % (ref 3–12)
NITRITE, URINE: NEGATIVE
NRBC AUTOMATED: 0 PER 100 WBC
PARTIAL THROMBOPLASTIN TIME: 47.4 SEC (ref 23.9–33.8)
PDW BLD-RTO: 18.7 % (ref 11.8–14.4)
PH UA: 6 (ref 5–8)
PLATELET # BLD: 243 K/UL (ref 138–453)
PMV BLD AUTO: 9.9 FL (ref 8.1–13.5)
POTASSIUM SERPL-SCNC: 4.1 MMOL/L (ref 3.7–5.3)
PROTEIN UA: NEGATIVE
PROTHROMBIN TIME: 45.6 SEC (ref 11.5–14.2)
RBC # BLD: 4.36 M/UL (ref 3.95–5.11)
RBC # BLD: ABNORMAL 10*6/UL
SEG NEUTROPHILS: 70 % (ref 36–65)
SEGMENTED NEUTROPHILS ABSOLUTE COUNT: 4.36 K/UL (ref 1.5–8.1)
SODIUM BLD-SCNC: 134 MMOL/L (ref 135–144)
SPECIFIC GRAVITY UA: 1.01 (ref 1–1.03)
TROPONIN, HIGH SENSITIVITY: <6 NG/L (ref 0–14)
TROPONIN, HIGH SENSITIVITY: <6 NG/L (ref 0–14)
TURBIDITY: CLEAR
URINE HGB: NEGATIVE
UROBILINOGEN, URINE: NORMAL
WBC # BLD: 6.2 K/UL (ref 3.5–11.3)

## 2022-09-30 PROCEDURE — 93005 ELECTROCARDIOGRAM TRACING: CPT | Performed by: EMERGENCY MEDICINE

## 2022-09-30 PROCEDURE — 85730 THROMBOPLASTIN TIME PARTIAL: CPT

## 2022-09-30 PROCEDURE — 80048 BASIC METABOLIC PNL TOTAL CA: CPT

## 2022-09-30 PROCEDURE — 36415 COLL VENOUS BLD VENIPUNCTURE: CPT

## 2022-09-30 PROCEDURE — 85025 COMPLETE CBC W/AUTO DIFF WBC: CPT

## 2022-09-30 PROCEDURE — 81003 URINALYSIS AUTO W/O SCOPE: CPT

## 2022-09-30 PROCEDURE — 99285 EMERGENCY DEPT VISIT HI MDM: CPT

## 2022-09-30 PROCEDURE — 84484 ASSAY OF TROPONIN QUANT: CPT

## 2022-09-30 PROCEDURE — 71045 X-RAY EXAM CHEST 1 VIEW: CPT

## 2022-09-30 PROCEDURE — 85610 PROTHROMBIN TIME: CPT

## 2022-09-30 PROCEDURE — 70450 CT HEAD/BRAIN W/O DYE: CPT

## 2022-09-30 PROCEDURE — 84703 CHORIONIC GONADOTROPIN ASSAY: CPT

## 2022-09-30 PROCEDURE — 93010 ELECTROCARDIOGRAM REPORT: CPT | Performed by: INTERNAL MEDICINE

## 2022-09-30 ASSESSMENT — ENCOUNTER SYMPTOMS
SHORTNESS OF BREATH: 0
SORE THROAT: 0
RHINORRHEA: 0
COUGH: 0
COLOR CHANGE: 0
EYE DISCHARGE: 0
EYE REDNESS: 0
VOMITING: 0
DIARRHEA: 0
NAUSEA: 0

## 2022-09-30 ASSESSMENT — PAIN SCALES - GENERAL: PAINLEVEL_OUTOF10: 9

## 2022-09-30 ASSESSMENT — PAIN - FUNCTIONAL ASSESSMENT: PAIN_FUNCTIONAL_ASSESSMENT: 0-10

## 2022-09-30 NOTE — ED PROVIDER NOTES
Stroke due to occlusion of left carotid artery (HCC) X14.210    Carotid artery stenosis with cerebral infarction [facility]) I63.239    Acute right hemiparesis (HCC) G81.91    Right sided numbness R20.0    MTHFR gene mutation Z15.89    Acute cerebrovascular accident (CVA) ([facility] [address] I63.9    Dysarthria R47.1    Encounter for blood transfusion Z51.89     SURGICAL HISTORY       Past Surgical History:   Procedure Laterality Date    APPENDECTOMY       SECTION      COLONOSCOPY N/A 10/23/2019    COLORECTAL CANCER SCREENING, NOT HIGH RISK performed by Marii Villatoro MD at [facility] N/A 10/22/2019    SIGMOIDOSCOPY ABORTED COLONOSCOPY performed by Marii Villatoro MD at [place] [address]. 10/21/2019    EGD ESOPHAGOGASTRODUODENOSCOPY performed by Marii Villatoro MD at [facility] Medication List as of 2022  5:41 PM        CONTINUE these medications which have NOT CHANGED    Details   amoxicillin (AMOXIL) 500 MG capsule Take 500 mg by mouth in the morning and at bedtimeHistorical Med      !! albuterol sulfate HFA (PROVENTIL HFA) 108 (90 Base) MCG/ACT inhaler Inhale 1-2 puffs into the lungs every 4 hours as needed for Wheezing or Shortness of Breath (Space out to every 6 hours as symptoms improve) Space out to every 6 hours as symptoms improve., Disp-18 g, R-0Print      ibuprofen (IBU) 800 MG tablet Take 1 tablet by mouth every 8 hours as needed for Pain, Disp-30 tablet, R-0Print      acetaminophen (TYLENOL) 500 MG tablet Take 2 tablets by mouth every 6 hours as needed for Pain, Disp-30 tablet, R-0Print      famotidine (PEPCID) 40 MG tablet Take 40 mg by mouth dailyHistorical Med      traZODone (DESYREL) 50 MG tablet Take 50 mg by mouth nightlyHistorical Med      !! albuterol sulfate HFA (PROVENTIL HFA) 108 (90 Base) MCG/ACT inhaler Inhale 2 puffs into the lungs every 6 hours as needed for Wheezing or Shortness of Breath, Disp-18 g, R-0Print      penicillin v potassium (VEETID) 500 MG tablet Take 1 tablet by mouth in the morning and 1 tablet at noon and 1 tablet in the evening and 1 tablet before bedtime. , Disp-40 tablet, R-0Print      warfarin (COUMADIN) 7.5 MG tablet TAKE ONE TABLET (OR AS DIRECTED BY MEDICATION MANAGEMENT) BY MOUTH ONCE DAILY, Disp-90 tablet, R-1Normal      ferrous sulfate (IRON 325) 325 (65 Fe) MG tablet Take 1 tablet by mouth daily (with breakfast), Disp-30 tablet, R-0Normal      Buprenorphine HCl-Naloxone HCl (SUBOXONE SL) Place under the tongueHistorical Med       !! - Potential duplicate medications found. Please discuss with provider. ALLERGIES     has No Known Allergies. FAMILY HISTORY     She indicated that the status of her mother is unknown. She indicated that the status of her father is unknown. SOCIAL HISTORY       Social History     Tobacco Use    Smoking status: Every Day     Types: Cigarettes    Smokeless tobacco: Never   Vaping Use    Vaping Use: Never used   Substance Use Topics    Alcohol use: No    Drug use: Yes     Types: Marijuana Zenaida Ray), Opiates      Comment: heroin     PHYSICAL EXAM     INITIAL VITALS: /82   Pulse 83   Temp 98.8 °F (37.1 °C) (Oral)   Resp 16   Ht 5' 3\" (1.6 m)   Wt 145 lb (65.8 kg)   LMP 09/16/2022   SpO2 97%   BMI 25.69 kg/m²    Physical Exam  Constitutional:       Appearance: Normal appearance. She is well-developed. She is not ill-appearing or toxic-appearing. HENT:      Head: Normocephalic and atraumatic. Eyes:      Conjunctiva/sclera: Conjunctivae normal.      Pupils: Pupils are equal, round, and reactive to light. Neck:      Trachea: Trachea normal.   Cardiovascular:      Rate and Rhythm: Normal rate and regular rhythm. Heart sounds: S1 normal and S2 normal. No murmur heard. Pulmonary:      Effort: Pulmonary effort is normal. No accessory muscle usage or respiratory distress. Breath sounds: Normal breath sounds.    Chest:      Chest wall: No deformity or tenderness. Abdominal:      General: Bowel sounds are normal. There is no distension or abdominal bruit. Palpations: Abdomen is not rigid. Tenderness: There is no abdominal tenderness. There is no guarding or rebound. Musculoskeletal:      Cervical back: Normal range of motion and neck supple. Skin:     General: Skin is warm. Findings: No rash. Neurological:      Mental Status: She is alert and oriented to person, place, and time. GCS: GCS eye subscore is 4. GCS verbal subscore is 5. GCS motor subscore is 6. Cranial Nerves: Cranial nerves 2-12 are intact. Sensory: Sensation is intact. Motor: Motor function is intact. Coordination: Coordination is intact. Psychiatric:         Speech: Speech normal.       MEDICAL DECISION MAKIN-year-old female presents with complaints of multiple complaints including chest pain, headache, a fall recently. Plan is basic labs CT of the head, urinalysis and reevaluation. 6:09 PM EDT  Patient's laboratory studies unremarkable, plan is discharge with outpatient follow-up, return if symptoms worsen or change. CRITICAL CARE:       PROCEDURES:    Procedures    DIAGNOSTIC RESULTS   EKG:All EKG's are interpreted by the Emergency Department Physician who either signs or Co-signs this chart in the absence of a cardiologist.        RADIOLOGY:All plain film, CT, MRI, and formal ultrasound images (except ED bedside ultrasound) are read by the radiologist, see reports below, unless otherwisenoted in MDM or here. CT HEAD WO CONTRAST   Final Result   No acute intracranial abnormality. Unremarkable CT of the head. XR CHEST PORTABLE   Final Result   No acute intrathoracic process. LABS: All lab results were reviewed by myself, and all abnormals are listed below.   Labs Reviewed   BASIC METABOLIC PANEL - Abnormal; Notable for the following components:       Result Value    Sodium 134 (*) All other components within normal limits   CBC WITH AUTO DIFFERENTIAL - Abnormal; Notable for the following components:    Hemoglobin 9.6 (*)     Hematocrit 32.5 (*)     MCV 74.5 (*)     MCH 22.0 (*)     RDW 18.7 (*)     Seg Neutrophils 70 (*)     Lymphocytes 22 (*)     All other components within normal limits   APTT - Abnormal; Notable for the following components:    PTT 47.4 (*)     All other components within normal limits   PROTIME-INR - Abnormal; Notable for the following components:    Protime 45.6 (*)     INR 4.9 (*)     All other components within normal limits   TROPONIN   TROPONIN   URINALYSIS   HCG, SERUM, QUALITATIVE       EMERGENCY DEPARTMENTCOURSE:         Vitals:    Vitals:    09/30/22 1447   BP: 116/82   Pulse: 83   Resp: 16   Temp: 98.8 °F (37.1 °C)   TempSrc: Oral   SpO2: 97%   Weight: 145 lb (65.8 kg)   Height: 5' 3\" (1.6 m)       The patient was given the following medications while in the emergency department:  No orders of the defined types were placed in this encounter. CONSULTS:  None    FINAL IMPRESSION      1. Acute bilateral low back pain without sciatica    2. Elevated INR          DISPOSITION/PLAN   DISPOSITION Decision To Discharge 09/30/2022 05:40:11 PM      PATIENT REFERRED TO:  [place] [address]  [phone]  Schedule an appointment as soon as possible for a visit in 2 days    DISCHARGE MEDICATIONS:  Discharge Medication List as of 9/30/2022  5:41 PM        The care is provided during an unprecedented national emergency due to the novel coronavirus, COVID 19.   MD Shailesh Norman MD  09/30/22 9889

## 2022-10-03 ENCOUNTER — HOSPITAL ENCOUNTER (OUTPATIENT)
Dept: PHARMACY | Age: 37
Setting detail: THERAPIES SERIES
Discharge: HOME | End: 2022-10-03
Payer: COMMERCIAL

## 2022-10-03 DIAGNOSIS — I63.9 ACUTE CVA (CEREBROVASCULAR ACCIDENT) (HCC): Primary | ICD-10-CM

## 2022-10-03 DIAGNOSIS — I63.232 STROKE DUE TO OCCLUSION OF LEFT CAROTID ARTERY (HCC): ICD-10-CM

## 2022-10-03 LAB
INR BLD: 3
PROTIME: 35.9 SECONDS

## 2022-10-03 PROCEDURE — 99212 OFFICE O/P EST SF 10 MIN: CPT

## 2022-10-03 PROCEDURE — 85610 PROTHROMBIN TIME: CPT

## 2022-10-03 NOTE — PROGRESS NOTES
Medication Management Service, Warfarin Management  AMAN CLARK [facility], [phone]  Visit Date: 10/3/2022   Subjective:   Aly Murcia is a 31 y.o. female who presents to clinic today for anticoagulation monitoring and adjustment. Patient seen in clinic for warfarin management due to  Indication:   CVA. INR goal: of 2.0-3.0. Duration of therapy: indefinite. Assessment and PLAN   PT/INR done in office per protocol. INR today is 3.0, therapeutic. Plan:  Patient reports declined in overall dietary intake and on amoxicillin 500mg BID thru 10/8/2022. Also recent supratherapeutic INR of 4.9 in ED on 2022. Will reduce regimen by 7% to warfarin 3.75mg on  only; 7.5mg all other days of the week. Using warfarin 7.5 mg tablets. Recheck INR in 2 week(s). Patient seen in room # 3. Patient verbalized understanding of dosing directions and information discussed. Dosing schedule given to patient. Progress note sent to referring office. Patient acknowledges working in consult agreement with pharmacist as referred by his/her physician.       Esdras Negro RPh, RAFAP  Clinical Pharmacist Medication Management  10/3/2022  10:30 AM      ========================================================================================  For Pharmacy Admin Tracking Only    Intervention Detail: Adherence Monitorin and Dose Adjustment: 1, reason: Improve Adherence, Therapy De-escalation  Total # of Interventions Recommended: 2  Total # of Interventions Accepted: 2  Time Spent (min): 20

## 2022-10-17 ENCOUNTER — HOSPITAL ENCOUNTER (EMERGENCY)
Age: 37
Discharge: HOME | End: 2022-10-17
Attending: EMERGENCY MEDICINE
Payer: COMMERCIAL

## 2022-10-17 ENCOUNTER — APPOINTMENT (OUTPATIENT)
Dept: GENERAL RADIOLOGY | Age: 37
End: 2022-10-17
Payer: COMMERCIAL

## 2022-10-17 ENCOUNTER — HOSPITAL ENCOUNTER (OUTPATIENT)
Dept: PHARMACY | Age: 37
Setting detail: THERAPIES SERIES
Discharge: HOME | End: 2022-10-17
Payer: COMMERCIAL

## 2022-10-17 VITALS
HEART RATE: 102 BPM | TEMPERATURE: 98.2 F | HEIGHT: 62 IN | OXYGEN SATURATION: 97 % | DIASTOLIC BLOOD PRESSURE: 76 MMHG | WEIGHT: 145 LBS | SYSTOLIC BLOOD PRESSURE: 131 MMHG | RESPIRATION RATE: 16 BRPM | BODY MASS INDEX: 26.68 KG/M2

## 2022-10-17 DIAGNOSIS — S80.00XA CONTUSION OF KNEE, UNSPECIFIED LATERALITY, INITIAL ENCOUNTER: Primary | ICD-10-CM

## 2022-10-17 DIAGNOSIS — I63.9 ACUTE CVA (CEREBROVASCULAR ACCIDENT) (HCC): Primary | ICD-10-CM

## 2022-10-17 DIAGNOSIS — S39.012A STRAIN OF LUMBAR REGION, INITIAL ENCOUNTER: ICD-10-CM

## 2022-10-17 DIAGNOSIS — I63.232 STROKE DUE TO OCCLUSION OF LEFT CAROTID ARTERY (HCC): ICD-10-CM

## 2022-10-17 LAB
INR BLD: 1.2
INR BLD: 1.3
PROTHROMBIN TIME: 15.1 SEC (ref 11.5–14.2)
PROTIME: 15.9 SECONDS

## 2022-10-17 PROCEDURE — 99284 EMERGENCY DEPT VISIT MOD MDM: CPT

## 2022-10-17 PROCEDURE — 85610 PROTHROMBIN TIME: CPT

## 2022-10-17 PROCEDURE — 72100 X-RAY EXAM L-S SPINE 2/3 VWS: CPT

## 2022-10-17 PROCEDURE — 36415 COLL VENOUS BLD VENIPUNCTURE: CPT

## 2022-10-17 PROCEDURE — 73562 X-RAY EXAM OF KNEE 3: CPT

## 2022-10-17 PROCEDURE — 99212 OFFICE O/P EST SF 10 MIN: CPT

## 2022-10-17 RX ORDER — ACETAMINOPHEN 500 MG
1000 TABLET ORAL EVERY 6 HOURS PRN
Qty: 60 TABLET | Refills: 0 | Status: SHIPPED | OUTPATIENT
Start: 2022-10-17

## 2022-10-17 RX ORDER — CYCLOBENZAPRINE HCL 10 MG
10 TABLET ORAL 3 TIMES DAILY PRN
Qty: 21 TABLET | Refills: 0 | Status: SHIPPED | OUTPATIENT
Start: 2022-10-17 | End: 2022-10-27

## 2022-10-17 ASSESSMENT — ENCOUNTER SYMPTOMS
NAUSEA: 0
EYE DISCHARGE: 0
DIARRHEA: 0
COUGH: 0
EYE REDNESS: 0
COLOR CHANGE: 0
RHINORRHEA: 0
SORE THROAT: 0
VOMITING: 0
BACK PAIN: 1
SHORTNESS OF BREATH: 0

## 2022-10-17 ASSESSMENT — PAIN DESCRIPTION - LOCATION: LOCATION: BACK

## 2022-10-17 ASSESSMENT — PAIN SCALES - GENERAL: PAINLEVEL_OUTOF10: 10

## 2022-10-17 ASSESSMENT — PAIN - FUNCTIONAL ASSESSMENT: PAIN_FUNCTIONAL_ASSESSMENT: 0-10

## 2022-10-17 NOTE — ED PROVIDER NOTES
EMERGENCY DEPARTMENT ENCOUNTER    Pt Name: Andres Reyes  MRN: [id number]  [place] 1985  Date of evaluation: 10/17/22  CHIEF COMPLAINT       Chief Complaint   Patient presents with    Ivan Quintin earlier today, unsure if she hit her head. Takes coumadin, did not take today yet. Pain to her lower back and right knee     HISTORY OF PRESENT ILLNESS   This is a 51-year-old female who presents with complaints of pain and discomfort in her bilateral knees and her low back. Patient states that she took a fall earlier, this was mechanical in nature, she did not hit her head, she does take Coumadin. Patient describes pain and discomfort in her knees bilaterally. REVIEW OF SYSTEMS     Review of Systems   Constitutional:  Negative for chills and fever. HENT:  Negative for rhinorrhea and sore throat. Eyes:  Negative for discharge, redness and visual disturbance. Respiratory:  Negative for cough and shortness of breath. Cardiovascular:  Negative for chest pain, palpitations and leg swelling. Gastrointestinal:  Negative for diarrhea, nausea and vomiting. Musculoskeletal:  Positive for back pain. Negative for arthralgias, myalgias and neck pain. Knee pain   Skin:  Negative for color change and rash. Neurological:  Negative for seizures, weakness and headaches. Psychiatric/Behavioral:  Negative for hallucinations, self-injury and suicidal ideas. PASTMEDICAL HISTORY     Past Medical History:   Diagnosis Date    Acid reflux     Anemia 10/18/2019    Drug abuse, IV ([address]     claims that she has previous iv drug dependency claims hasn' had recent usage.     Headache     History of pulmonary embolus (PE)     Marijuana smoker 10/18/2019    Smoker 10/18/2019     SURGICAL HISTORY       Past Surgical History:   Procedure Laterality Date    APPENDECTOMY       SECTION      COLONOSCOPY N/A 10/23/2019    COLORECTAL CANCER SCREENING, NOT HIGH RISK performed by Brittany Hickman MD at [address] [place] [address] OVARIAN CYST REMOVAL      SIGMOIDOSCOPY N/A 10/22/2019    SIGMOIDOSCOPY ABORTED COLONOSCOPY performed by Keiry English MD at [address] [place] [address] 10/21/2019    EGD ESOPHAGOGASTRODUODENOSCOPY performed by Keiry English MD at [facility]       Previous Medications    ALBUTEROL SULFATE HFA (PROVENTIL HFA) 108 (90 BASE) MCG/ACT INHALER    Inhale 2 puffs into the lungs every 6 hours as needed for Wheezing or Shortness of Breath    ALBUTEROL SULFATE HFA (PROVENTIL HFA) 108 (90 BASE) MCG/ACT INHALER    Inhale 1-2 puffs into the lungs every 4 hours as needed for Wheezing or Shortness of Breath (Space out to every 6 hours as symptoms improve) Space out to every 6 hours as symptoms improve. AMOXICILLIN (AMOXIL) 500 MG CAPSULE    Take 500 mg by mouth in the morning and at bedtime    BUPRENORPHINE HCL-NALOXONE HCL (SUBOXONE SL)    Place under the tongue    FAMOTIDINE (PEPCID) 40 MG TABLET    Take 40 mg by mouth daily    FERROUS SULFATE (IRON 325) 325 (65 FE) MG TABLET    Take 1 tablet by mouth daily (with breakfast)    IBUPROFEN (IBU) 800 MG TABLET    Take 1 tablet by mouth every 8 hours as needed for Pain    PENICILLIN V POTASSIUM (VEETID) 500 MG TABLET    Take 1 tablet by mouth in the morning and 1 tablet at noon and 1 tablet in the evening and 1 tablet before bedtime. TRAZODONE (DESYREL) 50 MG TABLET    Take 50 mg by mouth nightly    WARFARIN (COUMADIN) 7.5 MG TABLET    TAKE ONE TABLET (OR AS DIRECTED BY MEDICATION MANAGEMENT) BY MOUTH ONCE DAILY     ALLERGIES     has No Known Allergies. FAMILY HISTORY     She indicated that the status of her mother is unknown. She indicated that the status of her father is unknown.      SOCIAL HISTORY       Social History     Tobacco Use    Smoking status: Every Day     Types: Cigarettes    Smokeless tobacco: Never   Vaping Use    Vaping Use: Never used   Substance Use Topics    Alcohol use: No    Drug use: Not Currently     Types: Marijuana ([place]), Opiates      Comment: heroin     PHYSICAL EXAM     INITIAL VITALS: /76   Pulse (!) 102   Temp 98.2 °F (36.8 °C) (Oral)   Resp 16   Ht 5' 2\" (1.575 m)   Wt 145 lb (65.8 kg)   LMP 10/08/2022   SpO2 97%   BMI 26.52 kg/m²    Physical Exam  Constitutional:       Appearance: Normal appearance. She is well-developed. She is not ill-appearing or toxic-appearing. HENT:      Head: Normocephalic and atraumatic. Eyes:      Conjunctiva/sclera: Conjunctivae normal.      Pupils: Pupils are equal, round, and reactive to light. Neck:      Trachea: Trachea normal.   Cardiovascular:      Rate and Rhythm: Normal rate and regular rhythm. Heart sounds: S1 normal and S2 normal. No murmur heard. Pulmonary:      Effort: Pulmonary effort is normal. No accessory muscle usage or respiratory distress. Breath sounds: Normal breath sounds. Chest:      Chest wall: No deformity or tenderness. Abdominal:      General: Bowel sounds are normal. There is no distension or abdominal bruit. Palpations: Abdomen is not rigid. Tenderness: There is no abdominal tenderness. There is no guarding or rebound. Musculoskeletal:      Cervical back: Normal range of motion and neck supple. No spasms or tenderness. Thoracic back: No spasms or tenderness. Lumbar back: Spasms and tenderness present. Legs:    Skin:     General: Skin is warm. Findings: No rash. Neurological:      Mental Status: She is alert and oriented to person, place, and time. GCS: GCS eye subscore is 4. GCS verbal subscore is 5. GCS motor subscore is 6. Psychiatric:         Speech: Speech normal.       MEDICAL DECISION MAKIN-year-old female presents with complaints of a fall. Plan is x-rays of the bilateral knees as well as an x-ray of the lumbar spine. The patient's exam shows no evidence of a head injury, she did not lose consciousness.          HEART SCORE: not indicated  All patient's question's and concerns were answered prior to disposition and patient and/or family expressed understanding and agreement of treatment plan. CRITICAL CARE:              NIH STROKE SCALE:            PROCEDURES:    Procedures    DIAGNOSTIC RESULTS   EKG:All EKG's are interpreted by the Emergency Department Physician who either signs or Co-signs this chart in the absence of a cardiologist.        RADIOLOGY:All plain film, CT, MRI, and formal ultrasound images (except ED bedside ultrasound) are read by the radiologist, see reports below, unless otherwisenoted in MDM or here. XR LUMBAR SPINE (2-3 VIEWS)   Final Result   Unremarkable examination of the lumbar spine. XR KNEE RIGHT (3 VIEWS)   Final Result   No acute abnormality of the knee. XR KNEE LEFT (3 VIEWS)   Final Result   No acute abnormality of the knee. LABS: All lab results were reviewed by myself, and all abnormals are listed below. Labs Reviewed   PROTIME-INR - Abnormal; Notable for the following components:       Result Value    Protime 15.1 (*)     All other components within normal limits       EMERGENCY DEPARTMENTCOURSE:         Vitals:    Vitals:    10/17/22 0040   BP: 131/76   Pulse: (!) 102   Resp: 16   Temp: 98.2 °F (36.8 °C)   TempSrc: Oral   SpO2: 97%   Weight: 145 lb (65.8 kg)   Height: 5' 2\" (1.575 m)       The patient was given the following medications while in the emergency department:  Orders Placed This Encounter   Medications    acetaminophen (TYLENOL) 500 MG tablet     Sig: Take 2 tablets by mouth every 6 hours as needed for Pain     Dispense:  60 tablet     Refill:  0    cyclobenzaprine (FLEXERIL) 10 MG tablet     Sig: Take 1 tablet by mouth 3 times daily as needed for Muscle spasms     Dispense:  21 tablet     Refill:  0     CONSULTS:  None    FINAL IMPRESSION      1. Contusion of knee, unspecified laterality, initial encounter    2.  Strain of lumbar region, initial encounter          DISPOSITION/PLAN   DISPOSITION Decision To Discharge 10/17/2022 02:47:11 AM      PATIENT REFERRED TO:  [place] [address]  [phone]  Schedule an appointment as soon as possible for a visit in 2 days    DISCHARGE MEDICATIONS:  New Prescriptions    ACETAMINOPHEN (TYLENOL) 500 MG TABLET    Take 2 tablets by mouth every 6 hours as needed for Pain    CYCLOBENZAPRINE (FLEXERIL) 10 MG TABLET    Take 1 tablet by mouth 3 times daily as needed for Muscle spasms     Shailesh Castellano MD  Attending Emergency Physician      The care is provided during an unprecedented national emergency due to the novel coronavirus, COVID 19. This note was created with the assistance of a speech-recognition program. While intending to generate a document that actually reflects the content of the visit, no guarantees can be provided that every mistake has been identified and corrected by editing.     Derenda Bloch, MD  10/17/22 5104

## 2022-10-17 NOTE — PROGRESS NOTES
Medication Management Service, Warfarin Management  AMAN CLARK [facility], [phone]  Visit Date: 10/17/2022   Subjective:   Nu Davila is a 31 y.o. female who presents to clinic today for anticoagulation monitoring and adjustment. Patient seen in clinic for warfarin management due to  Indication:   CVA. INR goal: of 2.0-3.0. Duration of therapy: indefinite. Assessment and PLAN   PT/INR done in office per protocol. INR today is 1.3, subtherapeutic. Cause unknown, may have missed dose yesterday 10-16-22. Plan: Will increase warfarin to 11.25mg for today only and then increase weekly regimen 7.7% to warfarin 7.5mg daily since patient is no longer taking amoxicillin 500mg. Using warfarin 7.5 mg tablets. Recheck INR in 1 week(s). Patient seen in room # 1. ED. OP. = Educated patient to keep green vegetables consistent. Told patient to follow up with provider due to discontinuing amoxicillin by self before therapy complete for cyst on face. Patient is expecting biopsy of cyst and dental work. The dates are not scheduled yet, but told her to contact us once dates scheduled. Patient verbalized understanding of dosing directions and information discussed. Dosing schedule given to patient. Progress note sent to referring office. Patient acknowledges working in consult agreement with pharmacist as referred by his/her physician.       Electronically signed by Anna Sung on 10/17/22 at 11:33 AM EDT   For Pharmacy Admin Tracking Only    Intervention Detail: Adherence Monitorin and Dose Adjustment: 1, reason: Therapy Optimization  Total # of Interventions Recommended: 2  Total # of Interventions Accepted: 2  Time Spent (min): 20

## 2022-10-25 ENCOUNTER — HOSPITAL ENCOUNTER (OUTPATIENT)
Dept: PHARMACY | Age: 37
Setting detail: THERAPIES SERIES
Discharge: HOME | End: 2022-10-25
Payer: COMMERCIAL

## 2022-10-25 DIAGNOSIS — I63.232 STROKE DUE TO OCCLUSION OF LEFT CAROTID ARTERY (HCC): ICD-10-CM

## 2022-10-25 DIAGNOSIS — I63.9 ACUTE CVA (CEREBROVASCULAR ACCIDENT) (HCC): Primary | ICD-10-CM

## 2022-10-25 LAB
INR BLD: 2.1
PROTIME: 24.8 SECONDS

## 2022-10-25 PROCEDURE — 85610 PROTHROMBIN TIME: CPT

## 2022-10-25 PROCEDURE — 99211 OFF/OP EST MAY X REQ PHY/QHP: CPT

## 2022-10-25 NOTE — PROGRESS NOTES
Medication Management Service, Warfarin Management  AMAN CLARK [facility] [phone]  Visit Date: 10/25/2022   Subjective:   Marvin Reynolds is a 31 y.o. female who presents to clinic today for anticoagulation monitoring and adjustment. Patient seen in clinic for warfarin management due to  Indication:   CVA. INR goal: of 2.0-3.0. Duration of therapy: indefinite. Assessment and PLAN   PT/INR done in office per protocol. INR today is 2.1, therapeutic. Plan:  Continue current regimen of warfarin 7.5mg daily. s Using warfarin 7.5 mg tablets. Recheck INR in 2 week(s). Patient seen in room # 1. Patient verbalized understanding of dosing directions and information discussed. Dosing schedule given to patient. Progress note sent to referring office. Patient acknowledges working in consult agreement with pharmacist as referred by his/her physician. [address] [place] [address]  Ph., CACP, Clinical Pharmacist  [facility], [address] [place] [address] Cou[facility]in Clinic  10/25/2022  9:07 AM      For Pharmacy Admin Tracking Only    Intervention Detail:   Total # of Interventions Recommended: 0  Total # of Interventions Accepted: 0  Time Spent (min): 20

## 2022-10-28 ENCOUNTER — APPOINTMENT (OUTPATIENT)
Dept: GENERAL RADIOLOGY | Age: 37
End: 2022-10-28
Payer: COMMERCIAL

## 2022-10-28 ENCOUNTER — HOSPITAL ENCOUNTER (EMERGENCY)
Age: 37
Discharge: HOME | End: 2022-10-28
Attending: EMERGENCY MEDICINE
Payer: COMMERCIAL

## 2022-10-28 VITALS
BODY MASS INDEX: 25.69 KG/M2 | HEART RATE: 98 BPM | HEIGHT: 63 IN | RESPIRATION RATE: 16 BRPM | SYSTOLIC BLOOD PRESSURE: 121 MMHG | WEIGHT: 145 LBS | OXYGEN SATURATION: 100 % | TEMPERATURE: 98.4 F | DIASTOLIC BLOOD PRESSURE: 82 MMHG

## 2022-10-28 DIAGNOSIS — J18.9 PNEUMONIA DUE TO INFECTIOUS ORGANISM, UNSPECIFIED LATERALITY, UNSPECIFIED PART OF LUNG: Primary | ICD-10-CM

## 2022-10-28 DIAGNOSIS — R07.89 CHEST WALL PAIN: ICD-10-CM

## 2022-10-28 DIAGNOSIS — R79.1 ELEVATED INR: ICD-10-CM

## 2022-10-28 LAB
ABSOLUTE EOS #: 0.06 K/UL (ref 0–0.44)
ABSOLUTE IMMATURE GRANULOCYTE: 0.02 K/UL (ref 0–0.3)
ABSOLUTE LYMPH #: 1.12 K/UL (ref 1.1–3.7)
ABSOLUTE MONO #: 0.5 K/UL (ref 0.1–1.2)
ANION GAP SERPL CALCULATED.3IONS-SCNC: 11 MMOL/L (ref 9–17)
BASOPHILS # BLD: 0 % (ref 0–2)
BASOPHILS ABSOLUTE: <0.03 K/UL (ref 0–0.2)
BUN BLDV-MCNC: 11 MG/DL (ref 6–20)
BUN/CREAT BLD: 18 (ref 9–20)
CALCIUM SERPL-MCNC: 9.7 MG/DL (ref 8.6–10.4)
CHLORIDE BLD-SCNC: 97 MMOL/L (ref 98–107)
CO2: 25 MMOL/L (ref 20–31)
CREAT SERPL-MCNC: 0.61 MG/DL (ref 0.5–0.9)
EOSINOPHILS RELATIVE PERCENT: 1 % (ref 1–4)
GFR SERPL CREATININE-BSD FRML MDRD: >60 ML/MIN/1.73M2
GLUCOSE BLD-MCNC: 89 MG/DL (ref 70–99)
HCT VFR BLD CALC: 32 % (ref 36.3–47.1)
HEMOGLOBIN: 9.3 G/DL (ref 11.9–15.1)
IMMATURE GRANULOCYTES: 0 %
INR BLD: 3.9
LYMPHOCYTES # BLD: 15 % (ref 24–43)
MCH RBC QN AUTO: 21.8 PG (ref 25.2–33.5)
MCHC RBC AUTO-ENTMCNC: 29.1 G/DL (ref 28.4–34.8)
MCV RBC AUTO: 75.1 FL (ref 82.6–102.9)
MONOCYTES # BLD: 7 % (ref 3–12)
NRBC AUTOMATED: 0 PER 100 WBC
PDW BLD-RTO: 19.1 % (ref 11.8–14.4)
PLATELET # BLD: 189 K/UL (ref 138–453)
PMV BLD AUTO: 9.1 FL (ref 8.1–13.5)
POTASSIUM SERPL-SCNC: 4.1 MMOL/L (ref 3.7–5.3)
PROTHROMBIN TIME: 37.9 SEC (ref 11.5–14.2)
RBC # BLD: 4.26 M/UL (ref 3.95–5.11)
RBC # BLD: ABNORMAL 10*6/UL
SEG NEUTROPHILS: 77 % (ref 36–65)
SEGMENTED NEUTROPHILS ABSOLUTE COUNT: 5.56 K/UL (ref 1.5–8.1)
SODIUM BLD-SCNC: 133 MMOL/L (ref 135–144)
WBC # BLD: 7.3 K/UL (ref 3.5–11.3)

## 2022-10-28 PROCEDURE — 99284 EMERGENCY DEPT VISIT MOD MDM: CPT

## 2022-10-28 PROCEDURE — 85610 PROTHROMBIN TIME: CPT

## 2022-10-28 PROCEDURE — 71046 X-RAY EXAM CHEST 2 VIEWS: CPT

## 2022-10-28 PROCEDURE — 80048 BASIC METABOLIC PNL TOTAL CA: CPT

## 2022-10-28 PROCEDURE — 85025 COMPLETE CBC W/AUTO DIFF WBC: CPT

## 2022-10-28 PROCEDURE — 36415 COLL VENOUS BLD VENIPUNCTURE: CPT

## 2022-10-28 RX ORDER — PREDNISONE 20 MG/1
40 TABLET ORAL DAILY
Qty: 10 TABLET | Refills: 0 | Status: SHIPPED | OUTPATIENT
Start: 2022-10-28 | End: 2022-11-02

## 2022-10-28 RX ORDER — AMOXICILLIN AND CLAVULANATE POTASSIUM 875; 125 MG/1; MG/1
1 TABLET, FILM COATED ORAL 2 TIMES DAILY
Qty: 14 TABLET | Refills: 0 | Status: SHIPPED | OUTPATIENT
Start: 2022-10-28 | End: 2022-11-04

## 2022-10-28 ASSESSMENT — PAIN DESCRIPTION - ORIENTATION: ORIENTATION: RIGHT

## 2022-10-28 ASSESSMENT — ENCOUNTER SYMPTOMS
SHORTNESS OF BREATH: 1
NAUSEA: 0
COUGH: 0
VOMITING: 0
BACK PAIN: 0
ABDOMINAL PAIN: 0
DIARRHEA: 0
COLOR CHANGE: 0

## 2022-10-28 ASSESSMENT — PAIN SCALES - GENERAL: PAINLEVEL_OUTOF10: 10

## 2022-10-28 ASSESSMENT — PAIN DESCRIPTION - LOCATION: LOCATION: CHEST

## 2022-10-28 ASSESSMENT — PAIN DESCRIPTION - DESCRIPTORS: DESCRIPTORS: SHARP

## 2022-10-28 ASSESSMENT — PAIN DESCRIPTION - FREQUENCY: FREQUENCY: CONTINUOUS

## 2022-10-28 ASSESSMENT — PAIN - FUNCTIONAL ASSESSMENT: PAIN_FUNCTIONAL_ASSESSMENT: 0-10

## 2022-10-28 NOTE — ED NOTES
Pt to ED ambulatory with steady gait c/o right upper chest wall pain x3 days without cough or reports of illness  Pt states she had a mechanical fall about 3 weeks ago falling onto her knee and is worried that \" because of my clotting disorder I developed a clot and it may have spread to my lung\"  PT RR even and unlabored and conversing without difficulty and appears in NAD. Pt denies HA, dizziness, ABD pain N/V.       Yung Worthington RN  10/28/22 7475

## 2022-10-28 NOTE — ED PROVIDER NOTES
[facility] ED  eMERGENCY dEPARTMENT eNCOUnter      Pt Name: Baron Vyas  MRN: [id number]  [place] 1985  Date of evaluation: 10/28/2022  Provider: Lucia Cockayne, APRN - CNP    CHIEF COMPLAINT       Chief Complaint   Patient presents with    Shortness of Breath     Hx PE, on coumadin         HISTORY OF PRESENT ILLNESS  (Location/Symptom, Timing/Onset, Context/Setting, Quality, Duration, Modifying Factors, Severity.)   Baron Vyas is a 31 y.o. female who presents to the emergency department. C/o right-sided chest pain. Onset was 1-2 days ago. It is worse with movement, inspiration, and palpation. Denies fever, chills, injury, cough. She has a hx of PE and takes coumadin. Rates her pain 10/10 at this time. Denies chance of pregnancy. Nursing Notes were reviewed. ALLERGIES     Patient has no known allergies. CURRENT MEDICATIONS       Previous Medications    ACETAMINOPHEN (TYLENOL) 500 MG TABLET    Take 2 tablets by mouth every 6 hours as needed for Pain    ALBUTEROL SULFATE HFA (PROVENTIL HFA) 108 (90 BASE) MCG/ACT INHALER    Inhale 2 puffs into the lungs every 6 hours as needed for Wheezing or Shortness of Breath    ALBUTEROL SULFATE HFA (PROVENTIL HFA) 108 (90 BASE) MCG/ACT INHALER    Inhale 1-2 puffs into the lungs every 4 hours as needed for Wheezing or Shortness of Breath (Space out to every 6 hours as symptoms improve) Space out to every 6 hours as symptoms improve.     AMOXICILLIN (AMOXIL) 500 MG CAPSULE    Take 500 mg by mouth in the morning and at bedtime    BUPRENORPHINE HCL-NALOXONE HCL (SUBOXONE SL)    Place under the tongue    FAMOTIDINE (PEPCID) 40 MG TABLET    Take 40 mg by mouth daily    FERROUS SULFATE (IRON 325) 325 (65 FE) MG TABLET    Take 1 tablet by mouth daily (with breakfast)    IBUPROFEN (IBU) 800 MG TABLET    Take 1 tablet by mouth every 8 hours as needed for Pain    PENICILLIN V POTASSIUM (VEETID) 500 MG TABLET    Take 1 tablet by mouth in the morning and 1 tablet at noon and 1 tablet in the evening and 1 tablet before bedtime. TRAZODONE (DESYREL) 50 MG TABLET    Take 50 mg by mouth nightly    WARFARIN (COUMADIN) 7.5 MG TABLET    TAKE ONE TABLET (OR AS DIRECTED BY MEDICATION MANAGEMENT) BY MOUTH ONCE DAILY       PAST MEDICAL HISTORY         Diagnosis Date    Acid reflux     Anemia 10/18/2019    Drug abuse, IV ([address]     claims that she has previous iv drug dependency claims hasn' had recent usage. Headache     History of pulmonary embolus (PE)     Marijuana smoker 10/18/2019    Smoker 10/18/2019       SURGICAL HISTORY           Procedure Laterality Date    APPENDECTOMY       SECTION      COLONOSCOPY N/A 10/23/2019    COLORECTAL CANCER SCREENING, NOT HIGH RISK performed by Rashard Bobo MD at [address] N/A 10/22/2019    SIGMOIDOSCOPY ABORTED COLONOSCOPY performed by Rashard Bobo MD at [address] 10/21/2019    EGD ESOPHAGOGASTRODUODENOSCOPY performed by [facility] MD at [facility]           Problem Relation Age of Onset    No Known Problems Mother     No Known Problems Father      Family Status   Relation Name Status    Mother  (Not Specified)    Father  (Not Specified)        SOCIAL HISTORY      reports that she has been smoking cigarettes. She has never used smokeless tobacco. She reports that she does not currently use drugs after having used the following drugs: Marijuana (Weed) and Opiates . She reports that she does not drink alcohol. REVIEW OF SYSTEMS    (2-9 systems for level 4, 10 or more for level 5)     Review of Systems   Constitutional:  Negative for chills, diaphoresis, fatigue and fever. Respiratory:  Positive for shortness of breath. Negative for cough. Cardiovascular:  Positive for chest pain. Negative for palpitations. Gastrointestinal:  Negative for abdominal pain, diarrhea, nausea and vomiting.    Musculoskeletal: Positive for arthralgias and myalgias. Negative for back pain and neck pain. Skin:  Negative for color change, rash and wound. Neurological:  Negative for dizziness, weakness, light-headedness and headaches. Except as noted above the remainder of the review of systems was reviewed and negative. PHYSICAL EXAM    (up to 7 for level 4, 8 or more for level 5)     ED Triage Vitals   BP Temp Temp Source Heart Rate Resp SpO2 Height Weight   10/28/22 1613 10/28/22 1613 10/28/22 1613 10/28/22 1613 10/28/22 1613 10/28/22 1613 10/28/22 1859 10/28/22 1613   (!) 143/92 98.6 °F (37 °C) Oral (!) 121 20 100 % 5' 3\" (1.6 m) 145 lb (65.8 kg)     Physical Exam  Vitals reviewed. Constitutional:       General: She is not in acute distress. Appearance: She is well-developed. She is not diaphoretic. Eyes:      General: No scleral icterus. Conjunctiva/sclera: Conjunctivae normal.   Cardiovascular:      Rate and Rhythm: Normal rate and regular rhythm. Pulmonary:      Effort: Pulmonary effort is normal. No respiratory distress. Breath sounds: Normal breath sounds. No stridor. No wheezing or rales. Chest:      Chest wall: Tenderness (right) present. Abdominal:      Palpations: Abdomen is soft. Tenderness: There is no abdominal tenderness. Musculoskeletal:      Cervical back: Neck supple. Comments: Moves extremities. Skin:     General: Skin is warm and dry. Findings: No rash. Neurological:      Mental Status: She is alert and oriented to person, place, and time.    Psychiatric:         Behavior: Behavior normal.        DIAGNOSTIC RESULTS     RADIOLOGY:   Non-plain film images such as CT, Ultrasound and MRI are read by the radiologist. Plain radiographic images are visualized and preliminarily interpreted by the emergency physician with the below findings:    Interpretation per the Radiologist below, if available at the time of this note:    XR CHEST (2 VW)    Result Date: 10/28/2022  EXAMINATION: TWO XRAY VIEWS OF THE CHEST 10/28/2022 7:31 pm COMPARISON: September 30, 2022 HISTORY: ORDERING SYSTEM PROVIDED HISTORY: right-sided pain TECHNOLOGIST PROVIDED HISTORY: right-sided pain Reason for Exam: Pt states right side rib pain and sob x 2-3 days, worse today. Hx of PEs FINDINGS: Right perihilar airspace disease. Lungs otherwise clear. Heart and mediastinum normal.  Bony thorax intact. Right perihilar airspace disease         LABS:  Labs Reviewed   CBC WITH AUTO DIFFERENTIAL - Abnormal; Notable for the following components:       Result Value    Hemoglobin 9.3 (*)     Hematocrit 32.0 (*)     MCV 75.1 (*)     MCH 21.8 (*)     RDW 19.1 (*)     Seg Neutrophils 77 (*)     Lymphocytes 15 (*)     All other components within normal limits   BASIC METABOLIC PANEL - Abnormal; Notable for the following components:    Sodium 133 (*)     Chloride 97 (*)     All other components within normal limits   PROTIME-INR - Abnormal; Notable for the following components:    Protime 37.9 (*)     All other components within normal limits       All other labs were within normal range or not returned as of this dictation. EMERGENCY DEPARTMENT COURSE and DIFFERENTIAL DIAGNOSIS/MDM:   Vitals:    Vitals:    10/28/22 1613 10/28/22 1859 10/28/22 2048   BP: (!) 143/92  121/82   Pulse: (!) 121  98   Resp: 20  16   Temp: 98.6 °F (37 °C)  98.4 °F (36.9 °C)   TempSrc: Oral  Oral   SpO2: 100%  100%   Weight: 145 lb (65.8 kg)     Height:  5' 3\" (1.6 m)          CLINICAL DECISION MAKING:  The patient presented alert with a nontoxic appearance and was seen in conjunction with Dr. Dipika Suazo. Her INR was 3.9. She was advised to hold her coumadin for 1 day and to call her coumadin clinic for a recheck. Imaging showed concern for pneumonia. Prescriptions were written for Augmentin and prednisone. Follow up with pcp for a recheck, further evaluation and treatment.  Evaluation and treatment course in the ED, and plan of care upon discharge was discussed in length with the patient. Patient had no further questions prior to being discharged and was instructed to return to the ED for new or worsening symptoms. Care was provided during an unprecedented national emergency due to the novel coronavirus, Covid-19. FINAL IMPRESSION      1. Pneumonia due to infectious organism, unspecified laterality, unspecified part of lung    2. Chest wall pain    3.  Elevated INR            Problem List  Patient Active Problem List   Diagnosis Code    Acute pulmonary embolism (HCC) I26.99    Anemia D64.9    Smoker F17.200    Marijuana smoker F12.90    Iron deficiency anemia D50.9    History of intravenous drug abuse ([facility] F19.11    Occult blood positive stool R19.5    HH (hiatus hernia) K44.9    Acute CVA (cerebrovascular accident) ([facility] I63.9    Stroke due to occlusion of left carotid artery ([facility] Y40.671    Carotid artery stenosis with cerebral infarction [facility]) B65.147    Acute right hemiparesis (HCC) G81.91    Right sided numbness R20.0    MTHFR gene mutation Z15.89    Acute cerebrovascular accident (CVA) ([facility] I63.9    Dysarthria R47.1    Encounter for blood transfusion Z51.89         DISPOSITION/PLAN   DISPOSITION Decision To Discharge 10/28/2022 08:42:52 PM      PATIENT REFERRED TO:   Yolis Pacheco [address]  [phone]    Schedule an appointment as soon as possible for a visit       [facility] ED  [address] [facility]  [phone]    As needed, If symptoms worsen    [facility]  [address]  [address] [address]  [phone]  Schedule an appointment as soon as possible for a visit       DISCHARGE MEDICATIONS:     New Prescriptions    AMOXICILLIN-CLAVULANATE (AUGMENTIN) 875-125 MG PER TABLET    Take 1 tablet by mouth 2 times daily for 7 days    PREDNISONE (DELTASONE) 20 MG TABLET    Take 2 tablets by mouth daily for 5 days (Please note that portions of this note were completed with a voice recognition program.  Efforts were made to edit the dictations but occasionally words are mis-transcribed.)    EMILY Nye - Brandon 9, EMILY - CNP  10/31/22 [id number]

## 2022-10-29 NOTE — DISCHARGE INSTRUCTIONS
Hold your coumadin for 1 day then follow up with your medication management clinic to have your INR rechecked.

## 2022-10-31 ENCOUNTER — TELEPHONE (OUTPATIENT)
Dept: PHARMACY | Age: 37
End: 2022-10-31

## 2022-10-31 NOTE — TELEPHONE ENCOUNTER
Patient's INR=3.9 on 10/28. She held one dose of warfarin that day. Then patient was started on both Augmentin and prednisone 40 mg x 5 days. I advised patient to reduce her warfarin from 7.5 mg to 3.75 mg for this evening only. Then continue 7.5 mg daily. Patient visit moved up to 11/1. [address] [place] [address]  Ph., CACP, Clinical Pharmacist  Anticoagulation Services, [address] [facility]  10/31/2022  1:44 PM

## 2022-11-01 ENCOUNTER — HOSPITAL ENCOUNTER (OUTPATIENT)
Dept: PHARMACY | Age: 37
Setting detail: THERAPIES SERIES
Discharge: HOME | End: 2022-11-01
Payer: COMMERCIAL

## 2022-11-01 DIAGNOSIS — I63.9 ACUTE CVA (CEREBROVASCULAR ACCIDENT) (HCC): Primary | ICD-10-CM

## 2022-11-01 DIAGNOSIS — I63.232 STROKE DUE TO OCCLUSION OF LEFT CAROTID ARTERY (HCC): ICD-10-CM

## 2022-11-01 LAB
INR BLD: 1.6
PROTIME: 18.8 SECONDS

## 2022-11-01 PROCEDURE — 99212 OFFICE O/P EST SF 10 MIN: CPT

## 2022-11-01 PROCEDURE — 85610 PROTHROMBIN TIME: CPT

## 2022-11-01 NOTE — PROGRESS NOTES
Medication Management Service, Warfarin Management  [facility] CL[facility], [phone]  Visit Date: 2022   Subjective:   Maninder Samuels is a 31 y.o. female who presents to clinic today for anticoagulation monitoring and adjustment. Patient seen in clinic for warfarin management due to  Indication:   CVA. INR goal: of 2.0-3.0. Duration of therapy: indefinite. Assessment and PLAN   PT/INR done in office per protocol. INR today is 1.6, subtherapeutic likely due to recent held dose as instructed from ED, and possibly reduced dose yesterday. Plan: Will boost dose today to 11.25mg, and reduce dose on Thursday to 3.75mg due to short course of prednisone therapy. Otherwise will continue current regimen of warfarin 7.5mg orally once daily. Using warfarin 7.5 mg tablets. Recheck INR in 2 week(s). Patient seen in room # 3. Patient verbalized understanding of dosing directions and information discussed. Dosing schedule given to patient. Progress note sent to referring office. Patient acknowledges working in consult agreement with pharmacist as referred by his/her physician.       Meghan Campbell, WILBUR, RAFAP  Clinical Pharmacist Medication Management  2022  10:20 AM      For Pharmacy Admin Tracking Only    Intervention Detail: Adherence Monitorin and Dose Adjustment: 1, reason: Improve Adherence, Therapy Optimization  Total # of Interventions Recommended: 2  Total # of Interventions Accepted: 2  Time Spent (min): 20

## 2022-11-08 ENCOUNTER — HOSPITAL ENCOUNTER (OUTPATIENT)
Dept: PHARMACY | Age: 37
Setting detail: THERAPIES SERIES
Discharge: HOME | End: 2022-11-08
Payer: COMMERCIAL

## 2022-11-08 DIAGNOSIS — I63.9 ACUTE CVA (CEREBROVASCULAR ACCIDENT) (HCC): Primary | ICD-10-CM

## 2022-11-08 DIAGNOSIS — I63.232 STROKE DUE TO OCCLUSION OF LEFT CAROTID ARTERY (HCC): ICD-10-CM

## 2022-11-08 LAB
INR BLD: 1.1
PROTIME: 13.7 SECONDS

## 2022-11-08 PROCEDURE — G0009 ADMIN PNEUMOCOCCAL VACCINE: HCPCS | Performed by: INTERNAL MEDICINE

## 2022-11-08 PROCEDURE — 6360000002 HC RX W HCPCS: Performed by: INTERNAL MEDICINE

## 2022-11-08 PROCEDURE — 90670 PCV13 VACCINE IM: CPT | Performed by: INTERNAL MEDICINE

## 2022-11-08 PROCEDURE — 99213 OFFICE O/P EST LOW 20 MIN: CPT

## 2022-11-08 PROCEDURE — 85610 PROTHROMBIN TIME: CPT

## 2022-11-08 RX ORDER — WARFARIN SODIUM 7.5 MG/1
TABLET ORAL
Qty: 90 TABLET | Refills: 1 | Status: SHIPPED | OUTPATIENT
Start: 2022-11-08

## 2022-11-08 RX ADMIN — PNEUMOCOCCAL 20-VALENT CONJUGATE VACCINE 0.5 ML
2.2; 2.2; 2.2; 2.2; 2.2; 2.2; 2.2; 2.2; 2.2; 2.2; 2.2; 2.2; 2.2; 2.2; 2.2; 2.2; 4.4; 2.2; 2.2; 2.2 INJECTION, SUSPENSION INTRAMUSCULAR at 12:00

## 2022-11-08 NOTE — PROGRESS NOTES
Medication Management Service, Warfarin Management  [facility], [phone]  Visit Date: 11/8/2022   Subjective:   Yajaira Meyer is a 31 y.o. female who presents to clinic today for anticoagulation monitoring and adjustment. Patient seen in clinic for warfarin management due to  Indication:   CVA. INR goal: of 2.0-3.0. Duration of therapy: indefinite. Assessment and PLAN   PT/INR done in office per protocol. INR today is 1.1, subtherapeutic, Cause unknown. Patient has completed antibiotic and prednisone regimens. Plan: Will increase warfarin to 15 mg for today only and then resume weekly regimen of warfarin 7.5mg daily. Using warfarin 7.5 mg tablets, refill sent to pharmacy at today's appointment. Recheck INR in 1 week. Patient seen in room # 2. ED. OP. = Patient reported initiating healthier diet in the past week by eating 2 servings of greens per week in addition to eating more home-cooked meals. Educated patient to keep green vegetables consistent. PCV20 Vaccine administered at today's visit. Patient verbalized understanding of dosing directions and information discussed. Dosing schedule given to patient. Progress note sent to referring office. Patient acknowledges working in consult agreement with pharmacist as referred by his/her physician.       Electronically signed by Marlo Sheldon, Pharmacy Student 11/8/22    For Pharmacy Admin Tracking Only    Intervention Detail: Dose Adjustment: 1, reason: Therapy Optimization, Refill(s) Provided, and Vaccine Recommended/Administered  Total # of Interventions Recommended: 2  Total # of Interventions Accepted: 2  Time Spent (min): 30

## 2022-11-11 NOTE — ED PROVIDER NOTES
eMERGENCY dEPARTMENT eNCOUnter   Independent Attestation     Pt Name: Baron Vyas  MRN: [id number]  [place] 1985  Date of evaluation: 11/10/22     Baron Vyas is a 31 y.o. female with CC: Shortness of Breath (Hx PE, on coumadin)        This visit was performed by both a physician and an APC. I performed all aspects of the MDM as documented. The care is provided during an unprecedented national emergency due to the novel coronavirus, COVID 19.     Tomasa Johnson MD  Attending Emergency Physician            Tomasa Johnson MD  11/10/22 5595

## 2022-11-16 ENCOUNTER — HOSPITAL ENCOUNTER (OUTPATIENT)
Dept: PHARMACY | Age: 37
Setting detail: THERAPIES SERIES
Discharge: HOME | End: 2022-11-16
Payer: COMMERCIAL

## 2022-11-16 DIAGNOSIS — I63.9 ACUTE CVA (CEREBROVASCULAR ACCIDENT) (HCC): Primary | ICD-10-CM

## 2022-11-16 DIAGNOSIS — I63.232 STROKE DUE TO OCCLUSION OF LEFT CAROTID ARTERY (HCC): ICD-10-CM

## 2022-11-16 LAB
INR BLD: 1.9
PROTIME: 23.2 SECONDS

## 2022-11-16 PROCEDURE — 85610 PROTHROMBIN TIME: CPT

## 2022-11-16 PROCEDURE — 99212 OFFICE O/P EST SF 10 MIN: CPT

## 2022-11-16 NOTE — PROGRESS NOTES
Medication Management Service, Warfarin Management  [address], [phone]  Visit Date: 11/16/2022   Subjective:   Cuong Hunt is a 31 y.o. female who presents to clinic today for anticoagulation monitoring and adjustment. Patient seen in clinic for warfarin management due to  Indication:   CVA. INR goal: of 2.0-3.0. Duration of therapy: indefinite. Assessment and PLAN   PT/INR done in office per protocol. INR today is 1.9, subtherapeutic, Could not determine cause  Plan: Will increase weekly warfarin dose by 7.1%. New regimen is warfarin 11.25 mg on Wednesdays; 7.5 mg all other days. Using warfarin 7.5 mg tablets. Recheck INR in 1 1/2 weeks. Patient seen in room # 1. ED. OP. = Patient reported eating increased amount of fast-food while also eating 2 servings of greens per week. Educated patient to keep green vegetables consistent. Patient verbalized understanding of dosing directions and information discussed. Dosing schedule given to patient. Progress note sent to referring office. Patient acknowledges working in consult agreement with pharmacist as referred by his/her physician.       Electronically signed by Jeffrey Ma on 11/16/22 at 11:18 AM EST   For Pharmacy Admin Tracking Only    Intervention Detail: Dose Adjustment: 1, reason: Therapy Optimization  Total # of Interventions Recommended: 1  Total # of Interventions Accepted: 1  Time Spent (min): 20

## 2022-11-28 ENCOUNTER — HOSPITAL ENCOUNTER (OUTPATIENT)
Dept: PHARMACY | Age: 37
Setting detail: THERAPIES SERIES
Discharge: HOME | End: 2022-11-28
Payer: COMMERCIAL

## 2022-11-28 DIAGNOSIS — I63.232 STROKE DUE TO OCCLUSION OF LEFT CAROTID ARTERY (HCC): ICD-10-CM

## 2022-11-28 DIAGNOSIS — I63.9 ACUTE CVA (CEREBROVASCULAR ACCIDENT) (HCC): Primary | ICD-10-CM

## 2022-11-28 LAB
INR BLD: 2.4
PROTIME: 28.3 SECONDS

## 2022-11-28 PROCEDURE — 85610 PROTHROMBIN TIME: CPT

## 2022-11-28 PROCEDURE — 99211 OFF/OP EST MAY X REQ PHY/QHP: CPT

## 2022-11-28 NOTE — PROGRESS NOTES
Medication Management Service, Warfarin Management  [facility] CL[facility], [phone]  Visit Date: 11/28/2022   Subjective:   Litzy Montelongo is a 30 y.o. female who presents to clinic today for anticoagulation monitoring and adjustment. Patient seen in clinic for warfarin management due to  Indication:   CVA. INR goal: of 2.0-3.0. Duration of therapy: indefinite. Assessment and PLAN   PT/INR done in office per protocol. INR today is 2.4, therapeutic. Plan:  Continue current regimen of warfarin 11.25 mg every Wednesday and 7.5 all other days of the week. Using warfarin 7.5 mg tablets,   Recheck INR in 2 weeks. Patient seen in room # 2. ED. OP. = Patient reports eating 3-4  (up from 2 at last visit) servings of green vegetables  (mostly green beans one serving of iesha greens) per week due to eating more home-cooked meals. Educated patient on impact of green vegetables and vitamin K on her INR. Patient agreed to keep her intake of greens vegetables the same. Patient verbalized understanding of dosing directions and information discussed. Dosing schedule given to patient. Progress note sent to referring office. Patient acknowledges working in consult agreement with pharmacist as referred by his/her physician. [address] [place] [address]  Ph., CACP, Clinical Pharmacist  Anticoagulation Services, [address] [place] [address] [facility]  11/28/2022  11:20 AM      For Pharmacy Admin Tracking Only    Intervention Detail:   Total # of Interventions Recommended: 0  Total # of Interventions Accepted: 0  Time Spent (min): 20

## 2022-12-12 ENCOUNTER — HOSPITAL ENCOUNTER (OUTPATIENT)
Dept: PHARMACY | Age: 37
Setting detail: THERAPIES SERIES
Discharge: HOME | End: 2022-12-12
Payer: COMMERCIAL

## 2022-12-12 DIAGNOSIS — I63.9 ACUTE CVA (CEREBROVASCULAR ACCIDENT) (HCC): Primary | ICD-10-CM

## 2022-12-12 DIAGNOSIS — I63.232 STROKE DUE TO OCCLUSION OF LEFT CAROTID ARTERY (HCC): ICD-10-CM

## 2022-12-12 LAB
INR BLD: 2.3
PROTIME: 27.2 SECONDS

## 2022-12-12 PROCEDURE — 85610 PROTHROMBIN TIME: CPT

## 2022-12-12 PROCEDURE — 99211 OFF/OP EST MAY X REQ PHY/QHP: CPT

## 2022-12-12 NOTE — PROGRESS NOTES
Medication Management Service, Warfarin Management  [facility], [phone]  Visit Date: 2022   Subjective:   Sarah Head is a 30 y.o. female who presents to clinic today for anticoagulation monitoring and adjustment. Patient seen in clinic for warfarin management due to  Indication:   CVA. INR goal: of 2.0-3.0. Duration of therapy: indefinite. Assessment and PLAN   PT/INR done in office per protocol. INR today is 2.3, therapeutic. Plan:  Continue current regimen of warfarin 11.25 mg every Wednesday only; 7.5 all other days of the week. Using warfarin 7.5 mg tablets,   Recheck INR in 4 weeks. Patient seen in room # 3. ED. OP. = Stressed consistency with green vegetables, OK as she has been doing but do not increase. Be sure to limit other smaller green vegetables if having kale in any given week. Patient agreed to keep her intake of greens vegetables the same. Patient verbalized understanding of dosing directions and information discussed. Dosing schedule given to patient. Progress note sent to referring office. Patient acknowledges working in consult agreement with pharmacist as referred by his/her physician.       Michell Mcguire RPh, CACP  Clinical Pharmacist Medication Management  2022  11:25 AM      For Pharmacy Admin Tracking Only    Intervention Detail: Adherence Monitorin  Total # of Interventions Recommended: 1  Total # of Interventions Accepted: 1  Time Spent (min): 15

## 2023-01-09 ENCOUNTER — HOSPITAL ENCOUNTER (OUTPATIENT)
Dept: PHARMACY | Age: 38
Setting detail: THERAPIES SERIES
Discharge: HOME | End: 2023-01-09
Payer: MEDICAID

## 2023-01-09 DIAGNOSIS — I63.232 STROKE DUE TO OCCLUSION OF LEFT CAROTID ARTERY (HCC): ICD-10-CM

## 2023-01-09 DIAGNOSIS — I63.9 ACUTE CVA (CEREBROVASCULAR ACCIDENT) (HCC): Primary | ICD-10-CM

## 2023-01-09 LAB
INR BLD: 1.9
PROTIME: 22.5 SECONDS

## 2023-01-09 PROCEDURE — 99212 OFFICE O/P EST SF 10 MIN: CPT

## 2023-01-09 PROCEDURE — 85610 PROTHROMBIN TIME: CPT

## 2023-01-09 PROCEDURE — 99211 OFF/OP EST MAY X REQ PHY/QHP: CPT

## 2023-01-09 NOTE — PROGRESS NOTES
Medication Management Service, Warfarin Management  [facility] CL[facility], [phone]  Visit Date: 1/9/2023   Subjective:   Jossie Dallas is a 30 y.o. female who presents to clinic today for anticoagulation monitoring and adjustment. Patient seen in clinic for warfarin management due to  Indication:   CVA. INR goal: of 2.0-3.0. Duration of therapy: indefinite. Assessment and PLAN   PT/INR done in office per protocol. INR today is 1.9, subtherapeutic. INR could be slightly lowered due to interaction with Trazodone that patient just started last week. Ruled out all other usual causes. Plan: Will boost today's warfarin dose to 11.25 mg, then continue current regimen of warfarin 11.25 mg on Wednesdays and 7.5 mg all other days. If INR subtherapeutic or at low end of goal range, will consider increase in regimen since recent increase in trazodone. Using warfarin 7.5 mg tablets. Recheck INR in 2 week(s). Patient seen in room # 3. Patient verbalized understanding of dosing directions and information discussed. Dosing schedule given to patient. Progress note sent to referring office. Patient acknowledges working in consult agreement with pharmacist as referred by his/her physician.       Electronically signed by Beau Matthews on 1/9/23 at 11:25 AM EST    For Pharmacy Admin Tracking Only    Intervention Detail: Dose Adjustment: 1, reason: Therapy Optimization  Total # of Interventions Recommended: 1  Total # of Interventions Accepted: 1  Time Spent (min): 20

## 2023-01-23 ENCOUNTER — HOSPITAL ENCOUNTER (OUTPATIENT)
Dept: PHARMACY | Age: 38
Setting detail: THERAPIES SERIES
Discharge: HOME | End: 2023-01-23
Payer: MEDICAID

## 2023-01-23 DIAGNOSIS — I63.9 ACUTE CVA (CEREBROVASCULAR ACCIDENT) (HCC): Primary | ICD-10-CM

## 2023-01-23 DIAGNOSIS — I63.232 STROKE DUE TO OCCLUSION OF LEFT CAROTID ARTERY (HCC): ICD-10-CM

## 2023-01-23 LAB
INR BLD: 2.5
PROTIME: 45.6 SECONDS

## 2023-01-23 PROCEDURE — 99211 OFF/OP EST MAY X REQ PHY/QHP: CPT

## 2023-01-23 PROCEDURE — 85610 PROTHROMBIN TIME: CPT

## 2023-01-23 NOTE — PROGRESS NOTES
Medication Management Service, Warfarin Management  [facility], [phone]  Visit Date: 1/23/2023   Subjective:   Anahi Pastor is a 37 y.o. female who presents to clinic today for anticoagulation monitoring and adjustment.  Patient seen in clinic for warfarin management due to  Indication:   CVA.   INR goal: of 2.0-3.0.  Duration of therapy: indefinite.    Assessment and PLAN   PT/INR done in office per protocol.   INR today is 2.5, therapeutic.          Plan:  Will continue current regimen of warfarin 11.25 mg on Wednesdays; 7.5 mg all other days of the week.     Using warfarin 7.5 mg tablets.    Recheck INR in 4 week(s).   Patient seen in room # 1.    Of Note: Patient reports only taking Trazodone as needed for sleep.    Patient verbalized understanding of dosing directions and information discussed. Dosing schedule given to patient. Progress note sent to referring office.  Patient acknowledges working in consult agreement with pharmacist as referred by his/her physician.      Electronically signed by Desiree Tello on 1/23/23 at 12:21 PM EST    For Pharmacy Admin Tracking Only    Intervention Detail:   Total # of Interventions Recommended: 0  Total # of Interventions Accepted: 0  Time Spent (min): 20

## 2023-02-20 ENCOUNTER — HOSPITAL ENCOUNTER (OUTPATIENT)
Dept: PHARMACY | Age: 38
Setting detail: THERAPIES SERIES
Discharge: HOME | End: 2023-02-20
Payer: MEDICAID

## 2023-02-20 DIAGNOSIS — I63.232 STROKE DUE TO OCCLUSION OF LEFT CAROTID ARTERY (HCC): ICD-10-CM

## 2023-02-20 DIAGNOSIS — I63.9 ACUTE CVA (CEREBROVASCULAR ACCIDENT) (HCC): Primary | ICD-10-CM

## 2023-02-20 LAB
INR BLD: 3.3
PROTIME: 40.1 SECONDS

## 2023-02-20 PROCEDURE — 99212 OFFICE O/P EST SF 10 MIN: CPT

## 2023-02-20 PROCEDURE — 85610 PROTHROMBIN TIME: CPT

## 2023-02-20 NOTE — PROGRESS NOTES
Medication Management Service, Warfarin Management  [facility], [phone]  Visit Date: 2/20/2023   Subjective:   Anahi Pastor is a 37 y.o. female who presents to clinic today for anticoagulation monitoring and adjustment.  Patient seen in clinic for warfarin management due to  Indication:   CVA.   INR goal: of 2.0-3.0.  Duration of therapy: indefinite.    Assessment and PLAN   PT/INR done in office per protocol.   INR today is 3.3, supratherapeutic.  Potentially due to extra half tab and less vit K foods.      Plan:  Instructed the patient to take a reduced warfarin dose of 3.75 mg today, then resume 11.25 mg Wed and 7.5 mg on all other days.   Using warfarin 7.5 mg tablets.  Recheck INR in 4 week(s).   Patient seen in room # 3.    Patient verbalized understanding of dosing directions and information discussed. Dosing schedule given to patient. Progress note sent to referring office.  Patient acknowledges working in consult agreement with pharmacist as referred by his/her physician.      Electronically signed by Philip Solis RPH on 2/20/23 at 12:06 PM EST    For Pharmacy Admin Tracking Only    Intervention Detail: Dose Adjustment: 1, reason: Therapy Optimization  Total # of Interventions Recommended: 1  Total # of Interventions Accepted: 1  Time Spent (min): 15

## 2023-02-27 ENCOUNTER — TELEPHONE (OUTPATIENT)
Dept: PHARMACY | Age: 38
End: 2023-02-27

## 2023-02-27 NOTE — TELEPHONE ENCOUNTER
Patient left a VM after closing on Friday. No specific details just asked for a call back. I called back and left a message.     For Pharmacy Admin Tracking Only    Intervention Detail:   Total # of Interventions Recommended: 0  Total # of Interventions Accepted: 0  Time Spent (min): 5

## 2023-02-28 ENCOUNTER — TELEPHONE (OUTPATIENT)
Dept: PHARMACY | Age: 38
End: 2023-02-28

## 2023-02-28 NOTE — TELEPHONE ENCOUNTER
Spoke with patient regarding new medication;  amoxicillin 500mg Q8h x 8 days, started two days ago. Dietary intake is decreased and historically her INR becomes elevated with use of amoxicillin. Will reduce dose for March 1st and 8th to 7.5mg. Next INR check moved up by one week. Patient verified dosing instructions with read back method.      Bertram Basilio RP, CACP  Clinical Pharmacist Medication Management  2/28/2023  3:18 PM

## 2023-03-14 ENCOUNTER — HOSPITAL ENCOUNTER (OUTPATIENT)
Dept: PHARMACY | Age: 38
Setting detail: THERAPIES SERIES
Discharge: HOME | End: 2023-03-14
Payer: MEDICAID

## 2023-03-14 DIAGNOSIS — I63.232 STROKE DUE TO OCCLUSION OF LEFT CAROTID ARTERY (HCC): ICD-10-CM

## 2023-03-14 DIAGNOSIS — I63.9 ACUTE CVA (CEREBROVASCULAR ACCIDENT) (HCC): Primary | ICD-10-CM

## 2023-03-14 LAB
INR BLD: 1.6
PROTIME: 19.1 SECONDS

## 2023-03-14 PROCEDURE — 99212 OFFICE O/P EST SF 10 MIN: CPT

## 2023-03-14 PROCEDURE — 85610 PROTHROMBIN TIME: CPT

## 2023-03-14 NOTE — PROGRESS NOTES
Medication Management Service, Warfarin Management  AMAN CLARK [facility], [phone]  Visit Date: 3/14/2023   Subjective:   Colin Villa is a 30 y.o. female who presents to clinic today for anticoagulation monitoring and adjustment. Patient seen in clinic for warfarin management due to  Indication:   CVA. INR goal: of 2.0-3.0. Duration of therapy: indefinite. Assessment and PLAN   PT/INR done in office per protocol. INR today is 1.6, subtherapeutic likely due to reduction in dosing due to recent amoxicillin use. Plan: Will boost dose today only to 11.25 mg then continue current regimen of warfarin 11.25 mg every Wed only;  7.5 mg all other days of the week. Using warfarin 7.5 mg tablets. Recheck INR in 2 week(s). Patient seen in room # 3. Of note = Patient reports eating 3-4 servings of green vegetables weekly. Patient verbalized understanding of dosing directions and information discussed. Dosing schedule given to patient. Progress note sent to referring office. Patient acknowledges working in consult agreement with pharmacist as referred by his/her physician.       Electronically signed by Sunil Null on 3/14/23 at 11:07 AM EDT    For Pharmacy Admin Tracking Only    Intervention Detail: Dose Adjustment: 1, reason: Therapy Optimization  Total # of Interventions Recommended: 1  Total # of Interventions Accepted: 1  Time Spent (min): 20

## 2023-03-28 ENCOUNTER — HOSPITAL ENCOUNTER (OUTPATIENT)
Dept: PHARMACY | Age: 38
Setting detail: THERAPIES SERIES
Discharge: HOME | End: 2023-03-28
Payer: COMMERCIAL

## 2023-03-28 DIAGNOSIS — I63.232 STROKE DUE TO OCCLUSION OF LEFT CAROTID ARTERY (HCC): ICD-10-CM

## 2023-03-28 DIAGNOSIS — I63.9 ACUTE CVA (CEREBROVASCULAR ACCIDENT) (HCC): Primary | ICD-10-CM

## 2023-03-28 LAB
INR BLD: 3
PROTIME: 3.3 SECONDS

## 2023-03-28 PROCEDURE — 85610 PROTHROMBIN TIME: CPT

## 2023-03-28 PROCEDURE — 99211 OFF/OP EST MAY X REQ PHY/QHP: CPT

## 2023-03-28 NOTE — PROGRESS NOTES
Medication Management Service, Warfarin Management  AMAN CLARK [facility], [phone]  Visit Date: 3/28/2023   Subjective:   Aric Sullivan is a 30 y.o. female who presents to clinic today for anticoagulation monitoring and adjustment. Patient seen in clinic for warfarin management due to  Indication:   CVA. INR goal: of 2.0-3.0. Duration of therapy: indefinite. Assessment and PLAN   PT/INR done in office per protocol. INR today is 3.0, therapeutic after current regimen total of 56.25mg over the past week. Patient reports not having usual amount of green vegetables lately and a slight reduction in appetite. Plan:  Patient would prefer not to have to focus so much on green vegetables so will reduce regimen by 6.7% to warfarin 7.5mg orally once daily. She will reduce gv to 2-3 times per week, no more than 3 /week. Using warfarin 7.5 mg tablets. Recheck INR in 2 week(s). Patient seen in room # 3. Of note = Stressed consistency with max of 3 servings gv per week, 2 is OK. Patient verbalized understanding of dosing directions and information discussed. Dosing schedule given to patient. Progress note sent to referring office.     Rashard Tubbs, Mary, RAFAP  Clinical Pharmacist Medication Management  3/28/2023  11:38 AM      For Pharmacy Admin Tracking Only    Intervention Detail: Adherence Monitorin and Dose Adjustment: 1, reason: Improve Adherence, Therapy Optimization  Total # of Interventions Recommended: 2  Total # of Interventions Accepted: 2  Time Spent (min): 20

## 2023-03-30 ENCOUNTER — TELEPHONE (OUTPATIENT)
Dept: PHARMACY | Age: 38
End: 2023-03-30

## 2023-03-30 NOTE — TELEPHONE ENCOUNTER
Patient reports she has another abscess and her MD is going to write for another antibiotic. Patient reports she thought it was going to be amoxicillin again. I asked she let us know when she gets it, but otherwise, I did not make any changes to plan.

## 2023-04-26 ENCOUNTER — HOSPITAL ENCOUNTER (OUTPATIENT)
Dept: PHARMACY | Age: 38
Setting detail: THERAPIES SERIES
Discharge: HOME | End: 2023-04-26
Payer: MEDICAID

## 2023-04-26 ENCOUNTER — OFFICE VISIT (OUTPATIENT)
Dept: NEUROLOGY | Age: 38
End: 2023-04-26
Payer: MEDICAID

## 2023-04-26 VITALS
HEIGHT: 63 IN | HEART RATE: 92 BPM | BODY MASS INDEX: 25.69 KG/M2 | OXYGEN SATURATION: 99 % | DIASTOLIC BLOOD PRESSURE: 73 MMHG | SYSTOLIC BLOOD PRESSURE: 107 MMHG | WEIGHT: 145 LBS | TEMPERATURE: 98.3 F

## 2023-04-26 DIAGNOSIS — I63.9 ACUTE CVA (CEREBROVASCULAR ACCIDENT) (HCC): Primary | ICD-10-CM

## 2023-04-26 DIAGNOSIS — M79.609 PARESTHESIA AND PAIN OF RIGHT EXTREMITY: Primary | ICD-10-CM

## 2023-04-26 DIAGNOSIS — R20.2 PARESTHESIA AND PAIN OF RIGHT EXTREMITY: Primary | ICD-10-CM

## 2023-04-26 DIAGNOSIS — I63.232 STROKE DUE TO OCCLUSION OF LEFT CAROTID ARTERY (HCC): ICD-10-CM

## 2023-04-26 LAB
INR BLD: 2.6
PROTIME: 31.6 SECONDS

## 2023-04-26 PROCEDURE — 99214 OFFICE O/P EST MOD 30 MIN: CPT

## 2023-04-26 PROCEDURE — 85610 PROTHROMBIN TIME: CPT

## 2023-04-26 PROCEDURE — 99211 OFF/OP EST MAY X REQ PHY/QHP: CPT

## 2023-04-26 RX ORDER — GABAPENTIN 100 MG/1
100 CAPSULE ORAL 3 TIMES DAILY
Qty: 270 CAPSULE | Refills: 1 | Status: SHIPPED | OUTPATIENT
Start: 2023-04-26 | End: 2023-10-23

## 2023-04-26 ASSESSMENT — ENCOUNTER SYMPTOMS
CHEST TIGHTNESS: 0
NAUSEA: 0
PHOTOPHOBIA: 0
COLOR CHANGE: 0
TROUBLE SWALLOWING: 0
WHEEZING: 0
VOICE CHANGE: 0
VOMITING: 0
SHORTNESS OF BREATH: 0

## 2023-04-26 NOTE — PROGRESS NOTES
Medication Management Service, Warfarin Management  AMAN CLARK [facility], [phone]  Visit Date: 4/26/2023     Subjective:   Freddie Howell is a 30 y.o. female who presents to clinic today for anticoagulation monitoring and adjustment. Patient seen in clinic for warfarin management due to  Indication:   CVA. INR goal: of 2.0-3.0. Duration of therapy: indefinite. Assessment and PLAN   PT/INR done in office per protocol. INR today is 2.6, therapeutic. Plan: Will continue current regimen of warfarin 11.25 mg on Tuesdays; 7.5 mg all other days of the week. Using warfarin 7.5 mg tablets. Recheck INR in 4 week(s). Patient seen in room # 2. ED. OP. = Counseled patient on the importance of maintaining a consistent intake of green vegetables from week to week (she reported currently eating 2-3 servings per week). Of Note:  Again discussed smoking cessation with the patient at today's visit. She remains interested in quitting but is not ready to quit just yet. This Ese Tavera told her to let us know when is ready and we would love to help her. Patient verbalized understanding of dosing directions and information discussed. Dosing schedule given to patient. Progress note sent to referring office.     Electronically signed by DIDIER Mujica [facility] on 4/26/23 at 2:52 PM EDT     David Payne, PharmD, [address] Medication Management Service  [phone]  4/26/2023  2:59 PM    =======================================================    For Pharmacy Admin Tracking Only    Intervention Detail:   Total # of Interventions Recommended: 0  Total # of Interventions Accepted: 0  Time Spent (min): 15

## 2023-05-24 ENCOUNTER — HOSPITAL ENCOUNTER (OUTPATIENT)
Dept: PHARMACY | Age: 38
Setting detail: THERAPIES SERIES
Discharge: HOME | End: 2023-05-24
Payer: MEDICAID

## 2023-05-24 DIAGNOSIS — I63.9 ACUTE CVA (CEREBROVASCULAR ACCIDENT) (HCC): Primary | ICD-10-CM

## 2023-05-24 DIAGNOSIS — I63.232 STROKE DUE TO OCCLUSION OF LEFT CAROTID ARTERY (HCC): ICD-10-CM

## 2023-05-24 LAB
INR BLD: 3.2
PROTIME: 37.9 SECONDS

## 2023-05-24 PROCEDURE — 85610 PROTHROMBIN TIME: CPT

## 2023-05-24 PROCEDURE — 99213 OFFICE O/P EST LOW 20 MIN: CPT

## 2023-05-24 RX ORDER — WARFARIN SODIUM 7.5 MG/1
TABLET ORAL
Qty: 100 TABLET | Refills: 1 | Status: SHIPPED | OUTPATIENT
Start: 2023-05-24

## 2023-05-24 NOTE — PROGRESS NOTES
Medication Management Service, Warfarin Management  AMAN CLARK [facility], [phone]  Visit Date: 5/24/2023     Subjective:   Harish Peterson is a 30 y.o. female who presents to clinic today for anticoagulation monitoring and adjustment. Patient seen in clinic for warfarin management due to  Indication:   CVA. INR goal: of 2.0-3.0. Duration of therapy: indefinite. Assessment and PLAN   PT/INR done in office per protocol. INR today is 2.6, therapeutic. Plan: Will continue current regimen of warfarin 11.25 mg on Tuesdays; 7.5 mg all other days of the week. Using warfarin 7.5 mg tablets. Recheck INR in 4 week(s). Patient seen in room # 2. ED. OP. = Counseled patient on the importance of maintaining a consistent intake of green vegetables from week to week (she reported currently eating 2-3 servings per week). Of Note:  Again discussed smoking cessation with the patient at today's visit. She remains interested in quitting but is not ready to quit just yet. This Mearl Fetch told her to let us know when is ready and we would love to help her. Patient verbalized understanding of dosing directions and information discussed. Dosing schedule given to patient. Progress note sent to referring office.     Electronically signed by DIDIER Berman [facility] on 4/26/23 at 2:52 PM EDT     Heavenly Smith, PharmD, [address] Medication Management Service  [phone]  5/24/2023  3:03 PM    =======================================================    For Pharmacy Admin Tracking Only    Intervention Detail:   Total # of Interventions Recommended: 0  Total # of Interventions Accepted: 0  Time Spent (min): 15

## 2023-05-24 NOTE — PROGRESS NOTES
Medication Management Service, Warfarin Management  AMAN CLARK [facility], [phone]  Visit Date: 2023     Subjective:   Homer Saleh is a 30 y.o. female who presents to clinic today for anticoagulation monitoring and adjustment. Patient seen in clinic for warfarin management due to  Indication:   CVA. INR goal: of 2.0-3.0. Duration of therapy: indefinite. Assessment and PLAN   PT/INR done in office per protocol. INR today is 3.2, supratherapeutic, patient reports that she knows her dosing was \"off somehow\" not sure if she took extra or missed a dose. \"Maybe I thought I missed and took extra\". Plan: Will reduce dose for today only to 3.75mg then continue current regimen of warfarin 11.25 mg on  only; 7.5 mg all other days of the week. Using warfarin 7.5 mg tablets. Recheck INR in 4 week(s). Patient seen in room # 3. ED. OP. = Counseled patient on the importance of maintaining a consistent intake of green vegetables from week to week (she reported currently eating 2-3 servings per week). Suggested patient document, maricarmen off on calendar each day when she takes her dose. New prescription sent electronically to patient's preferred pharmacy. Patient verbalized understanding of dosing directions and information discussed. Dosing schedule given to patient. Progress note sent to referring office.     Rani Nolan RPh, CACP  Clinical Pharmacist Medication Management  2023  3:36 PM    For Pharmacy Admin Tracking Only    Intervention Detail: Adherence Monitorin, Dose Adjustment: 1, reason: Improve Adherence, Therapy De-escalation, and Refill(s) Provided  Total # of Interventions Recommended: 3  Total # of Interventions Accepted: 3  Time Spent (min):  25

## 2023-06-03 PROCEDURE — 96372 THER/PROPH/DIAG INJ SC/IM: CPT

## 2023-06-03 PROCEDURE — 99285 EMERGENCY DEPT VISIT HI MDM: CPT

## 2023-06-03 PROCEDURE — 96365 THER/PROPH/DIAG IV INF INIT: CPT

## 2023-06-04 ENCOUNTER — HOSPITAL ENCOUNTER (EMERGENCY)
Age: 38
Discharge: HOME | End: 2023-06-04
Attending: STUDENT IN AN ORGANIZED HEALTH CARE EDUCATION/TRAINING PROGRAM
Payer: MEDICAID

## 2023-06-04 ENCOUNTER — APPOINTMENT (OUTPATIENT)
Dept: CT IMAGING | Age: 38
End: 2023-06-04
Payer: MEDICAID

## 2023-06-04 VITALS
HEART RATE: 92 BPM | WEIGHT: 160 LBS | DIASTOLIC BLOOD PRESSURE: 86 MMHG | TEMPERATURE: 97.9 F | RESPIRATION RATE: 18 BRPM | OXYGEN SATURATION: 99 % | SYSTOLIC BLOOD PRESSURE: 135 MMHG | BODY MASS INDEX: 28.34 KG/M2

## 2023-06-04 DIAGNOSIS — K02.9 DENTAL CARIES: ICD-10-CM

## 2023-06-04 DIAGNOSIS — L03.211 FACIAL CELLULITIS: Primary | ICD-10-CM

## 2023-06-04 LAB
ALBUMIN SERPL-MCNC: 3.8 G/DL (ref 3.5–5.2)
ALP SERPL-CCNC: 85 U/L (ref 35–104)
ALT SERPL-CCNC: 11 U/L (ref 5–33)
ANION GAP SERPL CALCULATED.3IONS-SCNC: 11 MMOL/L (ref 9–17)
AST SERPL-CCNC: 19 U/L
BASOPHILS # BLD: 0.03 K/UL (ref 0–0.2)
BASOPHILS NFR BLD: 0 % (ref 0–2)
BILIRUB SERPL-MCNC: 0.3 MG/DL (ref 0.3–1.2)
BUN SERPL-MCNC: 8 MG/DL (ref 6–20)
BUN/CREAT SERPL: 13 (ref 9–20)
CALCIUM SERPL-MCNC: 8.9 MG/DL (ref 8.6–10.4)
CHLORIDE SERPL-SCNC: 102 MMOL/L (ref 98–107)
CO2 SERPL-SCNC: 23 MMOL/L (ref 20–31)
CREAT SERPL-MCNC: 0.62 MG/DL (ref 0.5–0.9)
CRP SERPL HS-MCNC: 13.9 MG/L (ref 0–5)
EOSINOPHIL # BLD: 0.07 K/UL (ref 0–0.44)
EOSINOPHILS RELATIVE PERCENT: 1 % (ref 1–4)
ERYTHROCYTE [DISTWIDTH] IN BLOOD BY AUTOMATED COUNT: 18.4 % (ref 11.8–14.4)
ERYTHROCYTE [SEDIMENTATION RATE] IN BLOOD BY WESTERGREN METHOD: 44 MM/HR (ref 0–20)
GFR SERPL CREATININE-BSD FRML MDRD: >60 ML/MIN/1.73M2
GLUCOSE SERPL-MCNC: 106 MG/DL (ref 70–99)
HCG SERPL QL: NEGATIVE
HCT VFR BLD AUTO: 30.8 % (ref 36.3–47.1)
HGB BLD-MCNC: 9 G/DL (ref 11.9–15.1)
IMM GRANULOCYTES # BLD AUTO: 0.01 K/UL (ref 0–0.3)
IMM GRANULOCYTES NFR BLD: 0 %
INR PPP: 1.6
LYMPHOCYTES # BLD: 16 % (ref 24–43)
LYMPHOCYTES NFR BLD: 1.4 K/UL (ref 1.1–3.7)
MCH RBC QN AUTO: 21.3 PG (ref 25.2–33.5)
MCHC RBC AUTO-ENTMCNC: 29.2 G/DL (ref 28.4–34.8)
MCV RBC AUTO: 73 FL (ref 82.6–102.9)
MONOCYTES NFR BLD: 0.39 K/UL (ref 0.1–1.2)
MONOCYTES NFR BLD: 4 % (ref 3–12)
NEUTROPHILS NFR BLD: 79 % (ref 36–65)
NEUTS SEG NFR BLD: 6.93 K/UL (ref 1.5–8.1)
NRBC AUTOMATED: 0 PER 100 WBC
PARTIAL THROMBOPLASTIN TIME: 30.3 SEC (ref 23.9–33.8)
PLATELET # BLD AUTO: 251 K/UL (ref 138–453)
PMV BLD AUTO: 10 FL (ref 8.1–13.5)
POTASSIUM SERPL-SCNC: 3.7 MMOL/L (ref 3.7–5.3)
PROT SERPL-MCNC: 7 G/DL (ref 6.4–8.3)
PROTHROMBIN TIME: 18.6 SEC (ref 11.5–14.2)
RBC # BLD AUTO: 4.22 M/UL (ref 3.95–5.11)
RBC # BLD: ABNORMAL 10*6/UL
SODIUM SERPL-SCNC: 136 MMOL/L (ref 135–144)
TROPONIN I SERPL HS-MCNC: <6 NG/L (ref 0–14)
WBC OTHER # BLD: 8.8 K/UL (ref 3.5–11.3)

## 2023-06-04 PROCEDURE — 6360000002 HC RX W HCPCS: Performed by: STUDENT IN AN ORGANIZED HEALTH CARE EDUCATION/TRAINING PROGRAM

## 2023-06-04 PROCEDURE — 84484 ASSAY OF TROPONIN QUANT: CPT

## 2023-06-04 PROCEDURE — 70491 CT SOFT TISSUE NECK W/DYE: CPT

## 2023-06-04 PROCEDURE — 85027 COMPLETE CBC AUTOMATED: CPT

## 2023-06-04 PROCEDURE — 84703 CHORIONIC GONADOTROPIN ASSAY: CPT

## 2023-06-04 PROCEDURE — 6360000002 HC RX W HCPCS

## 2023-06-04 PROCEDURE — 2580000003 HC RX 258: Performed by: STUDENT IN AN ORGANIZED HEALTH CARE EDUCATION/TRAINING PROGRAM

## 2023-06-04 PROCEDURE — 6360000004 HC RX CONTRAST MEDICATION: Performed by: STUDENT IN AN ORGANIZED HEALTH CARE EDUCATION/TRAINING PROGRAM

## 2023-06-04 PROCEDURE — 85652 RBC SED RATE AUTOMATED: CPT

## 2023-06-04 PROCEDURE — 85610 PROTHROMBIN TIME: CPT

## 2023-06-04 PROCEDURE — 36415 COLL VENOUS BLD VENIPUNCTURE: CPT

## 2023-06-04 PROCEDURE — 86140 C-REACTIVE PROTEIN: CPT

## 2023-06-04 PROCEDURE — 80053 COMPREHEN METABOLIC PANEL: CPT

## 2023-06-04 PROCEDURE — 6370000000 HC RX 637 (ALT 250 FOR IP): Performed by: STUDENT IN AN ORGANIZED HEALTH CARE EDUCATION/TRAINING PROGRAM

## 2023-06-04 PROCEDURE — 85730 THROMBOPLASTIN TIME PARTIAL: CPT

## 2023-06-04 RX ORDER — DOXYCYCLINE 100 MG/1
100 CAPSULE ORAL ONCE
Status: DISCONTINUED | OUTPATIENT
Start: 2023-06-04 | End: 2023-06-04

## 2023-06-04 RX ORDER — FAMOTIDINE 20 MG/1
20 TABLET, FILM COATED ORAL 2 TIMES DAILY
Qty: 60 TABLET | Refills: 0 | Status: SHIPPED | OUTPATIENT
Start: 2023-06-04

## 2023-06-04 RX ORDER — DEXAMETHASONE SODIUM PHOSPHATE 10 MG/ML
10 INJECTION, SOLUTION INTRAMUSCULAR; INTRAVENOUS ONCE
Status: COMPLETED | OUTPATIENT
Start: 2023-06-04 | End: 2023-06-04

## 2023-06-04 RX ORDER — 0.9 % SODIUM CHLORIDE 0.9 %
80 INTRAVENOUS SOLUTION INTRAVENOUS ONCE
Status: COMPLETED | OUTPATIENT
Start: 2023-06-04 | End: 2023-06-04

## 2023-06-04 RX ORDER — ONDANSETRON 4 MG/1
4 TABLET, ORALLY DISINTEGRATING ORAL 3 TIMES DAILY PRN
Qty: 21 TABLET | Refills: 0 | Status: SHIPPED | OUTPATIENT
Start: 2023-06-04

## 2023-06-04 RX ORDER — CLINDAMYCIN HYDROCHLORIDE 150 MG/1
450 CAPSULE ORAL 3 TIMES DAILY
Qty: 90 CAPSULE | Refills: 0 | Status: SHIPPED | OUTPATIENT
Start: 2023-06-04 | End: 2023-06-14

## 2023-06-04 RX ORDER — KETOROLAC TROMETHAMINE 30 MG/ML
30 INJECTION, SOLUTION INTRAMUSCULAR; INTRAVENOUS ONCE
Status: COMPLETED | OUTPATIENT
Start: 2023-06-04 | End: 2023-06-04

## 2023-06-04 RX ORDER — SODIUM CHLORIDE 0.9 % (FLUSH) 0.9 %
10 SYRINGE (ML) INJECTION PRN
Status: DISCONTINUED | OUTPATIENT
Start: 2023-06-04 | End: 2023-06-04 | Stop reason: HOSPADM

## 2023-06-04 RX ORDER — PREDNISONE 10 MG/1
TABLET ORAL
Qty: 20 TABLET | Refills: 0 | Status: SHIPPED | OUTPATIENT
Start: 2023-06-04

## 2023-06-04 RX ORDER — ONDANSETRON 2 MG/ML
4 INJECTION INTRAMUSCULAR; INTRAVENOUS ONCE
Status: COMPLETED | OUTPATIENT
Start: 2023-06-04 | End: 2023-06-04

## 2023-06-04 RX ORDER — CLINDAMYCIN HYDROCHLORIDE 150 MG/1
450 CAPSULE ORAL ONCE
Status: COMPLETED | OUTPATIENT
Start: 2023-06-04 | End: 2023-06-04

## 2023-06-04 RX ORDER — IBUPROFEN 800 MG/1
800 TABLET ORAL EVERY 8 HOURS PRN
Qty: 30 TABLET | Refills: 0 | Status: SHIPPED | OUTPATIENT
Start: 2023-06-04

## 2023-06-04 RX ORDER — ONDANSETRON 2 MG/ML
INJECTION INTRAMUSCULAR; INTRAVENOUS
Status: COMPLETED
Start: 2023-06-04 | End: 2023-06-04

## 2023-06-04 RX ADMIN — ONDANSETRON 4 MG: 2 INJECTION INTRAMUSCULAR; INTRAVENOUS at 00:46

## 2023-06-04 RX ADMIN — CLINDAMYCIN HYDROCHLORIDE 450 MG: 150 CAPSULE ORAL at 03:38

## 2023-06-04 RX ADMIN — DEXAMETHASONE SODIUM PHOSPHATE 10 MG: 10 INJECTION, SOLUTION INTRAMUSCULAR; INTRAVENOUS at 00:38

## 2023-06-04 RX ADMIN — SODIUM CHLORIDE, PRESERVATIVE FREE 10 ML: 5 INJECTION INTRAVENOUS at 01:46

## 2023-06-04 RX ADMIN — SODIUM CHLORIDE 3000 MG: 900 INJECTION INTRAVENOUS at 00:48

## 2023-06-04 RX ADMIN — SODIUM CHLORIDE 80 ML: 9 INJECTION, SOLUTION INTRAVENOUS at 01:47

## 2023-06-04 RX ADMIN — IOPAMIDOL 75 ML: 755 INJECTION, SOLUTION INTRAVENOUS at 01:46

## 2023-06-04 RX ADMIN — KETOROLAC TROMETHAMINE 30 MG: 30 INJECTION, SOLUTION INTRAMUSCULAR; INTRAVENOUS at 00:47

## 2023-06-04 ASSESSMENT — PAIN - FUNCTIONAL ASSESSMENT: PAIN_FUNCTIONAL_ASSESSMENT: 0-10

## 2023-06-04 ASSESSMENT — PAIN SCALES - GENERAL: PAINLEVEL_OUTOF10: 10

## 2023-06-07 NOTE — ED PROVIDER NOTES
[facility]      Pt Name: Rachna Castañeda  MRN: [id number]  [place] 1985  Date of evaluation: 6/7/23    CHIEF COMPLAINT       Chief Complaint   Patient presents with    Facial Swelling     Left side of jaw. Ongoing for approx 4 days. Given penicillin at urgent care. Unable to keep down meds    Nausea       HISTORY OF PRESENT ILLNESS   Rachna Castañeda is a 30 y.o. female who presents with left-sided facial swelling   pain is worse with chewing and pain is rated as severe. Pain is located over the left face and radiates to the same. Pain has been constant and worsening. Denies any pooling of secretions, hot potato voice. She was seen at urgent care and started on penicillin but has had nausea vomiting has been unable to tolerate the antibiotics. 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 History of CVA as well as PE anticoagulated on Coumadin. PASTMEDICAL HISTORY     Past Medical History:   Diagnosis Date    Acid reflux     Anemia 10/18/2019    Drug abuse, IV ([facility]     claims that she has previous iv drug dependency claims hasn' had recent usage.     Headache     History of pulmonary embolus (PE)     Marijuana smoker 10/18/2019    Smoker 10/18/2019     Past Problem List  Patient Active Problem List   Diagnosis Code    Acute pulmonary embolism (HCC) I26.99    Anemia D64.9    Smoker F17.200    Marijuana smoker F12.90    Iron deficiency anemia D50.9    History of intravenous drug abuse ([facility] F19.11    Occult blood positive stool R19.5    HH (hiatus hernia) K44.9    Acute CVA (cerebrovascular accident) ([facility] I63.9    Stroke due to occlusion of left carotid artery (HCC) S69.828    Carotid artery stenosis with cerebral infarction [facility]) O43.448    Acute right hemiparesis ([facility]) G81.91    Right sided numbness R20.0    MTHFR gene mutation Z15.89    Acute cerebrovascular accident (CVA) ([facility] I63.9    Dysarthria R47.1

## 2023-06-21 ENCOUNTER — HOSPITAL ENCOUNTER (OUTPATIENT)
Dept: PHARMACY | Age: 38
Setting detail: THERAPIES SERIES
Discharge: HOME | End: 2023-06-21
Payer: MEDICAID

## 2023-06-21 DIAGNOSIS — I63.232 STROKE DUE TO OCCLUSION OF LEFT CAROTID ARTERY (HCC): ICD-10-CM

## 2023-06-21 DIAGNOSIS — I63.9 ACUTE CVA (CEREBROVASCULAR ACCIDENT) (HCC): Primary | ICD-10-CM

## 2023-06-21 LAB
INR BLD: 2.2
PROTIME: 26 SECONDS

## 2023-06-21 PROCEDURE — 85610 PROTHROMBIN TIME: CPT

## 2023-06-21 PROCEDURE — 99211 OFF/OP EST MAY X REQ PHY/QHP: CPT

## 2023-06-21 NOTE — PROGRESS NOTES
Medication Management Service, Warfarin Management  AMAN CLARK [facility], [phone]  Visit Date: 6/21/2023   Subjective:   Sheyla Cho is a 30 y.o. female who presents to clinic today for anticoagulation monitoring and adjustment. Patient seen in clinic for warfarin management due to  Indication:   CVA. INR goal: of 2.0-3.0. Duration of therapy: indefinite. Assessment and PLAN   PT/INR done in office per protocol. INR today is 2.2, therapeutic. However, patient reported that she realized she mistakenly took the incorrect dose of warfarin last night (she took 7.5 mg as opposed to the prescribed 11.25 mg). Plan:  Given that she is already near the lower end of her goal range, patient will take 11.25 mg today only (as she should have done yesterday). Otherwise patient will continue current regimen of warfarin 11.25 mg on Tuesdays only; 7.5 mg all other days of the week. Using warfarin 7.5 mg tablets. Recheck INR in 4 week(s). Patient seen in room # 1. ED. OP. = Counseled patient on the importance of maintaining a consistent intake of green vegetables from week to week (she reported she is still currently eating 2-3 servings per week). Suggested patient document, maricarmen off on calendar each day when she takes her dose. Patient verbalized understanding of dosing directions and information discussed. Dosing schedule given to patient. Progress note sent to referring office.     Electronically signed by Zacarias Atkins [facility] on 6/21/23 at 1:59 PM EDT     Angelia Pineda, PharmD, [address] Medication Management Service  [phone]  6/21/2023  2:19 PM    =======================================================    For Pharmacy Admin Tracking Only    Intervention Detail: Dose Adjustment: 1, reason: Therapy Optimization  Total # of Interventions Recommended: 1  Total # of Interventions Accepted: 1  Time Spent (min): 15

## 2023-07-19 ENCOUNTER — HOSPITAL ENCOUNTER (OUTPATIENT)
Dept: PHARMACY | Age: 38
Setting detail: THERAPIES SERIES
Discharge: HOME | End: 2023-07-19
Payer: MEDICAID

## 2023-07-19 DIAGNOSIS — I63.9 ACUTE CVA (CEREBROVASCULAR ACCIDENT) (HCC): Primary | ICD-10-CM

## 2023-07-19 DIAGNOSIS — I63.232 STROKE DUE TO OCCLUSION OF LEFT CAROTID ARTERY (HCC): ICD-10-CM

## 2023-07-19 LAB
INR BLD: 4.1
PROTIME: 48.7 SECONDS

## 2023-07-19 PROCEDURE — 85610 PROTHROMBIN TIME: CPT

## 2023-07-19 PROCEDURE — 99212 OFFICE O/P EST SF 10 MIN: CPT

## 2023-07-19 NOTE — PROGRESS NOTES
Medication Management Service, Warfarin Management  AMAN CLARK [facility], [phone]  Visit Date: 2023     Subjective:   Desiree Apgar is a 30 y.o. female who presents to clinic today for anticoagulation monitoring and adjustment. Patient seen in clinic for warfarin management due to  Indication:   CVA. INR goal: of 2.0-3.0. Duration of therapy: indefinite. Assessment and PLAN   PT/INR done in office per protocol. INR today is 4.1, supratherapeutic and likely due to fewer gv, patient has been cooking at home less than usual so fewer gv. Plan: Will hold dose today then continue current regimen of warfarin 11.25 mg on  only; 7.5 mg all other days of the week. If high again at next check will reduce regimen. Using warfarin 7.5 mg tablets. Recheck INR in 2 week(s). Patient seen in room # 3. ED. OP. = Counseled patient on the importance of maintaining a consistent intake of green vegetables from week to week. Suggested patient document intake of green vegetables on calendar, provided high-lighter. Patient verbalized understanding of dosing directions and information discussed. Dosing schedule given to patient. Progress note sent to referring office.     Sheryle Bergamo, RPh, CACP  Clinical Pharmacist Medication Management  2023  2:58 PM    For Pharmacy Admin Tracking Only    Intervention Detail: Adherence Monitorin and Dose Adjustment: 1, reason: Improve Adherence, Therapy De-escalation  Total # of Interventions Recommended: 2  Total # of Interventions Accepted: 2  Time Spent (min): 20

## 2023-08-02 ENCOUNTER — OFFICE VISIT (OUTPATIENT)
Dept: NEUROLOGY | Age: 38
End: 2023-08-02
Payer: MEDICAID

## 2023-08-02 ENCOUNTER — HOSPITAL ENCOUNTER (OUTPATIENT)
Dept: PHARMACY | Age: 38
Setting detail: THERAPIES SERIES
Discharge: HOME | End: 2023-08-02
Payer: MEDICAID

## 2023-08-02 VITALS
HEART RATE: 78 BPM | SYSTOLIC BLOOD PRESSURE: 111 MMHG | HEIGHT: 63 IN | OXYGEN SATURATION: 98 % | RESPIRATION RATE: 16 BRPM | BODY MASS INDEX: 28.35 KG/M2 | DIASTOLIC BLOOD PRESSURE: 78 MMHG | WEIGHT: 160 LBS

## 2023-08-02 DIAGNOSIS — R20.2 PARESTHESIA AND PAIN OF RIGHT EXTREMITY: ICD-10-CM

## 2023-08-02 DIAGNOSIS — I63.9 LEFT SIDED CEREBRAL HEMISPHERE CEREBROVASCULAR ACCIDENT (CVA) (HCC): Primary | ICD-10-CM

## 2023-08-02 DIAGNOSIS — M79.609 PARESTHESIA AND PAIN OF RIGHT EXTREMITY: ICD-10-CM

## 2023-08-02 DIAGNOSIS — I63.9 ACUTE CVA (CEREBROVASCULAR ACCIDENT) (HCC): Primary | ICD-10-CM

## 2023-08-02 DIAGNOSIS — I63.232 STROKE DUE TO OCCLUSION OF LEFT CAROTID ARTERY (HCC): ICD-10-CM

## 2023-08-02 LAB
INR BLD: 2.5
PROTIME: 30.4 SECONDS

## 2023-08-02 PROCEDURE — 99214 OFFICE O/P EST MOD 30 MIN: CPT

## 2023-08-02 PROCEDURE — 99211 OFF/OP EST MAY X REQ PHY/QHP: CPT

## 2023-08-02 PROCEDURE — 85610 PROTHROMBIN TIME: CPT

## 2023-08-02 RX ORDER — GABAPENTIN 100 MG/1
200 CAPSULE ORAL 3 TIMES DAILY
Qty: 270 CAPSULE | Refills: 1 | Status: SHIPPED
Start: 2023-08-02 | End: 2023-08-08

## 2023-08-02 ASSESSMENT — ENCOUNTER SYMPTOMS
BLOOD IN STOOL: 1
WHEEZING: 0
VOICE CHANGE: 0
PHOTOPHOBIA: 0
CHEST TIGHTNESS: 0
COLOR CHANGE: 0
SHORTNESS OF BREATH: 0
VOMITING: 0
NAUSEA: 0
TROUBLE SWALLOWING: 0

## 2023-08-02 NOTE — PROGRESS NOTES
Medication Management Service, Warfarin Management  AMAN CLARK [facility], [phone]  Visit Date: 8/2/2023   Subjective:   Robbie Heimlich is a 30 y.o. female who presents to clinic today for anticoagulation monitoring and adjustment. Patient seen in clinic for warfarin management due to  Indication:   CVA. INR goal: of 2.0-3.0. Duration of therapy: indefinite. Assessment and PLAN   PT/INR done in office per protocol. INR today is 2.5, therapeutic. Plan:   Continue current regimen of warfarin 11.25 mg on Tuesdays; 7.5 mg all other days of the week. Using warfarin 7.5 mg tablets. Recheck INR in 4 week(s). Patient seen in room # 2. Ed OP:  Patient is struggling to maintain a consistent amount of green vegetables in her diet. She reports having maybe one each week since last visit. She will continue with 0-1 servings each week. Patient verbalized understanding of dosing directions and information discussed. Dosing schedule given to patient. Progress note sent to referring office. Patient acknowledges working in consult agreement with pharmacist as referred by his/her physician. [facility] Ph., CACP, Clinical Pharmacist  Anticoagulation Services, [address] [place] [address] Coumadin Clinic  8/2/2023  2:15 PM      For Pharmacy Admin Tracking Only    Intervention Detail:   Total # of Interventions Recommended:    Total # of Interventions Accepted: 0  Time Spent (min): 20

## 2023-08-02 NOTE — PROGRESS NOTES
[address], [facility], [address], [address]  P: [phone]  F: [phone]    NEUROLOGY CLINIC NOTE     PATIENT NAME: Bernabe Welch  PATIENT MRN: [id number]  PRIMARY CARE PHYSICIAN: ANTHONY De Luna    HPI:      Bernabe Welch is a 30 y.o. with past medical history of left MCA stroke s/p tPA with resultant right lower extremity paresthesia, history of left carotid bulb thrombus on Coumadin, history of pulmonary embolism in 2019 previously on Eliquis presented to neurology clinic for follow-up of right lower extremity paresthesia. Patient has been experiencing right lower extremity paresthesia described as pins-and-needles, burning sensation, and sharp sensations since the stroke in 2021. She was started on gabapentin 100 mg 3 times daily on prior visit, and states symptoms improved approximately 50% since starting the medication. She also complains of increased paresthesia of right lower extremity upon laying flat. The paresthesia is present constantly to varying degrees of intensity. Patient denies any visual disturbances, dysphagia, dysarthria, aphasia, motor weakness, sense of imbalance. MRI brain without IV contrast done on hospital admission in 5/25/21 showed watershed infarction in the left hemisphere, however patient's symptoms improved after tPA administration. She was placed on warfarin upon discharge. CTA head and neck showed left ICA thrombus in May 2021, and showed interval improvement in July 2021 on repeat CTA. History obtained from Patient and EMR. PATIENT HISTORY:     Past Medical History:   Diagnosis Date    Acid reflux     Anemia 10/18/2019    Drug abuse, IV ([address])     claims that she has previous iv drug dependency claims hasn' had recent usage.     Headache     History of pulmonary embolus (PE)     Marijuana smoker 10/18/2019    Smoker 10/18/2019        Past Surgical History:   Procedure Laterality Date

## 2023-08-02 NOTE — PATIENT INSTRUCTIONS
Increased Gabapentin dose to 200 mg 3 times per day. Please get the CTA head and neck imaging done as soon as possible.

## 2023-08-08 ENCOUNTER — TELEPHONE (OUTPATIENT)
Dept: NEUROLOGY | Age: 38
End: 2023-08-08

## 2023-08-08 ENCOUNTER — PATIENT MESSAGE (OUTPATIENT)
Dept: NEUROLOGY | Age: 38
End: 2023-08-08

## 2023-08-08 DIAGNOSIS — R20.2 PARESTHESIA AND PAIN OF RIGHT EXTREMITY: Primary | ICD-10-CM

## 2023-08-08 DIAGNOSIS — M79.609 PARESTHESIA AND PAIN OF RIGHT EXTREMITY: Primary | ICD-10-CM

## 2023-08-08 DIAGNOSIS — I63.9 LEFT SIDED CEREBRAL HEMISPHERE CEREBROVASCULAR ACCIDENT (CVA) (HCC): ICD-10-CM

## 2023-08-08 RX ORDER — GABAPENTIN 100 MG/1
CAPSULE ORAL
Qty: 180 CAPSULE | Refills: 3 | Status: SHIPPED | OUTPATIENT
Start: 2023-08-08 | End: 2024-08-26

## 2023-08-08 NOTE — TELEPHONE ENCOUNTER
New Gabapentin refill has been placed by Dr. Ravinder Albarado in office on 08/08/23. Patient was called and informed.

## 2023-08-08 NOTE — TELEPHONE ENCOUNTER
Updated Gabapentin prescription has been reordered with the correct sig by Dr. Sayda Mcgowan. Patient has been called and informed of the reordered prescription and will reach out to her pharmacy for pick-up.

## 2023-08-08 NOTE — TELEPHONE ENCOUNTER
From: Anny Pastor  To: Jorge Lopez DO  Sent: 8/8/2023 10:58 AM EDT  Subject: Can get prescription filled    I've been going back and forth between the pharmacy and your office trying to get my prescription filled. They said you didn't put enough info so they could fill it, something about the quantity.

## 2023-08-08 NOTE — TELEPHONE ENCOUNTER
Patient unable to  gabapentin. Order clarification needed. Quantity does match with sig. Patient also has [place]. Provider not covered.

## 2023-08-09 ENCOUNTER — TELEPHONE (OUTPATIENT)
Dept: PHARMACY | Age: 38
End: 2023-08-09

## 2023-08-09 NOTE — TELEPHONE ENCOUNTER
Patient called today to report that gabapentin is being increased. No interaction with warfarin, no adjustments necessary. Asked that she have her med list updated / verified at next clinic visit.       Fara Silverio RPh, RAFAP  Clinical Pharmacist Medication Management  8/9/2023  3:26 PM

## 2023-08-31 ENCOUNTER — HOSPITAL ENCOUNTER (OUTPATIENT)
Dept: PHARMACY | Age: 38
Setting detail: THERAPIES SERIES
Discharge: HOME | End: 2023-08-31
Payer: MEDICAID

## 2023-08-31 DIAGNOSIS — I63.232 STROKE DUE TO OCCLUSION OF LEFT CAROTID ARTERY (HCC): ICD-10-CM

## 2023-08-31 DIAGNOSIS — I63.9 ACUTE CVA (CEREBROVASCULAR ACCIDENT) (HCC): Primary | ICD-10-CM

## 2023-08-31 LAB
INR BLD: 3.8
PROTIME: 45.7 SECONDS

## 2023-08-31 PROCEDURE — 99212 OFFICE O/P EST SF 10 MIN: CPT

## 2023-08-31 PROCEDURE — 85610 PROTHROMBIN TIME: CPT

## 2023-08-31 NOTE — PROGRESS NOTES
Medication Management Service, Warfarin Management  [address], [phone]  Visit Date: 2023   Subjective:   Pooja Romero is a 30 y.o. female who presents to clinic today for anticoagulation monitoring and adjustment. Patient seen in clinic for warfarin management due to  Indication:   CVA. INR goal: of 2.0-3.0. Duration of therapy: indefinite. Assessment and PLAN   PT/INR done in office per protocol. INR today is 3.8, supratherapeutic and likely secondary to continued reduction in intake of gv. Plan: Will reduce dose for today only to warfarin 3.75mg and reduce regimen by 6.7% to warfarin 7.5mg orally one time daily. Using warfarin 7.5 mg tablets. Recheck INR in 2 week(s). Patient seen in room # 3. Ed OP:  Encouraged patient to have a green vegetable periodically, do not completely eliminate for diet. Patient verbalized understanding of dosing directions and information discussed. Dosing schedule given to patient. Progress note sent to referring office. Patient acknowledges working in consult agreement with pharmacist as referred by his/her physician.       Mel Fernando, Mary, CACP  Clinical Pharmacist Medication Management  2023  2:45 PM    For Pharmacy Admin Tracking Only    Intervention Detail: Adherence Monitorin and Dose Adjustment: 1, reason: Improve Adherence, Therapy De-escalation  Total # of Interventions Recommended: 2  Total # of Interventions Accepted: 2  Time Spent (min): 20

## 2023-09-18 ENCOUNTER — HOSPITAL ENCOUNTER (OUTPATIENT)
Dept: PHARMACY | Age: 38
Setting detail: THERAPIES SERIES
Discharge: HOME | End: 2023-09-18
Payer: MEDICAID

## 2023-09-18 DIAGNOSIS — I63.232 STROKE DUE TO OCCLUSION OF LEFT CAROTID ARTERY (HCC): ICD-10-CM

## 2023-09-18 DIAGNOSIS — I63.9 ACUTE CVA (CEREBROVASCULAR ACCIDENT) (HCC): Primary | ICD-10-CM

## 2023-09-18 LAB
INR BLD: 2.7
INR BLD: 2.7 (ref 32.9–?)
PROTIME: 32.9 SECONDS

## 2023-09-18 PROCEDURE — 99211 OFF/OP EST MAY X REQ PHY/QHP: CPT

## 2023-09-18 PROCEDURE — 85610 PROTHROMBIN TIME: CPT

## 2023-09-18 NOTE — PROGRESS NOTES
Medication Management Service, Warfarin Management  AMAN CLARK [facility], [phone]  Visit Date: 2023   Subjective:   Pooja Romero is a 30 y.o. female who presents to clinic today for anticoagulation monitoring and adjustment. Patient seen in clinic for warfarin management due to  Indication:   CVA. INR goal: of 2.0-3.0. Duration of therapy: indefinite. Assessment and PLAN   PT/INR done in office per protocol. INR today is 2.7, therapeutic. Patient stated that she has been consistent with her green vegetable intake. Plan: Will continue current regimen of warfarin 7.5 mg daily. Using warfarin 7.5 mg tablets. Recheck INR in 4 week(s). Patient seen in room # 2. Patient verbalized understanding of dosing directions and information discussed. Dosing schedule given to patient. Progress note sent to referring office. Patient signed Antony Ludmila forms during visit.     Electronically signed by Alvaro Díaz on 23 at 3:39 PM EDT     For Pharmacy Admin Tracking Only    Intervention Detail: Adherence Monitorin  Total # of Interventions Recommended: 0  Total # of Interventions Accepted: 0  Time Spent (min): 20

## 2023-09-26 ENCOUNTER — TELEPHONE (OUTPATIENT)
Dept: NEUROLOGY | Age: 38
End: 2023-09-26

## 2023-09-26 ENCOUNTER — TELEPHONE (OUTPATIENT)
Dept: PHARMACY | Age: 38
End: 2023-09-26

## 2023-09-26 DIAGNOSIS — I63.00 CEREBROVASCULAR ACCIDENT (CVA) DUE TO THROMBOSIS OF PRECEREBRAL ARTERY (HCC): Primary | ICD-10-CM

## 2023-09-26 NOTE — TELEPHONE ENCOUNTER
Called and asked for a new updated referral for warfarin management. Patient is not seen by Dr Laura Nieves since he has left but is followed by Dr Aki Lamar. Message will be passed on to physician to send in a new updated referral.     Ashly Smith.  Ph., CACP, Clinical Pharmacist  Anticoagulation Services, [address] [place] [address] Coumadin [facility]  9/26/2023  2:22 PM    For Pharmacy Admin Tracking Only    Intervention Detail:   Total # of Interventions Recommended: 0  Total # of Interventions Accepted: 0  Time Spent (min): 10

## 2023-10-16 ENCOUNTER — HOSPITAL ENCOUNTER (OUTPATIENT)
Dept: PHARMACY | Age: 38
Setting detail: THERAPIES SERIES
Discharge: HOME | End: 2023-10-16
Payer: MEDICAID

## 2023-10-16 DIAGNOSIS — I63.232 STROKE DUE TO OCCLUSION OF LEFT CAROTID ARTERY (HCC): ICD-10-CM

## 2023-10-16 DIAGNOSIS — I63.9 ACUTE CVA (CEREBROVASCULAR ACCIDENT) (HCC): Primary | ICD-10-CM

## 2023-10-16 LAB
INR BLD: 2.1
PROTIME: 25.2 SECONDS

## 2023-10-16 PROCEDURE — 85610 PROTHROMBIN TIME: CPT

## 2023-10-16 PROCEDURE — 99211 OFF/OP EST MAY X REQ PHY/QHP: CPT

## 2023-10-16 NOTE — PROGRESS NOTES
Medication Management Service, Warfarin Management  AMAN CLARK [facility], [phone]  Visit Date: 10/16/2023   Subjective:   Main Kennedy is a 30 y.o. female who presents to clinic today for anticoagulation monitoring and adjustment. Patient seen in clinic for warfarin management due to  Indication:   CVA. INR goal: of 2.0-3.0. Duration of therapy: indefinite. Assessment and PLAN   PT/INR done in office per protocol. INR today is 2.1, therapeutic. Patient reported may have had a bit more than usual of green vegetables. INR on downward trend. Plan: Will continue current regimen of warfarin 7.5 mg daily. Using warfarin 7.5 mg tablets. Recheck INR in 4 week(s). Patient seen in room # 3. Advised patient to have slight reduction in green vegetables. Agreed to Flu vaccine at next visit. Patient verbalized understanding of dosing directions and information discussed. Dosing schedule given to patient. Progress note sent to referring office.      Shannon Hernandez RPh, CACP  Clinical Pharmacist Medication Management  10/16/2023  2:10 PM      For Pharmacy Admin Tracking Only    Intervention Detail: Adherence Monitorin  Total # of Interventions Recommended: 1  Total # of Interventions Accepted: 1  Time Spent (min): 15

## 2023-10-17 ENCOUNTER — HOSPITAL ENCOUNTER (OUTPATIENT)
Dept: CT IMAGING | Age: 38
Discharge: HOME | End: 2023-10-19
Payer: MEDICAID

## 2023-10-17 DIAGNOSIS — I63.9 LEFT SIDED CEREBRAL HEMISPHERE CEREBROVASCULAR ACCIDENT (CVA) (HCC): ICD-10-CM

## 2023-10-17 PROCEDURE — 6360000004 HC RX CONTRAST MEDICATION

## 2023-10-17 PROCEDURE — 70496 CT ANGIOGRAPHY HEAD: CPT

## 2023-10-17 RX ORDER — SODIUM CHLORIDE 0.9 % (FLUSH) 0.9 %
5-40 SYRINGE (ML) INJECTION EVERY 12 HOURS SCHEDULED
Status: DISCONTINUED | OUTPATIENT
Start: 2023-10-17 | End: 2023-10-17

## 2023-10-17 RX ORDER — SODIUM CHLORIDE 0.9 % (FLUSH) 0.9 %
5-40 SYRINGE (ML) INJECTION PRN
Status: DISCONTINUED | OUTPATIENT
Start: 2023-10-17 | End: 2023-10-17

## 2023-10-17 RX ORDER — LIDOCAINE HYDROCHLORIDE 10 MG/ML
5 INJECTION, SOLUTION INFILTRATION; PERINEURAL ONCE
Status: DISCONTINUED | OUTPATIENT
Start: 2023-10-17 | End: 2023-10-17

## 2023-10-17 RX ORDER — SODIUM CHLORIDE 9 MG/ML
25 INJECTION, SOLUTION INTRAVENOUS PRN
Status: DISCONTINUED | OUTPATIENT
Start: 2023-10-17 | End: 2023-10-17

## 2023-10-17 RX ADMIN — IOPAMIDOL 75 ML: 755 INJECTION, SOLUTION INTRAVENOUS at 12:12

## 2023-11-13 ENCOUNTER — HOSPITAL ENCOUNTER (OUTPATIENT)
Dept: PHARMACY | Age: 38
Setting detail: THERAPIES SERIES
Discharge: HOME | End: 2023-11-13
Payer: MEDICAID

## 2023-11-13 DIAGNOSIS — I63.232 STROKE DUE TO OCCLUSION OF LEFT CAROTID ARTERY (HCC): ICD-10-CM

## 2023-11-13 DIAGNOSIS — I63.9 ACUTE CVA (CEREBROVASCULAR ACCIDENT) (HCC): Primary | ICD-10-CM

## 2023-11-13 LAB
INR BLD: 2
PROTIME: 24 SECONDS

## 2023-11-13 PROCEDURE — 85610 PROTHROMBIN TIME: CPT

## 2023-11-13 PROCEDURE — G0008 ADMIN INFLUENZA VIRUS VAC: HCPCS | Performed by: INTERNAL MEDICINE

## 2023-11-13 PROCEDURE — 99212 OFFICE O/P EST SF 10 MIN: CPT

## 2023-11-13 PROCEDURE — 90686 IIV4 VACC NO PRSV 0.5 ML IM: CPT | Performed by: INTERNAL MEDICINE

## 2023-11-13 PROCEDURE — 6360000002 HC RX W HCPCS: Performed by: INTERNAL MEDICINE

## 2023-11-13 RX ORDER — WARFARIN SODIUM 7.5 MG/1
TABLET ORAL
Qty: 90 TABLET | Refills: 1 | Status: SHIPPED | OUTPATIENT
Start: 2023-11-13

## 2023-11-13 RX ADMIN — INFLUENZA VIRUS VACCINE 0.5 ML: 15; 15; 15; 15 SUSPENSION INTRAMUSCULAR at 14:22

## 2023-11-13 NOTE — PROGRESS NOTES
Medication Management Service, Warfarin Management  AMAN CLARK [facility], [phone]  Visit Date: 11/13/2023   Subjective:   Brendon Garcia is a 30 y.o. female who presents to clinic today for anticoagulation monitoring and adjustment. Patient seen in clinic for warfarin management due to  Indication:   CVA. INR goal: of 2.0-3.0. Duration of therapy: indefinite. Assessment and PLAN   PT/INR done in office per protocol. INR today is 2.0, therapeutic. Plan:   Continue current regimen of warfarin 7.5 mg daily. Using warfarin 7.5 mg tablets. Sent a refill in to [facility]. Recheck INR in 5 week(s). Patient seen in room # 2. Flu vaccine given in office today. Patient verbalized understanding of dosing directions and information discussed. Dosing schedule given to patient. Progress note sent to referring office. Patient acknowledges working in consult agreement with pharmacist as referred by his/her physician. [facility]  Ph., CACP, Clinical Pharmacist  Anticoagulation Services, [address] [place] [address] Coumadin Clinic  11/13/2023  1:59 PM      For Pharmacy Admin Tracking Only    Intervention Detail: Refill(s) Provided and Vaccine Recommended/Administered  Total # of Interventions Recommended: 2  Total # of Interventions Accepted: 2  Time Spent (min): 30

## 2023-12-21 ENCOUNTER — TELEPHONE (OUTPATIENT)
Dept: NEUROSURGERY | Age: 38
End: 2023-12-21

## 2023-12-21 NOTE — TELEPHONE ENCOUNTER
Patient calling to requesting refill for Gabapentin. Please review and e-scribe if applicable.     Last Visit Date: 08/02/2023    Next Visit Date:  Future Appointments   Date Time Provider Department Center   12/27/2023  1:20 PM [facility]   2/7/2024  3:00 PM Abilio Mckenzie DO Neuro [facility] -

## 2023-12-23 DIAGNOSIS — M79.609 PARESTHESIA AND PAIN OF RIGHT EXTREMITY: ICD-10-CM

## 2023-12-23 DIAGNOSIS — R20.2 PARESTHESIA AND PAIN OF RIGHT EXTREMITY: ICD-10-CM

## 2023-12-23 DIAGNOSIS — I63.9 LEFT SIDED CEREBRAL HEMISPHERE CEREBROVASCULAR ACCIDENT (CVA) (HCC): ICD-10-CM

## 2023-12-26 RX ORDER — GABAPENTIN 100 MG/1
CAPSULE ORAL
Qty: 180 CAPSULE | Refills: 3 | OUTPATIENT
Start: 2023-12-26

## 2023-12-27 ENCOUNTER — HOSPITAL ENCOUNTER (OUTPATIENT)
Dept: PHARMACY | Age: 38
Setting detail: THERAPIES SERIES
Discharge: HOME | End: 2023-12-27
Payer: MEDICAID

## 2023-12-27 ENCOUNTER — TELEPHONE (OUTPATIENT)
Dept: NEUROSURGERY | Age: 38
End: 2023-12-27

## 2023-12-27 DIAGNOSIS — I63.9 LEFT SIDED CEREBRAL HEMISPHERE CEREBROVASCULAR ACCIDENT (CVA) (HCC): ICD-10-CM

## 2023-12-27 DIAGNOSIS — I63.232 STROKE DUE TO OCCLUSION OF LEFT CAROTID ARTERY (HCC): ICD-10-CM

## 2023-12-27 DIAGNOSIS — R20.2 PARESTHESIA AND PAIN OF RIGHT EXTREMITY: ICD-10-CM

## 2023-12-27 DIAGNOSIS — I63.9 ACUTE CVA (CEREBROVASCULAR ACCIDENT) (HCC): Primary | ICD-10-CM

## 2023-12-27 DIAGNOSIS — M79.609 PARESTHESIA AND PAIN OF RIGHT EXTREMITY: ICD-10-CM

## 2023-12-27 LAB
INR BLD: 2.1
PROTIME: 24.6 SECONDS

## 2023-12-27 PROCEDURE — 85610 PROTHROMBIN TIME: CPT

## 2023-12-27 PROCEDURE — 99211 OFF/OP EST MAY X REQ PHY/QHP: CPT

## 2023-12-27 RX ORDER — GABAPENTIN 100 MG/1
CAPSULE ORAL
Qty: 180 CAPSULE | Refills: 3 | Status: SHIPPED | OUTPATIENT
Start: 2023-12-27 | End: 2024-02-07

## 2023-12-27 NOTE — TELEPHONE ENCOUNTER
Hi Dr Briana Cui,    The pharmacy can not process the patients prescription because of a credentialing issue with   Please Advise

## 2023-12-27 NOTE — PROGRESS NOTES
Monitorin  Total # of Interventions Recommended: 0  Total # of Interventions Accepted: 0  Time Spent (min): 15

## 2023-12-28 ENCOUNTER — TELEPHONE (OUTPATIENT)
Dept: PHARMACY | Age: 38
End: 2023-12-28

## 2023-12-28 NOTE — TELEPHONE ENCOUNTER
Called patient with an update on her provider getting registered with [place] [employer]. The office is in the process of getting all of their providers registered. I advised that she call the pharmacy for her warfarin refill early to see if it goes through. If she can not get it she will call our office for assistance. We would then see if a different provider had sent us a referral (one already registered with [place] medicaid) or call the office to have registered provider send in a one time 90 day prescription for the patient. [address]  Ph., CACP, Clinical Pharmacist  Anticoagulation Services, [address] [place] [address] Coumadin Clinic  12/28/2023  9:15 AM      For Pharmacy Admin Tracking Only    Intervention Detail:   Total # of Interventions Recommended: 0  Total # of Interventions Accepted: 0  Time Spent (min): 10

## 2024-02-07 ENCOUNTER — OFFICE VISIT (OUTPATIENT)
Dept: NEUROLOGY | Age: 39
End: 2024-02-07
Payer: MEDICAID

## 2024-02-07 ENCOUNTER — HOSPITAL ENCOUNTER (OUTPATIENT)
Dept: PHARMACY | Age: 39
Setting detail: THERAPIES SERIES
Discharge: HOME | End: 2024-02-07
Payer: MEDICAID

## 2024-02-07 VITALS
DIASTOLIC BLOOD PRESSURE: 70 MMHG | HEIGHT: 63 IN | WEIGHT: 159.3 LBS | HEART RATE: 99 BPM | SYSTOLIC BLOOD PRESSURE: 107 MMHG | BODY MASS INDEX: 28.23 KG/M2

## 2024-02-07 DIAGNOSIS — I63.9 LEFT SIDED CEREBRAL HEMISPHERE CEREBROVASCULAR ACCIDENT (CVA) (HCC): ICD-10-CM

## 2024-02-07 DIAGNOSIS — M79.609 PARESTHESIA AND PAIN OF RIGHT EXTREMITY: ICD-10-CM

## 2024-02-07 DIAGNOSIS — R20.2 PARESTHESIA AND PAIN OF RIGHT EXTREMITY: ICD-10-CM

## 2024-02-07 DIAGNOSIS — I63.232 STROKE DUE TO OCCLUSION OF LEFT CAROTID ARTERY (HCC): ICD-10-CM

## 2024-02-07 DIAGNOSIS — I63.9 ACUTE CVA (CEREBROVASCULAR ACCIDENT) (HCC): Primary | ICD-10-CM

## 2024-02-07 LAB
INR BLD: 2.3
PROTIME: 27.9 SECONDS

## 2024-02-07 PROCEDURE — 85610 PROTHROMBIN TIME: CPT

## 2024-02-07 PROCEDURE — 99214 OFFICE O/P EST MOD 30 MIN: CPT

## 2024-02-07 PROCEDURE — 99211 OFF/OP EST MAY X REQ PHY/QHP: CPT

## 2024-02-07 RX ORDER — GABAPENTIN 300 MG/1
300 CAPSULE ORAL 3 TIMES DAILY
Qty: 90 CAPSULE | Refills: 3 | Status: SHIPPED | OUTPATIENT
Start: 2024-02-07 | End: 2024-06-06

## 2024-02-07 RX ORDER — GABAPENTIN 300 MG/1
300 CAPSULE ORAL 3 TIMES DAILY
Qty: 90 CAPSULE | Refills: 3 | Status: SHIPPED | OUTPATIENT
Start: 2024-02-07 | End: 2024-02-07 | Stop reason: SDUPTHER

## 2024-02-07 RX ORDER — CAPSAICIN 0.025 %
CREAM (GRAM) TOPICAL
Qty: 5 EACH | Refills: 1 | Status: SHIPPED | OUTPATIENT
Start: 2024-02-07 | End: 2024-03-08

## 2024-02-07 RX ORDER — WARFARIN SODIUM 7.5 MG/1
TABLET ORAL
Qty: 90 TABLET | Refills: 1 | Status: SHIPPED | OUTPATIENT
Start: 2024-02-07

## 2024-02-07 ASSESSMENT — ENCOUNTER SYMPTOMS
VOMITING: 0
SHORTNESS OF BREATH: 0
TROUBLE SWALLOWING: 0
CHEST TIGHTNESS: 0
BLOOD IN STOOL: 1
PHOTOPHOBIA: 0
COLOR CHANGE: 0
WHEEZING: 0
NAUSEA: 0
VOICE CHANGE: 0

## 2024-02-07 NOTE — PROGRESS NOTES
[address] [facility] [address], [address]  [place] [address]  P: [phone]  F: [phone]    NEUROLOGY CLINIC NOTE     PATIENT NAME: Anahi Pastor  PATIENT MRN: [id number]  PRIMARY CARE PHYSICIAN: Jonna He PA    HPI:      Anahi Pastor is a 38 y.o. with past medical history of left MCA stroke s/p tPA with resultant right lower extremity paresthesia, history of left carotid bulb thrombus on Coumadin, history of pulmonary embolism in 2019 previously on Eliquis presented to neurology clinic for follow-up of right lower extremity paresthesia.  Patient has been experiencing right lower extremity paresthesia described as pins-and-needles, burning sensation, and sharp sensations since the stroke in 2021.  She was started on gabapentin 100 mg 3 times daily on prior visit, and states symptoms improved approximately 50% since starting the medication.  She also complains of increased paresthesia of right lower extremity upon laying flat. The paresthesia is present constantly to varying degrees of intensity. Patient denies any visual disturbances, dysphagia, dysarthria, aphasia, motor weakness, sense of imbalance.     MRI brain without IV contrast done on hospital admission in 5/25/21 showed numerous small scattered acute infarcts  , however patient's symptoms improved after tPA administration. She was placed on warfarin upon discharge. CTA head and neck showed left ICA thrombus in May 2021, and showed interval improvement in July 2021 on repeat CTA.    Today, on 2/7/24, patient states she still has right leg paresthesia rated at 8/10, occurring daily. Patient denies any other new neurologic symptoms. Patient states the gabapentin has somewhat worked in decreasing her paresthesia, and denies adverse effects such as mental fog, dizziness, etc.      History obtained from Patient and EMR.         PATIENT HISTORY:     Past Medical History:   Diagnosis Date

## 2024-02-07 NOTE — PROGRESS NOTES
Medication Management Service, Warfarin Management  [facility], [phone]  Visit Date: 2024   Subjective:   Anahi Pastor is a 38 y.o. female who presents to clinic today for anticoagulation monitoring and adjustment.  Patient seen in clinic for warfarin management due to  Indication:   CVA.   INR goal: of 2.0-3.0.  Duration of therapy: indefinite.    Assessment and PLAN   PT/INR done in office per protocol.   INR today is 2.3, therapeutic.     Plan:   Continue current regimen of warfarin 7.5 mg daily.  Using warfarin 7.5 mg tablets.     Recheck INR in 6 week(s).   Patient seen in room # 2.    Patient was being treated for bronchitis, She reports stopping a course of prednisone and azithromycin last week. Reminded patient to call with medication changes    Patient sees Dr. Mckenzie this afternoon. Recommended patient requests a prescription from a provider registered with [place] Medicaid to avoid having to pay for the prescription.     Patient verbalized understanding of dosing directions and information discussed. Dosing schedule given to patient. Progress note sent to referring office.  Patient acknowledges working in consult agreement with pharmacist as referred by his/her physician.      Simin Chavarria, Pharm.D., PGY2 Ambulatory Care Resident  24    =======================================================  For Pharmacy Admin Tracking Only    Intervention Detail: Adherence Monitorin  Total # of Interventions Recommended: 0  Total # of Interventions Accepted: 0  Time Spent (min): 20

## 2024-03-26 ENCOUNTER — HOSPITAL ENCOUNTER (OUTPATIENT)
Dept: PHARMACY | Age: 39
Setting detail: THERAPIES SERIES
Discharge: HOME | End: 2024-03-26
Payer: MEDICAID

## 2024-03-26 DIAGNOSIS — I63.232 STROKE DUE TO OCCLUSION OF LEFT CAROTID ARTERY (HCC): ICD-10-CM

## 2024-03-26 DIAGNOSIS — I63.9 ACUTE CVA (CEREBROVASCULAR ACCIDENT) (HCC): Primary | ICD-10-CM

## 2024-03-26 LAB
INR BLD: 1.8
PROTIME: 21.5 SECONDS

## 2024-03-26 PROCEDURE — 99212 OFFICE O/P EST SF 10 MIN: CPT

## 2024-03-26 PROCEDURE — 85610 PROTHROMBIN TIME: CPT

## 2024-03-26 NOTE — PROGRESS NOTES
Medication Management Service, Warfarin Management  [facility], [phone]  Visit Date: 3/26/2024   Subjective:   Anahi Pastor is a 38 y.o. female who presents to clinic today for anticoagulation monitoring and adjustment.  Patient seen in clinic for warfarin management due to  Indication:   CVA.   INR goal: of 2.0-3.0.  Duration of therapy: indefinite.    Assessment and PLAN   PT/INR done in office per protocol.   INR today is 1.8, subtherapeutic. Likely due to missed dose last week.        Plan:  Will increase warfarin to 11.25 mg today only then continue current regimen of warfarin 7.5 mg every day.  Using warfarin 7.5 mg tablets.    Recheck INR in 4 week(s).   Patient seen in room # 3.    Patient verbalized understanding of dosing directions and information discussed. Dosing schedule given to patient. Progress note sent to referring office.     Electronically signed by Parminder Trujillo on 3/26/24 at 9:17 AM EDT    For Pharmacy Admin Tracking Only    Intervention Detail: Dose Adjustment: 1, reason: Therapy Optimization  Total # of Interventions Recommended: 1  Total # of Interventions Accepted: 1  Time Spent (min): 20

## 2024-04-23 ENCOUNTER — HOSPITAL ENCOUNTER (OUTPATIENT)
Dept: PHARMACY | Age: 39
Setting detail: THERAPIES SERIES
Discharge: HOME | End: 2024-04-23
Payer: MEDICAID

## 2024-04-23 DIAGNOSIS — I63.9 ACUTE CVA (CEREBROVASCULAR ACCIDENT) (HCC): Primary | ICD-10-CM

## 2024-04-23 DIAGNOSIS — I63.232 STROKE DUE TO OCCLUSION OF LEFT CAROTID ARTERY (HCC): ICD-10-CM

## 2024-04-23 LAB
INR BLD: 1.5
PROTIME: 18.6 SECONDS

## 2024-04-23 PROCEDURE — 99212 OFFICE O/P EST SF 10 MIN: CPT

## 2024-04-23 PROCEDURE — 85610 PROTHROMBIN TIME: CPT

## 2024-04-23 NOTE — PROGRESS NOTES
Medication Management Service, Warfarin Management  [facility], [phone]  Visit Date: 2024   Subjective:   nAahi Pastor is a 38 y.o. female who presents to clinic today for anticoagulation monitoring and adjustment.  Patient seen in clinic for warfarin management due to  Indication:   CVA.   INR goal: of 2.0-3.0.  Duration of therapy: indefinite.    Assessment and PLAN   PT/INR done in office per protocol.   INR today is 1.5, subtherapeutic likely cause not determined.         Plan:  H/O stroke secondary to occlusion, will increase regimen by 7.1% to warfarin 11.25mg on every Tuesday only, 7.5mg all other days of the week.  Using warfarin 7.5 mg tablets.    Recheck INR in 2 week(s).   Patient seen in room # 3.    Patient reports new swelling in fingers, and feet and ankles.  Denies any diagnosis of HTN and or CHF but has recently started a new job that requires her to be on her feet for extended periods of time.  She has also  had her gabapentin being gradually increased, now up to 300mg TID, potential for peripheral edema.  She further reports still with significant neuropathic pain.  I encouraged her to schedule appt with her PCP to discuss.      Encouraged patient to quite smoking.     Patient verbalized understanding of dosing directions and information discussed. Dosing schedule given to patient. Progress note sent to referring office.     Shereen Merida, [facility], CACP  Clinical Pharmacist Medication Management  2024  10:10 AM      For Pharmacy Admin Tracking Only    Intervention Detail: Adherence Monitorin and Dose Adjustment: 1, reason: Improve Adherence, Therapy Optimization  Total # of Interventions Recommended: 2  Total # of Interventions Accepted: 2  Time Spent (min): 20

## 2024-05-07 ENCOUNTER — HOSPITAL ENCOUNTER (OUTPATIENT)
Dept: PHARMACY | Age: 39
Setting detail: THERAPIES SERIES
Discharge: HOME | End: 2024-05-07
Payer: MEDICAID

## 2024-05-07 DIAGNOSIS — I63.232 STROKE DUE TO OCCLUSION OF LEFT CAROTID ARTERY (HCC): ICD-10-CM

## 2024-05-07 DIAGNOSIS — I63.9 ACUTE CVA (CEREBROVASCULAR ACCIDENT) (HCC): Primary | ICD-10-CM

## 2024-05-07 LAB
INR BLD: 2.6
PROTIME: 31.1 SECONDS

## 2024-05-07 PROCEDURE — 85610 PROTHROMBIN TIME: CPT

## 2024-05-07 PROCEDURE — 99211 OFF/OP EST MAY X REQ PHY/QHP: CPT

## 2024-05-07 NOTE — PROGRESS NOTES
Medication Management Service, Warfarin Management  [facility], [phone]  Visit Date: 5/7/2024   Subjective:   Anahi Pastor is a 38 y.o. female who presents to clinic today for anticoagulation monitoring and adjustment.  Patient seen in clinic for warfarin management due to  Indication:   CVA.   INR goal: of 2.0-3.0.  Duration of therapy: indefinite.    Assessment and PLAN   PT/INR done in office per protocol.   INR today is 2.6, therapeutic.        Plan:   Continue current regimen of warfarin 11.25 mg Tuesdays; 7.5 mg daily all other days of the week.  Using warfarin 7.5 mg tablets.     Recheck INR in 2 week(s).   Patient seen in room # 1.      Ed OP;  Patient reports zero servings of green vegetables weekly.   Patient verbalized understanding of dosing directions and information discussed. Dosing schedule given to patient. Progress note sent to referring office.  Patient acknowledges working in consult agreement with pharmacist as referred by his/her physician.      Mary MASSEY. Ph., CACP, Clinical Pharmacist  Anticoagulation Services, [facility] Coumadin Clinic  5/7/2024  9:54 AM      For Pharmacy Admin Tracking Only    Intervention Detail:   Total # of Interventions Recommended: 0  Total # of Interventions Accepted: 0  Time Spent (min): 20

## 2024-05-14 ENCOUNTER — TELEPHONE (OUTPATIENT)
Dept: PHARMACY | Age: 39
End: 2024-05-14

## 2024-05-14 RX ORDER — WARFARIN SODIUM 7.5 MG/1
TABLET ORAL
Qty: 135 TABLET | Refills: 0 | Status: SHIPPED | OUTPATIENT
Start: 2024-05-14

## 2024-05-22 ENCOUNTER — HOSPITAL ENCOUNTER (OUTPATIENT)
Dept: PHARMACY | Age: 39
Setting detail: THERAPIES SERIES
Discharge: HOME | End: 2024-05-22
Payer: MEDICAID

## 2024-05-22 DIAGNOSIS — I63.232 STROKE DUE TO OCCLUSION OF LEFT CAROTID ARTERY (HCC): ICD-10-CM

## 2024-05-22 DIAGNOSIS — I63.9 ACUTE CVA (CEREBROVASCULAR ACCIDENT) (HCC): Primary | ICD-10-CM

## 2024-05-22 LAB
INR BLD: 1.6
PROTIME: 19.3 SECONDS

## 2024-05-22 PROCEDURE — 85610 PROTHROMBIN TIME: CPT

## 2024-05-22 PROCEDURE — 99212 OFFICE O/P EST SF 10 MIN: CPT

## 2024-05-22 NOTE — PROGRESS NOTES
Medication Management Service, Warfarin Management  [facility], [phone]  Visit Date: 2024   Subjective:   Anahi Pastor is a 38 y.o. female who presents to clinic today for anticoagulation monitoring and adjustment.  Patient seen in clinic for warfarin management due to  Indication:   CVA.   INR goal: of 2.0-3.0.  Duration of therapy: indefinite.    Assessment and PLAN   PT/INR done in office per protocol.   INR today is 1.6, subtherapeutic. Patient is not certain that she did not miss a dose.  She usually takes at 3pm daily but has stated working and admits that she may have missed a dose.     Plan:  Will boost dose today to 11.25mg then continue current regimen of warfarin 11.25 mg ; 7.5 mg daily all other days of the week.  Using warfarin 7.5 mg tablets.     Recheck INR in 2 week(s).   Patient seen in room # 3.      Ed OP  Will change administration time to bedtime each day, starting today.  Will implement use of pillbox (provided).  Patient reports still not eating green vegetables.     Patient verbalized understanding of dosing directions and information discussed. Dosing schedule given to patient. Progress note sent to referring office.  Patient acknowledges working in consult agreement with pharmacist as referred by his/her physician.      Shereen Merida, [facility], CACP  Clinical Pharmacist Medication Management  2024  9:54 AM    For Pharmacy Admin Tracking Only    Intervention Detail: Adherence Monitorin and Dose Adjustment: 1, reason: Improve Adherence, Therapy Optimization  Total # of Interventions Recommended: 2  Total # of Interventions Accepted: 2  Time Spent (min): 20

## 2024-06-06 ENCOUNTER — HOSPITAL ENCOUNTER (OUTPATIENT)
Dept: PHARMACY | Age: 39
Setting detail: THERAPIES SERIES
Discharge: HOME | End: 2024-06-06

## 2024-06-06 DIAGNOSIS — I63.232 STROKE DUE TO OCCLUSION OF LEFT CAROTID ARTERY (HCC): ICD-10-CM

## 2024-06-06 DIAGNOSIS — I63.9 ACUTE CVA (CEREBROVASCULAR ACCIDENT) (HCC): Primary | ICD-10-CM

## 2024-06-06 LAB
INR BLD: 1.9
PROTIME: 22.3 SECONDS

## 2024-06-06 PROCEDURE — 99211 OFF/OP EST MAY X REQ PHY/QHP: CPT

## 2024-06-06 PROCEDURE — 85610 PROTHROMBIN TIME: CPT

## 2024-06-06 NOTE — PROGRESS NOTES
Medication Management Service, Warfarin Management  [facility], [phone]  Visit Date: 6/6/2024   Subjective:   Anahi Pastor is a 38 y.o. female who presents to clinic today for anticoagulation monitoring and adjustment.  Patient seen in clinic for warfarin management due to  Indication:   CVA.   INR goal: of 2.0-3.0.  Duration of therapy: indefinite.    Assessment and PLAN   PT/INR done in office per protocol.   INR today is 1.9, just below goal.   INR remains low despite a boost dose given at last visit.      Plan:   Increase current regimen  by 6.7 % weekly to warfarin 11.25 mg Tuesdays, Fridays; 7.5 mg daily all other days of the week.  Using warfarin 7.5 mg tablets.     Recheck INR in 2 week(s).   Patient seen in room # 1.    ED OP;  Patient reports zero servings of green vegetables weekly.     Patient verbalized understanding of dosing directions and information discussed. Dosing schedule given to patient. Progress note sent to referring office.  Patient acknowledges working in consult agreement with pharmacist as referred by his/her physician.      Mary MASSEY. Ph., CACP, Clinical Pharmacist  Anticoagulation Services, [facility] Coumadin Clinic  6/6/2024  10:15 AM    For Pharmacy Admin Tracking Only    Intervention Detail: Dose Adjustment: 1, reason: Therapy Optimization  Total # of Interventions Recommended: 1  Total # of Interventions Accepted: 1  Time Spent (min): 20

## 2024-06-19 ENCOUNTER — HOSPITAL ENCOUNTER (OUTPATIENT)
Dept: PHARMACY | Age: 39
Setting detail: THERAPIES SERIES
Discharge: HOME | End: 2024-06-19

## 2024-06-19 DIAGNOSIS — I63.232 STROKE DUE TO OCCLUSION OF LEFT CAROTID ARTERY (HCC): ICD-10-CM

## 2024-06-19 DIAGNOSIS — I63.9 ACUTE CVA (CEREBROVASCULAR ACCIDENT) (HCC): Primary | ICD-10-CM

## 2024-06-19 LAB
INR BLD: 2.5
PROTIME: 30.3 SECONDS

## 2024-06-19 PROCEDURE — 85610 PROTHROMBIN TIME: CPT

## 2024-06-19 PROCEDURE — 99211 OFF/OP EST MAY X REQ PHY/QHP: CPT

## 2024-06-19 NOTE — PROGRESS NOTES
Medication Management Service, Warfarin Management  [facility], [phone]  Visit Date: 2024   Subjective:   Anahi Pastor is a 38 y.o. female who presents to clinic today for anticoagulation monitoring and adjustment.  Patient seen in clinic for warfarin management due to  Indication:   CVA.   INR goal: of 2.0-3.0.  Duration of therapy: indefinite.    Assessment and PLAN   PT/INR done in office per protocol.   INR today is 2.5, therapeutic.      Plan:   Will continue current plan of warfarin 11.25 mg ,  only; 7.5 mg daily all other days of the week.  Using warfarin 7.5 mg tablets.   Recheck INR in 4 week(s).   Patient seen in room # 3.     Patient stays away from green vegetables.   Patient verbalized understanding of dosing directions and information discussed. Dosing schedule given to patient. Progress note sent to referring office.    Shereen Merida RP, CACP  Clinical Pharmacist Medication Management  2024  8:40 AM    For Pharmacy Admin Tracking Only    Intervention Detail: Adherence Monitorin  Total # of Interventions Recommended: 1  Total # of Interventions Accepted: 1  Time Spent (min): 15

## 2024-07-18 ENCOUNTER — TELEPHONE (OUTPATIENT)
Dept: PHARMACY | Age: 39
End: 2024-07-18

## 2024-07-29 ENCOUNTER — TELEPHONE (OUTPATIENT)
Dept: PHARMACY | Age: 39
End: 2024-07-29

## 2024-07-29 NOTE — TELEPHONE ENCOUNTER
Patient was a No Call No Show for ACC appointment today.  Called to reschedule, no answer, no vm.    Shereen Merida RPh, CACP  Clinical Pharmacist Medication Management  7/29/2024  10:26 AM

## 2024-08-02 ENCOUNTER — ANTI-COAG VISIT (OUTPATIENT)
Age: 39
End: 2024-08-02
Payer: MEDICAID

## 2024-08-02 DIAGNOSIS — I63.9 ACUTE CVA (CEREBROVASCULAR ACCIDENT) (HCC): Primary | ICD-10-CM

## 2024-08-02 DIAGNOSIS — I63.232 STROKE DUE TO OCCLUSION OF LEFT CAROTID ARTERY (HCC): ICD-10-CM

## 2024-08-02 LAB
INR BLD: 1.9
PROTIME: 22.7 SECONDS

## 2024-08-02 PROCEDURE — 99212 OFFICE O/P EST SF 10 MIN: CPT

## 2024-08-02 PROCEDURE — 85610 PROTHROMBIN TIME: CPT

## 2024-08-02 NOTE — PROGRESS NOTES
Medication Management Service, Warfarin Management  [facility], [phone]  Visit Date: 8/2/2024   Subjective:   Anahi Pastor is a 38 y.o. female who presents to clinic today for anticoagulation monitoring and adjustment.  Patient seen in clinic for warfarin management due to  Indication:   CVA.   INR goal: of 2.0-3.0.  Duration of therapy: indefinite.    Assessment and PLAN   PT/INR done in office per protocol.   INR today is 1.9, just below goal.  Patient accidentally took a partial dose of warfarin 7.5 mg last Thursday.  Could not identify any other reason for the drop in INR today.    Plan:  Continue current regimen of warfarin 11.25 mg Tuesdays, Fridays; 7.5 mg daily all other days of the week.  Using warfarin 7.5 mg tablets.     Recheck INR in 2 week(s).   Patient seen in room # 1.    ED OP;  Patient reports zero servings of green vegetables weekly.     Patient verbalized understanding of dosing directions and information discussed. Dosing schedule given to patient. Progress note sent to referring office.  Patient acknowledges working in consult agreement with pharmacist as referred by his/her physician.      Mary MASSEY. Ph., CACP, Clinical Pharmacist  Anticoagulation Services, [facility] Coumadin Clinic  8/2/2024  9:47 AM    For Pharmacy Admin Tracking Only    Intervention Detail: Dose Adjustment: 1, reason: Therapy Optimization  Total # of Interventions Recommended: 1  Total # of Interventions Accepted: 1  Time Spent (min): 20

## 2024-08-15 ENCOUNTER — ANTI-COAG VISIT (OUTPATIENT)
Age: 39
End: 2024-08-15
Payer: MEDICAID

## 2024-08-15 DIAGNOSIS — I63.9 ACUTE CVA (CEREBROVASCULAR ACCIDENT) (HCC): Primary | ICD-10-CM

## 2024-08-15 DIAGNOSIS — I63.232 STROKE DUE TO OCCLUSION OF LEFT CAROTID ARTERY (HCC): ICD-10-CM

## 2024-08-15 LAB
INTERNATIONAL NORMALIZATION RATIO, POC: 2
PROTHROMBIN TIME, POC: 23.9

## 2024-08-15 PROCEDURE — 99212 OFFICE O/P EST SF 10 MIN: CPT

## 2024-08-15 PROCEDURE — 85610 PROTHROMBIN TIME: CPT

## 2024-08-15 RX ORDER — WARFARIN SODIUM 7.5 MG/1
TABLET ORAL
Qty: 110 TABLET | Refills: 1 | Status: SHIPPED | OUTPATIENT
Start: 2024-08-15

## 2024-08-15 NOTE — PROGRESS NOTES
Medication Management Service, Warfarin Management  [facility], [phone]  Visit Date: 8/15/2024   Subjective:   Anahi Pastor is a 38 y.o. female who presents to clinic today for anticoagulation monitoring and adjustment.  Patient seen in clinic for warfarin management due to  Indication:   CVA.   INR goal: of 2.0-3.0.  Duration of therapy: indefinite.    Assessment and PLAN   PT/INR done in office per protocol.   INR today is 2.0, therapeutic.    Plan:  Continue current regimen of warfarin 11.25 mg Tuesdays, Fridays; 7.5 mg daily all other days of the week.  Using warfarin 7.5 mg tablets. Refills sent to [facility] today.    Recheck INR in 4 week(s).   Patient seen in room # 1.    ED OP;  Patient reports zero servings of green vegetables weekly.     Patient verbalized understanding of dosing directions and information discussed. Dosing schedule given to patient. Progress note sent to referring office.  Patient acknowledges working in consult agreement with pharmacist as referred by his/her physician.      Mary MASSEY. Ph., CACP, Clinical Pharmacist  Anticoagulation Services, [facility] Coumadin Clinic  8/15/2024  9:49 AM      For Pharmacy Admin Tracking Only    Intervention Detail: Refill(s) Provided  Total # of Interventions Recommended: 1  Total # of Interventions Accepted: 1  Time Spent (min): 15

## 2024-09-12 ENCOUNTER — ANTI-COAG VISIT (OUTPATIENT)
Age: 39
End: 2024-09-12
Payer: MEDICAID

## 2024-09-12 DIAGNOSIS — I63.9 ACUTE CVA (CEREBROVASCULAR ACCIDENT) (HCC): Primary | ICD-10-CM

## 2024-09-12 DIAGNOSIS — I63.232 STROKE DUE TO OCCLUSION OF LEFT CAROTID ARTERY (HCC): ICD-10-CM

## 2024-09-12 LAB
INTERNATIONAL NORMALIZATION RATIO, POC: 2.4
PROTHROMBIN TIME, POC: 28.4

## 2024-09-12 PROCEDURE — 85610 PROTHROMBIN TIME: CPT

## 2024-09-12 PROCEDURE — 99211 OFF/OP EST MAY X REQ PHY/QHP: CPT

## 2024-09-29 ENCOUNTER — APPOINTMENT (OUTPATIENT)
Dept: CT IMAGING | Age: 39
End: 2024-09-29
Payer: MEDICAID

## 2024-09-29 ENCOUNTER — HOSPITAL ENCOUNTER (EMERGENCY)
Age: 39
Discharge: HOME | End: 2024-09-29
Attending: EMERGENCY MEDICINE
Payer: MEDICAID

## 2024-09-29 ENCOUNTER — APPOINTMENT (OUTPATIENT)
Dept: GENERAL RADIOLOGY | Age: 39
End: 2024-09-29
Payer: MEDICAID

## 2024-09-29 VITALS
HEART RATE: 73 BPM | RESPIRATION RATE: 15 BRPM | OXYGEN SATURATION: 98 % | DIASTOLIC BLOOD PRESSURE: 71 MMHG | TEMPERATURE: 98.1 F | SYSTOLIC BLOOD PRESSURE: 105 MMHG

## 2024-09-29 DIAGNOSIS — S39.012A BACK STRAIN, INITIAL ENCOUNTER: Primary | ICD-10-CM

## 2024-09-29 LAB
ANION GAP SERPL CALCULATED.3IONS-SCNC: 12 MMOL/L (ref 9–16)
BASOPHILS # BLD: 0.03 K/UL (ref 0–0.2)
BASOPHILS NFR BLD: 0 % (ref 0–2)
BUN SERPL-MCNC: 11 MG/DL (ref 6–20)
CALCIUM SERPL-MCNC: 9.1 MG/DL (ref 8.6–10.4)
CHLORIDE SERPL-SCNC: 103 MMOL/L (ref 98–107)
CO2 SERPL-SCNC: 20 MMOL/L (ref 20–31)
CREAT SERPL-MCNC: 0.8 MG/DL (ref 0.5–0.9)
EOSINOPHIL # BLD: 0.11 K/UL (ref 0–0.44)
EOSINOPHILS RELATIVE PERCENT: 2 % (ref 1–4)
ERYTHROCYTE [DISTWIDTH] IN BLOOD BY AUTOMATED COUNT: 19.6 % (ref 11.8–14.4)
GFR, ESTIMATED: >90 ML/MIN/1.73M2
GLUCOSE SERPL-MCNC: 89 MG/DL (ref 74–99)
HCG SERPL QL: NEGATIVE
HCT VFR BLD AUTO: 36.2 % (ref 36.3–47.1)
HGB BLD-MCNC: 11.2 G/DL (ref 11.9–15.1)
IMM GRANULOCYTES # BLD AUTO: <0.03 K/UL (ref 0–0.3)
IMM GRANULOCYTES NFR BLD: 0 %
INR PPP: 2.1
LYMPHOCYTES NFR BLD: 1.62 K/UL (ref 1.1–3.7)
LYMPHOCYTES RELATIVE PERCENT: 24 % (ref 24–43)
MCH RBC QN AUTO: 23.3 PG (ref 25.2–33.5)
MCHC RBC AUTO-ENTMCNC: 30.9 G/DL (ref 28.4–34.8)
MCV RBC AUTO: 75.3 FL (ref 82.6–102.9)
MONOCYTES NFR BLD: 0.32 K/UL (ref 0.1–1.2)
MONOCYTES NFR BLD: 5 % (ref 3–12)
NEUTROPHILS NFR BLD: 69 % (ref 36–65)
NEUTS SEG NFR BLD: 4.69 K/UL (ref 1.5–8.1)
NRBC BLD-RTO: 0 PER 100 WBC
PLATELET # BLD AUTO: 179 K/UL (ref 138–453)
PMV BLD AUTO: 9.8 FL (ref 8.1–13.5)
POTASSIUM SERPL-SCNC: 3.3 MMOL/L (ref 3.7–5.3)
PROTHROMBIN TIME: 23.1 SEC (ref 11.7–14.9)
RBC # BLD AUTO: 4.81 M/UL (ref 3.95–5.11)
RBC # BLD: ABNORMAL 10*6/UL
SODIUM SERPL-SCNC: 135 MMOL/L (ref 136–145)
TROPONIN I SERPL HS-MCNC: <6 NG/L (ref 0–14)
WBC OTHER # BLD: 6.8 K/UL (ref 3.5–11.3)

## 2024-09-29 PROCEDURE — 85610 PROTHROMBIN TIME: CPT

## 2024-09-29 PROCEDURE — 71045 X-RAY EXAM CHEST 1 VIEW: CPT

## 2024-09-29 PROCEDURE — 96374 THER/PROPH/DIAG INJ IV PUSH: CPT | Performed by: EMERGENCY MEDICINE

## 2024-09-29 PROCEDURE — 84703 CHORIONIC GONADOTROPIN ASSAY: CPT

## 2024-09-29 PROCEDURE — 6360000004 HC RX CONTRAST MEDICATION

## 2024-09-29 PROCEDURE — 6370000000 HC RX 637 (ALT 250 FOR IP)

## 2024-09-29 PROCEDURE — 71260 CT THORAX DX C+: CPT

## 2024-09-29 PROCEDURE — 93005 ELECTROCARDIOGRAM TRACING: CPT

## 2024-09-29 PROCEDURE — 6360000002 HC RX W HCPCS

## 2024-09-29 PROCEDURE — 85025 COMPLETE CBC W/AUTO DIFF WBC: CPT

## 2024-09-29 PROCEDURE — 80048 BASIC METABOLIC PNL TOTAL CA: CPT

## 2024-09-29 PROCEDURE — 99285 EMERGENCY DEPT VISIT HI MDM: CPT | Performed by: EMERGENCY MEDICINE

## 2024-09-29 PROCEDURE — 84484 ASSAY OF TROPONIN QUANT: CPT

## 2024-09-29 RX ORDER — IBUPROFEN 600 MG/1
600 TABLET, FILM COATED ORAL EVERY 6 HOURS PRN
Qty: 30 TABLET | Refills: 0 | Status: SHIPPED | OUTPATIENT
Start: 2024-09-29

## 2024-09-29 RX ORDER — IOPAMIDOL 755 MG/ML
75 INJECTION, SOLUTION INTRAVASCULAR
Status: COMPLETED | OUTPATIENT
Start: 2024-09-29 | End: 2024-09-29

## 2024-09-29 RX ORDER — KETOROLAC TROMETHAMINE 15 MG/ML
15 INJECTION, SOLUTION INTRAMUSCULAR; INTRAVENOUS ONCE
Status: COMPLETED | OUTPATIENT
Start: 2024-09-29 | End: 2024-09-29

## 2024-09-29 RX ADMIN — IOPAMIDOL 75 ML: 755 INJECTION, SOLUTION INTRAVENOUS at 18:28

## 2024-09-29 RX ADMIN — POTASSIUM BICARBONATE 40 MEQ: 782 TABLET, EFFERVESCENT ORAL at 19:35

## 2024-09-29 RX ADMIN — KETOROLAC TROMETHAMINE 15 MG: 15 INJECTION, SOLUTION INTRAMUSCULAR; INTRAVENOUS at 16:38

## 2024-09-29 ASSESSMENT — PAIN - FUNCTIONAL ASSESSMENT: PAIN_FUNCTIONAL_ASSESSMENT: 0-10

## 2024-09-29 ASSESSMENT — PAIN SCALES - GENERAL: PAINLEVEL_OUTOF10: 8

## 2024-09-29 NOTE — DISCHARGE INSTRUCTIONS
You were seen today in the emergency department for your back pain.  We have evaluated you and determined that you likely have a muscle strain.  We now feel you are safe for discharge home.    Please return to the emergency department immediately if develop any new or worsening concerns including chest pain, shortness of breath, abdominal pain, nausea, vomiting, diarrhea, weakness, loss consciousness, fever, chills, or any other concerns.    Please call your PCP and schedule appointment within the next 24 to 48 hours for follow-up.

## 2024-09-29 NOTE — ED PROVIDER NOTES
[facility] ED  Emergency Department Encounter  Emergency Medicine Resident     Pt Name:Anahi Pastor  MRN: [id number]  Birthdate 1985  Date of evaluation: 24  PCP:  Jonna He PA  Note Started: 4:25 PM EDT      CHIEF COMPLAINT       Chief Complaint   Patient presents with    Shortness of Breath    Back Pain       HISTORY OF PRESENT ILLNESS  (Location/Symptom, Timing/Onset, Context/Setting, Quality, Duration, Modifying Factors, Severity.)      Anahi Pastor is a 38 y.o. female who presents with shortness of breath and back pain.  Patient states that beginning today she had bilateral back pain and shortness of breath, pain is increasing with deep inspiration.  Patient states she has history of pulmonary embolism and multiple strokes from a \"clotting disorder\".  She states she takes her Coumadin as prescribed.  Denies any chest pain, abdominal pain, nausea, vomiting, diarrhea.    PAST MEDICAL / SURGICAL / SOCIAL / FAMILY HISTORY      has a past medical history of Acid reflux, Anemia, Drug abuse, IV (HCC), Headache, History of pulmonary embolus (PE), Marijuana smoker, and Smoker.     has a past surgical history that includes  section; ovarian cyst removal; Appendectomy; Upper gastrointestinal endoscopy (N/A, 10/21/2019); sigmoidoscopy (N/A, 10/22/2019); and Colonoscopy (N/A, 10/23/2019).      Social History     Socioeconomic History    Marital status:      Spouse name: Hubert    Number of children: 4    Years of education: Not on file    Highest education level: Not on file   Occupational History    Not on file   Tobacco Use    Smoking status: Every Day     Types: Cigarettes    Smokeless tobacco: Never   Vaping Use    Vaping status: Never Used   Substance and Sexual Activity    Alcohol use: No    Drug use: Not Currently     Types: Marijuana (Weed), Opiates      Comment: heroin    Sexual activity: Yes   Other Topics Concern    Not on file   Social History 
portions of this note were completed with a voice recognition program. Efforts were made to edit the dictations but occasionally words are mis-transcribed.)            Eulalia Mckinley MD  09/29/24 [id number]

## 2024-09-29 NOTE — ED TRIAGE NOTES
Pt to ED for SOB and mid back pain. Pt states it has been going on for about a week or so but now it is worse. Pt states she has a hx of PE's so she wanted to be checked out. Pt does take blood thinners and took them last night.

## 2024-10-02 LAB
EKG ATRIAL RATE: 81 BPM
EKG P AXIS: 34 DEGREES
EKG P-R INTERVAL: 102 MS
EKG Q-T INTERVAL: 394 MS
EKG QRS DURATION: 84 MS
EKG QTC CALCULATION (BAZETT): 457 MS
EKG R AXIS: 73 DEGREES
EKG T AXIS: 63 DEGREES
EKG VENTRICULAR RATE: 81 BPM

## 2024-10-02 PROCEDURE — 93010 ELECTROCARDIOGRAM REPORT: CPT | Performed by: INTERNAL MEDICINE

## 2024-10-24 ENCOUNTER — TELEPHONE (OUTPATIENT)
Age: 39
End: 2024-10-24

## 2024-10-24 NOTE — TELEPHONE ENCOUNTER
Patient was a No Call No Show for ACC appointment today.  Called to reschedule.  VM not set up, unable to lteave a message.

## 2024-11-12 ENCOUNTER — TELEPHONE (OUTPATIENT)
Age: 39
End: 2024-11-12

## 2024-11-12 NOTE — TELEPHONE ENCOUNTER
Patient overdue for check of INR, called to schedule, unable to leave .  Multiple attempts made to reach patient, will close referral at this time.     Shereen Merida, [facility], CACP  Clinical Pharmacist Medication Management  11/12/2024  9:27 AM
